# Patient Record
Sex: FEMALE | Race: BLACK OR AFRICAN AMERICAN | Employment: OTHER | URBAN - METROPOLITAN AREA
[De-identification: names, ages, dates, MRNs, and addresses within clinical notes are randomized per-mention and may not be internally consistent; named-entity substitution may affect disease eponyms.]

---

## 2017-04-18 ENCOUNTER — HOSPITAL ENCOUNTER (INPATIENT)
Age: 82
LOS: 6 days | Discharge: HOME | DRG: 871 | End: 2017-04-24
Attending: EMERGENCY MEDICINE | Admitting: INTERNAL MEDICINE
Payer: MEDICARE

## 2017-04-18 ENCOUNTER — APPOINTMENT (OUTPATIENT)
Dept: CT IMAGING | Age: 82
DRG: 871 | End: 2017-04-18
Attending: EMERGENCY MEDICINE
Payer: MEDICARE

## 2017-04-18 ENCOUNTER — APPOINTMENT (OUTPATIENT)
Dept: GENERAL RADIOLOGY | Age: 82
DRG: 871 | End: 2017-04-18
Attending: EMERGENCY MEDICINE
Payer: MEDICARE

## 2017-04-18 DIAGNOSIS — J18.9 PNEUMONIA OF LEFT LUNG DUE TO INFECTIOUS ORGANISM, UNSPECIFIED PART OF LUNG: Primary | ICD-10-CM

## 2017-04-18 PROBLEM — A41.9 SEPSIS, UNSPECIFIED: Status: ACTIVE | Noted: 2017-04-18

## 2017-04-18 PROBLEM — E86.0 DEHYDRATION: Status: ACTIVE | Noted: 2017-04-18

## 2017-04-18 LAB
ALBUMIN SERPL BCP-MCNC: 3.5 G/DL (ref 3.5–5)
ALBUMIN/GLOB SERPL: 0.7 {RATIO} (ref 1.1–2.2)
ALP SERPL-CCNC: 101 U/L (ref 45–117)
ALT SERPL-CCNC: 18 U/L (ref 12–78)
ANION GAP BLD CALC-SCNC: 8 MMOL/L (ref 5–15)
APPEARANCE UR: ABNORMAL
AST SERPL W P-5'-P-CCNC: 20 U/L (ref 15–37)
ATRIAL RATE: 85 BPM
BACTERIA URNS QL MICRO: ABNORMAL /HPF
BASOPHILS # BLD AUTO: 0 K/UL (ref 0–0.1)
BASOPHILS # BLD: 0 % (ref 0–1)
BILIRUB SERPL-MCNC: 0.6 MG/DL (ref 0.2–1)
BILIRUB UR QL: NEGATIVE
BNP SERPL-MCNC: 3944 PG/ML (ref 0–450)
BUN SERPL-MCNC: 19 MG/DL (ref 6–20)
BUN/CREAT SERPL: 13 (ref 12–20)
CALCIUM SERPL-MCNC: 9.7 MG/DL (ref 8.5–10.1)
CALCULATED R AXIS, ECG10: 23 DEGREES
CALCULATED T AXIS, ECG11: 73 DEGREES
CHLORIDE SERPL-SCNC: 107 MMOL/L (ref 97–108)
CK MB CFR SERPL CALC: 1 % (ref 0–2.5)
CK MB SERPL-MCNC: 1.3 NG/ML (ref 5–25)
CK SERPL-CCNC: 125 U/L (ref 26–192)
CO2 SERPL-SCNC: 29 MMOL/L (ref 21–32)
COLOR UR: ABNORMAL
CREAT SERPL-MCNC: 1.43 MG/DL (ref 0.55–1.02)
DIAGNOSIS, 93000: NORMAL
DIFFERENTIAL METHOD BLD: ABNORMAL
EOSINOPHIL # BLD: 0 K/UL (ref 0–0.4)
EOSINOPHIL NFR BLD: 0 % (ref 0–7)
EPITH CASTS URNS QL MICRO: ABNORMAL /LPF
ERYTHROCYTE [DISTWIDTH] IN BLOOD BY AUTOMATED COUNT: 17.2 % (ref 11.5–14.5)
GLOBULIN SER CALC-MCNC: 4.7 G/DL (ref 2–4)
GLUCOSE SERPL-MCNC: 98 MG/DL (ref 65–100)
GLUCOSE UR STRIP.AUTO-MCNC: NEGATIVE MG/DL
HCT VFR BLD AUTO: 38.9 % (ref 35–47)
HGB BLD-MCNC: 12.6 G/DL (ref 11.5–16)
HGB UR QL STRIP: ABNORMAL
HYALINE CASTS URNS QL MICRO: ABNORMAL /LPF (ref 0–5)
INR PPP: 2.2 (ref 0.9–1.1)
KETONES UR QL STRIP.AUTO: NEGATIVE MG/DL
LACTATE SERPL-SCNC: 1.7 MMOL/L (ref 0.4–2)
LEUKOCYTE ESTERASE UR QL STRIP.AUTO: ABNORMAL
LYMPHOCYTES # BLD AUTO: 7 % (ref 12–49)
LYMPHOCYTES # BLD: 0.5 K/UL (ref 0.8–3.5)
MAGNESIUM SERPL-MCNC: 2.2 MG/DL (ref 1.6–2.4)
MCH RBC QN AUTO: 27.2 PG (ref 26–34)
MCHC RBC AUTO-ENTMCNC: 32.4 G/DL (ref 30–36.5)
MCV RBC AUTO: 83.8 FL (ref 80–99)
MONOCYTES # BLD: 0.7 K/UL (ref 0–1)
MONOCYTES NFR BLD AUTO: 10 % (ref 5–13)
NEUTS SEG # BLD: 5.8 K/UL (ref 1.8–8)
NEUTS SEG NFR BLD AUTO: 83 % (ref 32–75)
NITRITE UR QL STRIP.AUTO: NEGATIVE
PH UR STRIP: 6 [PH] (ref 5–8)
PLATELET # BLD AUTO: 198 K/UL (ref 150–400)
POTASSIUM SERPL-SCNC: 4.2 MMOL/L (ref 3.5–5.1)
PROT SERPL-MCNC: 8.2 G/DL (ref 6.4–8.2)
PROT UR STRIP-MCNC: 100 MG/DL
PROTHROMBIN TIME: 23.2 SEC (ref 9–11.1)
Q-T INTERVAL, ECG07: 356 MS
QRS DURATION, ECG06: 82 MS
QTC CALCULATION (BEZET), ECG08: 435 MS
RBC # BLD AUTO: 4.64 M/UL (ref 3.8–5.2)
RBC #/AREA URNS HPF: ABNORMAL /HPF (ref 0–5)
RBC MORPH BLD: ABNORMAL
SODIUM SERPL-SCNC: 144 MMOL/L (ref 136–145)
SP GR UR REFRACTOMETRY: 1.02 (ref 1–1.03)
TROPONIN I SERPL-MCNC: <0.04 NG/ML
UROBILINOGEN UR QL STRIP.AUTO: 1 EU/DL (ref 0.2–1)
VENTRICULAR RATE, ECG03: 90 BPM
WBC # BLD AUTO: 7 K/UL (ref 3.6–11)
WBC URNS QL MICRO: ABNORMAL /HPF (ref 0–4)

## 2017-04-18 PROCEDURE — 81001 URINALYSIS AUTO W/SCOPE: CPT | Performed by: EMERGENCY MEDICINE

## 2017-04-18 PROCEDURE — 96365 THER/PROPH/DIAG IV INF INIT: CPT

## 2017-04-18 PROCEDURE — 93005 ELECTROCARDIOGRAM TRACING: CPT

## 2017-04-18 PROCEDURE — 36415 COLL VENOUS BLD VENIPUNCTURE: CPT | Performed by: EMERGENCY MEDICINE

## 2017-04-18 PROCEDURE — 85025 COMPLETE CBC W/AUTO DIFF WBC: CPT | Performed by: EMERGENCY MEDICINE

## 2017-04-18 PROCEDURE — 74011000258 HC RX REV CODE- 258: Performed by: EMERGENCY MEDICINE

## 2017-04-18 PROCEDURE — 83605 ASSAY OF LACTIC ACID: CPT | Performed by: EMERGENCY MEDICINE

## 2017-04-18 PROCEDURE — 87040 BLOOD CULTURE FOR BACTERIA: CPT | Performed by: EMERGENCY MEDICINE

## 2017-04-18 PROCEDURE — 71250 CT THORAX DX C-: CPT

## 2017-04-18 PROCEDURE — 74011250637 HC RX REV CODE- 250/637: Performed by: INTERNAL MEDICINE

## 2017-04-18 PROCEDURE — 80053 COMPREHEN METABOLIC PANEL: CPT | Performed by: EMERGENCY MEDICINE

## 2017-04-18 PROCEDURE — 82550 ASSAY OF CK (CPK): CPT | Performed by: EMERGENCY MEDICINE

## 2017-04-18 PROCEDURE — 71010 XR CHEST PORT: CPT

## 2017-04-18 PROCEDURE — 77030029684 HC NEB SM VOL KT MONA -A

## 2017-04-18 PROCEDURE — 83735 ASSAY OF MAGNESIUM: CPT | Performed by: EMERGENCY MEDICINE

## 2017-04-18 PROCEDURE — 74011000250 HC RX REV CODE- 250: Performed by: INTERNAL MEDICINE

## 2017-04-18 PROCEDURE — 83880 ASSAY OF NATRIURETIC PEPTIDE: CPT | Performed by: EMERGENCY MEDICINE

## 2017-04-18 PROCEDURE — 74011250636 HC RX REV CODE- 250/636: Performed by: EMERGENCY MEDICINE

## 2017-04-18 PROCEDURE — 84484 ASSAY OF TROPONIN QUANT: CPT | Performed by: EMERGENCY MEDICINE

## 2017-04-18 PROCEDURE — 65660000000 HC RM CCU STEPDOWN

## 2017-04-18 PROCEDURE — 94640 AIRWAY INHALATION TREATMENT: CPT

## 2017-04-18 PROCEDURE — 85610 PROTHROMBIN TIME: CPT | Performed by: EMERGENCY MEDICINE

## 2017-04-18 PROCEDURE — 74011250636 HC RX REV CODE- 250/636: Performed by: INTERNAL MEDICINE

## 2017-04-18 PROCEDURE — 74011000250 HC RX REV CODE- 250: Performed by: EMERGENCY MEDICINE

## 2017-04-18 PROCEDURE — 99285 EMERGENCY DEPT VISIT HI MDM: CPT

## 2017-04-18 RX ORDER — AMLODIPINE BESYLATE 5 MG/1
2.5 TABLET ORAL DAILY
Status: DISCONTINUED | OUTPATIENT
Start: 2017-04-19 | End: 2017-04-24 | Stop reason: HOSPADM

## 2017-04-18 RX ORDER — ACETAMINOPHEN 325 MG/1
650 TABLET ORAL
Status: DISCONTINUED | OUTPATIENT
Start: 2017-04-18 | End: 2017-04-24 | Stop reason: HOSPADM

## 2017-04-18 RX ORDER — WARFARIN 2 MG/1
4 TABLET ORAL
Status: DISCONTINUED | OUTPATIENT
Start: 2017-04-18 | End: 2017-04-18

## 2017-04-18 RX ORDER — WARFARIN 2 MG/1
4 TABLET ORAL ONCE
Status: COMPLETED | OUTPATIENT
Start: 2017-04-18 | End: 2017-04-18

## 2017-04-18 RX ORDER — GUAIFENESIN 600 MG/1
1200 TABLET, EXTENDED RELEASE ORAL EVERY 12 HOURS
Status: DISCONTINUED | OUTPATIENT
Start: 2017-04-18 | End: 2017-04-20

## 2017-04-18 RX ORDER — SODIUM CHLORIDE 9 MG/ML
75 INJECTION, SOLUTION INTRAVENOUS CONTINUOUS
Status: DISCONTINUED | OUTPATIENT
Start: 2017-04-18 | End: 2017-04-19

## 2017-04-18 RX ORDER — GUAIFENESIN/DEXTROMETHORPHAN 100-10MG/5
10 SYRUP ORAL
Status: DISCONTINUED | OUTPATIENT
Start: 2017-04-18 | End: 2017-04-24 | Stop reason: HOSPADM

## 2017-04-18 RX ORDER — SODIUM CHLORIDE 0.9 % (FLUSH) 0.9 %
5-10 SYRINGE (ML) INJECTION EVERY 8 HOURS
Status: DISCONTINUED | OUTPATIENT
Start: 2017-04-18 | End: 2017-04-24 | Stop reason: HOSPADM

## 2017-04-18 RX ORDER — SODIUM CHLORIDE 0.9 % (FLUSH) 0.9 %
5-10 SYRINGE (ML) INJECTION AS NEEDED
Status: DISCONTINUED | OUTPATIENT
Start: 2017-04-18 | End: 2017-04-24 | Stop reason: HOSPADM

## 2017-04-18 RX ORDER — ALLOPURINOL 100 MG/1
200 TABLET ORAL 2 TIMES DAILY
Status: DISCONTINUED | OUTPATIENT
Start: 2017-04-18 | End: 2017-04-19

## 2017-04-18 RX ORDER — METOPROLOL SUCCINATE 50 MG/1
50 TABLET, EXTENDED RELEASE ORAL DAILY
Status: DISCONTINUED | OUTPATIENT
Start: 2017-04-19 | End: 2017-04-24 | Stop reason: HOSPADM

## 2017-04-18 RX ORDER — GUAIFENESIN 100 MG/5ML
200 SOLUTION ORAL
COMMUNITY
End: 2017-04-24

## 2017-04-18 RX ORDER — IPRATROPIUM BROMIDE AND ALBUTEROL SULFATE 2.5; .5 MG/3ML; MG/3ML
3 SOLUTION RESPIRATORY (INHALATION)
Status: DISCONTINUED | OUTPATIENT
Start: 2017-04-18 | End: 2017-04-20

## 2017-04-18 RX ORDER — IPRATROPIUM BROMIDE AND ALBUTEROL SULFATE 2.5; .5 MG/3ML; MG/3ML
3 SOLUTION RESPIRATORY (INHALATION)
Status: COMPLETED | OUTPATIENT
Start: 2017-04-18 | End: 2017-04-18

## 2017-04-18 RX ADMIN — GUAIFENESIN AND DEXTROMETHORPHAN 10 ML: 100; 10 SYRUP ORAL at 20:09

## 2017-04-18 RX ADMIN — ALLOPURINOL 200 MG: 100 TABLET ORAL at 18:08

## 2017-04-18 RX ADMIN — IPRATROPIUM BROMIDE AND ALBUTEROL SULFATE 3 ML: .5; 3 SOLUTION RESPIRATORY (INHALATION) at 13:33

## 2017-04-18 RX ADMIN — Medication 10 ML: at 18:07

## 2017-04-18 RX ADMIN — GUAIFENESIN 1200 MG: 600 TABLET, EXTENDED RELEASE ORAL at 20:56

## 2017-04-18 RX ADMIN — WARFARIN SODIUM 4 MG: 2 TABLET ORAL at 18:07

## 2017-04-18 RX ADMIN — CEFTRIAXONE SODIUM 2 G: 2 INJECTION, POWDER, FOR SOLUTION INTRAMUSCULAR; INTRAVENOUS at 14:47

## 2017-04-18 RX ADMIN — IPRATROPIUM BROMIDE AND ALBUTEROL SULFATE 3 ML: .5; 3 SOLUTION RESPIRATORY (INHALATION) at 20:30

## 2017-04-18 RX ADMIN — SODIUM CHLORIDE 75 ML/HR: 900 INJECTION, SOLUTION INTRAVENOUS at 18:03

## 2017-04-18 NOTE — ED NOTES
Pt states she has had a productive cough  With white sputum for 3 days, with low grade temp accompanied by shortness of breath and weakness. Pt has hx of Lung CA and HTN.c/o cough has progressively gotten worse tightness in chest and sore throat. Currently in A-fib.   MD Morris at bedside  to assess Pt

## 2017-04-18 NOTE — PROGRESS NOTES
MD Wilfredo Agrawal at bedside to discuss case with Pt and family members. Pt remains with dyspnea exertion. SPO2 92-97% on room air. Pt states some relief from previous nebs. Denies pain.

## 2017-04-18 NOTE — PROGRESS NOTES
TRANSFER - OUT REPORT:    Verbal report given to Laverne(name) on Jeannie Iraheta  being transferred to [facility](unit) for routine progression of care       Report consisted of patients Situation, Background, Assessment and   Recommendations(SBAR). Information from the following report(s) SBAR, Kardex, Intake/Output, MAR and Recent Results was reviewed with the receiving nurse. Lines:   Peripheral IV 04/18/17 Left Antecubital (Active)   Site Assessment Clean, dry, & intact 4/18/2017  5:44 PM   Phlebitis Assessment 0 4/18/2017  5:44 PM   Infiltration Assessment 0 4/18/2017  5:44 PM   Dressing Status Clean, dry, & intact 4/18/2017  5:44 PM   Dressing Type Tape;Transparent 4/18/2017  5:44 PM   Hub Color/Line Status Pink 4/18/2017  5:44 PM        Opportunity for questions and clarification was provided.       Patient transported with:  transport

## 2017-04-18 NOTE — ED PROVIDER NOTES
HPI Comments: Pelon Bridges is a 80 y.o. female with hx of A-fib who presents ambulatory to ED [facility] ED with CC of cough producing clear sputum, wheezing, and mild SOB x ~3 days. Pt reports her symptoms are exacerbated by exertion, but denies orthopnea. Per daughter, pt has hx of lung cancer and PNA but denies recent history of admission to hospital. Per daughter, pt is currently taking warfarin and lasix; she denies pt has had recent changes to her medication regimen. Pt reports baseline leg swelling, which she states is unchanged. She denies hx of COPD, CHF, or asthma. Pt's daughter reports pt had her flu shot this year. She states pt has an inhaler that she uses as needed. Pt denies fever, chills, sore throat, CP, ABD pain, and N/V/D. She denies alcohol and tobacco use. PCP: Goldie Lucio    There are no other complaints, changes, or physical findings at this time. The history is provided by the patient and a relative. Past Medical History:   Diagnosis Date    Cancer [facility])     right breast CA, lung, colon    Gastrointestinal disorder     GERD    Hypertension     Other ill-defined conditions     gout       Past Surgical History:   Procedure Laterality Date    BREAST SURGERY PROCEDURE UNLISTED  1995    Right mastectomy    HX APPENDECTOMY      HX HEENT      right glass eye    [place] COLONOSCOPY W/BIOPSY SINGLE/MULTIPLE  3/29/2013         [place] COLSC FLX W/RMVL OF TUMOR POLYP LESION SNARE TQ  3/29/2013              Family History:   Problem Relation Age of Onset    Colon Cancer Other     Hypertension Other        Social History     Social History    Marital status:      Spouse name: N/A    Number of children: N/A    Years of education: N/A     Occupational History    Not on file.      Social History Main Topics    Smoking status: Former Smoker     Quit date: 1967    Smokeless tobacco: Never Used    Alcohol use No    Drug use: No    Sexual activity: Not Currently     Other Topics Concern  Not on file     Social History Narrative         ALLERGIES: Penicillins    Review of Systems   Constitutional: Negative for chills, fatigue and fever. HENT: Negative for congestion, rhinorrhea and sore throat. Eyes: Negative for pain, discharge and visual disturbance. Respiratory: Positive for cough, shortness of breath and wheezing. Negative for chest tightness. -orthopnea   Cardiovascular: Negative for chest pain, palpitations and leg swelling (increased from baseline). Gastrointestinal: Negative for abdominal pain, constipation, diarrhea, nausea and vomiting. Genitourinary: Negative for dysuria, frequency and hematuria. Musculoskeletal: Negative for arthralgias, back pain and myalgias. Skin: Negative for rash. Neurological: Negative for dizziness, weakness, light-headedness and headaches. Psychiatric/Behavioral: Negative. Patient Vitals for the past 12 hrs:   Temp Pulse Resp BP SpO2   04/18/17 1336 - 91 21 (!) 155/113 92 %   04/18/17 1304 - - - - 92 %   04/18/17 1242 99.3 °F (37.4 °C) 86 19 (!) 161/101 94 %   04/18/17 1225 99.1 °F (37.3 °C) 95 24 151/85 93 %            Physical Exam   Constitutional: She is oriented to person, place, and time. She appears well-developed and well-nourished. No distress. HENT:   Head: Normocephalic and atraumatic. Eyes: EOM are normal. Right eye exhibits no discharge. 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 No scleral icterus. Neck: Normal range of motion. Neck supple. No tracheal deviation present. Cardiovascular: Normal rate, normal heart sounds and intact distal pulses. An irregular rhythm present. Exam reveals no gallop and no friction rub. No murmur heard. Pulmonary/Chest: Effort normal. No respiratory distress. She has wheezes (scattered). Crackles in inferior lung bases   Abdominal: Soft. She exhibits no distension. There is no tenderness. Musculoskeletal: Normal range of motion.    2+ pitting edema B/L lower extremities Lymphadenopathy:     She has no cervical adenopathy. Neurological: She is alert and oriented to person, place, and time. No focal neuro deficits   Skin: Skin is warm and dry. No rash noted. Psychiatric: She has a normal mood and affect. Nursing note and vitals reviewed. MDM  Number of Diagnoses or Management Options  Pneumonia of left lung due to infectious organism, unspecified part of lung:   Diagnosis management comments:     Differential includes pneumonia, heart failure, pulmonary edema, pleural effusion, mass, ACS, PE  - CBC, CMP, Rogerio, BNP,coags  - CXR    Disposition: CXR indicates perihilar pneumonia with concern for possible underlying mass. No recent hospitalizations. Will treat with ceftriaxone and azithromycin. Will defer 30 cc/kg bolus since patient does not meet severe sepsis criteria (elevated creatinine is chronic, INR of 2.2 is due to coumadin) and has a history of heart failure. Amount and/or Complexity of Data Reviewed  Clinical lab tests: reviewed and ordered  Tests in the radiology section of CPT®: ordered and reviewed  Tests in the medicine section of CPT®: ordered and reviewed  Obtain history from someone other than the patient: yes (Family)  Review and summarize past medical records: yes  Discuss the patient with other providers: yes (Hospitalist)  Independent visualization of images, tracings, or specimens: yes      ED Course       Procedures    EKG interpretation: 12:37  Rhythm: atrial fib; and irregular. Rate (approx.): 90; Axis: normal; QRS interval: normal ; ST/T wave: normal.  Written by Demarcus Lyon ED Scribe, as dictated by Jose Walker MD.     CONSULT NOTE:   2:26 PM  Jose Walker MD spoke with Jim Wilder MD,  Specialty: Hospitalist  Discussed pt's hx, disposition, and available diagnostic and imaging results. Reviewed care plans. Consultant will evaluate pt for admission.     LABORATORY TESTS:  Recent Results (from the past 12 hour(s)) EKG, 12 LEAD, INITIAL    Collection Time: 04/18/17 12:37 PM   Result Value Ref Range    Ventricular Rate 90 BPM    Atrial Rate 85 BPM    QRS Duration 82 ms    Q-T Interval 356 ms    QTC Calculation (Bezet) 435 ms    Calculated R Axis 23 degrees    Calculated T Axis 73 degrees    Diagnosis       Atrial fibrillation  When compared with ECG of 17-AUG-2016 11:44,  No significant change was found     CK W/ CKMB & INDEX    Collection Time: 04/18/17 12:51 PM   Result Value Ref Range     26 - 192 U/L    CK - MB 1.3 <3.6 NG/ML    CK-MB Index 1.0 0 - 2.5     MAGNESIUM    Collection Time: 04/18/17 12:51 PM   Result Value Ref Range    Magnesium 2.2 1.6 - 2.4 mg/dL   PRO-BNP    Collection Time: 04/18/17 12:51 PM   Result Value Ref Range    NT pro-BNP 3944 (H) 0 - 450 PG/ML   PROTHROMBIN TIME + INR    Collection Time: 04/18/17 12:51 PM   Result Value Ref Range    INR 2.2 (H) 0.9 - 1.1      Prothrombin time 23.2 (H) 9.0 - 11.1 sec   TROPONIN I    Collection Time: 04/18/17 12:51 PM   Result Value Ref Range    Troponin-I, Qt. <0.04 <0.05 ng/mL   CBC WITH AUTOMATED DIFF    Collection Time: 04/18/17 12:57 PM   Result Value Ref Range    WBC 7.0 3.6 - 11.0 K/uL    RBC 4.64 3.80 - 5.20 M/uL    HGB 12.6 11.5 - 16.0 g/dL    HCT 38.9 35.0 - 47.0 %    MCV 83.8 80.0 - 99.0 FL    MCH 27.2 26.0 - 34.0 PG    MCHC 32.4 30.0 - 36.5 g/dL    RDW 17.2 (H) 11.5 - 14.5 %    PLATELET 658 939 - 235 K/uL    NEUTROPHILS 83 (H) 32 - 75 %    LYMPHOCYTES 7 (L) 12 - 49 %    MONOCYTES 10 5 - 13 %    EOSINOPHILS 0 0 - 7 %    BASOPHILS 0 0 - 1 %    ABS. NEUTROPHILS 5.8 1.8 - 8.0 K/UL    ABS. LYMPHOCYTES 0.5 (L) 0.8 - 3.5 K/UL    ABS. MONOCYTES 0.7 0.0 - 1.0 K/UL    ABS. EOSINOPHILS 0.0 0.0 - 0.4 K/UL    ABS.  BASOPHILS 0.0 0.0 - 0.1 K/UL    RBC COMMENTS NORMOCYTIC, NORMOCHROMIC      DF SMEAR SCANNED     METABOLIC PANEL, COMPREHENSIVE    Collection Time: 04/18/17 12:57 PM   Result Value Ref Range    Sodium 144 136 - 145 mmol/L    Potassium 4.2 3.5 - 5.1 mmol/L    Chloride 107 97 - 108 mmol/L    CO2 29 21 - 32 mmol/L    Anion gap 8 5 - 15 mmol/L    Glucose 98 65 - 100 mg/dL    BUN 19 6 - 20 MG/DL    Creatinine 1.43 (H) 0.55 - 1.02 MG/DL    BUN/Creatinine ratio 13 12 - 20      GFR est AA 41 (L) >60 ml/min/1.73m2    GFR est non-AA 34 (L) >60 ml/min/1.73m2    Calcium 9.7 8.5 - 10.1 MG/DL    Bilirubin, total 0.6 0.2 - 1.0 MG/DL    ALT (SGPT) 18 12 - 78 U/L    AST (SGOT) 20 15 - 37 U/L    Alk. phosphatase 101 45 - 117 U/L    Protein, total 8.2 6.4 - 8.2 g/dL    Albumin 3.5 3.5 - 5.0 g/dL    Globulin 4.7 (H) 2.0 - 4.0 g/dL    A-G Ratio 0.7 (L) 1.1 - 2.2     LACTIC ACID, PLASMA    Collection Time: 04/18/17 12:57 PM   Result Value Ref Range    Lactic acid 1.7 0.4 - 2.0 MMOL/L       IMAGING RESULTS:  CXR Results  (Last 48 hours)               04/18/17 1332  XR CHEST PORT Final result    Impression:  IMPRESSION:        Increased linear density at the left lung treatment site. Superimposed pneumonia   versus recurrent cancer. No evidence for atrial fibrillation. Narrative:  EXAM:  XR CHEST PORT       INDICATION:  dyspnea, productive cough. Symptoms increasing over the past 5   days. History of lung cancer. COMPARISON:  8/17/2016       FINDINGS: A portable AP radiograph of the chest was obtained at 1323 hours. The   patient is on a cardiac monitor. There is increased somewhat linear   consolidation in the left superhilar lung. The left hemidiaphragm remains   elevated. The cardiomediastinal silhouette is unchanged. MEDICATIONS GIVEN:  Medications   albuterol-ipratropium (DUO-NEB) 2.5 MG-0.5 MG/3 ML (3 mL Nebulization Given 4/18/17 1333)       IMPRESSION:  1. Pneumonia of left lung due to infectious organism, unspecified part of lung        PLAN:  1.  Admit to Hospitalist    ADMIT NOTE:  2:26 PM  The patient is being admitted to the hospital by Monica Erickson MD.  The results of their tests and reasons for their admission have been discussed with the patient and/or available family. They convey agreement and understanding for the need to be admitted and for their admission diagnosis. This note is prepared by Arty Brittle, acting as Scribe for Arianna Amezquita MD.    Arianna Amezquita MD: The scribe's documentation has been prepared under my direction and personally reviewed by me in its entirety. I confirm that the note above accurately reflects all work, treatment, procedures, and medical decision making performed by me.

## 2017-04-18 NOTE — IP AVS SNAPSHOT
[address] [facility] 
[phone] Patient: Nithya Zamora MRN: [id number] :1918 You are allergic to the following Allergen Reactions Penicillins Hives Recent Documentation Height Weight Breastfeeding? BMI OB Status Smoking Status 1.575 m 98.2 kg No 39.6 kg/m2 Postmenopausal Former Smoker Emergency Contacts Name Discharge Info Relation Home Work Mobile Mara Ahumada  Child [2] [phone] Breanna Hooper  Daughter [21] [phone] About your hospitalization You were admitted on:  2017 You last received care in the:  [facility] 2 CARDIOPULMONARY CARE You were discharged on:  2017 Unit phone number:  [phone] Why you were hospitalized Your primary diagnosis was:  Not on File Your diagnoses also included:  Pneumonia, Sepsis (Hcc), A-Fib (Hcc), Squamous Cell Lung Cancer (Hcc), Dehydration Providers Seen During Your Hospitalizations Provider Role Specialty Primary office phone Edna Schreiber MD Attending Provider Emergency Medicine [phone] Ana Vanessa MD Attending Provider Internal Medicine [phone] Your Primary Care Physician (PCP) Primary Care Physician Office Phone Office Fax Ziggy Chase [phone] Follow-up Information Follow up With Details Comments Contact Info Madison Prince On 2017 Your appointment is at 10:10am.  Have PCP schedule follow up chest CT in 6-8 weeks to ensure pneumonia resolution. [address] [facility] [address] [phone] [facility]  This is your oxygen provider. Please call them with any questions. [address] [address] [facility] [address] 
[phone] Current Discharge Medication List  
  
START taking these medications Dose & Instructions Dispensing Information Comments Morning Noon Evening Bedtime  
 albuterol-ipratropium 2.5 mg-0.5 mg/3 ml Nebu Commonly known as:  Jai Wray Your last dose was: Your next dose is:    
   
   
 Dose:  3 mL  
3 mL by Nebulization route every four (4) hours as needed. Quantity:  30 Nebule Refills:  1  
     
   
   
   
  
 guaiFENesin-dextromethorphan 100-10 mg/5 mL syrup Commonly known as:  ROBITUSSIN DM Your last dose was: Your next dose is:    
   
   
 Dose:  10 mL Take 10 mL by mouth every six (6) hours as needed for up to 10 days. Quantity:  1 Bottle Refills:  0  
     
   
   
   
  
 levoFLOXacin 500 mg tablet Commonly known as:  Cabrera Swanson Your last dose was: Your next dose is:    
   
   
 Dose:  500 mg Take 1 Tab by mouth daily for 3 days. Quantity:  3 Tab Refills:  0 Nebulizers Misc Your last dose was: Your next dose is:    
   
   
 Dose:  1 Inhalation 1 Inhalation by Does Not Apply route every four (4) hours as needed. Quantity:  1 Each Refills:  0  
     
   
   
   
  
 predniSONE 10 mg dose pack Commonly known as:  STERAPRED DS Your last dose was: Your next dose is:    
   
   
 See administration instruction per 10mg dose pack Quantity:  21 Tab Refills:  0 CONTINUE these medications which have CHANGED Dose & Instructions Dispensing Information Comments Morning Noon Evening Bedtime  
 furosemide 20 mg tablet Commonly known as:  LASIX What changed:  Another medication with the same name was removed. Continue taking this medication, and follow the directions you see here. Your last dose was: Your next dose is:    
   
   
 40mg daily PO Quantity:  30 tablet Refills:  0 CONTINUE these medications which have NOT CHANGED Dose & Instructions Dispensing Information Comments Morning Noon Evening Bedtime albuterol 90 mcg/actuation inhaler Commonly known as:  PROVENTIL HFA, VENTOLIN HFA, PROAIR HFA Your last dose was: Your next dose is:    
   
   
 Dose:  2 Puff Take 2 puffs by inhalation every four (4) hours as needed for Wheezing. Refills:  0  
     
   
   
   
  
 allopurinol 100 mg tablet Commonly known as:  Adeline Poisson Your last dose was: Your next dose is:    
   
   
 Dose:  200 mg Take 200 mg by mouth two (2) times a day. Refills:  0  
     
   
   
   
  
 COLCRYS 0.6 mg tablet Generic drug:  colchicine Your last dose was: Your next dose is:    
   
   
 Dose:  0.6 mg Take 0.6 mg by mouth every other day. Refills:  0 KLOR-CON 20 mEq packet Generic drug:  potassium chloride Your last dose was: Your next dose is:    
   
   
 Dose:  20 mEq Take 20 mEq by mouth daily. Refills:  0  
     
   
   
   
  
 metoprolol succinate 50 mg XL tablet Commonly known as:  TOPROL-XL Your last dose was: Your next dose is:    
   
   
 Dose:  50 mg Take 1 Tab by mouth daily. Quantity:  30 Tab Refills:  0 NORVASC 2.5 mg tablet Generic drug:  amLODIPine Your last dose was: Your next dose is:    
   
   
 Dose:  2.5 mg Take 2.5 mg by mouth daily. Refills:  0  
     
   
   
   
  
 TYLENOL EXTRA STRENGTH 500 mg tablet Generic drug:  acetaminophen Your last dose was: Your next dose is:    
   
   
 Dose:  1000 mg Take 1,000 mg by mouth every six (6) hours as needed for Pain. Refills:  0  
     
   
   
   
  
 * warfarin 6 mg tablet Commonly known as:  COUMADIN Your last dose was: Your next dose is:    
   
   
 Dose:  6 mg Take 1 tablet by mouth every Monday and Friday. Quantity:  1 tablet Refills:  0  
     
   
   
   
  
 * warfarin 4 mg tablet Commonly known as:  COUMADIN Your last dose was: Your next dose is:    
   
   
 Dose:  4 mg Take 1 tablet by mouth every Tuesday, Thursday, Saturday & Sunday. Quantity:  30 tablet Refills:  0  
     
   
   
   
  
 * Notice: This list has 2 medication(s) that are the same as other medications prescribed for you. Read the directions carefully, and ask your doctor or other care provider to review them with you. STOP taking these medications   
 guaiFENesin 100 mg/5 mL liquid Commonly known as:  ROBITUSSIN  
   
  
 lisinopril 2.5 mg tablet Commonly known as:  Kenzie Gil Where to Get Your Medications Information on where to get these meds will be given to you by the nurse or doctor. ! Ask your nurse or doctor about these medications  
  albuterol-ipratropium 2.5 mg-0.5 mg/3 ml Nebu  
 guaiFENesin-dextromethorphan 100-10 mg/5 mL syrup  
 levoFLOXacin 500 mg tablet Nebulizers Misc  
 predniSONE 10 mg dose pack Discharge Instructions None Discharge Instructions Attachments/References WARFARIN: LONG-TERM USE (ENGLISH) Discharge Orders None [facility] Announce[facility] We are excited to announce that we are making your provider's discharge notes available to you in Keepstream. You will see these notes when they are completed and signed by the physician that discharged you from your recent hospital stay. If you have any questions or concerns about any information you see in Keepstream, please call the Health Information Department where you were seen or reach out to your Primary Care Provider for more information about your plan of care. Introducing [facility]! Nasreen Burgos introduces Keepstream patient portal. Now you can access parts of your medical record, email your doctor's office, and request medication refills online. 1. In your internet browser, go to [email or web address] 2. Click on the First Time User? Click Here link in the Sign In box.  You will see the New Member Sign Up page. 3. Enter your [employer] Access Code exactly as it appears below. You will not need to use this code after youve completed the sign-up process. If you do not sign up before the expiration date, you must request a new code. · [employer] Access Code: [email or web address] Expires: 7/17/2017 12:32 PM 
 
4. Enter the last four digits of your Social Security Number (xxxx) and Date of Birth (mm/dd/yyyy) as indicated and click Submit. You will be taken to the next sign-up page. 5. Create a [employer]a ID. This will be your [employer] login ID and cannot be changed, so think of one that is secure and easy to remember. 6. Create a [employer] password. You can change your password at any time. 7. Enter your Password Reset Question and Answer. This can be used at a later time if you forget your password. 8. Enter your e-mail address. You will receive e-mail notification when new information is available in [address] 9. Click Sign Up. You can now view and download portions of your medical record. 10. Click the Download Summary menu link to download a portable copy of your medical information. If you have questions, please visit the Frequently Asked Questions section of the [employer] website. Remember, iSirona is NOT to be used for urgent needs. For medical emergencies, dial 911. Now available from your iPhone and Android! General Information Please provide this summary of care documentation to your next provider. Patient Signature:  ____________________________________________________________ Date:  ____________________________________________________________  
  
Arna Star Provider Signature:  ____________________________________________________________ Date:  ____________________________________________________________ More Information Taking Warfarin Safely: Care Instructions Your Care Instructions Warfarin is a medicine that you take to prevent blood clots. It is often called a blood thinner. Doctors give warfarin (such as Coumadin) to reduce the risk of blood clots. You may be at risk for blood clots if you have atrial fibrillation or deep vein thrombosis. Some other health problems may also put you at risk. Warfarin slows the amount of time it takes for your blood to clot. It can cause bleeding problems. Even if you've been taking warfarin for a while, it's important to know how to take it safely. Foods and other medicines can affect the way warfarin works. Some can make warfarin work too well. This can cause bleeding problems. And some can make it work poorly, so that it does not prevent blood clots very well. You will need regular blood tests to check how long it takes for your blood to form a clot. This test is called a PT or prothrombin time test. The result of the test is called an INR level. Depending on the test results, your doctor or anticoagulation clinic may adjust your dose of warfarin. Follow-up care is a key part of your treatment and safety. Be sure to make and go to all appointments, and call your doctor if you are having problems. It's also a good idea to know your test results and keep a list of the medicines you take. How can you care for yourself at home? Take warfarin safely · Take your warfarin at the same time each day. · If you miss a dose of warfarin, don't take an extra dose to make up for it. Your doctor can tell you exactly what to do so you don't take too much or too little. · Wear medical alert jewelry that lets others know that you take warfarin. You can buy this at most [employer]bon [employer] Systems. · Don't take warfarin if you are pregnant or planning to get pregnant. Talk to your doctor about how you can prevent getting pregnant while you are taking it. · Don't change your dose or stop taking warfarin unless your doctor tells you to. Effects of medicines and food on warfarin · Don't start or stop taking any medicines, vitamins, or natural remedies unless you first talk to your doctor. Many medicines can affect how warfarin works. These include aspirin and other pain relievers, over-the-counter medicines, multivitamins, dietary supplements, and herbal products. · Tell all of your doctors and pharmacists that you take warfarin. Some prescription medicines can affect how warfarin works. · Keep the amount of vitamin K in your diet about the same from day to day. Do not suddenly eat a lot more or a lot less food that is rich in vitamin K than you usually do. Vitamin K affects how warfarin works and how your blood clots. Talk with your doctor before making big changes in your diet. Foods that have a lot of vitamin K include cooked green vegetables, such as: ¨ Kale, spinach, turnip greens, jayshree greens, Swiss chard, and mustard greens. ¨ [place] sprouts, broccoli, and asparagus. · Limit your use of alcohol. Avoid bleeding by preventing falls and injuries · Wear slippers or shoes with nonskid soles. · Remove throw rugs and clutter. · Rearrange furniture and electrical cords to keep them out of walking paths. · Keep stairways, porches, and outside walkways well lit. Use night-lights in hallways and bathrooms. · Be extra careful when you work with sharp tools or knives. When should you call for help? Call 911 anytime you think you may need emergency care. For example, call if: 
· You have a sudden, severe headache that is different from past headaches. Call your doctor now or seek immediate medical care if: 
· You have any abnormal bleeding, such as: 
¨ Nosebleeds. ¨ Vaginal bleeding that is different (heavier, more frequent, at a different time of the month) than what you are used to. ¨ Bloody or black stools, or rectal bleeding. ¨ Bloody or pink urine. Watch closely for changes in your health, and be sure to contact your doctor if you have any problems. Where can you learn more? Go to http://natty-mirza.info/. Enter P091 in the search box to learn more about \"Taking Warfarin Safely: Care Instructions. \" Current as of: November 15, 2016 Content Version: 11.2 © 6411-0500 [employer], [employer]. Care instructions adapted under license by [employer] (which disclaims liability or warranty for this information). If you have questions about a medical condition or this instruction, always ask your healthcare professional. [address] [address] any warranty or liability for your use of this information.

## 2017-04-18 NOTE — PROGRESS NOTES
Pharmacy Initial Dosing of Warfarin    Pharmacy consulted to dose warfarin for this  80 y.o. female ordered warfarin for AF    Goal INR (2-3, 2.5-3.5, 3-4): 2-3    Concurrent anticoagulants & Platelet Inhibitors:   Home Warfarin Dose: 6 mg Monday and Friday, 4 mg Tuesday, Thursday, Saturday, Sunday  Major Interacting Medications (Dose/Frequency): None    INR (0.9-1.1) > 5 discuss with MD:   Recent Labs      04/18/17   1257  04/18/17   1251   INR   --   2.2*   HGB  12.6   --    PLT  198   --    ALB  3.5   --    SGOT  20   --    ALT  18   --    TBILI  0.6   --      Warfarin Sensitivity & Sensitivity Factors Present: Normal    Impression/Plan: Warfarin 4 mg tonight then daily dose by INR. Pharmacy will follow daily and adjust the dose as appropriate.     Thanks for the consult    Husam Moya, [facility]

## 2017-04-18 NOTE — H&P
Hospitalist Admission Note    NAME: Kendall Parham   :  1918   MRN:  [id number]     Date/Time:  2017 4:38 PM    Patient PCP: Bonita Wang  ________________________________________________________________________    My assessment of this patient's clinical condition and my plan of care is as follows. Assessment / Plan:  Sepsis POA (tachypnea, tachycardia + L Lung ASD on CXR)  Due to Left perihilar pneumonia POA -? Recurrent mass on CT  H/o remote R Breast Ca, Lung Ca & Colon CA  CXR noted  CT chest noted for ? L Perihilar mass(recurrent) versus Pneumonia + Mediastinal LNs  UA neg    Admit to telemetry bed- pt not hypoxic but has H/o Afib  IV Abx- rocephin + Azithromycin for now as started in ER  Add Duoneb Q 6 RT to improve clearance of mucus  Mucinex, Robitussin DM prn cough  Gentle Hydration for ow, holding Lasix- will need to resume it in 24 hrs after stopping IVF  Will repeat imaging with CT in 3-4 weeks to see if L perihilar ASD resolved with Abx treatment - if not then may need recurrence of Lung Ca workup- spoke with pt & family in ER room 16 - they voice understanding    Chronic CKD stage 3 - Cr at baseline ~ 1.4/1.5  Dehydration POA  AFib with rate control POA  HTN    Holding Lisinopril & Lasix as above, IVF for now  Cont Coumadin, Toprol, norvasc    GOUT  Cont allopurinol  Colchicine prn        Code Status: Full as per pt's wishes in ER  Surrogate Decision Maker: mPOA daughter Portia Damon # [phone]    DVT Prophylaxis: on Coumadin, INR therapeutic  GI Prophylaxis: not indicated    Baseline: pt lives with family at home - is functional at her age. Subjective:   CHIEF COMPLAINT: Cough with Sputum x 3 days    HISTORY OF PRESENT ILLNESS:     Abdulaziz Gomez is a 80 y.o.  female who presents with above complains from home ambulatory. Pt complains of worsening Cough with productive sputum x 3 days.   No h/o associated fever, chills, myalgia or upper respiratory failure, worsening on lying down  Denies any aggravating  Or relieving factors for the cough. H/o Lung cancer remote- pt & family claims she is cured & in remission  H/o Colon & R breast Ca too in past  No h/o sick contact  Claims pt is up to date with Flu & pneumonia shots as per pcp    Pt was found to have CXR & CT chest showing L perihilar ASD/lesion with mediastinal LNs with evidence of mild dehydration on blood work in ER      We were asked to admit for work up and evaluation of the above problems. Past Medical History:   Diagnosis Date    Cancer [facility])     right breast CA, lung, colon    Gastrointestinal disorder     GERD    Hypertension     Other ill-defined conditions     gout        Past Surgical History:   Procedure Laterality Date    BREAST SURGERY PROCEDURE UNLISTED  1995    Right mastectomy    HX APPENDECTOMY      HX HEENT      right glass eye    [place] COLONOSCOPY W/BIOPSY SINGLE/MULTIPLE  3/29/2013         WI COLSC FLX W/RMVL OF TUMOR POLYP LESION SNARE TQ  3/29/2013            Social History   Substance Use Topics    Smoking status: Former Smoker     Quit date: 1967    Smokeless tobacco: Never Used    Alcohol use No        Family History   Problem Relation Age of Onset    Colon Cancer Other     Hypertension Other      Allergies   Allergen Reactions    Penicillins Hives        Prior to Admission medications    Medication Sig Start Date End Date Taking? Authorizing Provider   guaiFENesin (ROBITUSSIN) 100 mg/5 mL liquid Take 200 mg by mouth three (3) times daily as needed for Cough. Yes Marky Dejesus MD   warfarin (COUMADIN) 6 mg tablet Take 1 tablet by mouth every Monday and Friday. 1/23/15  Yes Annalisa Rose MD   warfarin (COUMADIN) 4 mg tablet Take 1 tablet by mouth every Tuesday, Thursday, Saturday & Sunday.  1/24/15  Yes Annalisa Rose MD   furosemide (LASIX) 20 mg tablet 40mg daily PO 1/23/15  Yes Annalisa Rose MD   albuterol (PROVENTIL HFA, VENTOLIN HFA, PROAIR HFA) 90 mcg/actuation inhaler Take 2 puffs by inhalation every four (4) hours as needed for Wheezing. Yes Marky Dejesus MD   potassium chloride (KLOR-CON) 20 mEq packet Take 20 mEq by mouth daily. Yes Marky Dejesus MD   metoprolol-XL (TOPROL-XL) 50 mg XL tablet Take 1 Tab by mouth daily. 4/5/13  Yes Gab Joseph MD   allopurinol (ZYLOPRIM) 100 mg tablet Take 200 mg by mouth two (2) times a day. Yes Marky Dejesus MD   amLODIPine (NORVASC) 2.5 mg tablet Take 2.5 mg by mouth daily. Yes Marky Dejesus MD   furosemide (LASIX) 20 mg tablet Take 1 Tab by mouth daily. 8/18/16   Willow Hodges MD   lisinopril (PRINIVIL, ZESTRIL) 2.5 mg tablet Take 2 tablets by mouth daily. 1/23/15   Geo Garcia MD   colchicine (COLCRYS) 0.6 mg tablet Take 0.6 mg by mouth every other day. Marky Dejesus MD   acetaminophen (TYLENOL EXTRA STRENGTH) 500 mg tablet Take 1,000 mg by mouth every six (6) hours as needed for Pain.     Marky Dejesus MD       REVIEW OF SYSTEMS:           Total of 12 systems reviewed as follows:       POSITIVE= underlined text  Negative = text not underlined  General:  fever, chills, sweats, generalized weakness, weight loss/gain,      loss of appetite   Eyes:    blurred vision, eye pain, loss of vision, double vision  ENT:    rhinorrhea, pharyngitis   Respiratory:   cough, sputum production, SOB, WOOTEN, wheezing, pleuritic pain   Cardiology:   chest pain, palpitations, orthopnea, PND, edema, syncope   Gastrointestinal:  abdominal pain , N/V, diarrhea, dysphagia, constipation, bleeding   Genitourinary:  frequency, urgency, dysuria, hematuria, incontinence   Muskuloskeletal :  arthralgia, myalgia, back pain  Hematology:  easy bruising, nose or gum bleeding, lymphadenopathy   Dermatological: rash, ulceration, pruritis, color change / jaundice  Endocrine:   hot flashes or polydipsia   Neurological:  headache, dizziness, confusion, focal weakness, paresthesia,     Speech difficulties, memory loss, gait difficulty  Psychological: Feelings of anxiety, depression, agitation    Objective:   VITALS:    Visit Vitals    /88    Pulse 99    Temp 99.3 °F (37.4 °C)    Resp 25    Ht 5' 2\" (1.575 m)    Wt 91.6 kg (202 lb)    SpO2 92%    BMI 36.95 kg/m2       PHYSICAL EXAM:    General:    Alert, cooperative, no distress, appears stated age. HEENT: Atraumatic, anicteric sclerae, pink conjunctivae     No oral ulcers, mucosa moist, throat clear, dentition fair  Neck:  Supple, symmetrical,  thyroid: non tender  Lungs:   Mild wheezing on the L lung field noted on auscultation . No rales. Chest wall:  No tenderness  No Accessory muscle use. Heart:   irregular  rhythm,  No  murmur   No edema  Abdomen:   Soft, non-tender. Not distended. Bowel sounds normal  Extremities: No cyanosis. No clubbing    Skin:     Not pale. Not Jaundiced  No rashes   Psych:  Good insight. Not depressed. Not anxious or agitated. Neurologic: EOMs intact. No facial asymmetry. No aphasia or slurred speech. Symmetrical strength, Sensation grossly intact.  Alert and oriented X 4.     _______________________________________________________________________  Care Plan discussed with:    Comments   Patient x    Family  x At bedside in ER 16   RN     Care Manager                    Consultant:      _______________________________________________________________________  Expected  Disposition:   Home with Family x   HH/PT/OT/RN ?   SNF/LTC    LASHONDA    ________________________________________________________________________  TOTAL TIME:  58 Minutes    Critical Care Provided     Minutes non procedure based      Comments    x Reviewed previous records   >50% of visit spent in counseling and coordination of care  Discussion with patient and/or family and questions answered       ________________________________________________________________________  Signed: Shelby Gonzalez MD    Procedures: see electronic medical records for all procedures/Xrays and details which were not copied into this note but were reviewed prior to creation of Plan. LAB DATA REVIEWED:    Recent Results (from the past 24 hour(s))   EKG, 12 LEAD, INITIAL    Collection Time: 04/18/17 12:37 PM   Result Value Ref Range    Ventricular Rate 90 BPM    Atrial Rate 85 BPM    QRS Duration 82 ms    Q-T Interval 356 ms    QTC Calculation (Bezet) 435 ms    Calculated R Axis 23 degrees    Calculated T Axis 73 degrees    Diagnosis       Atrial fibrillation  Nonspecific ST abnormality  When compared with ECG of 17-AUG-2016 11:44,  No significant change was found  Confirmed by Amari Mora ([id number]) on 4/18/2017 4:25:45 PM     CK W/ CKMB & INDEX    Collection Time: 04/18/17 12:51 PM   Result Value Ref Range     26 - 192 U/L    CK - MB 1.3 <3.6 NG/ML    CK-MB Index 1.0 0 - 2.5     MAGNESIUM    Collection Time: 04/18/17 12:51 PM   Result Value Ref Range    Magnesium 2.2 1.6 - 2.4 mg/dL   PRO-BNP    Collection Time: 04/18/17 12:51 PM   Result Value Ref Range    NT pro-BNP 3944 (H) 0 - 450 PG/ML   PROTHROMBIN TIME + INR    Collection Time: 04/18/17 12:51 PM   Result Value Ref Range    INR 2.2 (H) 0.9 - 1.1      Prothrombin time 23.2 (H) 9.0 - 11.1 sec   TROPONIN I    Collection Time: 04/18/17 12:51 PM   Result Value Ref Range    Troponin-I, Qt. <0.04 <0.05 ng/mL   CBC WITH AUTOMATED DIFF    Collection Time: 04/18/17 12:57 PM   Result Value Ref Range    WBC 7.0 3.6 - 11.0 K/uL    RBC 4.64 3.80 - 5.20 M/uL    HGB 12.6 11.5 - 16.0 g/dL    HCT 38.9 35.0 - 47.0 %    MCV 83.8 80.0 - 99.0 FL    MCH 27.2 26.0 - 34.0 PG    MCHC 32.4 30.0 - 36.5 g/dL    RDW 17.2 (H) 11.5 - 14.5 %    PLATELET 017 082 - 481 K/uL    NEUTROPHILS 83 (H) 32 - 75 %    LYMPHOCYTES 7 (L) 12 - 49 %    MONOCYTES 10 5 - 13 %    EOSINOPHILS 0 0 - 7 %    BASOPHILS 0 0 - 1 %    ABS. NEUTROPHILS 5.8 1.8 - 8.0 K/UL    ABS. LYMPHOCYTES 0.5 (L) 0.8 - 3.5 K/UL    ABS. MONOCYTES 0.7 0.0 - 1.0 K/UL    ABS. EOSINOPHILS 0.0 0.0 - 0.4 K/UL    ABS. BASOPHILS 0.0 0.0 - 0.1 K/UL    RBC COMMENTS NORMOCYTIC, NORMOCHROMIC      DF SMEAR SCANNED     METABOLIC PANEL, COMPREHENSIVE    Collection Time: 04/18/17 12:57 PM   Result Value Ref Range    Sodium 144 136 - 145 mmol/L    Potassium 4.2 3.5 - 5.1 mmol/L    Chloride 107 97 - 108 mmol/L    CO2 29 21 - 32 mmol/L    Anion gap 8 5 - 15 mmol/L    Glucose 98 65 - 100 mg/dL    BUN 19 6 - 20 MG/DL    Creatinine 1.43 (H) 0.55 - 1.02 MG/DL    BUN/Creatinine ratio 13 12 - 20      GFR est AA 41 (L) >60 ml/min/1.73m2    GFR est non-AA 34 (L) >60 ml/min/1.73m2    Calcium 9.7 8.5 - 10.1 MG/DL    Bilirubin, total 0.6 0.2 - 1.0 MG/DL    ALT (SGPT) 18 12 - 78 U/L    AST (SGOT) 20 15 - 37 U/L    Alk.  phosphatase 101 45 - 117 U/L    Protein, total 8.2 6.4 - 8.2 g/dL    Albumin 3.5 3.5 - 5.0 g/dL    Globulin 4.7 (H) 2.0 - 4.0 g/dL    A-G Ratio 0.7 (L) 1.1 - 2.2     LACTIC ACID, PLASMA    Collection Time: 04/18/17 12:57 PM   Result Value Ref Range    Lactic acid 1.7 0.4 - 2.0 MMOL/L   URINALYSIS W/ RFLX MICROSCOPIC    Collection Time: 04/18/17  2:22 PM   Result Value Ref Range    Color YELLOW/STRAW      Appearance CLOUDY (A) CLEAR      Specific gravity 1.023 1.003 - 1.030      pH (UA) 6.0 5.0 - 8.0      Protein 100 (A) NEG mg/dL    Glucose NEGATIVE  NEG mg/dL    Ketone NEGATIVE  NEG mg/dL    Bilirubin NEGATIVE  NEG      Blood TRACE (A) NEG      Urobilinogen 1.0 0.2 - 1.0 EU/dL    Nitrites NEGATIVE  NEG      Leukocyte Esterase SMALL (A) NEG      WBC 5-10 0 - 4 /hpf    RBC 20-50 0 - 5 /hpf    Epithelial cells MODERATE (A) FEW /lpf    Bacteria 1+ (A) NEG /hpf    Hyaline cast 2-5 0 - 5 /lpf

## 2017-04-18 NOTE — PROGRESS NOTES
Patient arrived to the unit from the ED via stretcher. Orders placed at [phone] patient is to be placed on cardiac monitoring. Lyndsey 33 supervisor who stated that the patient will be placed on PCU when a bed is available. Patient is A&O times three. Up with assistance to the bathroom. Family at bedside. Will monitor patient. .     Primary Nurse Odalys Mcallister RN and Reéne Capellan RN performed a dual skin assessment on this patient No impairment noted  Michael score is 19

## 2017-04-18 NOTE — PROGRESS NOTES
TRANSFER - IN REPORT:    Verbal report received from Braeden(name) on Sherryle Real  being received from ED(unit) for routine progression of care      Report consisted of patients Situation, Background, Assessment and   Recommendations(SBAR). Information from the following report(s) SBAR, Kardex, ED Summary, Intake/Output, MAR and Recent Results was reviewed with the receiving nurse. Opportunity for questions and clarification was provided. Assessment completed upon patients arrival to unit and care assumed.

## 2017-04-18 NOTE — ROUTINE PROCESS
TRANSFER - OUT REPORT:    Verbal report given to Katharine RN(name) on Sherryle Real  being transferred to Oncology(unit) for routine progression of care       Report consisted of patients Situation, Background, Assessment and   Recommendations(SBAR). Information from the following report(s) SBAR and ED Summary was reviewed with the receiving nurse. Lines:       Opportunity for questions and clarification was provided. Patient transported with:   Transport.

## 2017-04-19 ENCOUNTER — APPOINTMENT (OUTPATIENT)
Dept: GENERAL RADIOLOGY | Age: 82
DRG: 871 | End: 2017-04-19
Attending: INTERNAL MEDICINE
Payer: MEDICARE

## 2017-04-19 LAB
ANION GAP BLD CALC-SCNC: 11 MMOL/L (ref 5–15)
BUN SERPL-MCNC: 21 MG/DL (ref 6–20)
BUN/CREAT SERPL: 15 (ref 12–20)
CALCIUM SERPL-MCNC: 9 MG/DL (ref 8.5–10.1)
CHLORIDE SERPL-SCNC: 108 MMOL/L (ref 97–108)
CO2 SERPL-SCNC: 25 MMOL/L (ref 21–32)
CREAT SERPL-MCNC: 1.39 MG/DL (ref 0.55–1.02)
ERYTHROCYTE [DISTWIDTH] IN BLOOD BY AUTOMATED COUNT: 17 % (ref 11.5–14.5)
GLUCOSE SERPL-MCNC: 97 MG/DL (ref 65–100)
HCT VFR BLD AUTO: 34.6 % (ref 35–47)
HGB BLD-MCNC: 11.4 G/DL (ref 11.5–16)
INR PPP: 2.7 (ref 0.9–1.1)
MCH RBC QN AUTO: 27.8 PG (ref 26–34)
MCHC RBC AUTO-ENTMCNC: 32.9 G/DL (ref 30–36.5)
MCV RBC AUTO: 84.4 FL (ref 80–99)
PLATELET # BLD AUTO: 176 K/UL (ref 150–400)
POTASSIUM SERPL-SCNC: 4.2 MMOL/L (ref 3.5–5.1)
PROTHROMBIN TIME: 28.1 SEC (ref 9–11.1)
RBC # BLD AUTO: 4.1 M/UL (ref 3.8–5.2)
SODIUM SERPL-SCNC: 144 MMOL/L (ref 136–145)
WBC # BLD AUTO: 8 K/UL (ref 3.6–11)

## 2017-04-19 PROCEDURE — 80048 BASIC METABOLIC PNL TOTAL CA: CPT | Performed by: INTERNAL MEDICINE

## 2017-04-19 PROCEDURE — 74011000250 HC RX REV CODE- 250: Performed by: INTERNAL MEDICINE

## 2017-04-19 PROCEDURE — 36415 COLL VENOUS BLD VENIPUNCTURE: CPT | Performed by: INTERNAL MEDICINE

## 2017-04-19 PROCEDURE — 74011000258 HC RX REV CODE- 258: Performed by: INTERNAL MEDICINE

## 2017-04-19 PROCEDURE — 94640 AIRWAY INHALATION TREATMENT: CPT

## 2017-04-19 PROCEDURE — 74011250636 HC RX REV CODE- 250/636: Performed by: INTERNAL MEDICINE

## 2017-04-19 PROCEDURE — 85027 COMPLETE CBC AUTOMATED: CPT | Performed by: HOSPITALIST

## 2017-04-19 PROCEDURE — 71010 XR CHEST PORT: CPT

## 2017-04-19 PROCEDURE — 65660000000 HC RM CCU STEPDOWN

## 2017-04-19 PROCEDURE — 74011250637 HC RX REV CODE- 250/637: Performed by: INTERNAL MEDICINE

## 2017-04-19 PROCEDURE — 85610 PROTHROMBIN TIME: CPT | Performed by: INTERNAL MEDICINE

## 2017-04-19 RX ORDER — IPRATROPIUM BROMIDE AND ALBUTEROL SULFATE 2.5; .5 MG/3ML; MG/3ML
3 SOLUTION RESPIRATORY (INHALATION)
Status: COMPLETED | OUTPATIENT
Start: 2017-04-19 | End: 2017-04-19

## 2017-04-19 RX ORDER — WARFARIN 2 MG/1
2 TABLET ORAL ONCE
Status: COMPLETED | OUTPATIENT
Start: 2017-04-19 | End: 2017-04-19

## 2017-04-19 RX ORDER — ALLOPURINOL 100 MG/1
200 TABLET ORAL DAILY
Status: DISCONTINUED | OUTPATIENT
Start: 2017-04-20 | End: 2017-04-24 | Stop reason: HOSPADM

## 2017-04-19 RX ADMIN — METOPROLOL SUCCINATE 50 MG: 50 TABLET, EXTENDED RELEASE ORAL at 09:13

## 2017-04-19 RX ADMIN — GUAIFENESIN 1200 MG: 600 TABLET, EXTENDED RELEASE ORAL at 09:13

## 2017-04-19 RX ADMIN — GUAIFENESIN AND DEXTROMETHORPHAN 10 ML: 100; 10 SYRUP ORAL at 19:47

## 2017-04-19 RX ADMIN — WARFARIN SODIUM 2 MG: 2 TABLET ORAL at 17:33

## 2017-04-19 RX ADMIN — CEFTRIAXONE 1 G: 1 INJECTION, POWDER, FOR SOLUTION INTRAMUSCULAR; INTRAVENOUS at 15:28

## 2017-04-19 RX ADMIN — IPRATROPIUM BROMIDE AND ALBUTEROL SULFATE 3 ML: .5; 3 SOLUTION RESPIRATORY (INHALATION) at 22:27

## 2017-04-19 RX ADMIN — AZITHROMYCIN MONOHYDRATE 500 MG: 500 INJECTION, POWDER, LYOPHILIZED, FOR SOLUTION INTRAVENOUS at 09:15

## 2017-04-19 RX ADMIN — IPRATROPIUM BROMIDE AND ALBUTEROL SULFATE 3 ML: .5; 3 SOLUTION RESPIRATORY (INHALATION) at 20:41

## 2017-04-19 RX ADMIN — Medication 10 ML: at 22:05

## 2017-04-19 RX ADMIN — Medication 10 ML: at 15:29

## 2017-04-19 RX ADMIN — IPRATROPIUM BROMIDE AND ALBUTEROL SULFATE 3 ML: .5; 3 SOLUTION RESPIRATORY (INHALATION) at 01:07

## 2017-04-19 RX ADMIN — ALLOPURINOL 200 MG: 100 TABLET ORAL at 09:13

## 2017-04-19 RX ADMIN — GUAIFENESIN 1200 MG: 600 TABLET, EXTENDED RELEASE ORAL at 22:04

## 2017-04-19 RX ADMIN — IPRATROPIUM BROMIDE AND ALBUTEROL SULFATE 3 ML: .5; 3 SOLUTION RESPIRATORY (INHALATION) at 08:01

## 2017-04-19 RX ADMIN — AMLODIPINE BESYLATE 2.5 MG: 5 TABLET ORAL at 09:14

## 2017-04-19 NOTE — CARDIO/PULMONARY
Cardiopulmonary Rehab Nursing Entry:    Chart reviewed. Pt 81 yo admitting diagnosis sepsis, PNA, a-fib, with history of, PMHx of lung CA, former smoker. EF 50%. Hospitalist entry does not indicate CHF exacerbation this admission. CHF instruction not indicated at this time.

## 2017-04-19 NOTE — PROGRESS NOTES
0000: Primary Nurse Gomez Baldwin RN and Zenaida Camargo RN performed a dual skin assessment on this patient No impairment noted  Michael score is 19.

## 2017-04-19 NOTE — PROGRESS NOTES
Pharmacy Daily Dosing of Warfarin    Indication: afib  Goal INR: 2-3    Home Warfarin Dose: 6 mg Monday and Friday, 4 mg Tuesday, Thursday, Saturday, Sunday  Concurrent Anticoagulants/Antiplatelets none  Major Interacting Medications: azithromycin - increases INR, allopurinol (on this PTA)    INR (0.9-1.1) > 5 or Platelets (< 34Z): discuss with MD:  Recent Labs      04/19/17   0232  04/18/17   1257  04/18/17   1251   INR  2.7*   --   2.2*   HGB  11.4*  12.6   --    PLT  176  198   --        Impression/Plan: warfarin 2 mg tonight, decreasing dose due to drug interaction     Pharmacy will follow daily and adjust the dose as appropriate.     Thanks for the consult  Ashia Ferrell, ABBYD

## 2017-04-19 NOTE — PROGRESS NOTES
Hospitalist Progress Note    NAME: Annalisa Yoon   :  1918   MRN:  [id number]       Interim Hospital Summary: 80 y.o. female whom presented on 2017 with      Assessment / Plan:    Sepsis POA  tachypnea, tachycardia. CXR Increased linear density at the left lung treatment site. Superimposed pneumonia versus recurrent cancer. Due to Left perihilar pneumonia POA   Recurrent mass on CT, in setting of h/o  H/o remote R Breast Ca, Lung Ca & Colon CA    -cont IV Abx- rocephin + Azithromycin   -Duoneb Q 6 RT Mucinex, Robitussin DM prn cough    need repeat imaging with CT in 3-4 weeks to see if Left perihilar ASD resolved with Abx treatment - if not then may need recurrence of Lung Ca workup     Chronic CKD stage 3  Stable, Cr at baseline ~ 1.4/1.5    AFib with rate control POA  HTN     Cont Coumadin, Toprol, norvasc     GOUT  Cont allopurinol  Colchicine prn          Code Status: Full as per pt's wishes in ER  Surrogate Decision Maker: mPOA daughter Flakita Cox # [phone]     DVT Prophylaxis: on Coumadin, INR therapeutic  GI Prophylaxis: not indicated     Baseline: pt lives with family at home - is functional at her age. Subjective:     Chief Complaint / Reason for Physician Visit  \" i am feeling better\". Discussed with RN events overnight. Review of Systems:  Symptom Y/N Comments  Symptom Y/N Comments   Fever/Chills n   Chest Pain n    Poor Appetite n   Edema     Cough y   Abdominal Pain n    Sputum y   Joint Pain n    SOB/WOOTEN y   Pruritis/Rash n    Nausea/vomit n   Tolerating PT/OT     Diarrhea    Tolerating Diet y    Constipation    Other       Could NOT obtain due to:      Objective:     VITALS:   Last 24hrs VS reviewed since prior progress note.  Most recent are:  Patient Vitals for the past 24 hrs:   Temp Pulse Resp BP SpO2   17 1525 97.8 °F (36.6 °C) 91 18 144/82 93 %   17 1227 98.3 °F (36.8 °C) 89 18 133/82 95 %   17 0805 98 °F (36.7 °C) 87 18 146/76 94 % 04/19/17 0802 - - - - 93 %   04/19/17 0407 98.6 °F (37 °C) 85 20 128/68 96 %   04/19/17 0107 - - - - 94 %   04/18/17 2324 98.9 °F (37.2 °C) 88 20 122/63 96 %   04/18/17 2030 - - - - 95 %   04/18/17 1938 98.8 °F (37.1 °C) 95 22 137/67 96 %   04/18/17 1727 98.2 °F (36.8 °C) 95 22 91/49 95 %   04/18/17 1604 - 99 25 - 92 %       Intake/Output Summary (Last 24 hours) at 04/19/17 1551  Last data filed at 04/19/17 1526   Gross per 24 hour   Intake              200 ml   Output                0 ml   Net              200 ml        PHYSICAL EXAM:  General: WD, WN. Alert, cooperative, no acute distress    EENT:  EOMI. Anicteric sclerae. MMM  Resp:  CTA bilaterally, no wheezing or rales. No accessory muscle use  CV:  Regular  rhythm,  No edema  GI:  Soft, Non distended, Non tender.  +Bowel sounds  Neurologic:  Alert and oriented X 3, normal speech,   Psych:   Good insight. Not anxious nor agitated  Skin:  No rashes. No jaundice    Reviewed most current lab test results and cultures  YES  Reviewed most current radiology test results   YES  Review and summation of old records today    NO  Reviewed patient's current orders and MAR    YES  PMH/ reviewed - no change compared to H&P  ________________________________________________________________________  Care Plan discussed with:    Comments   Patient x    Family  x daughter   RN     Care Manager     Consultant                        Multidiciplinary team rounds were held today with , nursing, pharmacist and clinical coordinator. Patient's plan of care was discussed; medications were reviewed and discharge planning was addressed.      ________________________________________________________________________  Total NON critical care TIME:  35   Minutes    Total CRITICAL CARE TIME Spent:   Minutes non procedure based      Comments   >50% of visit spent in counseling and coordination of care ________________________________________________________________________  Idalmis Hawk MD     Procedures: see electronic medical records for all procedures/Xrays and details which were not copied into this note but were reviewed prior to creation of Plan. LABS:  I reviewed today's most current labs and imaging studies.   Pertinent labs include:  Recent Labs      04/19/17   0232  04/18/17   1257   WBC  8.0  7.0   HGB  11.4*  12.6   HCT  34.6*  38.9   PLT  176  198     Recent Labs      04/19/17   0232  04/18/17   1257  04/18/17   1251   NA  144  144   --    K  4.2  4.2   --    CL  108  107   --    CO2  25  29   --    GLU  97  98   --    BUN  21*  19   --    CREA  1.39*  1.43*   --    CA  9.0  9.7   --    MG   --    --   2.2   ALB   --   3.5   --    TBILI   --   0.6   --    SGOT   --   20   --    ALT   --   18   --    INR  2.7*   --   2.2*       Signed: Idalmis Hawk MD

## 2017-04-19 NOTE — CDMP QUERY
Patient is noted to have a BMI of 36.9kg. Please clarify if this patient is:     =>Obese   =>Overweight  =>Other explanation of clinical findings  =>Unable to determine (no explanation for clinical findings)    Presentation: 5'2\"-202#= BMI 36.9kg    REFERENCE:  The [address] [place] [address] has issued a statement indicating that, \"Individuals who are overweight, obese, or morbidly obese are at an increased risk for certain medical conditions when compared to persons of normal weight. Therefore, these conditions are always clinically significant and reportable when documented by the provider. Please clarify and document your clinical opinion in the progress notes and discharge summary including the definitive and/or presumptive diagnosis, (suspected or probable), related to the above clinical findings. Please include clinical findings supporting your diagnosis. Thank you!   Herberth Arrieta, [facility]. [address]

## 2017-04-19 NOTE — PROGRESS NOTES
Pharmacy Automatic Renal Dosing    Medication: allopurinol   Current regimen:  200 mg twice daily  Recent Labs      04/19/17   0232  04/18/17   1257   CREA  1.39*  1.43*   BUN  21*  19     Creatinine Clearance (ml/min): 17      Impression/Plan: allopurinol dose decreased to 200 mg daily for CrCl < [facility], [facility]

## 2017-04-20 LAB
INR PPP: 2.4 (ref 0.9–1.1)
PROTHROMBIN TIME: 24.8 SEC (ref 9–11.1)

## 2017-04-20 PROCEDURE — 36415 COLL VENOUS BLD VENIPUNCTURE: CPT | Performed by: INTERNAL MEDICINE

## 2017-04-20 PROCEDURE — 65660000000 HC RM CCU STEPDOWN

## 2017-04-20 PROCEDURE — 85610 PROTHROMBIN TIME: CPT | Performed by: INTERNAL MEDICINE

## 2017-04-20 PROCEDURE — 74011250636 HC RX REV CODE- 250/636: Performed by: HOSPITALIST

## 2017-04-20 PROCEDURE — 77010033678 HC OXYGEN DAILY

## 2017-04-20 PROCEDURE — 74011000250 HC RX REV CODE- 250: Performed by: INTERNAL MEDICINE

## 2017-04-20 PROCEDURE — 94640 AIRWAY INHALATION TREATMENT: CPT

## 2017-04-20 PROCEDURE — 74011000250 HC RX REV CODE- 250: Performed by: HOSPITALIST

## 2017-04-20 PROCEDURE — 74011250636 HC RX REV CODE- 250/636: Performed by: INTERNAL MEDICINE

## 2017-04-20 PROCEDURE — 74011250637 HC RX REV CODE- 250/637: Performed by: INTERNAL MEDICINE

## 2017-04-20 PROCEDURE — 74011250637 HC RX REV CODE- 250/637: Performed by: HOSPITALIST

## 2017-04-20 PROCEDURE — 74011000258 HC RX REV CODE- 258: Performed by: INTERNAL MEDICINE

## 2017-04-20 RX ORDER — IPRATROPIUM BROMIDE AND ALBUTEROL SULFATE 2.5; .5 MG/3ML; MG/3ML
3 SOLUTION RESPIRATORY (INHALATION)
Status: DISCONTINUED | OUTPATIENT
Start: 2017-04-20 | End: 2017-04-24 | Stop reason: HOSPADM

## 2017-04-20 RX ORDER — ACETYLCYSTEINE 200 MG/ML
200 SOLUTION ORAL; RESPIRATORY (INHALATION)
Status: DISCONTINUED | OUTPATIENT
Start: 2017-04-20 | End: 2017-04-24 | Stop reason: HOSPADM

## 2017-04-20 RX ORDER — WARFARIN 2 MG/1
4 TABLET ORAL ONCE
Status: COMPLETED | OUTPATIENT
Start: 2017-04-20 | End: 2017-04-20

## 2017-04-20 RX ADMIN — IPRATROPIUM BROMIDE AND ALBUTEROL SULFATE 3 ML: .5; 3 SOLUTION RESPIRATORY (INHALATION) at 21:04

## 2017-04-20 RX ADMIN — IPRATROPIUM BROMIDE AND ALBUTEROL SULFATE 3 ML: .5; 3 SOLUTION RESPIRATORY (INHALATION) at 11:49

## 2017-04-20 RX ADMIN — IPRATROPIUM BROMIDE AND ALBUTEROL SULFATE 3 ML: .5; 3 SOLUTION RESPIRATORY (INHALATION) at 08:27

## 2017-04-20 RX ADMIN — IPRATROPIUM BROMIDE AND ALBUTEROL SULFATE 3 ML: .5; 3 SOLUTION RESPIRATORY (INHALATION) at 16:54

## 2017-04-20 RX ADMIN — ACETYLCYSTEINE 200 MG: 200 SOLUTION ORAL; RESPIRATORY (INHALATION) at 10:00

## 2017-04-20 RX ADMIN — METOPROLOL SUCCINATE 50 MG: 50 TABLET, EXTENDED RELEASE ORAL at 09:03

## 2017-04-20 RX ADMIN — AZITHROMYCIN MONOHYDRATE 500 MG: 500 INJECTION, POWDER, LYOPHILIZED, FOR SOLUTION INTRAVENOUS at 09:03

## 2017-04-20 RX ADMIN — Medication 10 ML: at 14:28

## 2017-04-20 RX ADMIN — METHYLPREDNISOLONE SODIUM SUCCINATE 40 MG: 40 INJECTION, POWDER, FOR SOLUTION INTRAMUSCULAR; INTRAVENOUS at 20:40

## 2017-04-20 RX ADMIN — METHYLPREDNISOLONE SODIUM SUCCINATE 40 MG: 40 INJECTION, POWDER, FOR SOLUTION INTRAMUSCULAR; INTRAVENOUS at 10:31

## 2017-04-20 RX ADMIN — Medication 10 ML: at 20:39

## 2017-04-20 RX ADMIN — METHYLPREDNISOLONE SODIUM SUCCINATE 40 MG: 40 INJECTION, POWDER, FOR SOLUTION INTRAMUSCULAR; INTRAVENOUS at 14:27

## 2017-04-20 RX ADMIN — ALLOPURINOL 200 MG: 100 TABLET ORAL at 09:03

## 2017-04-20 RX ADMIN — AMLODIPINE BESYLATE 2.5 MG: 5 TABLET ORAL at 09:04

## 2017-04-20 RX ADMIN — ACETYLCYSTEINE 200 MG: 200 SOLUTION ORAL; RESPIRATORY (INHALATION) at 21:04

## 2017-04-20 RX ADMIN — CEFTRIAXONE 1 G: 1 INJECTION, POWDER, FOR SOLUTION INTRAMUSCULAR; INTRAVENOUS at 14:27

## 2017-04-20 RX ADMIN — IPRATROPIUM BROMIDE AND ALBUTEROL SULFATE 3 ML: .5; 3 SOLUTION RESPIRATORY (INHALATION) at 02:14

## 2017-04-20 RX ADMIN — WARFARIN SODIUM 4 MG: 2 TABLET ORAL at 17:59

## 2017-04-20 RX ADMIN — Medication 10 ML: at 04:48

## 2017-04-20 NOTE — PROGRESS NOTES
Nutrition Services      Nutrition Screen:  Wt Readings from Last 10 Encounters:   04/18/17 91.6 kg (202 lb)   12/31/16 91.6 kg (202 lb)   08/17/16 91.4 kg (201 lb 8 oz)   01/23/15 91.4 kg (201 lb 8 oz)   12/29/14 93.6 kg (206 lb 5.6 oz)   01/08/14 86.6 kg (191 lb)   12/13/13 91.5 kg (201 lb 12.8 oz)   03/27/13 88.4 kg (194 lb 14.2 oz)   03/13/13 89.8 kg (198 lb)     Body mass index is 36.95 kg/(m^2). Supplements: Ensure                        __x___ ordered ______  declined. __ __  Pt is nutritionally stable at this time, will rescreen in 7 days. _x __    Pt is at nutritional risk and will be rescreened in 2-5 days. __ __  Pt is at moderate or high nutritional risk, will refer to RD for assessment.        Megha Herrmann  Dietetic Technician, Registered

## 2017-04-20 NOTE — PROGRESS NOTES
CM assessment complete. Pt is alert and oriented, sitting up in the chair, her g'daughter is at bedside. Demographics verified with the pt. Pt's DME is a walker, wc, scooter, and wc ramp. Preferred pharmacy is [facility]. Pt lives with her daughter, she has aids come in M-F for about 2 hours a day. Pt is fairly independent with ADL's, the aids assist her with cleaning and going to her MD appt's. Her daughter will also take her to her MD appt's at times. Family is with her 24/7. Pt has used [facility] in the past.      Will leave FYI on chart for MD to order PT/OT consults. CM will continue to follow and assist with d/c needs as they arise. Care Management Interventions  PCP Verified by CM:  Yes  Last Visit to PCP: 04/06/17  Transition of Care Consult (CM Consult): Discharge Planning  Health Maintenance Reviewed: Yes  Physical Therapy Consult: No  Occupational Therapy Consult: No  Current Support Network: [place] (lives with daughter)  Confirm Follow Up Transport: Family  Plan discussed with Pt/Family/Caregiver: Yes  Freedom of Choice Offered: Yes  Discharge Location  Discharge Placement:  (tbd)    Tory Koch, RN    Ext [phone]

## 2017-04-20 NOTE — PROGRESS NOTES
[address] SHIFT NURSING NOTE    Bedside and Verbal shift change report given to Mili Lopez RN (oncoming nurse) by Anika Feliz RN (offgoing nurse). Report included the following information SBAR, Kardex, Intake/Output, MAR, Accordion, Recent Results and Cardiac Rhythm A fib. SHIFT SUMMARY: Moderate to severe WOOTEN. Patient now requiring 2L O2 via NC to keep saturations above 90%        Admission Date 4/18/2017   Admission Diagnosis Pneumonia   Consults None        Consults   [] PT   [] OT   [] Speech   [] Palliative      [] Hospice    [] Case Management   [x] None   Cardiac Monitoring   [x] Yes   [] No     Antibiotics   [x] Yes   [] No   GI Prophylaxis  (Ex: Protonix, Pepcid, etc,.)   [] Yes   [x] No          DVT Prophylaxis   SCDs:             Cricket stockings:         [x] Medication (Ex: Lovenox, Eliquis, Brilinta, Coumadin,  Heparin, etc..)   [] Contraindicated   [] No VTE needed       Urinary Catheter             LDAs               Peripheral IV 04/18/17 Left Antecubital (Active)   Site Assessment Clean, dry, & intact 4/19/2017  9:00 PM   Phlebitis Assessment 0 4/19/2017  9:00 PM   Infiltration Assessment 0 4/19/2017  9:00 PM   Dressing Status Clean, dry, & intact 4/19/2017  9:00 PM   Dressing Type Tape;Transparent 4/19/2017  9:00 PM   Hub Color/Line Status Pink;Flushed;Patent;Capped 4/19/2017 10:05 PM                      I/Os   Intake/Output Summary (Last 24 hours) at 04/20/17 0706  Last data filed at 04/20/17 0448   Gross per 24 hour   Intake          2498.75 ml   Output              400 ml   Net          2098.75 ml         Activity Level Activity Level: Up with Assistance     Activity Assistance: Partial (one person)   Diet Active Orders   Diet    DIET CARDIAC Regular; 2 GM NA (House Low NA)      Purposeful Rounding every 1-2 hour?    [x] Yes    Shane Score  Total Score: 2   Bed Alarm (If score 3 or >)   [] Yes    [] Refused (See signed refusal form in chart)   Michael Score  Michael Score: 19       Michael Score (if score 14 or less)   [] PMT consult   [] Nutrition consult   [] Wound Care consult      []  Specialty bed         Influenza Vaccine Received Flu Vaccine for Current Season (usually Sept-March): Not Flu Season               Needs prior to discharge:   Home O2 required:    [] Yes   [x] No     If yes, how much O2 required?     Other:    Last Bowel Movement Date: 04/19/17   Readmission Risk Assessment Tool Score High Risk            22       Total Score        3 Relationship with PCP    4 More than 1 Admission in calendar year    9 Patient Insurance is Medicare, Medicaid or Self Pay    6 Charlson Comorbidity Score        Criteria that do not apply:    Patient Living Status    Patient Length of Stay > 5       Expected Length of Stay 4d 21h   Actual Length of Stay 2

## 2017-04-20 NOTE — PROGRESS NOTES
Pharmacy Daily Dosing of Warfarin    Indication: afib  Goal INR: 2-3    Home Warfarin Dose: 6 mg Monday and Friday, 4 mg Tuesday, Thursday, Saturday, Sunday  Concurrent Anticoagulants/Antiplatelets none  Major Interacting Medications: azithromycin - increases INR, allopurinol (on this PTA)    INR (0.9-1.1) > 5 or Platelets (< 09Y): discuss with MD:  Recent Labs      04/20/17   0440  04/19/17   0232  04/18/17   1257  04/18/17   1251   INR  2.4*  2.7*   --   2.2*   HGB   --   11.4*  12.6   --    PLT   --   176  198   --        Impression/Plan: INR appears stable, monitor for DDI as above, will continue with PTA dose of 4 mg tonight     Pharmacy will follow daily and adjust the dose as appropriate.     Thanks for the consult  Milly Avitia PHARMD       [facility] Dosing and Monitoring Guidelines

## 2017-04-20 NOTE — PROGRESS NOTES
[address] SHIFT NURSING NOTE    Bedside shift change report given to Eben Stone (oncoming nurse) by Zahra Ruff (offgoing nurse). Report included the following information SBAR, Kardex and MAR. SHIFT SUMMARY:         Admission Date 4/18/2017   Admission Diagnosis Pneumonia   Consults None        Consults   [] PT   [] OT   [] Speech   [] Palliative      [] Hospice    [] Case Management   [x] None   Cardiac Monitoring   [x] Yes   [] No     Antibiotics   [x] Yes   [] No   GI Prophylaxis  (Ex: Protonix, Pepcid, etc,.)   [] Yes   [x] No          DVT Prophylaxis   SCDs:             Cricket stockings:         [x] Medication (Ex: Lovenox, Eliquis, Brilinta, Coumadin,  Heparin, etc..)   [] Contraindicated   [] No VTE needed       Urinary Catheter             LDAs               Peripheral IV 04/18/17 Left Antecubital (Active)   Site Assessment Drainage (comment);Dry; Intact 4/20/2017  7:45 AM   Phlebitis Assessment 0 4/20/2017  7:45 AM   Infiltration Assessment 0 4/20/2017  7:45 AM   Dressing Status Dry; Intact 4/20/2017  7:45 AM   Dressing Type Transparent;Tape 4/20/2017  7:45 AM   Hub Color/Line Status Pink;Capped 4/20/2017  7:45 AM                      I/Os   Intake/Output Summary (Last 24 hours) at 04/20/17 0816  Last data filed at 04/20/17 0448   Gross per 24 hour   Intake          2498.75 ml   Output              400 ml   Net          2098.75 ml         Activity Level Activity Level: Up with Assistance     Activity Assistance: Partial (one person)   Diet Active Orders   Diet    DIET CARDIAC Regular; 2 GM NA (House Low NA)      Purposeful Rounding every 1-2 hour?    [x] Yes    Shane Score  Total Score: 3   Bed Alarm (If score 3 or >)   [x] Yes    [] Refused (See signed refusal form in chart)   Michael Score  Michael Score: 19       Michael Score (if score 14 or less)   [] PMT consult   [] Nutrition consult   [] Wound Care consult      []  Specialty bed         Influenza Vaccine Received Flu Vaccine for Current Season (usually Sept-March): Not Flu Season               Needs prior to discharge:   Home O2 required:    [] Yes   [x] No     If yes, how much O2 required?     Other:    Last Bowel Movement Date: 04/19/17   Readmission Risk Assessment Tool Score High Risk            22       Total Score        3 Relationship with PCP    4 More than 1 Admission in calendar year    9 Patient Insurance is Medicare, Medicaid or Self Pay    6 Charlson Comorbidity Score        Criteria that do not apply:    Patient Living Status    Patient Length of Stay > 5       Expected Length of Stay 4d 21h   Actual Length of Stay 2

## 2017-04-20 NOTE — PROGRESS NOTES
Cardiopulmonary Care Interdisciplinary rounds were held today to discuss patient plan of care and outcomes. The following members were present: PT, NP/Physician, Pharmacy, Nursing, Nutritionist and Case Management.       Plan of Care: Continue current treatment plan  PT/OT consult

## 2017-04-20 NOTE — PROGRESS NOTES
Hospitalist Progress Note    NAME: Royal Brown   :  1918   MRN:  [id number]       Interim Hospital Summary: 80 y.o. female whom presented on 2017 with      Assessment / Plan:    Sepsis POA  tachypnea, tachycardia. CXR Increased linear density at the left lung treatment site. Superimposed pneumonia versus recurrent cancer. Due to Left perihilar pneumonia POA   Recurrent mass on CT, in setting of h/o  H/o remote R Breast Ca, Lung Ca & Colon CA  Wheezing and episode of SOB last night  -started on IV steroids, cont suppl oxygen for   -cont IV Abx- rocephin + Azithromycin   -Duoneb Q 4 RT Mucinex, Robitussin DM prn cough    need repeat imaging with CT in 3-4 weeks to see if Left perihilar ASD resolved with Abx treatment - if not then may need recurrence of Lung Ca workup     UA concerning for UTI: POA  Urine cx not sent, will not be of any benefit to do urine cx as pt is already on abx, ceftriaxone will cover for UTI. Chronic CKD stage 3  Stable, Cr at baseline ~ 1.4/1.5    AFib with rate control POA  HTN     Cont Coumadin, Toprol, norvasc     GOUT  Cont allopurinol  Colchicine prn      obesity  BMI of 36.9kg    Code Status: Full as per pt's wishes in ER  Surrogate Decision Maker: Ehsan daughter Mariely Grossman # [phone]     DVT Prophylaxis: on Coumadin, INR therapeutic  GI Prophylaxis: not indicated     Baseline: pt lives with family at home - is functional at her age. Subjective:     Chief Complaint / Reason for Physician Visit  \" had rough night\". Discussed with RN events overnight.      Review of Systems:  Symptom Y/N Comments  Symptom Y/N Comments   Fever/Chills n   Chest Pain n    Poor Appetite n   Edema     Cough y   Abdominal Pain n    Sputum y   Joint Pain n    SOB/WOOTEN y   Pruritis/Rash n    Nausea/vomit n   Tolerating PT/OT     Diarrhea    Tolerating Diet y    Constipation    Other       Could NOT obtain due to:      Objective:     VITALS:   Last 24hrs VS reviewed since prior progress note. Most recent are:  Patient Vitals for the past 24 hrs:   Temp Pulse Resp BP SpO2   04/20/17 1156 - - - - 98 %   04/20/17 1100 98.5 °F (36.9 °C) 92 26 133/74 96 %   04/20/17 0828 - - - - 97 %   04/20/17 0731 98.4 °F (36.9 °C) 85 24 158/89 99 %   04/20/17 0518 - 96 - 156/90 -   04/20/17 0430 98.5 °F (36.9 °C) 94 24 (!) 156/101 -   04/20/17 0214 - - - - 94 %   04/19/17 2301 98.1 °F (36.7 °C) 97 18 (!) 143/96 92 %   04/19/17 2227 - - - - 97 %   04/19/17 2200 - (!) 108 - (!) 159/102 97 %   04/19/17 2159 - (!) 110 - - (!) 88 %   04/19/17 2042 - - - - 90 %   04/19/17 2024 97.8 °F (36.6 °C) 99 18 (!) 151/102 90 %   04/19/17 1525 97.8 °F (36.6 °C) 91 18 144/82 93 %       Intake/Output Summary (Last 24 hours) at 04/20/17 1414  Last data filed at 04/20/17 0448   Gross per 24 hour   Intake          2498.75 ml   Output              200 ml   Net          2298.75 ml        PHYSICAL EXAM:  General: WD, WN. Alert, cooperative, no acute distress    EENT:  EOMI. Anicteric sclerae. MMM  Resp:  CTA bilaterally, no wheezing or rales. No accessory muscle use  CV:  Regular  rhythm,  No edema  GI:  Soft, Non distended, Non tender.  +Bowel sounds  Neurologic:  Alert and oriented X 3, normal speech,   Psych:   Good insight. Not anxious nor agitated  Skin:  No rashes. No jaundice    Reviewed most current lab test results and cultures  YES  Reviewed most current radiology test results   YES  Review and summation of old records today    NO  Reviewed patient's current orders and MAR    YES  PMH/SH reviewed - no change compared to H&P  ________________________________________________________________________  Care Plan discussed with:    Comments   Patient x    Family  x daughter   RN     Care Manager     Consultant                        Multidiciplinary team rounds were held today with , nursing, pharmacist and clinical coordinator.   Patient's plan of care was discussed; medications were reviewed and discharge planning was addressed. ________________________________________________________________________  Total NON critical care TIME:  35   Minutes    Total CRITICAL CARE TIME Spent:   Minutes non procedure based      Comments   >50% of visit spent in counseling and coordination of care     ________________________________________________________________________  Josi Ayala MD     Procedures: see electronic medical records for all procedures/Xrays and details which were not copied into this note but were reviewed prior to creation of Plan. LABS:  I reviewed today's most current labs and imaging studies.   Pertinent labs include:  Recent Labs      04/19/17   0232  04/18/17   1257   WBC  8.0  7.0   HGB  11.4*  12.6   HCT  34.6*  38.9   PLT  176  198     Recent Labs      04/20/17   0440  04/19/17   0232  04/18/17   1257  04/18/17   1251   NA   --   144  144   --    K   --   4.2  4.2   --    CL   --   108  107   --    CO2   --   25  29   --    GLU   --   97  98   --    BUN   --   21*  19   --    CREA   --   1.39*  1.43*   --    CA   --   9.0  9.7   --    MG   --    --    --   2.2   ALB   --    --   3.5   --    TBILI   --    --   0.6   --    SGOT   --    --   20   --    ALT   --    --   18   --    INR  2.4*  2.7*   --   2.2*       Signed: Josi Ayala MD

## 2017-04-21 LAB
ANION GAP BLD CALC-SCNC: 11 MMOL/L (ref 5–15)
BUN SERPL-MCNC: 31 MG/DL (ref 6–20)
BUN/CREAT SERPL: 22 (ref 12–20)
CALCIUM SERPL-MCNC: 9.5 MG/DL (ref 8.5–10.1)
CHLORIDE SERPL-SCNC: 108 MMOL/L (ref 97–108)
CO2 SERPL-SCNC: 24 MMOL/L (ref 21–32)
CREAT SERPL-MCNC: 1.43 MG/DL (ref 0.55–1.02)
ERYTHROCYTE [DISTWIDTH] IN BLOOD BY AUTOMATED COUNT: 17 % (ref 11.5–14.5)
GLUCOSE SERPL-MCNC: 136 MG/DL (ref 65–100)
HCT VFR BLD AUTO: 36.5 % (ref 35–47)
HGB BLD-MCNC: 11.7 G/DL (ref 11.5–16)
INR PPP: 2.4 (ref 0.9–1.1)
MCH RBC QN AUTO: 26.8 PG (ref 26–34)
MCHC RBC AUTO-ENTMCNC: 32.1 G/DL (ref 30–36.5)
MCV RBC AUTO: 83.7 FL (ref 80–99)
PLATELET # BLD AUTO: 186 K/UL (ref 150–400)
POTASSIUM SERPL-SCNC: 4.5 MMOL/L (ref 3.5–5.1)
PROTHROMBIN TIME: 24.9 SEC (ref 9–11.1)
RBC # BLD AUTO: 4.36 M/UL (ref 3.8–5.2)
SODIUM SERPL-SCNC: 143 MMOL/L (ref 136–145)
WBC # BLD AUTO: 6.6 K/UL (ref 3.6–11)

## 2017-04-21 PROCEDURE — 74011250636 HC RX REV CODE- 250/636: Performed by: HOSPITALIST

## 2017-04-21 PROCEDURE — G8978 MOBILITY CURRENT STATUS: HCPCS | Performed by: PHYSICAL THERAPIST

## 2017-04-21 PROCEDURE — 97161 PT EVAL LOW COMPLEX 20 MIN: CPT | Performed by: PHYSICAL THERAPIST

## 2017-04-21 PROCEDURE — G8987 SELF CARE CURRENT STATUS: HCPCS

## 2017-04-21 PROCEDURE — 36415 COLL VENOUS BLD VENIPUNCTURE: CPT | Performed by: HOSPITALIST

## 2017-04-21 PROCEDURE — 65660000000 HC RM CCU STEPDOWN

## 2017-04-21 PROCEDURE — 85610 PROTHROMBIN TIME: CPT | Performed by: INTERNAL MEDICINE

## 2017-04-21 PROCEDURE — 74011000258 HC RX REV CODE- 258: Performed by: INTERNAL MEDICINE

## 2017-04-21 PROCEDURE — 97165 OT EVAL LOW COMPLEX 30 MIN: CPT

## 2017-04-21 PROCEDURE — 97116 GAIT TRAINING THERAPY: CPT | Performed by: PHYSICAL THERAPIST

## 2017-04-21 PROCEDURE — 74011250637 HC RX REV CODE- 250/637: Performed by: INTERNAL MEDICINE

## 2017-04-21 PROCEDURE — 74011250636 HC RX REV CODE- 250/636: Performed by: INTERNAL MEDICINE

## 2017-04-21 PROCEDURE — G8979 MOBILITY GOAL STATUS: HCPCS | Performed by: PHYSICAL THERAPIST

## 2017-04-21 PROCEDURE — 85027 COMPLETE CBC AUTOMATED: CPT | Performed by: HOSPITALIST

## 2017-04-21 PROCEDURE — 74011000250 HC RX REV CODE- 250: Performed by: HOSPITALIST

## 2017-04-21 PROCEDURE — 80048 BASIC METABOLIC PNL TOTAL CA: CPT | Performed by: HOSPITALIST

## 2017-04-21 PROCEDURE — 94640 AIRWAY INHALATION TREATMENT: CPT

## 2017-04-21 RX ADMIN — Medication 10 ML: at 22:03

## 2017-04-21 RX ADMIN — IPRATROPIUM BROMIDE AND ALBUTEROL SULFATE 3 ML: .5; 3 SOLUTION RESPIRATORY (INHALATION) at 23:00

## 2017-04-21 RX ADMIN — ACETYLCYSTEINE: 200 SOLUTION ORAL; RESPIRATORY (INHALATION) at 19:24

## 2017-04-21 RX ADMIN — METHYLPREDNISOLONE SODIUM SUCCINATE 40 MG: 40 INJECTION, POWDER, FOR SOLUTION INTRAMUSCULAR; INTRAVENOUS at 22:03

## 2017-04-21 RX ADMIN — IPRATROPIUM BROMIDE AND ALBUTEROL SULFATE 3 ML: .5; 3 SOLUTION RESPIRATORY (INHALATION) at 04:54

## 2017-04-21 RX ADMIN — IPRATROPIUM BROMIDE AND ALBUTEROL SULFATE 3 ML: .5; 3 SOLUTION RESPIRATORY (INHALATION) at 15:39

## 2017-04-21 RX ADMIN — ACETYLCYSTEINE 200 MG: 200 SOLUTION ORAL; RESPIRATORY (INHALATION) at 08:10

## 2017-04-21 RX ADMIN — ALLOPURINOL 200 MG: 100 TABLET ORAL at 08:52

## 2017-04-21 RX ADMIN — METHYLPREDNISOLONE SODIUM SUCCINATE 40 MG: 40 INJECTION, POWDER, FOR SOLUTION INTRAMUSCULAR; INTRAVENOUS at 15:07

## 2017-04-21 RX ADMIN — METHYLPREDNISOLONE SODIUM SUCCINATE 40 MG: 40 INJECTION, POWDER, FOR SOLUTION INTRAMUSCULAR; INTRAVENOUS at 07:26

## 2017-04-21 RX ADMIN — IPRATROPIUM BROMIDE AND ALBUTEROL SULFATE 3 ML: .5; 3 SOLUTION RESPIRATORY (INHALATION) at 19:24

## 2017-04-21 RX ADMIN — AZITHROMYCIN MONOHYDRATE 500 MG: 500 INJECTION, POWDER, LYOPHILIZED, FOR SOLUTION INTRAVENOUS at 08:52

## 2017-04-21 RX ADMIN — Medication 10 ML: at 07:26

## 2017-04-21 RX ADMIN — AMLODIPINE BESYLATE 2.5 MG: 5 TABLET ORAL at 08:52

## 2017-04-21 RX ADMIN — Medication 10 ML: at 15:11

## 2017-04-21 RX ADMIN — CEFTRIAXONE 1 G: 1 INJECTION, POWDER, FOR SOLUTION INTRAMUSCULAR; INTRAVENOUS at 15:08

## 2017-04-21 RX ADMIN — WARFARIN SODIUM 3 MG: 2 TABLET ORAL at 17:17

## 2017-04-21 RX ADMIN — METOPROLOL SUCCINATE 50 MG: 50 TABLET, EXTENDED RELEASE ORAL at 08:52

## 2017-04-21 RX ADMIN — IPRATROPIUM BROMIDE AND ALBUTEROL SULFATE 3 ML: .5; 3 SOLUTION RESPIRATORY (INHALATION) at 08:10

## 2017-04-21 RX ADMIN — IPRATROPIUM BROMIDE AND ALBUTEROL SULFATE 3 ML: .5; 3 SOLUTION RESPIRATORY (INHALATION) at 11:38

## 2017-04-21 RX ADMIN — IPRATROPIUM BROMIDE AND ALBUTEROL SULFATE 3 ML: .5; 3 SOLUTION RESPIRATORY (INHALATION) at 00:37

## 2017-04-21 NOTE — PROGRESS NOTES
Bedside report received from [facility], [address] [place] [address]. [address] SHIFT NURSING NOTE    Bedside shift change report given to Gold Valencia (oncoming nurse) by [facility] (offgoing nurse). Report included the following information SBAR, Kardex, Procedure Summary, Intake/Output, MAR and Recent Results. SHIFT SUMMARY:         Admission Date 4/18/2017   Admission Diagnosis Pneumonia   Consults None        Consults   [x] PT   [x] OT   [] Speech   [] Palliative      [] Hospice    [x] Case Management   [] None   Cardiac Monitoring   [x] Yes   [] No     Antibiotics   [x] Yes   [] No   GI Prophylaxis  (Ex: Protonix, Pepcid, etc,.)   [x] Yes   [] No          DVT Prophylaxis   SCDs:             Cricket stockings:         [x] Medication (Ex: Lovenox, Eliquis, Brilinta, Coumadin,  Heparin, etc..)   [] Contraindicated   [] No VTE needed       Urinary Catheter             LDAs               Peripheral IV 04/18/17 Left Antecubital (Active)   Site Assessment Clean, dry, & intact 4/21/2017  2:49 PM   Phlebitis Assessment 0 4/21/2017  2:49 PM   Infiltration Assessment 0 4/21/2017  2:49 PM   Dressing Status Clean, dry, & intact 4/21/2017  2:49 PM   Dressing Type Transparent;Tape 4/21/2017  2:49 PM   Hub Color/Line Status Pink;Patent 4/21/2017  2:49 PM                      I/Os   Intake/Output Summary (Last 24 hours) at 04/21/17 1720  Last data filed at 04/21/17 0300   Gross per 24 hour   Intake              360 ml   Output                0 ml   Net              360 ml         Activity Level Activity Level: Up with Assistance     Activity Assistance: Partial (one person)   Diet Active Orders   Diet    DIET CARDIAC Regular; 2 GM NA (House Low NA)      Purposeful Rounding every 1-2 hour?    [x] Yes    Shane Score  Total Score: 3   Bed Alarm (If score 3 or >)   [] Yes    [] Refused (See signed refusal form in chart)   Michael Score  Michael Score: 19       Michael Score (if score 14 or less)   [] PMT consult   [] Nutrition consult   [] Wound Care consult []  Specialty bed         Influenza Vaccine Received Flu Vaccine for Current Season (usually Sept-March): Not Flu Season               Needs prior to discharge:   Home O2 required:    [] Yes   [x] No     If yes, how much O2 required?     Other:    Last Bowel Movement Date: 04/20/17   Readmission Risk Assessment Tool Score High Risk            22       Total Score        3 Relationship with PCP    4 More than 1 Admission in calendar year    9 Patient Insurance is Medicare, Medicaid or Self Pay    6 Charlson Comorbidity Score        Criteria that do not apply:    Patient Living Status    Patient Length of Stay > 5       Expected Length of Stay 4d 21h   Actual Length of Stay 3

## 2017-04-21 NOTE — PROGRESS NOTES
Problem: Mobility Impaired (Adult and Pediatric)  Goal: *Acute Goals and Plan of Care (Insert Text)  Physical Therapy Goals  Initiated 4/21/2017  1. Patient will move from supine to sit and sit to supine in bed with independence within 7 day(s). 2. Patient will transfer from bed to chair and chair to bed with supervision/set-up using the least restrictive device within 7 day(s). 3. Patient will perform sit to stand with supervision/set-up within 7 day(s). 4. Patient will ambulate with minimal assistance/contact guard assist for 150 feet with the least restrictive device within 7 day(s). 5. Patient will ascend/descend 2 stairs with no handrail(s) with minimal assistance/contact guard assist within 7 day(s). PHYSICAL THERAPY EVALUATION  Patient: Bonnie Jerome (8 y.o. female)  Date: 4/21/2017  Primary Diagnosis: Pneumonia        Precautions: fall        ASSESSMENT :  Based on the objective data described below, the patient presents with decreased activity tolerance, generalized weakness, decreased functional mobility skills. Patient up in chair upon arrival.  Sit to stand with CGA. Patient ambulated 27' with rolling walker and CGA on room air (on 2L upon arrival). O2 sats drop to 88% while ambulating; patient sat in chair for rest x 5 minutes. O2 resumed at 2L. Patient ambulated additional 30' back to room and returned to chair. Sats in low 90's. Prior to admission, patient was fairly independent and used a rollator for ambulation. Patient lives with daughter in one story home with 2 steps to enter. Patient's daughter concerned about condition of bedside commode and tub transfer bench.  notified. Recommend return to outpatient PT at discharge. (HHPT also reasonable if needed at discharge). Patient will benefit from skilled intervention to address the above impairments.   Patients rehabilitation potential is considered to be Good  Factors which may influence rehabilitation potential include:   [X]         None noted  [ ]         Mental ability/status  [ ]         Medical condition  [ ]         Home/family situation and support systems  [ ]         Safety awareness  [ ]         Pain tolerance/management  [ ]         Other:        PLAN :  Recommendations and Planned Interventions:  [X]           Bed Mobility Training             [ ]    Neuromuscular Re-Education  [X]           Transfer Training                   [ ]    Orthotic/Prosthetic Training  [X]           Gait Training                         [ ]    Modalities  [X]           Therapeutic Exercises           [ ]    Edema Management/Control  [X]           Therapeutic Activities            [ ]    Patient and Family Training/Education  [ ]           Other (comment):     Frequency/Duration: Patient will be followed by physical therapy  4 times a week to address goals. Discharge Recommendations: Outpatient vs. HHPT  Further Equipment Recommendations for Discharge: none       SUBJECTIVE:   Patient stated I exercise at home!       OBJECTIVE DATA SUMMARY:   HISTORY:    Past Medical History:   Diagnosis Date    Cancer ([facility] [address]       right breast CA, lung, colon    Gastrointestinal disorder       GERD    Hypertension      Other ill-defined conditions       gout     Past Surgical History:   Procedure Laterality Date    BREAST SURGERY PROCEDURE UNLISTED   1995     Right mastectomy    HX APPENDECTOMY        HX HEENT         right glass eye    [place] COLONOSCOPY W/BIOPSY SINGLE/MULTIPLE   3/29/2013          [place] COLSC FLX W/RMVL OF TUMOR POLYP LESION SNARE TQ   3/29/2013           Prior Level of Function/Home Situation: Lives with daughter in one story home. Ambulates independently with rollator.   Personal factors and/or comorbidities impacting plan of care: respiratory status     Home Situation  Home Environment: Private residence  # Steps to Enter: 2  Rails to Enter: No  One/Two Story Residence: One story  Living Alone: No  Support Systems: Child(indra), Family member(s)  Patient Expects to be Discharged to[de-identified] Private residence  Current DME Used/Available at Home: Transfer bench, Walker, rollator, Commode, bedside     EXAMINATION/PRESENTATION/DECISION MAKING:   Critical Behavior:  Neurologic State: Alert  Orientation Level: Oriented X4  Cognition: Appropriate decision making  Safety/Judgement: Awareness of environment, Insight into deficits  Hearing: Auditory  Auditory Impairment: None  Skin:  intact  Edema: LE edema  Range Of Motion:  AROM: Within functional limits           PROM: Within functional limits           Strength:    Strength: Generally decreased, functional                    Tone & Sensation:   Tone: Normal              Sensation: Impaired (feet)               Coordination:  Coordination: Within functional limits  Vision:      Functional Mobility:  Bed Mobility:              Transfers:  Sit to Stand: Contact guard assistance  Stand to Sit: Contact guard assistance                       Balance:   Sitting: Intact  Standing: Intact; With support (with rolling walker and CGA)  Ambulation/Gait Training:  Distance (ft): 60 Feet (ft)  Assistive Device: Gait belt;Walker, rolling  Ambulation - Level of Assistance: Contact guard assistance        Gait Abnormalities: Decreased step clearance              Speed/[place]: Pace decreased (<100 feet/min)  Step Length: Left shortened;Right shortened                                                             Functional Measure:     Elder Mobility Scale      12/20            EMS and G-code impairment scale:  Percentage of impairment CH  0% CI  1-19% CJ  20-39% CK  40-59% CL  60-79% CM  80-99% CN  100%   EMS Score 0-20 20 17-19 13-16 9-12 5-8 1-4 0      Scores under 10  generally these patients are dependent in mobility maneuvers; require help with  basic ADL, such as transfers, toileting and dressing.      Scores between 10  13  generally these patients are borderline in terms of safe mobility and  independence in ADL i.e. they require some help with some mobility maneuvers. Scores over 14  Generally these patients are able to perform mobility maneuvers alone and safely  and are independent in basic ADL. G codes: In compliance with CMSs Claims Based Outcome Reporting, the following G-code set was chosen for this patient based on their primary functional limitation being treated: The outcome measure chosen to determine the severity of the functional limitation was the Elderly Mobility Scale with a score of 12/20 which was correlated with the impairment scale. · Mobility - Walking and Moving Around:               - CURRENT STATUS:    CK - 40%-59% impaired, limited or restricted               - GOAL STATUS:           CJ - 20%-39% impaired, limited or restricted               - D/C STATUS:                       ---------------To be determined---------------      Physical Therapy Evaluation Charge Determination   History Examination Presentation Decision-Making   MEDIUM  Complexity : 1-2 comorbidities / personal factors will impact the outcome/ POC  LOW Complexity : 1-2 Standardized tests and measures addressing body structure, function, activity limitation and / or participation in recreation  LOW Complexity : Stable, uncomplicated  LOW Complexity : FOTO score of       Based on the above components, the patient evaluation is determined to be of the following complexity level: LOW      Pain:  Pain Scale 1: Numeric (0 - 10)  Pain Intensity 1: 0              Activity Tolerance:   fair  Please refer to the flowsheet for vital signs taken during this treatment.   After treatment:   [X]         Patient left in no apparent distress sitting up in chair  [ ]         Patient left in no apparent distress in bed  [X]         Call bell left within reach  [X]         Nursing notified  [X]         Caregiver present  [ ]         Bed alarm activated COMMUNICATION/EDUCATION:   The patients plan of care was discussed with: Occupational Therapist and Registered Nurse.  [X]         Fall prevention education was provided and the patient/caregiver indicated understanding. [ ]         Patient/family have participated as able in goal setting and plan of care. [X]         Patient/family agree to work toward stated goals and plan of care. [ ]         Patient understands intent and goals of therapy, but is neutral about his/her participation. [ ]         Patient is unable to participate in goal setting and plan of care.      Thank you for this referral.  Lashaun Smallwood, PT   Time Calculation: 28 mins

## 2017-04-21 NOTE — PROGRESS NOTES
Problem: Self Care Deficits Care Plan (Adult)  Goal: *Acute Goals and Plan of Care (Insert Text)  Occupational Therapy Goals  Initiated 4/21/2017  1. Patient will perform grooming with supervision/set-up within 7 day(s). 2. Patient will perform bathing with minimal assistance/contact guard assist within 7 day(s). 3. Patient will perform upper body dressing with modified independence within 7 day(s). 4. Patient will perform toilet transfers with supervision/set-up within 7 day(s). 5. Patient will perform all aspects of toileting with independence within 7 day(s). 6. Patient will participate in upper extremity therapeutic exercise/activities with independence for 5 minutes within 7 day(s). 7. Patient will utilize energy conservation techniques during functional activities with verbal cues within 7 day(s). OCCUPATIONAL THERAPY EVALUATION  Patient: Katlyn Madrid (8 y.o. female)  Date: 4/21/2017  Primary Diagnosis: Pneumonia        Precautions:          ASSESSMENT :  Based on the objective data described below, the patient presents with generalized weakness, decreased endurance, strength, difficulty breathing, functional mobility, and ADLs. Pt was living with her family and stated that she used a RW , and was needing assist with LB ADLs the past few weeks. Pt was sitting in recliner when OT arrived and was SBA to come to stand and was SBA for transfer to the toilet with SBA and use of RW. She was on RA and her O2% ranged in the 90's and as she walked further her O2% dropped to 88% and she needed a rest break. Pt was able to bring her O2% back to 90s once she was put back on 2 liters of NC. Pt was tired after eval and left sitting in recliner with her family in the home. Pt stated that she wanted to continue with out pt PT once she is discharged. OT spoke with family and they will be able to take her to out pt and talked to CM about pt needs BSC and transfer tub seat per family.   Recommend that pt have further therapy at discharge , out pt PT and return home with family and 24/7 assist .     Patient will benefit from skilled intervention to address the above impairments. Patients rehabilitation potential is considered to be Good  Factors which may influence rehabilitation potential include:   [X]             None noted  [ ]             Mental ability/status  [ ]             Medical condition  [ ]             Home/family situation and support systems  [ ]             Safety awareness  [ ]             Pain tolerance/management  [ ]             Other:        PLAN :  Recommendations and Planned Interventions:  [X]               Self Care Training                  [ ]        Therapeutic Activities  [X]               Functional Mobility Training    [ ]        Cognitive Retraining  [X]               Therapeutic Exercises           [X]        Endurance Activities  [ ]               Balance Training                   [ ]        Neuromuscular Re-Education  [ ]               Visual/Perceptual Training     [X]   Home Safety Training  [X]               Patient Education                 [X]        Family Training/Education  [ ]               Other (comment):     Frequency/Duration: Patient will be followed by occupational therapy 4 times a week to address goals. Discharge Recommendations: Home Health  Further Equipment Recommendations for Discharge: tbd       SUBJECTIVE:   Patient stated I need to rest.  I may need the oxygen again.       OBJECTIVE DATA SUMMARY:   HISTORY:   Past Medical History:   Diagnosis Date    Cancer ([facility] [address]       right breast CA, lung, colon    Gastrointestinal disorder       GERD    Hypertension      Other ill-defined conditions       gout     Past Surgical History:   Procedure Laterality Date    BREAST SURGERY PROCEDURE UNLISTED   1995     Right mastectomy    HX APPENDECTOMY        HX HEENT         right glass eye    [place] COLONOSCOPY W/BIOPSY SINGLE/MULTIPLE   3/29/2013          [place] COLSC FLX W/RMVL OF TUMOR POLYP LESION SNARE TQ   3/29/2013              Prior Level of Function/Home Situation: pt lives with family and was needing assist with ADLs and was able to use RW and did not need assist with transfers. Expanded or extensive additional review of patient history:      Home Situation  Home Environment: Private residence  # Steps to Enter: 2  Rails to Enter: No  One/Two Story Residence: One story  Living Alone: No  Support Systems: Child(indra), Family member(s)  Patient Expects to be Discharged to[de-identified] Private residence  Current DME Used/Available at Home: Transfer bench, Walker, rollator, Commode, bedside  [X]  Right hand dominant             [ ]  Left hand dominant     EXAMINATION OF PERFORMANCE DEFICITS:  Cognitive/Behavioral Status:  Neurologic State: Alert  Orientation Level: Oriented X4  Cognition: Appropriate decision making  Perception: Appears intact  Perseveration: No perseveration noted  Safety/Judgement: Awareness of environment; Insight into deficits     Skin: in good health     Edema: noted in BLE     Hearing: Auditory  Auditory Impairment: None     Vision/Perceptual:                 intact                     Range of Motion:     AROM: Within functional limits  PROM: Within functional limits                       Strength:     Strength: Generally decreased, functional                 Coordination:  Coordination: Within functional limits  Fine Motor Skills-Upper: Left Intact; Right Intact    Gross Motor Skills-Upper: Left Intact; Right Intact     Tone & Sensation:     Tone: Normal  Sensation: Impaired (feet)                       Balance:  Sitting: Intact  Standing: Intact; With support (with rolling walker and CGA)     Functional Mobility and Transfers for ADLs:  Bed Mobility:        Transfers:  Sit to Stand: Contact guard assistance  Stand to Sit: Contact guard assistance  Bed to Chair: Contact guard assistance  Toilet Transfer : Contact guard assistance     ADL Assessment:  Feeding: Setup Oral Facial Hygiene/Grooming: Setup     Bathing: Maximum assistance;Minimum assistance     Upper Body Dressing: Setup     Lower Body Dressing: Maximum assistance;Minimum assistance     Toileting: Contact guard assistance              Barthel Index:      Bathin  Bladder: 10  Bowels: 10  Groomin  Dressin  Feedin  Mobility: 10  Stairs: 0  Toilet Use: 5  Transfer (Bed to Chair and Back): 10  Total: 60         Barthel and G-code impairment scale:  Percentage of impairment CH  0% CI  1-19% CJ  20-39% CK  40-59% CL  60-79% CM  80-99% CN  100%   Barthel Score 0-100 100 99-80 79-60 59-40 20-39 1-19    0   Barthel Score 0-20 20 17-19 13-16 9-12 5-8 1-4 0      The Barthel ADL Index: Guidelines  1. The index should be used as a record of what a patient does, not as a record of what a patient could do. 2. The main aim is to establish degree of independence from any help, physical or verbal, however minor and for whatever reason. 3. The need for supervision renders the patient not independent. 4. A patient's performance should be established using the best available evidence. Asking the patient, friends/relatives and nurses are the usual sources, but direct observation and common sense are also important. However direct testing is not needed. 5. Usually the patient's performance over the preceding 24-48 hours is important, but occasionally longer periods will be relevant. 6. Middle categories imply that the patient supplies over 50 per cent of the effort. 7. Use of aids to be independent is allowed. Amie Altman., Barthel, D.W. (0692). Functional evaluation: the Barthel Index. [address] [facility] (14)2. [facility] dana ADRIANA Dimas, Marilee Andrade., Bi Quiñonez., Jamaal Enamorado, [address] [place] [address] (). Measuring the change indisability after inpatient rehabilitation; comparison of the responsiveness of the Barthel Index and Functional [place] Measure. Journal of Neurology, Neurosurgery, and Psychiatry, 66(4), 825-534.   [facility] [place], [place].A, Josefa Grahamnacaakash Arriaza M.A. (2004.) Assessment of post-stroke quality of life in cost-effectiveness studies: The usefulness of the Barthel Index and the EuroQoL-5D. Quality of Life Research, 13, 377-63               Cognitive Retraining  Safety/Judgement: Awareness of environment; Insight into deficits        Functional Measure:        G codes: In compliance with CMSs Claims Based Outcome Reporting, the following G-code set was chosen for this patient based on their primary functional limitation being treated: The outcome measure chosen to determine the severity of the functional limitation was the Barthel with a score of 60/100 which was correlated with the impairment scale. · Self Care:               - CURRENT STATUS:    CJ - 20%-39% impaired, limited or restricted               - GOAL STATUS:           CI - 1%-19% impaired, limited or restricted               - D/C STATUS:                       ---------------To be determined---------------      Occupational Therapy Evaluation Charge Determination   History Examination Decision-Making   MEDIUM Complexity : Expanded review of history including physical, cognitive and psychosocial  history  LOW Complexity : 1-3 performance deficits relating to physical, cognitive , or psychosocial skils that result in activity limitations and / or participation restrictions  MEDIUM Complexity : Patient may present with comorbidities that affect occupational performnce. Miniml to moderate modification of tasks or assistance (eg, physical or verbal ) with assesment(s) is necessary to enable patient to complete evaluation       Based on the above components, the patient evaluation is determined to be of the following complexity level: LOW   Pain:  Pain Scale 1: Numeric (0 - 10)  Pain Intensity 1: 0              Activity Tolerance:   vss  Please refer to the flowsheet for vital signs taken during this treatment.   After treatment:   [X] Patient left in no apparent distress sitting up in chair  [ ] Patient left in no apparent distress in bed  [ ] Call bell left within reach  [X] Nursing notified  [X] Caregiver present  [ ] Bed alarm activated      COMMUNICATION/EDUCATION:   The patients plan of care was discussed with: Physical Therapist and Registered Nurse and family  [X] Home safety education was provided and the patient/caregiver indicated understanding. [X] Patient/family have participated as able in goal setting and plan of care. [X] Patient/family agree to work toward stated goals and plan of care. [ ] Patient understands intent and goals of therapy, but is neutral about his/her participation. [ ] Patient is unable to participate in goal setting and plan of care. This patients plan of care is appropriate for delegation to [facility].      Thank you for this referral.  Jennifer Cedeno OT  Time Calculation: 21 mins

## 2017-04-21 NOTE — PROGRESS NOTES
Therapy is recommending pt continue with her outpt PT they are also recommending a bsc and tub transfer bench. FYI was left for MD to write prescription for outpt PT at discharge. I have s/w DME company and due to pt having Medicare as her primary insurance, which does not cover these DME items,  Medicaid will not cover for the bsc or tub transfer bench. Will update family.     Eligio Son RN    Ext [phone]

## 2017-04-21 NOTE — PROGRESS NOTES
Hospitalist Progress Note    NAME: Antoni Foote   :  1918   MRN:  [id number]       Interim Hospital Summary: 80 y.o. female whom presented on 2017 with      Assessment / Plan:    Sepsis POA  tachypnea, tachycardia. CXR Increased linear density at the left lung treatment site. Superimposed pneumonia versus recurrent cancer. Due to Left perihilar pneumonia POA   Recurrent mass on CT, in setting of h/o  H/o remote R Breast Ca, Lung Ca & Colon CA  Improved symptomatically  -on IV steroids, cont suppl oxygen to keep sats above 90  -cont IV Abx- rocephin + Azithromycin day day 3 today  -Duoneb Q 4 RT Mucinex, Robitussin DM prn cough    need repeat imaging with CT in 3-4 weeks to see if Left perihilar ASD resolved with Abx treatment - if not then may need recurrence of Lung Ca workup     UA concerning for UTI: POA  Urine cx not sent, will not be of any benefit to do urine cx as pt is already on abx, ceftriaxone will cover for UTI. Chronic CKD stage 3  Stable, Cr at baseline ~ 1.4/1.5    AFib with rate control POA  HTN     Cont Coumadin, Toprol, norvasc     GOUT  Cont allopurinol  Colchicine prn      obesity  BMI of 36.9kg    Code Status: Full as per pt's wishes in ER  Surrogate Decision Maker: Ehsan daughter Jasen Reyes # [phone]     DVT Prophylaxis: on Coumadin, INR therapeutic  GI Prophylaxis: not indicated     Baseline: pt lives with family at home - is functional at her age. Subjective:     Chief Complaint / Reason for Physician Visit  \" i am doing better\". Discussed with RN events overnight.      Review of Systems:  Symptom Y/N Comments  Symptom Y/N Comments   Fever/Chills n   Chest Pain n    Poor Appetite n   Edema     Cough y   Abdominal Pain n    Sputum y   Joint Pain n    SOB/WOOTEN y   Pruritis/Rash n    Nausea/vomit n   Tolerating PT/OT     Diarrhea    Tolerating Diet y    Constipation    Other       Could NOT obtain due to:      Objective:     VITALS:   Last 24hrs VS reviewed since prior progress note. Most recent are:  Patient Vitals for the past 24 hrs:   Temp Pulse Resp BP SpO2   04/21/17 1141 97.5 °F (36.4 °C) 85 22 135/79 100 %   04/21/17 0759 97.7 °F (36.5 °C) 76 22 (!) 149/97 100 %   04/21/17 0454 - - - - 99 %   04/21/17 0322 97.6 °F (36.4 °C) 77 22 (!) 149/97 100 %   04/21/17 0037 - - - - 95 %   04/21/17 0018 97.5 °F (36.4 °C) 75 22 127/75 100 %   04/20/17 2104 - - - - 97 %   04/20/17 2012 97.4 °F (36.3 °C) 85 24 149/83 100 %   04/20/17 1654 - - - - 95 %   04/20/17 1507 97.5 °F (36.4 °C) 86 24 139/87 92 %       Intake/Output Summary (Last 24 hours) at 04/21/17 1336  Last data filed at 04/21/17 0300   Gross per 24 hour   Intake              360 ml   Output                0 ml   Net              360 ml        PHYSICAL EXAM:  General: WD, WN. Alert, cooperative, no acute distress    EENT:  EOMI. Anicteric sclerae. MMM  Resp:  CTA bilaterally, no wheezing or rales. No accessory muscle use  CV:  Regular  rhythm,  No edema  GI:  Soft, Non distended, Non tender.  +Bowel sounds  Neurologic:  Alert and oriented X 3, normal speech,   Psych:   Good insight. Not anxious nor agitated  Skin:  No rashes. No jaundice    Reviewed most current lab test results and cultures  YES  Reviewed most current radiology test results   YES  Review and summation of old records today    NO  Reviewed patient's current orders and MAR    YES  PMH/SH reviewed - no change compared to H&P  ________________________________________________________________________  Care Plan discussed with:    Comments   Patient x    Family  x daughter   RN     Care Manager     Consultant                        Multidiciplinary team rounds were held today with , nursing, pharmacist and clinical coordinator. Patient's plan of care was discussed; medications were reviewed and discharge planning was addressed.      ________________________________________________________________________  Total NON critical care TIME:  35 Minutes    Total CRITICAL CARE TIME Spent:   Minutes non procedure based      Comments   >50% of visit spent in counseling and coordination of care     ________________________________________________________________________  Keiry Stuart MD     Procedures: see electronic medical records for all procedures/Xrays and details which were not copied into this note but were reviewed prior to creation of Plan. LABS:  I reviewed today's most current labs and imaging studies.   Pertinent labs include:  Recent Labs      04/21/17   0333  04/19/17   0232   WBC  6.6  8.0   HGB  11.7  11.4*   HCT  36.5  34.6*   PLT  186  176     Recent Labs      04/21/17   0333  04/20/17   0440  04/19/17   0232   NA  143   --   144   K  4.5   --   4.2   CL  108   --   108   CO2  24   --   25   GLU  136*   --   97   BUN  31*   --   21*   CREA  1.43*   --   1.39*   CA  9.5   --   9.0   INR  2.4*  2.4*  2.7*       Signed: Keiry Stuart MD

## 2017-04-21 NOTE — PROGRESS NOTES
Pharmacy Daily Dosing of Warfarin    Indication: afib  Goal INR: 2-3    Home Warfarin Dose: 6 mg Monday and Friday, 4 mg Tuesday, Thursday, Saturday, Sunday  Concurrent Anticoagulants/Antiplatelets none  Major Interacting Medications: azithromycin - increases INR, allopurinol (on this PTA)    INR (0.9-1.1) > 5 or Platelets (< 14V): discuss with MD:  Recent Labs      04/21/17   0333  04/20/17   0440  04/19/17   0232  04/18/17   1257   INR  2.4*  2.4*  2.7*   --    HGB  11.7   --   11.4*  12.6   PLT  186   --   176  198       Impression/Plan: INR appears stable, monitor for DDI as above  Warfarin 3 mg today     Pharmacy will follow daily and adjust the dose as appropriate.     Thanks for the consult  Lanette Martins, ABBYD

## 2017-04-22 LAB
INR PPP: 2.5 (ref 0.9–1.1)
PROTHROMBIN TIME: 26.4 SEC (ref 9–11.1)

## 2017-04-22 PROCEDURE — 94640 AIRWAY INHALATION TREATMENT: CPT

## 2017-04-22 PROCEDURE — 74011250636 HC RX REV CODE- 250/636: Performed by: INTERNAL MEDICINE

## 2017-04-22 PROCEDURE — 74011000250 HC RX REV CODE- 250: Performed by: HOSPITALIST

## 2017-04-22 PROCEDURE — 74011250636 HC RX REV CODE- 250/636: Performed by: HOSPITALIST

## 2017-04-22 PROCEDURE — 77010033678 HC OXYGEN DAILY

## 2017-04-22 PROCEDURE — 74011000258 HC RX REV CODE- 258: Performed by: INTERNAL MEDICINE

## 2017-04-22 PROCEDURE — 85610 PROTHROMBIN TIME: CPT | Performed by: INTERNAL MEDICINE

## 2017-04-22 PROCEDURE — 65660000000 HC RM CCU STEPDOWN

## 2017-04-22 PROCEDURE — 36415 COLL VENOUS BLD VENIPUNCTURE: CPT | Performed by: INTERNAL MEDICINE

## 2017-04-22 PROCEDURE — 74011250637 HC RX REV CODE- 250/637: Performed by: INTERNAL MEDICINE

## 2017-04-22 RX ORDER — WARFARIN 2 MG/1
2 TABLET ORAL ONCE
Status: COMPLETED | OUTPATIENT
Start: 2017-04-22 | End: 2017-04-22

## 2017-04-22 RX ADMIN — IPRATROPIUM BROMIDE AND ALBUTEROL SULFATE 3 ML: .5; 3 SOLUTION RESPIRATORY (INHALATION) at 11:47

## 2017-04-22 RX ADMIN — AZITHROMYCIN MONOHYDRATE 500 MG: 500 INJECTION, POWDER, LYOPHILIZED, FOR SOLUTION INTRAVENOUS at 08:47

## 2017-04-22 RX ADMIN — METHYLPREDNISOLONE SODIUM SUCCINATE 40 MG: 40 INJECTION, POWDER, FOR SOLUTION INTRAMUSCULAR; INTRAVENOUS at 06:20

## 2017-04-22 RX ADMIN — IPRATROPIUM BROMIDE AND ALBUTEROL SULFATE 3 ML: .5; 3 SOLUTION RESPIRATORY (INHALATION) at 03:05

## 2017-04-22 RX ADMIN — Medication 10 ML: at 15:14

## 2017-04-22 RX ADMIN — Medication 10 ML: at 06:21

## 2017-04-22 RX ADMIN — METHYLPREDNISOLONE SODIUM SUCCINATE 40 MG: 40 INJECTION, POWDER, FOR SOLUTION INTRAMUSCULAR; INTRAVENOUS at 22:25

## 2017-04-22 RX ADMIN — IPRATROPIUM BROMIDE AND ALBUTEROL SULFATE 3 ML: .5; 3 SOLUTION RESPIRATORY (INHALATION) at 17:04

## 2017-04-22 RX ADMIN — AMLODIPINE BESYLATE 2.5 MG: 5 TABLET ORAL at 08:49

## 2017-04-22 RX ADMIN — Medication 10 ML: at 22:25

## 2017-04-22 RX ADMIN — CEFTRIAXONE 1 G: 1 INJECTION, POWDER, FOR SOLUTION INTRAMUSCULAR; INTRAVENOUS at 22:25

## 2017-04-22 RX ADMIN — ACETYLCYSTEINE 200 MG: 200 SOLUTION ORAL; RESPIRATORY (INHALATION) at 20:46

## 2017-04-22 RX ADMIN — WARFARIN SODIUM 2 MG: 2 TABLET ORAL at 16:54

## 2017-04-22 RX ADMIN — IPRATROPIUM BROMIDE AND ALBUTEROL SULFATE 3 ML: .5; 3 SOLUTION RESPIRATORY (INHALATION) at 20:46

## 2017-04-22 RX ADMIN — METHYLPREDNISOLONE SODIUM SUCCINATE 40 MG: 40 INJECTION, POWDER, FOR SOLUTION INTRAMUSCULAR; INTRAVENOUS at 15:14

## 2017-04-22 RX ADMIN — METOPROLOL SUCCINATE 50 MG: 50 TABLET, EXTENDED RELEASE ORAL at 08:50

## 2017-04-22 RX ADMIN — IPRATROPIUM BROMIDE AND ALBUTEROL SULFATE 3 ML: .5; 3 SOLUTION RESPIRATORY (INHALATION) at 23:39

## 2017-04-22 RX ADMIN — ACETYLCYSTEINE 200 MG: 200 SOLUTION ORAL; RESPIRATORY (INHALATION) at 07:46

## 2017-04-22 RX ADMIN — IPRATROPIUM BROMIDE AND ALBUTEROL SULFATE 3 ML: .5; 3 SOLUTION RESPIRATORY (INHALATION) at 07:46

## 2017-04-22 RX ADMIN — ALLOPURINOL 200 MG: 100 TABLET ORAL at 08:50

## 2017-04-22 NOTE — PROGRESS NOTES
Pharmacy Daily Dosing of Warfarin    Indication: afib  Goal INR: 2-3    Home Warfarin Dose: 6 mg Monday and Friday, 4 mg Tuesday, Thursday, Saturday, Sunday  Concurrent Anticoagulants/Antiplatelets none  Major Interacting Medications: azithromycin - increases INR, allopurinol (on this PTA)    INR (0.9-1.1) > 5 or Platelets (< 85U): discuss with MD:  Recent Labs      04/22/17   0325  04/21/17   0333  04/20/17   0440   INR  2.5*  2.4*  2.4*   HGB   --   11.7   --    PLT   --   186   --        Impression/Plan: INR may be starting to increase  Warfarin 2 mg today     Pharmacy will follow daily and adjust the dose as appropriate.     Thanks for the consult  Ashia Ferrell, ABBYD

## 2017-04-22 NOTE — PROGRESS NOTES
Hospitalist Progress Note    NAME: Tremayne Denise   :  1918   MRN:  [id number]       Interim Hospital Summary: 80 y.o. female whom presented on 2017 with      Assessment / Plan:    Sepsis POA  tachypnea, tachycardia. CXR Increased linear density at the left lung treatment site. Superimposed pneumonia versus recurrent cancer. Due to Left perihilar pneumonia POA   Recurrent mass on CT, in setting of h/o  H/o remote R Breast Ca, Lung Ca & Colon CA  Improving symptomatically  -on IV steroids, cont suppl oxygen to keep sats above 90  -cont IV Abx- rocephin + Azithromycin day 4  -Duoneb Q 4 RT Mucinex, Robitussin DM prn cough    need repeat imaging with CT in 3-4 weeks to see if Left perihilar ASD resolved with Abx treatment - if not then may need recurrence of Lung Ca workup     UA concerning for UTI: POA  Urine cx not sent,  ceftriaxone will cover for UTI. Chronic CKD stage 3  Stable, Cr at baseline ~ 1.4/1.5    AFib with rate control POA  HTN     Cont Coumadin, Toprol, norvasc     GOUT  Cont allopurinol  Colchicine prn      obesity  BMI of 36.9kg    Code Status: Full as per pt's wishes in ER  Surrogate Decision Maker: Ehsan daughter Yari Captain # [phone]     DVT Prophylaxis: on Coumadin, INR therapeutic  GI Prophylaxis: not indicated     Baseline: pt lives with family at home - is functional at her age. Subjective:     Chief Complaint / Reason for Physician Visit  \" i get SOB when i walk\". Discussed with RN events overnight. Review of Systems:  Symptom Y/N Comments  Symptom Y/N Comments   Fever/Chills n   Chest Pain n    Poor Appetite n   Edema     Cough y   Abdominal Pain n    Sputum y   Joint Pain n    SOB/WOOTEN y   Pruritis/Rash n    Nausea/vomit n   Tolerating PT/OT     Diarrhea    Tolerating Diet y    Constipation    Other       Could NOT obtain due to:      Objective:     VITALS:   Last 24hrs VS reviewed since prior progress note.  Most recent are:  Patient Vitals for the past 24 hrs:   Temp Pulse Resp BP SpO2   04/22/17 0746 - - - - 97 %   04/22/17 0725 97.4 °F (36.3 °C) 81 20 (!) 150/99 99 %   04/22/17 0344 97.3 °F (36.3 °C) 87 18 152/89 100 %   04/22/17 0305 - - - - 95 %   04/22/17 0237 97.4 °F (36.3 °C) 88 20 146/76 96 %   04/21/17 2302 - - - - 100 %   04/21/17 2300 97.2 °F (36.2 °C) 79 20 151/74 100 %   04/21/17 1951 97.6 °F (36.4 °C) 85 22 145/87 100 %   04/21/17 1924 - - - - 94 %   04/21/17 1506 98 °F (36.7 °C) 82 22 138/83 100 %   04/21/17 1141 97.5 °F (36.4 °C) 85 22 135/79 100 %       Intake/Output Summary (Last 24 hours) at 04/22/17 1101  Last data filed at 04/22/17 1010   Gross per 24 hour   Intake              550 ml   Output              400 ml   Net              150 ml        PHYSICAL EXAM:  General: WD, WN. Alert, cooperative, no acute distress    EENT:  EOMI. Anicteric sclerae. MMM  Resp:  wheeze b/l  CV:  Regular  rhythm,  No edema  GI:  Soft, Non distended, Non tender.  +Bowel sounds  Neurologic:  Alert and oriented X 3, normal speech,   Psych:   Good insight. Not anxious nor agitated  Skin:  No rashes. No jaundice    Reviewed most current lab test results and cultures  YES  Reviewed most current radiology test results   YES  Review and summation of old records today    NO  Reviewed patient's current orders and MAR    YES  PMH/SH reviewed - no change compared to H&P  ________________________________________________________________________  Care Plan discussed with:    Comments   Patient x    Family  x daughter   RN     Care Manager     Consultant                        Multidiciplinary team rounds were held today with , nursing, pharmacist and clinical coordinator. Patient's plan of care was discussed; medications were reviewed and discharge planning was addressed.      ________________________________________________________________________  Total NON critical care TIME:  35   Minutes    Total CRITICAL CARE TIME Spent:   Minutes non procedure based Comments   >50% of visit spent in counseling and coordination of care     ________________________________________________________________________  Venu Damon MD     Procedures: see electronic medical records for all procedures/Xrays and details which were not copied into this note but were reviewed prior to creation of Plan. LABS:  I reviewed today's most current labs and imaging studies.   Pertinent labs include:  Recent Labs      04/21/17   0333   WBC  6.6   HGB  11.7   HCT  36.5   PLT  186     Recent Labs      04/22/17   0325  04/21/17   0333  04/20/17   0440   NA   --   143   --    K   --   4.5   --    CL   --   108   --    CO2   --   24   --    GLU   --   136*   --    BUN   --   31*   --    CREA   --   1.43*   --    CA   --   9.5   --    INR  2.5*  2.4*  2.4*       Signed: Venu Damon MD

## 2017-04-22 NOTE — PROGRESS NOTES
[address] SHIFT NURSING NOTE    Bedside shift change report given to Anneliese Dinero RN (oncoming nurse) by Carmen Mosley RN (offgoing nurse). Report included the following information SBAR, Kardex, Intake/Output, MAR, Recent Results, Med Rec Status and Cardiac Rhythm AFIB. SHIFT SUMMARY: Patient incontinent of soft, loose BM this AM.        Admission Date 4/18/2017   Admission Diagnosis Pneumonia   Consults None        Consults   [] PT   [] OT   [] Speech   [] Palliative      [] Hospice    [x] Case Management   [] None   Cardiac Monitoring   [x] Yes   [] No     Antibiotics   [x] Yes   [] No   GI Prophylaxis  (Ex: Protonix, Pepcid, etc,.)   [] Yes   [x] No          DVT Prophylaxis   SCDs:             Cricket stockings:         [x] Medication (Ex: Lovenox, Eliquis, Brilinta, Coumadin,  Heparin, etc..)   [] Contraindicated   [] No VTE needed       Urinary Catheter             LDAs               Peripheral IV 04/18/17 Left Antecubital (Active)   Site Assessment Clean, dry, & intact 4/22/2017  3:30 AM   Phlebitis Assessment 0 4/22/2017  3:30 AM   Infiltration Assessment 0 4/22/2017  3:30 AM   Dressing Status Clean, dry, & intact 4/22/2017  3:30 AM   Dressing Type Tape;Transparent 4/22/2017  3:30 AM   Hub Color/Line Status Pink;Flushed;Patent 4/22/2017  3:30 AM                      I/Os   Intake/Output Summary (Last 24 hours) at 04/22/17 0550  Last data filed at 04/22/17 0330   Gross per 24 hour   Intake              190 ml   Output                0 ml   Net              190 ml         Activity Level Activity Level: Up with Assistance     Activity Assistance: Partial (one person)   Diet Active Orders   Diet    DIET CARDIAC Regular; 2 GM NA (House Low NA)      Purposeful Rounding every 1-2 hour?    [x] Yes    Shane Score  Total Score: 3   Bed Alarm (If score 3 or >)   [x] Yes    [] Refused (See signed refusal form in chart)   Michael Score  Michael Score: 20       Michael Score (if score 14 or less)   [] PMT consult   [] Nutrition consult   [] Wound Care consult      []  Specialty bed         Influenza Vaccine Received Flu Vaccine for Current Season (usually Sept-March): Not Flu Season               Needs prior to discharge:   Home O2 required:    [x] Yes   [] No     If yes, how much O2 required?  2L    Other:    Last Bowel Movement Date: 04/22/17   Readmission Risk Assessment Tool Score High Risk            22       Total Score        3 Relationship with PCP    4 More than 1 Admission in calendar year    9 Patient Insurance is Medicare, Medicaid or Self Pay    6 Charlson Comorbidity Score        Criteria that do not apply:    Patient Living Status    Patient Length of Stay > 5       Expected Length of Stay 4d 21h   Actual Length of Stay 4

## 2017-04-22 NOTE — ROUTINE PROCESS
Bedside and Verbal shift change report given to Jon Officer (oncoming nurse) by Dorita Larose RN (offgoing nurse). Report included the following information SBAR, Kardex, Intake/Output, MAR, Accordion, Recent Results and Cardiac Rhythm A-fib.

## 2017-04-23 ENCOUNTER — APPOINTMENT (OUTPATIENT)
Dept: GENERAL RADIOLOGY | Age: 82
DRG: 871 | End: 2017-04-23
Attending: HOSPITALIST
Payer: MEDICARE

## 2017-04-23 LAB
BACTERIA SPEC CULT: NORMAL
ERYTHROCYTE [DISTWIDTH] IN BLOOD BY AUTOMATED COUNT: 17 % (ref 11.5–14.5)
HCT VFR BLD AUTO: 38.3 % (ref 35–47)
HGB BLD-MCNC: 12.6 G/DL (ref 11.5–16)
INR PPP: 2.4 (ref 0.9–1.1)
MCH RBC QN AUTO: 27.5 PG (ref 26–34)
MCHC RBC AUTO-ENTMCNC: 32.9 G/DL (ref 30–36.5)
MCV RBC AUTO: 83.4 FL (ref 80–99)
PLATELET # BLD AUTO: 195 K/UL (ref 150–400)
PROTHROMBIN TIME: 25.2 SEC (ref 9–11.1)
RBC # BLD AUTO: 4.59 M/UL (ref 3.8–5.2)
SERVICE CMNT-IMP: NORMAL
WBC # BLD AUTO: 8.2 K/UL (ref 3.6–11)

## 2017-04-23 PROCEDURE — 74011250636 HC RX REV CODE- 250/636: Performed by: INTERNAL MEDICINE

## 2017-04-23 PROCEDURE — 85610 PROTHROMBIN TIME: CPT | Performed by: INTERNAL MEDICINE

## 2017-04-23 PROCEDURE — 74011250636 HC RX REV CODE- 250/636: Performed by: HOSPITALIST

## 2017-04-23 PROCEDURE — 36415 COLL VENOUS BLD VENIPUNCTURE: CPT | Performed by: INTERNAL MEDICINE

## 2017-04-23 PROCEDURE — 74011000258 HC RX REV CODE- 258: Performed by: INTERNAL MEDICINE

## 2017-04-23 PROCEDURE — 74011250637 HC RX REV CODE- 250/637: Performed by: INTERNAL MEDICINE

## 2017-04-23 PROCEDURE — 65660000000 HC RM CCU STEPDOWN

## 2017-04-23 PROCEDURE — 74011000250 HC RX REV CODE- 250: Performed by: HOSPITALIST

## 2017-04-23 PROCEDURE — 94640 AIRWAY INHALATION TREATMENT: CPT

## 2017-04-23 PROCEDURE — 85027 COMPLETE CBC AUTOMATED: CPT | Performed by: HOSPITALIST

## 2017-04-23 PROCEDURE — 71020 XR CHEST PA LAT: CPT

## 2017-04-23 RX ADMIN — AMLODIPINE BESYLATE 2.5 MG: 5 TABLET ORAL at 09:14

## 2017-04-23 RX ADMIN — ALLOPURINOL 200 MG: 100 TABLET ORAL at 09:14

## 2017-04-23 RX ADMIN — IPRATROPIUM BROMIDE AND ALBUTEROL SULFATE 3 ML: .5; 3 SOLUTION RESPIRATORY (INHALATION) at 11:30

## 2017-04-23 RX ADMIN — Medication 10 ML: at 14:55

## 2017-04-23 RX ADMIN — ACETYLCYSTEINE 200 MG: 200 SOLUTION ORAL; RESPIRATORY (INHALATION) at 08:01

## 2017-04-23 RX ADMIN — METHYLPREDNISOLONE SODIUM SUCCINATE 40 MG: 40 INJECTION, POWDER, FOR SOLUTION INTRAMUSCULAR; INTRAVENOUS at 21:44

## 2017-04-23 RX ADMIN — Medication 10 ML: at 21:44

## 2017-04-23 RX ADMIN — METOPROLOL SUCCINATE 50 MG: 50 TABLET, EXTENDED RELEASE ORAL at 09:14

## 2017-04-23 RX ADMIN — IPRATROPIUM BROMIDE AND ALBUTEROL SULFATE 3 ML: .5; 3 SOLUTION RESPIRATORY (INHALATION) at 08:01

## 2017-04-23 RX ADMIN — AZITHROMYCIN MONOHYDRATE 500 MG: 500 INJECTION, POWDER, LYOPHILIZED, FOR SOLUTION INTRAVENOUS at 09:14

## 2017-04-23 RX ADMIN — Medication 10 ML: at 07:31

## 2017-04-23 RX ADMIN — ACETAMINOPHEN 650 MG: 325 TABLET, FILM COATED ORAL at 23:40

## 2017-04-23 RX ADMIN — IPRATROPIUM BROMIDE AND ALBUTEROL SULFATE 3 ML: .5; 3 SOLUTION RESPIRATORY (INHALATION) at 04:06

## 2017-04-23 RX ADMIN — CEFTRIAXONE 1 G: 1 INJECTION, POWDER, FOR SOLUTION INTRAMUSCULAR; INTRAVENOUS at 14:55

## 2017-04-23 RX ADMIN — ACETYLCYSTEINE 200 MG: 200 SOLUTION ORAL; RESPIRATORY (INHALATION) at 20:45

## 2017-04-23 RX ADMIN — WARFARIN SODIUM 3 MG: 2 TABLET ORAL at 17:37

## 2017-04-23 RX ADMIN — METHYLPREDNISOLONE SODIUM SUCCINATE 40 MG: 40 INJECTION, POWDER, FOR SOLUTION INTRAMUSCULAR; INTRAVENOUS at 14:54

## 2017-04-23 RX ADMIN — METHYLPREDNISOLONE SODIUM SUCCINATE 40 MG: 40 INJECTION, POWDER, FOR SOLUTION INTRAMUSCULAR; INTRAVENOUS at 07:30

## 2017-04-23 RX ADMIN — IPRATROPIUM BROMIDE AND ALBUTEROL SULFATE 3 ML: .5; 3 SOLUTION RESPIRATORY (INHALATION) at 15:25

## 2017-04-23 RX ADMIN — IPRATROPIUM BROMIDE AND ALBUTEROL SULFATE 3 ML: .5; 3 SOLUTION RESPIRATORY (INHALATION) at 20:45

## 2017-04-23 NOTE — PROGRESS NOTES
[address] SHIFT NURSING NOTE    Bedside shift change report given to Patricia Logan (oncoming nurse) by Jose Khan (offgoing nurse). Report included the following information SBAR, Kardex and MAR. SHIFT SUMMARY:   9076 Pt off floor for procedure. 1400 On room air at rest patient O2 at 96%. On room air with exertion pt O2 sat dropped to 86%. On 2L NC with exertion patient maintained O2 at 96%. Admission Date 4/18/2017   Admission Diagnosis Pneumonia   Consults None        Consults   [x] PT   [x] OT   [] Speech   [] Palliative      [] Hospice    [] Case Management   [] None   Cardiac Monitoring   [x] Yes   [] No     Antibiotics   [x] Yes   [] No   GI Prophylaxis  (Ex: Protonix, Pepcid, etc,.)   [] Yes   [x] No          DVT Prophylaxis   SCDs:             Cricket stockings:         [] Medication (Ex: Lovenox, Eliquis, Brilinta, Coumadin,  Heparin, etc..)   [] Contraindicated   [x] No VTE needed       Urinary Catheter             LDAs               Peripheral IV 04/18/17 Left Antecubital (Active)   Site Assessment Clean, dry, & intact 4/23/2017  7:40 AM   Phlebitis Assessment 0 4/23/2017  7:40 AM   Infiltration Assessment 0 4/23/2017  7:40 AM   Dressing Status Clean, dry, & intact 4/23/2017  7:40 AM   Dressing Type Transparent;Tape 4/23/2017  7:40 AM   Hub Color/Line Status Pink;Capped 4/23/2017  7:40 AM                      I/Os   Intake/Output Summary (Last 24 hours) at 04/23/17 0800  Last data filed at 04/22/17 1315   Gross per 24 hour   Intake              840 ml   Output              400 ml   Net              440 ml         Activity Level Activity Level: Up with Assistance     Activity Assistance: Partial (one person)   Diet Active Orders   Diet    DIET CARDIAC Regular; 2 GM NA (House Low NA)      Purposeful Rounding every 1-2 hour?    [x] Yes    Shane Score  Total Score: 2   Bed Alarm (If score 3 or >)   [] Yes    [] Refused (See signed refusal form in chart)   Michael Score  Michael Score: 18       Michael Score (if score 14 or less)   [] PMT consult   [] Nutrition consult   [] Wound Care consult      []  Specialty bed         Influenza Vaccine Received Flu Vaccine for Current Season (usually Sept-March): Not Flu Season               Needs prior to discharge:   Home O2 required:    [x] Yes   [] No     If yes, how much O2 required?     Other:    Last Bowel Movement Date: 04/22/17   Readmission Risk Assessment Tool Score High Risk            22       Total Score        3 Relationship with PCP    4 More than 1 Admission in calendar year    9 Patient Insurance is Medicare, Medicaid or Self Pay    6 Charlson Comorbidity Score        Criteria that do not apply:    Patient Living Status    Patient Length of Stay > 5       Expected Length of Stay 4d 21h   Actual Length of Stay 5

## 2017-04-23 NOTE — PROGRESS NOTES
Pharmacy Daily Dosing of Warfarin    Indication: afib  Goal INR: 2-3    Home Warfarin Dose: 6 mg Monday and Friday, 4 mg Tuesday, Thursday, Saturday, Sunday  Concurrent Anticoagulants/Antiplatelets none  Major Interacting Medications: azithromycin - increases INR, allopurinol (on this PTA)    INR (0.9-1.1) > 5 or Platelets (< 61C): discuss with MD:  Recent Labs      04/23/17   0437  04/22/17   0325  04/21/17   0333   INR  2.4*  2.5*  2.4*   HGB  12.6   --   11.7   PLT  195   --   186       Impression/Plan: INR stable, therapetuic  Warfarin 3 mg today     Pharmacy will follow daily and adjust the dose as appropriate.     Thanks for the consult  Elda Felix PHARMD

## 2017-04-23 NOTE — PROGRESS NOTES
Hospitalist Progress Note    NAME: Annalisa Yoon   :  1918   MRN:  [id number]       Interim Hospital Summary: 80 y.o. female whom presented on 2017 with      Assessment / Plan:    Sepsis POA  tachypnea, tachycardia. CXR Increased linear density at the left lung treatment site. Superimposed pneumonia versus recurrent cancer. Due to Left perihilar pneumonia POA   Recurrent mass on CT, in setting of h/o  H/o remote R Breast Ca, Lung Ca & Colon CA  Improving symptomatically  -will do 6 min walk test and repeat CXR  -will taper steroids, cont suppl oxygen to keep sats above 90  -cont IV Abx- rocephin + Azithromycin day 5  -Duoneb Q 4 RT Mucinex, Robitussin DM prn cough    need repeat imaging with CT in 3-4 weeks to see if Left perihilar ASD resolved with Abx treatment - if not then may need recurrence of Lung Ca workup     UA concerning for UTI: POA  Urine cx not sent,  ceftriaxone will cover for UTI. Chronic CKD stage 3  Stable, Cr at baseline ~ 1.4/1.5    AFib with rate control POA  HTN     Cont Coumadin, Toprol, norvasc     GOUT  Cont allopurinol  Colchicine prn      obesity  BMI of 36.9kg    Code Status: Full as per pt's wishes in ER  Surrogate Decision Maker: mPOA daughter Flakita Cox # [phone]     DVT Prophylaxis: on Coumadin, INR therapeutic  GI Prophylaxis: not indicated     Baseline: pt lives with family at home - is functional at her age. Subjective:     Chief Complaint / Reason for Physician Visit  \" had good night and feeling good this morning\". Discussed with RN events overnight.      Review of Systems:  Symptom Y/N Comments  Symptom Y/N Comments   Fever/Chills n   Chest Pain n    Poor Appetite n   Edema     Cough y   Abdominal Pain n    Sputum y   Joint Pain n    SOB/WOOTEN y   Pruritis/Rash n    Nausea/vomit n   Tolerating PT/OT     Diarrhea    Tolerating Diet y    Constipation    Other       Could NOT obtain due to:      Objective:     VITALS:   Last 24hrs VS reviewed since prior progress note. Most recent are:  Patient Vitals for the past 24 hrs:   Temp Pulse Resp BP SpO2   04/23/17 0801 - - - - 98 %   04/23/17 0739 97.9 °F (36.6 °C) 81 18 (!) 152/100 100 %   04/23/17 0405 98 °F (36.7 °C) 77 20 (!) 150/95 97 %   04/22/17 2337 - - - - 98 %   04/22/17 2230 97.9 °F (36.6 °C) 76 18 140/84 96 %   04/22/17 2045 - - - - 98 %   04/22/17 1955 98 °F (36.7 °C) 83 18 148/86 98 %   04/22/17 1704 - - - - 98 %   04/22/17 1445 97.9 °F (36.6 °C) 80 20 136/88 98 %   04/22/17 1148 - - - - 98 %   04/22/17 1115 97.7 °F (36.5 °C) 83 20 134/76 97 %       Intake/Output Summary (Last 24 hours) at 04/23/17 0953  Last data filed at 04/22/17 1315   Gross per 24 hour   Intake              480 ml   Output              400 ml   Net               80 ml        PHYSICAL EXAM:  General: WD, WN. Alert, cooperative, no acute distress    EENT:  EOMI. Anicteric sclerae. MMM  Resp:  wheeze b/l  CV:  Regular  rhythm,  No edema  GI:  Soft, Non distended, Non tender.  +Bowel sounds  Neurologic:  Alert and oriented X 3, normal speech,   Psych:   Good insight. Not anxious nor agitated  Skin:  No rashes. No jaundice    Reviewed most current lab test results and cultures  YES  Reviewed most current radiology test results   YES  Review and summation of old records today    NO  Reviewed patient's current orders and MAR    YES  PMH/SH reviewed - no change compared to H&P  ________________________________________________________________________  Care Plan discussed with:    Comments   Patient x    Family  x daughter   RN     Care Manager     Consultant                        Multidiciplinary team rounds were held today with , nursing, pharmacist and clinical coordinator. Patient's plan of care was discussed; medications were reviewed and discharge planning was addressed.      ________________________________________________________________________  Total NON critical care TIME:  35   Minutes    Total CRITICAL CARE TIME Spent: Minutes non procedure based      Comments   >50% of visit spent in counseling and coordination of care     ________________________________________________________________________  Dot Abbasi MD     Procedures: see electronic medical records for all procedures/Xrays and details which were not copied into this note but were reviewed prior to creation of Plan. LABS:  I reviewed today's most current labs and imaging studies.   Pertinent labs include:  Recent Labs      04/23/17 0437 04/21/17   0333   WBC  8.2  6.6   HGB  12.6  11.7   HCT  38.3  36.5   PLT  195  186     Recent Labs      04/23/17 0437 04/22/17   0325  04/21/17   0333   NA   --    --   143   K   --    --   4.5   CL   --    --   108   CO2   --    --   24   GLU   --    --   136*   BUN   --    --   31*   CREA   --    --   1.43*   CA   --    --   9.5   INR  2.4*  2.5*  2.4*       Signed: Dot Abbasi MD

## 2017-04-24 VITALS
DIASTOLIC BLOOD PRESSURE: 78 MMHG | TEMPERATURE: 97.3 F | HEART RATE: 72 BPM | HEIGHT: 62 IN | WEIGHT: 216.49 LBS | SYSTOLIC BLOOD PRESSURE: 130 MMHG | OXYGEN SATURATION: 95 % | BODY MASS INDEX: 39.84 KG/M2 | RESPIRATION RATE: 20 BRPM

## 2017-04-24 LAB
INR PPP: 2.2 (ref 0.9–1.1)
PROTHROMBIN TIME: 23.1 SEC (ref 9–11.1)

## 2017-04-24 PROCEDURE — 74011250637 HC RX REV CODE- 250/637: Performed by: INTERNAL MEDICINE

## 2017-04-24 PROCEDURE — 74011250636 HC RX REV CODE- 250/636: Performed by: HOSPITALIST

## 2017-04-24 PROCEDURE — 36415 COLL VENOUS BLD VENIPUNCTURE: CPT | Performed by: INTERNAL MEDICINE

## 2017-04-24 PROCEDURE — 77030029684 HC NEB SM VOL KT MONA -A

## 2017-04-24 PROCEDURE — 85610 PROTHROMBIN TIME: CPT | Performed by: INTERNAL MEDICINE

## 2017-04-24 PROCEDURE — 94640 AIRWAY INHALATION TREATMENT: CPT

## 2017-04-24 PROCEDURE — 74011250636 HC RX REV CODE- 250/636: Performed by: INTERNAL MEDICINE

## 2017-04-24 PROCEDURE — 77010033678 HC OXYGEN DAILY

## 2017-04-24 PROCEDURE — 74011000250 HC RX REV CODE- 250: Performed by: HOSPITALIST

## 2017-04-24 RX ORDER — FUROSEMIDE 10 MG/ML
40 INJECTION INTRAMUSCULAR; INTRAVENOUS DAILY
Status: DISCONTINUED | OUTPATIENT
Start: 2017-04-24 | End: 2017-04-24 | Stop reason: HOSPADM

## 2017-04-24 RX ORDER — PREDNISONE 10 MG/1
TABLET ORAL
Qty: 21 TAB | Refills: 0 | Status: SHIPPED | OUTPATIENT
Start: 2017-04-24 | End: 2017-12-09

## 2017-04-24 RX ORDER — PEN NEEDLE, DIABETIC 31 GX5/16"
1 NEEDLE, DISPOSABLE MISCELLANEOUS
Qty: 1 EACH | Refills: 0 | Status: SHIPPED | OUTPATIENT
Start: 2017-04-24 | End: 2018-05-29

## 2017-04-24 RX ORDER — GUAIFENESIN/DEXTROMETHORPHAN 100-10MG/5
10 SYRUP ORAL
Qty: 1 BOTTLE | Refills: 0 | Status: SHIPPED | OUTPATIENT
Start: 2017-04-24 | End: 2017-05-04

## 2017-04-24 RX ORDER — LEVOFLOXACIN 500 MG/1
500 TABLET, FILM COATED ORAL DAILY
Qty: 3 TAB | Refills: 0 | Status: SHIPPED | OUTPATIENT
Start: 2017-04-24 | End: 2017-04-27

## 2017-04-24 RX ORDER — IPRATROPIUM BROMIDE AND ALBUTEROL SULFATE 2.5; .5 MG/3ML; MG/3ML
3 SOLUTION RESPIRATORY (INHALATION)
Qty: 30 NEBULE | Refills: 1 | Status: ON HOLD | OUTPATIENT
Start: 2017-04-24 | End: 2018-07-18

## 2017-04-24 RX ADMIN — IPRATROPIUM BROMIDE AND ALBUTEROL SULFATE 3 ML: .5; 3 SOLUTION RESPIRATORY (INHALATION) at 11:22

## 2017-04-24 RX ADMIN — AMLODIPINE BESYLATE 2.5 MG: 5 TABLET ORAL at 09:05

## 2017-04-24 RX ADMIN — ALLOPURINOL 200 MG: 100 TABLET ORAL at 09:05

## 2017-04-24 RX ADMIN — AZITHROMYCIN MONOHYDRATE 500 MG: 500 INJECTION, POWDER, LYOPHILIZED, FOR SOLUTION INTRAVENOUS at 09:05

## 2017-04-24 RX ADMIN — FUROSEMIDE 40 MG: 10 INJECTION, SOLUTION INTRAMUSCULAR; INTRAVENOUS at 09:05

## 2017-04-24 RX ADMIN — METHYLPREDNISOLONE SODIUM SUCCINATE 40 MG: 40 INJECTION, POWDER, FOR SOLUTION INTRAMUSCULAR; INTRAVENOUS at 05:35

## 2017-04-24 RX ADMIN — Medication 10 ML: at 05:35

## 2017-04-24 RX ADMIN — IPRATROPIUM BROMIDE AND ALBUTEROL SULFATE 3 ML: .5; 3 SOLUTION RESPIRATORY (INHALATION) at 00:01

## 2017-04-24 RX ADMIN — ACETYLCYSTEINE 200 MG: 200 SOLUTION ORAL; RESPIRATORY (INHALATION) at 08:22

## 2017-04-24 RX ADMIN — IPRATROPIUM BROMIDE AND ALBUTEROL SULFATE 3 ML: .5; 3 SOLUTION RESPIRATORY (INHALATION) at 04:27

## 2017-04-24 RX ADMIN — IPRATROPIUM BROMIDE AND ALBUTEROL SULFATE 3 ML: .5; 3 SOLUTION RESPIRATORY (INHALATION) at 08:22

## 2017-04-24 RX ADMIN — METOPROLOL SUCCINATE 50 MG: 50 TABLET, EXTENDED RELEASE ORAL at 09:05

## 2017-04-24 NOTE — PROGRESS NOTES
Patient seen and examined by me. Patient meets criteria for home oxygen for diagnosis of Hypoxia secondary to pneumonia concern for post obstructive and underlying malignancy. She will need 2 liters continuous home oxygen via nasal cannula with portable tank.

## 2017-04-24 NOTE — DISCHARGE SUMMARY
Hospitalist Discharge Summary     Patient ID:  Nicolasa Gaston  [id number]  47 y.o.  8/12/1918    PCP on record: Cecy Lombardi    Admit date: 4/18/2017  Discharge date and time: 4/24/2017      DISCHARGE DIAGNOSIS:    Sepsis POA  Left perihilar pneumonia POA   UA concerning for UTI: POA  Chronic CKD stage 3  AFib with rate control POA  HTN  GOUT  obesity      CONSULTATIONS:  None    Excerpted HPI from H&P of Saran Torres MD:  Cynthia De Los Santos is a 80 y.o.  female who presents with above complains from home ambulatory. Pt complains of worsening Cough with productive sputum x 3 days. No h/o associated fever, chills, myalgia or upper respiratory failure, worsening on lying down  Denies any aggravating Or relieving factors for the cough. H/o Lung cancer remote- pt & family claims she is cured & in remission  H/o Colon & R breast Ca too in past  No h/o sick contact  Claims pt is up to date with Flu & pneumonia shots as per pcp     Pt was found to have CXR & CT chest showing L perihilar ASD/lesion with mediastinal LNs with evidence of mild dehydration on blood work in ER        We were asked to admit for work up and evaluation of the above problems    ______________________________________________________________________  DISCHARGE SUMMARY/HOSPITAL COURSE:  for full details see H&P, daily progress notes, labs, consult notes. Sepsis POA  tachypnea, tachycardia. CXR Increased linear density at the left lung treatment site. Superimposed pneumonia versus recurrent cancer.   Due to Left perihilar pneumonia POA   Recurrent mass on CT, in setting of h/o H/o remote R Breast Ca, Lung Ca & Colon CA  Improved symptomatically  -will need home oxygen on discharge, oxygen arranged.  -treated with IV, abx, treated with ceftriaxone and azith-for 7 days and will d/c on 3 days levaquin  -Duoneb Q 4 RT Mucinex, Robitussin DM prn cough     Need repeat imaging with CT in 3-4 weeks to see if Left perihilar ASD resolved with Abx treatment - if not then may need recurrence of Lung Ca workup  Communicated with son and patient and voiced understanding      UA concerning for UTI: POA  Urine cx not sent, ceftriaxone will cover for UTI.     Chronic CKD stage 3  Stable, Cr at baseline ~ 1.4/1. 5     AFib with rate control POA  HTN      Cont Coumadin, Toprol, norvasc      GOUT  Cont allopurinol  Colchicine prn       obesity  BMI of 36.9kg     Code Status: Full as per pt's wishes in ER  Surrogate Decision Maker: Ehsan daughter Mariely Grossman # [phone]  Baseline: pt lives with family at home - is functional at her age.  _______________________________________________________________________  Patient seen and examined by me on discharge day. Pertinent Findings:  Gen:    Not in distress  Chest: Clear lungs  CVS:   Regular rhythm. No edema  Abd:  Soft, not distended, not tender  Neuro:  Alert, CN 2-12 grossly intact  _______________________________________________________________________  DISCHARGE MEDICATIONS:   Current Discharge Medication List      START taking these medications    Details   albuterol-ipratropium (DUO-NEB) 2.5 mg-0.5 mg/3 ml nebu 3 mL by Nebulization route every four (4) hours as needed. Qty: 30 Nebule, Refills: 1      guaiFENesin-dextromethorphan (ROBITUSSIN DM) 100-10 mg/5 mL syrup Take 10 mL by mouth every six (6) hours as needed for up to 10 days. Qty: 1 Bottle, Refills: 0      levoFLOXacin (LEVAQUIN) 500 mg tablet Take 1 Tab by mouth daily for 3 days. Qty: 3 Tab, Refills: 0      Nebulizers misc 1 Inhalation by Does Not Apply route every four (4) hours as needed. Qty: 1 Each, Refills: 0      predniSONE (STERAPRED DS) 10 mg dose pack See administration instruction per 10mg dose pack  Qty: 21 Tab, Refills: 0         CONTINUE these medications which have NOT CHANGED    Details   !! warfarin (COUMADIN) 6 mg tablet Take 1 tablet by mouth every Monday and Friday.   Qty: 1 tablet, Refills: 0      !! warfarin (COUMADIN) 4 mg tablet Take 1 tablet by mouth every Tuesday, Thursday, Saturday & Sunday. Qty: 30 tablet, Refills: 0      furosemide (LASIX) 20 mg tablet 40mg daily PO  Qty: 30 tablet, Refills: 0      albuterol (PROVENTIL HFA, VENTOLIN HFA, PROAIR HFA) 90 mcg/actuation inhaler Take 2 puffs by inhalation every four (4) hours as needed for Wheezing. potassium chloride (KLOR-CON) 20 mEq packet Take 20 mEq by mouth daily. metoprolol-XL (TOPROL-XL) 50 mg XL tablet Take 1 Tab by mouth daily. Qty: 30 Tab, Refills: 0      allopurinol (ZYLOPRIM) 100 mg tablet Take 200 mg by mouth two (2) times a day. amLODIPine (NORVASC) 2.5 mg tablet Take 2.5 mg by mouth daily. colchicine (COLCRYS) 0.6 mg tablet Take 0.6 mg by mouth every other day. acetaminophen (TYLENOL EXTRA STRENGTH) 500 mg tablet Take 1,000 mg by mouth every six (6) hours as needed for Pain. !! - Potential duplicate medications found. Please discuss with provider. STOP taking these medications       guaiFENesin (ROBITUSSIN) 100 mg/5 mL liquid Comments:   Reason for Stopping:         lisinopril (PRINIVIL, ZESTRIL) 2.5 mg tablet Comments:   Reason for Stopping:               My Recommended Diet, Activity, Wound Care, and follow-up labs are listed in the patient's Discharge Insturctions which I have personally completed and reviewed.     _______________________________________________________________________  DISPOSITION:    Home with Family:    Home with HH/PT/OT/RN:    SNF/LTC:    LASHONDA:    OTHER:        Condition at Discharge:  Stable  _______________________________________________________________________  Follow up with:   PCP : Eder Narvaez  Follow-up Information     Follow up With Details Comments [id number]  [phone]                Total time in minutes spent coordinating this discharge (includes going over instructions, follow-up, prescriptions, and preparing report for sign off to her PCP) :  35 minutes    Signed:  Cinda Garcia MD

## 2017-04-24 NOTE — PROGRESS NOTES
Pt is being discharged to home today with her family. Family will transport home. Pt is needing home oxygen, referral being sent to [facility], waiting on their response to see if they deliver to pt's area. Follow up appt has been made, AVS updated. I have called pt's daughter and notified her of pt's discharge, she said someone will be up to  the pt. CM will continue to follow. Aranza Srinivasan RN    Ext [phone] - [facility] has accepted pt and a portable tank has been delivered to pt's room. I have updated pt's daughter. MD has written prescription for pt to continue outpt PT, pt is aware and prescription will be given to her with her d/c paperwork.

## 2017-04-24 NOTE — PROGRESS NOTES
Pt discharged to home, instructions reviewed with pt and pt's daughter and copies given along with prescriptions. Pt's IV & telemetry removed, opportunity for questions provided.

## 2017-06-20 ENCOUNTER — HOSPITAL ENCOUNTER (OUTPATIENT)
Dept: CT IMAGING | Age: 82
Discharge: HOME | End: 2017-06-20
Attending: FAMILY MEDICINE
Payer: MEDICARE

## 2017-06-20 DIAGNOSIS — R93.89 ABNORMAL CHEST XRAY: ICD-10-CM

## 2017-06-20 PROCEDURE — 71250 CT THORAX DX C-: CPT

## 2017-12-09 ENCOUNTER — APPOINTMENT (OUTPATIENT)
Dept: GENERAL RADIOLOGY | Age: 82
End: 2017-12-09
Attending: EMERGENCY MEDICINE
Payer: MEDICARE

## 2017-12-09 ENCOUNTER — HOSPITAL ENCOUNTER (EMERGENCY)
Age: 82
Discharge: HOME | End: 2017-12-09
Attending: EMERGENCY MEDICINE
Payer: MEDICARE

## 2017-12-09 ENCOUNTER — APPOINTMENT (OUTPATIENT)
Dept: CT IMAGING | Age: 82
End: 2017-12-09
Attending: PHYSICIAN ASSISTANT
Payer: MEDICARE

## 2017-12-09 VITALS
SYSTOLIC BLOOD PRESSURE: 151 MMHG | RESPIRATION RATE: 23 BRPM | HEART RATE: 94 BPM | WEIGHT: 204 LBS | HEIGHT: 62 IN | DIASTOLIC BLOOD PRESSURE: 95 MMHG | OXYGEN SATURATION: 93 % | BODY MASS INDEX: 37.54 KG/M2 | TEMPERATURE: 98.5 F

## 2017-12-09 DIAGNOSIS — J90 PLEURAL EFFUSION, LEFT: ICD-10-CM

## 2017-12-09 DIAGNOSIS — J20.8 ACUTE BRONCHITIS DUE TO OTHER SPECIFIED ORGANISMS: Primary | ICD-10-CM

## 2017-12-09 LAB
ALBUMIN SERPL-MCNC: 3.2 G/DL (ref 3.5–5)
ALBUMIN/GLOB SERPL: 0.6 {RATIO} (ref 1.1–2.2)
ALP SERPL-CCNC: 134 U/L (ref 45–117)
ALT SERPL-CCNC: 20 U/L (ref 12–78)
ANION GAP SERPL CALC-SCNC: 6 MMOL/L (ref 5–15)
APPEARANCE UR: ABNORMAL
AST SERPL-CCNC: 33 U/L (ref 15–37)
BACTERIA URNS QL MICRO: ABNORMAL /HPF
BASOPHILS # BLD: 0.1 K/UL (ref 0–0.1)
BASOPHILS NFR BLD: 1 % (ref 0–1)
BILIRUB SERPL-MCNC: 0.6 MG/DL (ref 0.2–1)
BILIRUB UR QL: NEGATIVE
BNP SERPL-MCNC: 3059 PG/ML (ref 0–450)
BUN SERPL-MCNC: 18 MG/DL (ref 6–20)
BUN/CREAT SERPL: 11 (ref 12–20)
CALCIUM SERPL-MCNC: 9.3 MG/DL (ref 8.5–10.1)
CHLORIDE SERPL-SCNC: 103 MMOL/L (ref 97–108)
CK MB CFR SERPL CALC: 0.8 % (ref 0–2.5)
CK MB SERPL-MCNC: 1.1 NG/ML (ref 5–25)
CK SERPL-CCNC: 136 U/L (ref 26–192)
CO2 SERPL-SCNC: 32 MMOL/L (ref 21–32)
COLOR UR: ABNORMAL
CREAT SERPL-MCNC: 1.71 MG/DL (ref 0.55–1.02)
EOSINOPHIL # BLD: 0 K/UL (ref 0–0.4)
EOSINOPHIL NFR BLD: 0 % (ref 0–7)
EPITH CASTS URNS QL MICRO: ABNORMAL /LPF
ERYTHROCYTE [DISTWIDTH] IN BLOOD BY AUTOMATED COUNT: 17.6 % (ref 11.5–14.5)
GLOBULIN SER CALC-MCNC: 5 G/DL (ref 2–4)
GLUCOSE SERPL-MCNC: 95 MG/DL (ref 65–100)
GLUCOSE UR STRIP.AUTO-MCNC: NEGATIVE MG/DL
HCT VFR BLD AUTO: 39.4 % (ref 35–47)
HGB BLD-MCNC: 12.9 G/DL (ref 11.5–16)
HGB UR QL STRIP: NEGATIVE
HYALINE CASTS URNS QL MICRO: ABNORMAL /LPF (ref 0–5)
KETONES UR QL STRIP.AUTO: NEGATIVE MG/DL
LEUKOCYTE ESTERASE UR QL STRIP.AUTO: ABNORMAL
LYMPHOCYTES # BLD: 0.3 K/UL (ref 0.8–3.5)
LYMPHOCYTES NFR BLD: 6 % (ref 12–49)
MCH RBC QN AUTO: 26.4 PG (ref 26–34)
MCHC RBC AUTO-ENTMCNC: 32.7 G/DL (ref 30–36.5)
MCV RBC AUTO: 80.6 FL (ref 80–99)
MONOCYTES # BLD: 0.8 K/UL (ref 0–1)
MONOCYTES NFR BLD: 15 % (ref 5–13)
NEUTS SEG # BLD: 4 K/UL (ref 1.8–8)
NEUTS SEG NFR BLD: 78 % (ref 32–75)
NITRITE UR QL STRIP.AUTO: NEGATIVE
PH UR STRIP: 6.5 [PH] (ref 5–8)
PLATELET # BLD AUTO: 244 K/UL (ref 150–400)
POTASSIUM SERPL-SCNC: 4 MMOL/L (ref 3.5–5.1)
PROT SERPL-MCNC: 8.2 G/DL (ref 6.4–8.2)
PROT UR STRIP-MCNC: 100 MG/DL
RBC # BLD AUTO: 4.89 M/UL (ref 3.8–5.2)
RBC #/AREA URNS HPF: ABNORMAL /HPF (ref 0–5)
RBC MORPH BLD: ABNORMAL
SODIUM SERPL-SCNC: 141 MMOL/L (ref 136–145)
SP GR UR REFRACTOMETRY: 1.02 (ref 1–1.03)
TROPONIN I SERPL-MCNC: <0.04 NG/ML
UA: UC IF INDICATED,UAUC: ABNORMAL
UROBILINOGEN UR QL STRIP.AUTO: 1 EU/DL (ref 0.2–1)
WBC # BLD AUTO: 5.2 K/UL (ref 3.6–11)
WBC URNS QL MICRO: ABNORMAL /HPF (ref 0–4)

## 2017-12-09 PROCEDURE — 85025 COMPLETE CBC W/AUTO DIFF WBC: CPT | Performed by: EMERGENCY MEDICINE

## 2017-12-09 PROCEDURE — 71020 XR CHEST PA LAT: CPT

## 2017-12-09 PROCEDURE — 71250 CT THORAX DX C-: CPT

## 2017-12-09 PROCEDURE — 77030029684 HC NEB SM VOL KT MONA -A

## 2017-12-09 PROCEDURE — 74011000250 HC RX REV CODE- 250: Performed by: PHYSICIAN ASSISTANT

## 2017-12-09 PROCEDURE — 87086 URINE CULTURE/COLONY COUNT: CPT | Performed by: PHYSICIAN ASSISTANT

## 2017-12-09 PROCEDURE — 84484 ASSAY OF TROPONIN QUANT: CPT | Performed by: PHYSICIAN ASSISTANT

## 2017-12-09 PROCEDURE — 80053 COMPREHEN METABOLIC PANEL: CPT | Performed by: PHYSICIAN ASSISTANT

## 2017-12-09 PROCEDURE — 93005 ELECTROCARDIOGRAM TRACING: CPT

## 2017-12-09 PROCEDURE — 94640 AIRWAY INHALATION TREATMENT: CPT

## 2017-12-09 PROCEDURE — 36415 COLL VENOUS BLD VENIPUNCTURE: CPT | Performed by: EMERGENCY MEDICINE

## 2017-12-09 PROCEDURE — 99284 EMERGENCY DEPT VISIT MOD MDM: CPT

## 2017-12-09 PROCEDURE — 83880 ASSAY OF NATRIURETIC PEPTIDE: CPT | Performed by: PHYSICIAN ASSISTANT

## 2017-12-09 PROCEDURE — 82550 ASSAY OF CK (CPK): CPT | Performed by: PHYSICIAN ASSISTANT

## 2017-12-09 PROCEDURE — 74011250637 HC RX REV CODE- 250/637: Performed by: PHYSICIAN ASSISTANT

## 2017-12-09 PROCEDURE — 81001 URINALYSIS AUTO W/SCOPE: CPT | Performed by: PHYSICIAN ASSISTANT

## 2017-12-09 RX ORDER — ALBUTEROL SULFATE 90 UG/1
2 AEROSOL, METERED RESPIRATORY (INHALATION)
Qty: 1 INHALER | Refills: 0 | Status: SHIPPED | OUTPATIENT
Start: 2017-12-09 | End: 2018-07-01

## 2017-12-09 RX ORDER — IPRATROPIUM BROMIDE AND ALBUTEROL SULFATE 2.5; .5 MG/3ML; MG/3ML
3 SOLUTION RESPIRATORY (INHALATION)
Status: COMPLETED | OUTPATIENT
Start: 2017-12-09 | End: 2017-12-09

## 2017-12-09 RX ORDER — DOXYCYCLINE HYCLATE 100 MG
100 TABLET ORAL 2 TIMES DAILY
Qty: 13 TAB | Refills: 0 | Status: SHIPPED | OUTPATIENT
Start: 2017-12-09 | End: 2017-12-16

## 2017-12-09 RX ORDER — DOXYCYCLINE HYCLATE 100 MG
100 TABLET ORAL
Status: COMPLETED | OUTPATIENT
Start: 2017-12-09 | End: 2017-12-09

## 2017-12-09 RX ORDER — BENZONATATE 100 MG/1
100 CAPSULE ORAL
COMMUNITY
End: 2017-12-09

## 2017-12-09 RX ORDER — BENZONATATE 100 MG/1
100 CAPSULE ORAL
Qty: 30 CAP | Refills: 0 | Status: SHIPPED | OUTPATIENT
Start: 2017-12-09 | End: 2017-12-16

## 2017-12-09 RX ORDER — AZITHROMYCIN 250 MG/1
500 TABLET, FILM COATED ORAL
Status: DISCONTINUED | OUTPATIENT
Start: 2017-12-09 | End: 2017-12-09

## 2017-12-09 RX ADMIN — IPRATROPIUM BROMIDE AND ALBUTEROL SULFATE 3 ML: .5; 3 SOLUTION RESPIRATORY (INHALATION) at 14:42

## 2017-12-09 RX ADMIN — DOXYCYCLINE HYCLATE 100 MG: 100 TABLET, COATED ORAL at 17:54

## 2017-12-09 NOTE — ED NOTES
Assisted pt to stand/pivot to [facility]. Pt back to bed and position of comfort  Clean catch urine obtained and sent. Son back to bedside.

## 2017-12-09 NOTE — ED NOTES
Case discussed with Isaías WINSLOW; will give po dose of AB, then discharge home with instructions to use home O2.

## 2017-12-09 NOTE — ED NOTES
Assumed care of pt from triage. Pt transferred from chair to bed. Pt reports she has had SOB and coughing up phlegm \"cold like\". Dizziness has been ongoing for a few days. Pt denies constipation or urinary discomfort.   Son at bedside

## 2017-12-09 NOTE — ROUTINE PROCESS
I have reviewed discharge instructions with the patient. The patient verbalized understanding. Son at bedside. Assisted into clothes and to wheelchair by RN, to private vehicle.

## 2017-12-09 NOTE — DISCHARGE INSTRUCTIONS
Bronchitis: Care Instructions  Your Care Instructions    Bronchitis is inflammation of the bronchial tubes, which carry air to the lungs. The tubes swell and produce mucus, or phlegm. The mucus and inflamed bronchial tubes make you cough. You may have trouble breathing. Most cases of bronchitis are caused by viruses like those that cause colds. Antibiotics usually do not help and they may be harmful. Bronchitis usually develops rapidly and lasts about 2 to 3 weeks in otherwise healthy people. Follow-up care is a key part of your treatment and safety. Be sure to make and go to all appointments, and call your doctor if you are having problems. It's also a good idea to know your test results and keep a list of the medicines you take. How can you care for yourself at home? · Take all medicines exactly as prescribed. Call your doctor if you think you are having a problem with your medicine. · Get some extra rest.  · Take an over-the-counter pain medicine, such as acetaminophen (Tylenol), ibuprofen (Advil, Motrin), or naproxen (Aleve) to reduce fever and relieve body aches. Read and follow all instructions on the label. · Do not take two or more pain medicines at the same time unless the doctor told you to. Many pain medicines have acetaminophen, which is Tylenol. Too much acetaminophen (Tylenol) can be harmful. · Take an over-the-counter cough medicine that contains dextromethorphan to help quiet a dry, hacking cough so that you can sleep. Avoid cough medicines that have more than one active ingredient. Read and follow all instructions on the label. · Breathe moist air from a humidifier, hot shower, or sink filled with hot water. The heat and moisture will thin mucus so you can cough it out. · Do not smoke. Smoking can make bronchitis worse. If you need help quitting, talk to your doctor about stop-smoking programs and medicines. These can increase your chances of quitting for good.   When should you call for help? Call 911 anytime you think you may need emergency care. For example, call if:  ? · You have severe trouble breathing. ?Call your doctor now or seek immediate medical care if:  ? · You have new or worse trouble breathing. ? · You cough up dark brown or bloody mucus (sputum). ? · You have a new or higher fever. ? · You have a new rash. ? Watch closely for changes in your health, and be sure to contact your doctor if:  ? · You cough more deeply or more often, especially if you notice more mucus or a change in the color of your mucus. ? · You are not getting better as expected. Where can you learn more? Go to http://natty-mirza.info/. Enter H333 in the search box to learn more about \"Bronchitis: Care Instructions. \"  Current as of: May 12, 2017  Content Version: 11.4  © 6869-8718 [employer]. Care instructions adapted under license by [employer] (which disclaims liability or warranty for this information). If you have questions about a medical condition or this instruction, always ask your healthcare professional. [address] any warranty or liability for your use of this information.

## 2017-12-09 NOTE — ED PROVIDER NOTES
tablet 40mg daily PO (Patient taking differently: 40 mg two (2) times a day. 40mg daily PO) 30 tablet 0    potassium chloride (KLOR-CON) 20 mEq packet Take 20 mEq by mouth daily. Indications: pill      colchicine (COLCRYS) 0.6 mg tablet Take 0.6 mg by mouth every other day.  acetaminophen (TYLENOL EXTRA STRENGTH) 500 mg tablet Take 1,000 mg by mouth every six (6) hours as needed for Pain.  amLODIPine (NORVASC) 2.5 mg tablet Take 2.5 mg by mouth daily. Past History     Past Medical History:  Past Medical History:   Diagnosis Date    [facility] ([facility] [address] [facility]     Cancer ([facility]     right breast CA, lung, colon    Gastrointestinal disorder     GERD    Hypertension     Other ill-defined conditions(799.89)     gout       Past Surgical History:  Past Surgical History:   Procedure Laterality Date    BREAST SURGERY PROCEDURE UNLISTED  1995    Right mastectomy    HX APPENDECTOMY      HX HEENT      right glass eye    [place] COLONOSCOPY W/BIOPSY SINGLE/MULTIPLE  3/29/2013         [place] COLSC FLX W/RMVL OF TUMOR POLYP LESION SNARE TQ  3/29/2013            Family History:  Family History   Problem Relation Age of Onset    Colon Cancer Other     Hypertension Other        Social History:  Social History   Substance Use Topics    Smoking status: Former Smoker     Quit date: 1967    Smokeless tobacco: Never Used    Alcohol use No       Allergies: Allergies   Allergen Reactions    Penicillins Hives         Review of Systems   Review of Systems   Constitutional: Negative. Negative for chills and fever. HENT: Positive for congestion and ear pain. Negative for rhinorrhea and sore throat. Eyes: Negative. Negative for visual disturbance. Respiratory: Positive for cough and shortness of breath. Negative for chest tightness and wheezing. Cardiovascular: Negative. Negative for chest pain and palpitations. Gastrointestinal: Negative. Negative for abdominal pain, constipation, diarrhea, nausea and vomiting. Genitourinary: Negative. Negative for dysuria and hematuria. Musculoskeletal: Negative. Negative for arthralgias and myalgias. Skin: Negative. Negative for rash. Allergic/Immunologic: Negative. Negative for environmental allergies and food allergies. Neurological: Positive for dizziness. Negative for headaches. Psychiatric/Behavioral: Negative. Negative for suicidal ideas. Physical Exam   Physical Exam   Constitutional: She is oriented to person, place, and time. She appears well-developed and well-nourished. No distress. Pt is an elderly AAF, awake and alert in NAD. Patient does not appear septic. HENT:   Head: Normocephalic and atraumatic. Right Ear: External ear normal.   Left Ear: Tympanic membrane, external ear and ear canal normal.   Nose: Nose normal.   Mouth/Throat: Uvula is midline, oropharynx is clear and moist and mucous membranes are normal.   Dentures in place. Right ear with cerumen impaction; unable to visualize TM. Eyes: Conjunctivae are normal. Right eye exhibits no discharge. 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 right eye prosthesis. Neck: Normal range of motion. Cardiovascular: Normal rate, normal heart sounds and intact distal pulses. Pulmonary/Chest: Effort normal. No respiratory distress. She has no wheezes. She has no rales. No active cough during interview or PE. Shallow breath sounds. Abdominal: Soft. Bowel sounds are normal. There is no tenderness. There is no guarding. No CVA tenderness b/l. Musculoskeletal: Normal range of motion. She exhibits edema. She exhibits no tenderness. Scant 1+ pitting edema bilateral lower extremities. Neurological: She is alert and oriented to person, place, and time. Coordination normal.   No focal neuro deficits. Skin: Skin is warm and dry. No rash noted. She is not diaphoretic. No erythema. No pallor. Psychiatric: She has a normal mood and affect.  Her behavior is normal.   Vitals reviewed. Diagnostic Study Results     Labs -     Recent Results (from the past 12 hour(s))   EKG, 12 LEAD, INITIAL    Collection Time: 12/09/17  1:45 PM   Result Value Ref Range    Ventricular Rate 98 BPM    Atrial Rate 227 BPM    QRS Duration 82 ms    Q-T Interval 354 ms    QTC Calculation (Bezet) 451 ms    Calculated T Axis 100 degrees    Diagnosis       Atrial fibrillation  Low voltage QRS  Abnormal QRS-T angle, consider primary T wave abnormality  When compared with ECG of 18-APR-2017 12:37,  No significant change was found     CBC WITH AUTOMATED DIFF    Collection Time: 12/09/17  2:49 PM   Result Value Ref Range    WBC 5.2 3.6 - 11.0 K/uL    RBC 4.89 3.80 - 5.20 M/uL    HGB 12.9 11.5 - 16.0 g/dL    HCT 39.4 35.0 - 47.0 %    MCV 80.6 80.0 - 99.0 FL    MCH 26.4 26.0 - 34.0 PG    MCHC 32.7 30.0 - 36.5 g/dL    RDW 17.6 (H) 11.5 - 14.5 %    PLATELET 919 939 - 890 K/uL    NEUTROPHILS 78 (H) 32 - 75 %    LYMPHOCYTES 6 (L) 12 - 49 %    MONOCYTES 15 (H) 5 - 13 %    EOSINOPHILS 0 0 - 7 %    BASOPHILS 1 0 - 1 %    ABS. NEUTROPHILS 4.0 1.8 - 8.0 K/UL    ABS. LYMPHOCYTES 0.3 (L) 0.8 - 3.5 K/UL    ABS. MONOCYTES 0.8 0.0 - 1.0 K/UL    ABS. EOSINOPHILS 0.0 0.0 - 0.4 K/UL    ABS.  BASOPHILS 0.1 0.0 - 0.1 K/UL    RBC COMMENTS ANISOCYTOSIS  1+       NT-PRO BNP    Collection Time: 12/09/17  2:49 PM   Result Value Ref Range    NT pro-BNP 3059 (H) 0 - 450 PG/ML   TROPONIN I    Collection Time: 12/09/17  2:49 PM   Result Value Ref Range    Troponin-I, Qt. <0.04 <0.05 ng/mL   CK W/ CKMB & INDEX    Collection Time: 12/09/17  2:49 PM   Result Value Ref Range     26 - 192 U/L    CK - MB 1.1 <3.6 NG/ML    CK-MB Index 0.8 0 - 2.5     METABOLIC PANEL, COMPREHENSIVE    Collection Time: 12/09/17  2:49 PM   Result Value Ref Range    Sodium 141 136 - 145 mmol/L    Potassium 4.0 3.5 - 5.1 mmol/L    Chloride 103 97 - 108 mmol/L    CO2 32 21 - 32 mmol/L    Anion gap 6 5 - 15 mmol/L    Glucose 95 65 - 100 mg/dL    BUN 18 6 - 20 MG/DL Creatinine 1.71 (H) 0.55 - 1.02 MG/DL    BUN/Creatinine ratio 11 (L) 12 - 20      GFR est AA 33 (L) >60 ml/min/1.73m2    GFR est non-AA 28 (L) >60 ml/min/1.73m2    Calcium 9.3 8.5 - 10.1 MG/DL    Bilirubin, total 0.6 0.2 - 1.0 MG/DL    ALT (SGPT) 20 12 - 78 U/L    AST (SGOT) 33 15 - 37 U/L    Alk. phosphatase 134 (H) 45 - 117 U/L    Protein, total 8.2 6.4 - 8.2 g/dL    Albumin 3.2 (L) 3.5 - 5.0 g/dL    Globulin 5.0 (H) 2.0 - 4.0 g/dL    A-G Ratio 0.6 (L) 1.1 - 2.2     URINALYSIS W/ REFLEX CULTURE    Collection Time: 12/09/17  3:44 PM   Result Value Ref Range    Color YELLOW/STRAW      Appearance CLOUDY (A) CLEAR      Specific gravity 1.018 1.003 - 1.030      pH (UA) 6.5 5.0 - 8.0      Protein 100 (A) NEG mg/dL    Glucose NEGATIVE  NEG mg/dL    Ketone NEGATIVE  NEG mg/dL    Bilirubin NEGATIVE  NEG      Blood NEGATIVE  NEG      Urobilinogen 1.0 0.2 - 1.0 EU/dL    Nitrites NEGATIVE  NEG      Leukocyte Esterase SMALL (A) NEG      WBC 0-4 0 - 4 /hpf    RBC 5-10 0 - 5 /hpf    Epithelial cells MODERATE (A) FEW /lpf    Bacteria 1+ (A) NEG /hpf    UA:UC IF INDICATED URINE CULTURE ORDERED (A) CNI      Hyaline cast 0-2 0 - 5 /lpf       Radiologic Studies -   CT CHEST WO CONT   Final Result      XR CHEST PA LAT   Final Result        CT Results  (Last 48 hours)               12/09/17 1651  CT CHEST WO CONT Final result    Impression:  IMPRESSION:    1. Scattered tree-in-bud opacities throughout the right lung can be seen with   nonspecific infection or inflammation. 2. Stable appearance of the left hemithorax with chronic left upper lobe opacity   and left pleural effusion. Narrative:  EXAM:  CT CHEST WO CONT       INDICATION:  Shortness of breath and cough for 2 weeks. COMPARISON: Chest radiographs 12/9/2017. CT chest 6/20/2017. TECHNIQUE: Helical CT of the chest is performed without intravenous contrast.   Coronal and sagittal reformatted images are obtained.  CT dose reduction was   achieved through use of a standardized protocol tailored for this examination   and automatic exposure control for dose modulation. FINDINGS: Evaluation of the solid organs is limited without intravenous   contrast. The aorta is stable in caliber with atherosclerosis. The main   pulmonary artery is normal in caliber. The cardiac size is within normal limits. No pericardial effusion. No lymphadenopathy by CT size criteria. There is stable chronic left upper lobe opacification and chronic left pleural   effusion. There are new scattered tree-in-bud opacities throughout the right   lung. There is no right pleural effusion. There is no pneumothorax. The central   airways are clear. In the limited images of the upper abdomen, the partially imaged solid organs   are unremarkable. There is diffuse osteopenia and diffuse degenerative changes   in the spine without aggressive bony lesion. CXR Results  (Last 48 hours)               12/09/17 1409  XR CHEST PA LAT Final result    Impression:  IMPRESSION:    Stable chronic left lung opacity and pleural effusion. Clear right lung. Narrative:  EXAM:  CR chest PA and lateral       INDICATION:  Cough and shortness of breath. COMPARISON: CT chest 6/20/2017. Chest radiographs 4/23/2017. TECHNIQUE: Frontal and lateral chest views. FINDINGS: The cardiomediastinal contours are stable. There is stable left   hemidiaphragm elevation with stable chronic left opacity and pleural effusion. The right lung and pleural space are clear. There is no pneumothorax. The bones   and upper abdomen are stable. Medical Decision Making   I am the first provider for this patient. I reviewed the vital signs, available nursing notes, past medical history, past surgical history, family history and social history. Vital Signs-Reviewed the patient's vital signs.   Patient Vitals for the past 12 hrs:   Temp Pulse Resp BP SpO2 12/09/17 1755 - 94 23 (!) 151/95 93 %   12/09/17 1700 - 95 29 - 94 %   12/09/17 1612 - 99 20 - 90 %   12/09/17 1600 - 95 26 161/72 94 %   12/09/17 1545 - 98 22 159/74 94 %   12/09/17 1455 - 91 24 - (!) 87 %   12/09/17 1448 - 92 24 - 94 %   12/09/17 1418 - 90 22 (!) 151/96 94 %   12/09/17 1329 98.5 °F (36.9 °C) 90 20 (!) 176/94 93 %       Pulse Oximetry Analysis - 93% on room air    Cardiac Monitor:   Rate: 94 bpm  Rhythm: Atrial Fibrillation     EKG interpretation: (Preliminary)  Rhythm: atrial fibrillation. low voltage QRS. Non-specific T-wave abnormality. Written by Juan José Mcclain. Enrrique Ernst, ED Scribe, as dictated by [employer] Hulda Opitz, MD.    Records Reviewed: Nursing Notes, Old Medical Records and Previous electrocardiograms    Provider Notes (Medical Decision Making):   DDx: Pneumonia, bronchitis, chronic pleural effusion, chronic A-fib     ED Course:   Initial assessment performed. The patients presenting problems have been discussed, and they are in agreement with the care plan formulated and outlined with them. I have encouraged them to ask questions as they arise throughout their visit. 1:57 PM  CL Shields attempted to evaluate the patient, but pt was not yet in the room. 3:33 PM  Pt states she is feeling better and her breathing has improved. She has deeper breath sounds on auscultation, lungs still CTA B/L. .     4:25 PM  Pt's oxygen saturation dropped to 87% with ambulation. Discussed with patient. Will order chest CT and admit patient. Patient and son are in agreement. Thalia Sanchezma    CONSULT NOTE:   5:24 PM  CL Shields spoke with Dr Roshni Koo,   Specialty: Hospitalist  Discussed pt's hx, disposition, and available diagnostic and imaging results. Reviewed care plans. Consultant will evaluate pt for admission, and recommends starting the patient on Rocephin and Zithromax, and obtain cultures. He will evaluate the patient for admission. Written by Juan José Mcclain.  Enrrique Ernst, ED Scribe, as dictated by CL Lucero. 5:38 PM  The hospitalist has evaluated the patient, patient now states she is supposed to be on oxygen at home, but she does not use it. Dr Rafael Junior recommends discharging the patient home. Patient states she has plenty of oxygen at home she can start using. Will dc home with abx. Son and patient are in agreement with plan. Disposition:  DISCHARGE NOTE  6:13 PM  The patient has been re-evaluated and is ready for discharge. Reviewed available results with patient. Counseled pt on diagnosis and care plan. Pt has expressed understanding, and all questions have been answered. Pt agrees with plan and agrees to follow up as recommended, or return to the ED if their symptoms worsen. Discharge instructions have been provided and explained to the pt, along with reasons to return to the ED. PLAN:  1. Discharge Medication List as of 12/9/2017  5:46 PM      START taking these medications    Details   doxycycline (VIBRA-TABS) 100 mg tablet Take 1 Tab by mouth two (2) times a day for 7 days. , Normal, Disp-13 Tab, R-0         CONTINUE these medications which have CHANGED    Details   albuterol (PROVENTIL HFA, VENTOLIN HFA, PROAIR HFA) 90 mcg/actuation inhaler Take 2 Puffs by inhalation every four (4) hours as needed for Wheezing., Normal, Disp-1 Inhaler, R-0      benzonatate (TESSALON PERLES) 100 mg capsule Take 1 Cap by mouth three (3) times daily as needed for Cough for up to 7 days. , Normal, Disp-30 Cap, R-0         CONTINUE these medications which have NOT CHANGED    Details   albuterol-ipratropium (DUO-NEB) 2.5 mg-0.5 mg/3 ml nebu 3 mL by Nebulization route every four (4) hours as needed. , Print, Disp-30 Nebule, R-1      Nebulizers misc 1 Inhalation by Does Not Apply route every four (4) hours as needed. , Print, Disp-1 Each, R-0      !! warfarin (COUMADIN) 6 mg tablet Take 1 tablet by mouth every Monday and Friday. , Print, Disp-1 tablet, R-0      !! warfarin (COUMADIN) 4 mg tablet Take 1 tablet by mouth every Tuesday, Thursday, Saturday & Sunday. , Print, Disp-30 tablet, R-0      furosemide (LASIX) 20 mg tablet 40mg daily PO, Print, Disp-30 tablet, R-0      potassium chloride (KLOR-CON) 20 mEq packet Take 20 mEq by mouth daily. Indications: pill, Historical Med      colchicine (COLCRYS) 0.6 mg tablet Take 0.6 mg by mouth every other day., Historical Med      acetaminophen (TYLENOL EXTRA STRENGTH) 500 mg tablet Take 1,000 mg by mouth every six (6) hours as needed for Pain., Historical Med      amLODIPine (NORVASC) 2.5 mg tablet Take 2.5 mg by mouth daily. Historical Med, 2.5 mg       !! - Potential duplicate medications found. Please discuss with provider. 2.   Follow-up Information     Follow up With Details Comments [address] an appointment as soon as possible for a visit in 2 days  [address] [facility]  [phone]      [facility] [place] EM[facility]  As needed or, If symptoms worsen [address] [facility] [id number] [phone]          Diagnosis     Clinical Impression:   1. Acute bronchitis due to other specified organisms    2. Pleural effusion, left        Attestations: This note is prepared by Kaylyn Zhang. Marlo Tate, acting as Scribe for Amy Duran. CL Talavera: The scribe's documentation has been prepared under my direction and personally reviewed by me in its entirety. I confirm that the note above accurately reflects all work, treatment, procedures, and medical decision making performed by me. This note will not be viewable in [address]

## 2017-12-10 LAB
ATRIAL RATE: 227 BPM
CALCULATED T AXIS, ECG11: 100 DEGREES
DIAGNOSIS, 93000: NORMAL
Q-T INTERVAL, ECG07: 354 MS
QRS DURATION, ECG06: 82 MS
QTC CALCULATION (BEZET), ECG08: 451 MS
VENTRICULAR RATE, ECG03: 98 BPM

## 2017-12-11 LAB
BACTERIA SPEC CULT: NORMAL
CC UR VC: NORMAL
SERVICE CMNT-IMP: NORMAL

## 2017-12-29 ENCOUNTER — HOSPITAL ENCOUNTER (EMERGENCY)
Age: 82
Discharge: HOME | End: 2017-12-29
Attending: EMERGENCY MEDICINE
Payer: MEDICARE

## 2017-12-29 ENCOUNTER — APPOINTMENT (OUTPATIENT)
Dept: GENERAL RADIOLOGY | Age: 82
End: 2017-12-29
Attending: EMERGENCY MEDICINE
Payer: MEDICARE

## 2017-12-29 VITALS
DIASTOLIC BLOOD PRESSURE: 91 MMHG | RESPIRATION RATE: 19 BRPM | TEMPERATURE: 98 F | BODY MASS INDEX: 35.7 KG/M2 | HEIGHT: 62 IN | HEART RATE: 77 BPM | OXYGEN SATURATION: 98 % | SYSTOLIC BLOOD PRESSURE: 136 MMHG | WEIGHT: 194 LBS

## 2017-12-29 DIAGNOSIS — J20.9 ACUTE BRONCHITIS, UNSPECIFIED ORGANISM: Primary | ICD-10-CM

## 2017-12-29 DIAGNOSIS — J90 PLEURAL EFFUSION: ICD-10-CM

## 2017-12-29 PROCEDURE — 77030029684 HC NEB SM VOL KT MONA -A

## 2017-12-29 PROCEDURE — 99283 EMERGENCY DEPT VISIT LOW MDM: CPT

## 2017-12-29 PROCEDURE — 94640 AIRWAY INHALATION TREATMENT: CPT

## 2017-12-29 PROCEDURE — A9270 NON-COVERED ITEM OR SERVICE: HCPCS | Performed by: EMERGENCY MEDICINE

## 2017-12-29 PROCEDURE — 74011636637 HC RX REV CODE- 636/637: Performed by: EMERGENCY MEDICINE

## 2017-12-29 PROCEDURE — 74011000250 HC RX REV CODE- 250: Performed by: EMERGENCY MEDICINE

## 2017-12-29 PROCEDURE — 71020 XR CHEST PA LAT: CPT

## 2017-12-29 RX ORDER — BENZONATATE 100 MG/1
100 CAPSULE ORAL
Qty: 30 CAP | Refills: 0 | Status: SHIPPED | OUTPATIENT
Start: 2017-12-29 | End: 2018-01-05

## 2017-12-29 RX ORDER — PREDNISONE 50 MG/1
50 TABLET ORAL DAILY
Qty: 3 TAB | Refills: 0 | Status: SHIPPED | OUTPATIENT
Start: 2017-12-29 | End: 2018-01-01

## 2017-12-29 RX ORDER — IPRATROPIUM BROMIDE AND ALBUTEROL SULFATE 2.5; .5 MG/3ML; MG/3ML
3 SOLUTION RESPIRATORY (INHALATION)
Status: COMPLETED | OUTPATIENT
Start: 2017-12-29 | End: 2017-12-29

## 2017-12-29 RX ORDER — PREDNISONE 20 MG/1
60 TABLET ORAL
Status: COMPLETED | OUTPATIENT
Start: 2017-12-29 | End: 2017-12-29

## 2017-12-29 RX ADMIN — PREDNISONE 60 MG: 20 TABLET ORAL at 20:07

## 2017-12-29 RX ADMIN — IPRATROPIUM BROMIDE AND ALBUTEROL SULFATE 3 ML: .5; 3 SOLUTION RESPIRATORY (INHALATION) at 19:24

## 2017-12-29 NOTE — ED NOTES
Assumed care of pt from triage. Pt reports cough for several weeks, states her daughter noted bloody sputum. Pt states she did not see any blood. Reports white sputum. Denies fever. Positioned for comfort on stretcher. Call bell within reach. Family bedside.

## 2017-12-29 NOTE — ED PROVIDER NOTES
EMERGENCY DEPARTMENT HISTORY AND PHYSICAL EXAM      Date: 12/29/2017  Patient Name: Maxine Thrasher    History of Presenting Illness     Chief Complaint   Patient presents with    Cough     pt has had a cough x3 weeks. Dx with bronchitis on 12/9. Pts son noted blood in her sputum today. History Provided By: patient, patient's son    HPI: Maxine Thrasher, 80 y.o. female with PMHx significant for HTN and A-fib, presents ambulatory to the ED with cc of productive cough that returned yesterday afternoon. She reports mild CP secondary to cough. Pt states cough is worse at night. Pt's son reports that another family member noticed minimal blood in the sputum pt's cough was producing. Pt was seen here 12/9/17 for cough and diagnosed with bronchitis; pt was prescribed doxycycline, tessalon perles, and albuterol. Pt reports she last used her inhaler yesterday twice and once today PTA with some relief. She denies SOB today, nausea, vomiting, or fever. PCP: Osvaldo Fulton    There are no other complaints, changes, or physical findings at this time. Current Outpatient Prescriptions   Medication Sig Dispense Refill    predniSONE (DELTASONE) 50 mg tablet Take 1 Tab by mouth daily for 3 days. 3 Tab 0    benzonatate (TESSALON PERLES) 100 mg capsule Take 1 Cap by mouth three (3) times daily as needed for Cough for up to 7 days. 30 Cap 0    albuterol (PROVENTIL HFA, VENTOLIN HFA, PROAIR HFA) 90 mcg/actuation inhaler Take 2 Puffs by inhalation every four (4) hours as needed for Wheezing. 1 Inhaler 0    albuterol-ipratropium (DUO-NEB) 2.5 mg-0.5 mg/3 ml nebu 3 mL by Nebulization route every four (4) hours as needed. 30 Nebule 1    Nebulizers misc 1 Inhalation by Does Not Apply route every four (4) hours as needed. 1 Each 0    warfarin (COUMADIN) 6 mg tablet Take 1 tablet by mouth every Monday and Friday.  1 tablet 0    warfarin (COUMADIN) 4 mg tablet Take 1 tablet by mouth every Tuesday, Thursday, Saturday & Sunday. 30 tablet 0    furosemide (LASIX) 20 mg tablet 40mg daily PO (Patient taking differently: 40 mg two (2) times a day. 40mg daily PO) 30 tablet 0    potassium chloride (KLOR-CON) 20 mEq packet Take 20 mEq by mouth daily. Indications: pill      colchicine (COLCRYS) 0.6 mg tablet Take 0.6 mg by mouth every other day.  acetaminophen (TYLENOL EXTRA STRENGTH) 500 mg tablet Take 1,000 mg by mouth every six (6) hours as needed for Pain.  amLODIPine (NORVASC) 2.5 mg tablet Take 2.5 mg by mouth daily. Past History     Past Medical History:  Past Medical History:   Diagnosis Date    [facility] ([facility] [address]     Cancer ([facility] [address]     right breast CA, lung, colon    Gastrointestinal disorder     GERD    Hypertension     Other ill-defined conditions(799.89)     gout       Past Surgical History:  Past Surgical History:   Procedure Laterality Date    BREAST SURGERY PROCEDURE UNLISTED  1995    Right mastectomy    HX APPENDECTOMY      HX HEENT      right glass eye    SC COLONOSCOPY W/BIOPSY SINGLE/MULTIPLE  3/29/2013         SC COLSC FLX W/RMVL OF TUMOR POLYP LESION SNARE TQ  3/29/2013            Family History:  Family History   Problem Relation Age of Onset    Colon Cancer Other     Hypertension Other        Social History:  Social History   Substance Use Topics    Smoking status: Former Smoker     Quit date: 1967    Smokeless tobacco: Never Used    Alcohol use No       Allergies: Allergies   Allergen Reactions    Penicillins Hives         Review of Systems   Review of Systems   Constitutional: Negative for chills and fever. Respiratory: Positive for cough. Negative for shortness of breath. Positive for minimal hemoptysis   Cardiovascular: Positive for chest pain (secondary to cough). Gastrointestinal: Negative for constipation, diarrhea, nausea and vomiting. Neurological: Negative for weakness and numbness. All other systems reviewed and are negative.       Physical Exam   Physical Exam Constitutional: She is oriented to person, place, and time. She appears well-developed and well-nourished. HENT:   Head: Normocephalic and atraumatic. Eyes: Conjunctivae and EOM are normal.   Neck: Normal range of motion. Neck supple. Cardiovascular: Normal rate and regular rhythm. Pulmonary/Chest: Effort normal and breath sounds normal. No respiratory distress. Poor air movement. Shallow breaths. Abdominal: Soft. She exhibits no distension. There is no tenderness. Musculoskeletal: Normal range of motion. Trace pitting edema bilaterally of lower extremities. Neurological: She is alert and oriented to person, place, and time. Skin: Skin is warm and dry. Psychiatric: She has a normal mood and affect. Nursing note and vitals reviewed. Diagnostic Study Results     Labs -   No results found for this or any previous visit (from the past 12 hour(s)). Radiologic Studies -   CXR Results  (Last 48 hours)               12/29/17 1854  XR CHEST PA LAT Final result    Impression:  IMPRESSION: Unchanged left pleural effusion and left upper and lower lobe   airspace disease. Narrative:  INDICATION: Cough. Bronchitis. Hemoptysis. COMPARISON: December 9, 2017       FINDINGS: PA and lateral views of the chest demonstrate a stable   cardiomediastinal silhouette. Moderately large left pleural effusion is   unchanged. Airspace disease in the left upper lower lobes appear stable. The   right lung remains clear. The visualized osseous structures are unremarkable. Medical Decision Making   I am the first provider for this patient. I reviewed the vital signs, available nursing notes, past medical history, past surgical history, family history and social history. Vital Signs-Reviewed the patient's vital signs.   Patient Vitals for the past 12 hrs:   Temp Pulse Resp BP SpO2   12/29/17 1957 - - 19 (!) 136/91 98 %   12/29/17 1734 98 °F (36.7 °C) 77 18 172/84 99 % Records Reviewed: Old Medical Records    Provider Notes (Medical Decision Making): The patient complains of nasal congestion, rhinorrhea, and cough. DDx: viral URI, acute bronchitis, bacterial sinusitis vs. pharyngitis, migraine, flu. Minimal hemoptysis most likely related to bronchitis. Will get CXR to r/o PNA and treat symptoms. ED Course:   Initial assessment performed. The patients presenting problems have been discussed, and they are in agreement with the care plan formulated and outlined with them. I have encouraged them to ask questions as they arise throughout their visit. 7:58 PM  Pt's O2 stayed 95% on ambulation. She states she feels better after nebulizer treatment. Given unchanged X-Ray, will discharge pt on medications for acute bronchitis and recommend follow up with pulmologist for persistent pleural .      Disposition:  Discharge Note:  8:02 PM  The pt is ready for discharge. The pt's signs, symptoms, diagnosis, and discharge instructions have been discussed and pt has conveyed their understanding. The pt is to follow up as recommended or return to ER should their symptoms worsen. Plan has been discussed and pt is in agreement. PLAN:  1. Discharge Medication List as of 12/29/2017  8:00 PM      START taking these medications    Details   predniSONE (DELTASONE) 50 mg tablet Take 1 Tab by mouth daily for 3 days. , Normal, Disp-3 Tab, R-0      benzonatate (TESSALON PERLES) 100 mg capsule Take 1 Cap by mouth three (3) times daily as needed for Cough for up to 7 days. , Normal, Disp-30 Cap, R-0         CONTINUE these medications which have NOT CHANGED    Details   albuterol (PROVENTIL HFA, VENTOLIN HFA, PROAIR HFA) 90 mcg/actuation inhaler Take 2 Puffs by inhalation every four (4) hours as needed for Wheezing., Normal, Disp-1 Inhaler, R-0      albuterol-ipratropium (DUO-NEB) 2.5 mg-0.5 mg/3 ml nebu 3 mL by Nebulization route every four (4) hours as needed. , Print, Disp-30 Nebule, R-1 Nebulizers misc 1 Inhalation by Does Not Apply route every four (4) hours as needed. , Print, Disp-1 Each, R-0      !! warfarin (COUMADIN) 6 mg tablet Take 1 tablet by mouth every Monday and Friday. , Print, Disp-1 tablet, R-0      !! warfarin (COUMADIN) 4 mg tablet Take 1 tablet by mouth every Tuesday, Thursday, Saturday & Sunday. , Print, Disp-30 tablet, R-0      furosemide (LASIX) 20 mg tablet 40mg daily PO, Print, Disp-30 tablet, R-0      potassium chloride (KLOR-CON) 20 mEq packet Take 20 mEq by mouth daily. Indications: pill, Historical Med      colchicine (COLCRYS) 0.6 mg tablet Take 0.6 mg by mouth every other day., Historical Med      acetaminophen (TYLENOL EXTRA STRENGTH) 500 mg tablet Take 1,000 mg by mouth every six (6) hours as needed for Pain., Historical Med      amLODIPine (NORVASC) 2.5 mg tablet Take 2.5 mg by mouth daily. Historical Med, 2.5 mg       !! - Potential duplicate medications found. Please discuss with provider. 2.   Follow-up Information     Follow up With Details Comments Contact Info    Your Pulmonologist   About persistent pleural effusion         Return to ED if worse     Diagnosis     Clinical Impression:   1. Acute bronchitis, unspecified organism    2. Pleural effusion        Attestations: This note is prepared by Chas Medel, acting as a Scribe for MD Chris Wilburn MD: The scribe's documentation has been prepared under my direction and personally reviewed by me in its entirety. I confirm that the notes above accurately reflects all work, treatment, procedures, and medical decision making performed by me.

## 2017-12-30 NOTE — DISCHARGE INSTRUCTIONS
Learning About Pleural Effusion  What is pleural effusion? Pleural effusion (say \"PLER-abdiaziz [id number]-[id number]\") is the buildup of fluid in the space between tissues lining the lungs and chest wall. Because of the fluid buildup, the lungs may not be able to expand completely, which can make it hard to breathe. The lung, or part of it, may collapse. Pleural effusion has many causes, such as pneumonia, cancer, inflammation of the tissues around the lungs, and heart failure. Pleural effusion is usually diagnosed with an X-ray and a physical exam. The doctor listens to the air flow in your lungs. What are the symptoms? Symptoms of pleural effusion may include:  · Trouble breathing. · Shortness of breath. · Chest pain. · Fever. · A cough. Minor pleural effusion may not cause any symptoms. How is pleural effusion treated? Doctors may need to treat the condition that is causing pleural effusion. For example, you may get medicines to treat pneumonia or congestive heart failure. Minor pleural effusion often goes away on its own without treatment. Removing fluid  Pleural effusion can be treated by removing fluid from the space between the tissues around the lungs. This is done with a needle that's put into the chest (thoracentesis). A small amount of the fluid may be sent to a lab to find out what is causing the buildup of fluid. Removing the fluid may help to relieve symptoms, such as shortness of breath and chest pain. It can help the lungs to expand more fully. In some cases, if pleural effusion doesn't get better, a catheter may be placed in the chest. This is a flexible tube that allows fluid to drain from the lungs. The catheter stays in the chest until the doctor removes it. Some people may get a treatment that removes the fluid and then puts a medicine into the chest cavity. This helps to prevent too much fluid from building up again. Follow-up care is a key part of your treatment and safety.  Be sure to make and go to all appointments, and call your doctor if you are having problems. It's also a good idea to know your test results and keep a list of the medicines you take. Where can you learn more? Go to http://natty-mirza.info/. Enter A920 in the search box to learn more about \"Learning About Pleural Effusion. \"  Current as of: May 12, 2017  Content Version: 11.4  © 9681-6192 [employer]. Care instructions adapted under license by [employer] (which disclaims liability or warranty for this information). If you have questions about a medical condition or this instruction, always ask your healthcare professional. [address] any warranty or liability for your use of this information. Bronchitis: Care Instructions  Your Care Instructions    Bronchitis is inflammation of the bronchial tubes, which carry air to the lungs. The tubes swell and produce mucus, or phlegm. The mucus and inflamed bronchial tubes make you cough. You may have trouble breathing. Most cases of bronchitis are caused by viruses like those that cause colds. Antibiotics usually do not help and they may be harmful. Bronchitis usually develops rapidly and lasts about 2 to 3 weeks in otherwise healthy people. Follow-up care is a key part of your treatment and safety. Be sure to make and go to all appointments, and call your doctor if you are having problems. It's also a good idea to know your test results and keep a list of the medicines you take. How can you care for yourself at home? · Take all medicines exactly as prescribed. Call your doctor if you think you are having a problem with your medicine. · Get some extra rest.  · Take an over-the-counter pain medicine, such as acetaminophen (Tylenol), ibuprofen (Advil, Motrin), or naproxen (Aleve) to reduce fever and relieve body aches. Read and follow all instructions on the label.   · Do not take two or more pain medicines at the same time unless the doctor told you to. Many pain medicines have acetaminophen, which is Tylenol. Too much acetaminophen (Tylenol) can be harmful. · Take an over-the-counter cough medicine that contains dextromethorphan to help quiet a dry, hacking cough so that you can sleep. Avoid cough medicines that have more than one active ingredient. Read and follow all instructions on the label. · Breathe moist air from a humidifier, hot shower, or sink filled with hot water. The heat and moisture will thin mucus so you can cough it out. · Do not smoke. Smoking can make bronchitis worse. If you need help quitting, talk to your doctor about stop-smoking programs and medicines. These can increase your chances of quitting for good. When should you call for help? Call 911 anytime you think you may need emergency care. For example, call if:  ? · You have severe trouble breathing. ?Call your doctor now or seek immediate medical care if:  ? · You have new or worse trouble breathing. ? · You cough up dark brown or bloody mucus (sputum). ? · You have a new or higher fever. ? · You have a new rash. ? Watch closely for changes in your health, and be sure to contact your doctor if:  ? · You cough more deeply or more often, especially if you notice more mucus or a change in the color of your mucus. ? · You are not getting better as expected. Where can you learn more? Go to http://natty-mirza.info/. Enter H333 in the search box to learn more about \"Bronchitis: Care Instructions. \"  Current as of: May 12, 2017  Content Version: 11.4  © 4130-1067 [employer]. Care instructions adapted under license by [employer] (which disclaims liability or warranty for this information).  If you have questions about a medical condition or this instruction, always ask your healthcare professional. [place] [address] any warranty or liability for your use of this information.

## 2017-12-30 NOTE — ED NOTES
Dr. Anabela Hope reviewed discharge instructions with the patient and family. The patient and family verbalized understanding. Patient wheeled to discharge by this nurse.

## 2017-12-30 NOTE — ED NOTES
Bedside and Verbal shift change report given to Domingo Neves (oncoming nurse) by Fred Lundberg (offgoing nurse). Report included the following information SBAR.

## 2018-04-13 ENCOUNTER — HOSPITAL ENCOUNTER (EMERGENCY)
Age: 83
Discharge: HOME | End: 2018-04-13
Attending: EMERGENCY MEDICINE
Payer: MEDICARE

## 2018-04-13 ENCOUNTER — APPOINTMENT (OUTPATIENT)
Dept: GENERAL RADIOLOGY | Age: 83
End: 2018-04-13
Attending: EMERGENCY MEDICINE
Payer: MEDICARE

## 2018-04-13 VITALS
WEIGHT: 200 LBS | HEIGHT: 62 IN | BODY MASS INDEX: 36.8 KG/M2 | TEMPERATURE: 98.2 F | DIASTOLIC BLOOD PRESSURE: 74 MMHG | RESPIRATION RATE: 18 BRPM | SYSTOLIC BLOOD PRESSURE: 143 MMHG | OXYGEN SATURATION: 97 % | HEART RATE: 86 BPM

## 2018-04-13 DIAGNOSIS — R06.09 DYSPNEA ON EXERTION: Primary | ICD-10-CM

## 2018-04-13 DIAGNOSIS — E87.79 OTHER HYPERVOLEMIA: ICD-10-CM

## 2018-04-13 LAB
ALBUMIN SERPL-MCNC: 3 G/DL (ref 3.5–5)
ALBUMIN/GLOB SERPL: 0.6 {RATIO} (ref 1.1–2.2)
ALP SERPL-CCNC: 123 U/L (ref 45–117)
ALT SERPL-CCNC: 21 U/L (ref 12–78)
ANION GAP SERPL CALC-SCNC: 9 MMOL/L (ref 5–15)
AST SERPL-CCNC: 24 U/L (ref 15–37)
BASOPHILS # BLD: 0 K/UL (ref 0–0.1)
BASOPHILS NFR BLD: 1 % (ref 0–1)
BILIRUB SERPL-MCNC: 0.5 MG/DL (ref 0.2–1)
BNP SERPL-MCNC: 3611 PG/ML (ref 0–450)
BUN SERPL-MCNC: 17 MG/DL (ref 6–20)
BUN/CREAT SERPL: 12 (ref 12–20)
CALCIUM SERPL-MCNC: 9.3 MG/DL (ref 8.5–10.1)
CHLORIDE SERPL-SCNC: 106 MMOL/L (ref 97–108)
CK SERPL-CCNC: 56 U/L (ref 26–192)
CO2 SERPL-SCNC: 28 MMOL/L (ref 21–32)
CREAT SERPL-MCNC: 1.38 MG/DL (ref 0.55–1.02)
DIFFERENTIAL METHOD BLD: ABNORMAL
EOSINOPHIL # BLD: 0 K/UL (ref 0–0.4)
EOSINOPHIL NFR BLD: 1 % (ref 0–7)
ERYTHROCYTE [DISTWIDTH] IN BLOOD BY AUTOMATED COUNT: 17.4 % (ref 11.5–14.5)
GLOBULIN SER CALC-MCNC: 4.8 G/DL (ref 2–4)
GLUCOSE SERPL-MCNC: 110 MG/DL (ref 65–100)
HCT VFR BLD AUTO: 39.8 % (ref 35–47)
HGB BLD-MCNC: 12.5 G/DL (ref 11.5–16)
IMM GRANULOCYTES # BLD: 0 K/UL (ref 0–0.04)
IMM GRANULOCYTES NFR BLD AUTO: 0 % (ref 0–0.5)
LYMPHOCYTES # BLD: 0.5 K/UL (ref 0.8–3.5)
LYMPHOCYTES NFR BLD: 11 % (ref 12–49)
MCH RBC QN AUTO: 25.9 PG (ref 26–34)
MCHC RBC AUTO-ENTMCNC: 31.4 G/DL (ref 30–36.5)
MCV RBC AUTO: 82.6 FL (ref 80–99)
MONOCYTES # BLD: 0.5 K/UL (ref 0–1)
MONOCYTES NFR BLD: 10 % (ref 5–13)
NEUTS SEG # BLD: 3.7 K/UL (ref 1.8–8)
NEUTS SEG NFR BLD: 77 % (ref 32–75)
NRBC # BLD: 0 K/UL (ref 0–0.01)
NRBC BLD-RTO: 0 PER 100 WBC
PLATELET # BLD AUTO: 262 K/UL (ref 150–400)
PMV BLD AUTO: 10.4 FL (ref 8.9–12.9)
POTASSIUM SERPL-SCNC: 3.8 MMOL/L (ref 3.5–5.1)
PROT SERPL-MCNC: 7.8 G/DL (ref 6.4–8.2)
RBC # BLD AUTO: 4.82 M/UL (ref 3.8–5.2)
RBC MORPH BLD: ABNORMAL
SODIUM SERPL-SCNC: 143 MMOL/L (ref 136–145)
TROPONIN I SERPL-MCNC: <0.04 NG/ML
WBC # BLD AUTO: 4.7 K/UL (ref 3.6–11)
WBC MORPH BLD: ABNORMAL

## 2018-04-13 PROCEDURE — 71045 X-RAY EXAM CHEST 1 VIEW: CPT

## 2018-04-13 PROCEDURE — 82550 ASSAY OF CK (CPK): CPT | Performed by: PHYSICIAN ASSISTANT

## 2018-04-13 PROCEDURE — 96374 THER/PROPH/DIAG INJ IV PUSH: CPT

## 2018-04-13 PROCEDURE — 74011250636 HC RX REV CODE- 250/636: Performed by: EMERGENCY MEDICINE

## 2018-04-13 PROCEDURE — 80053 COMPREHEN METABOLIC PANEL: CPT | Performed by: PHYSICIAN ASSISTANT

## 2018-04-13 PROCEDURE — 85025 COMPLETE CBC W/AUTO DIFF WBC: CPT | Performed by: PHYSICIAN ASSISTANT

## 2018-04-13 PROCEDURE — 99283 EMERGENCY DEPT VISIT LOW MDM: CPT

## 2018-04-13 PROCEDURE — 83880 ASSAY OF NATRIURETIC PEPTIDE: CPT | Performed by: PHYSICIAN ASSISTANT

## 2018-04-13 PROCEDURE — 93005 ELECTROCARDIOGRAM TRACING: CPT

## 2018-04-13 PROCEDURE — 36415 COLL VENOUS BLD VENIPUNCTURE: CPT | Performed by: PHYSICIAN ASSISTANT

## 2018-04-13 PROCEDURE — 84484 ASSAY OF TROPONIN QUANT: CPT | Performed by: PHYSICIAN ASSISTANT

## 2018-04-13 RX ORDER — FUROSEMIDE 10 MG/ML
20 INJECTION INTRAMUSCULAR; INTRAVENOUS
Status: COMPLETED | OUTPATIENT
Start: 2018-04-13 | End: 2018-04-13

## 2018-04-13 RX ADMIN — FUROSEMIDE 20 MG: 10 INJECTION, SOLUTION INTRAMUSCULAR; INTRAVENOUS at 22:59

## 2018-04-13 NOTE — ED PROVIDER NOTES
EMERGENCY DEPARTMENT HISTORY AND PHYSICAL EXAM      Date: 4/13/2018  Patient Name: Daisy Lisa    History of Presenting Illness     Chief Complaint   Patient presents with    Shortness of Breath     Pt wheeled to triage with c/o SOB, cough x \"a few days\" with productive white mucus; denies fevers at home; hx of lung cancer      PROVIDER IN TRIAGE NOTE:  7:51 PM  Emily Boateng PA-C has evaluated the patient as the Provider in Triage (PIT). Pt presents with c/o shortness of breath and lower extremity swelling. They have reviewed the vital signs and the triage nurse assessment. They have talked with the patient and any available family and advised that the appropriate studies have been ordered to initiate the work up based on the clinical presentation during the assessment. The pt has been advised that they will be accommodated in the Main ED as soon as possible. The pt has been requested to contact the triage nurse or PIT immediately if they experiences any changes in their condition during this brief waiting period. History Provided By: Patient and Pt's Relative    HPI: Daisy Lisa, 80 y.o. female with PMHx significant for HTN, R breast CA, R Mastectomy, Lung CA, Colon CA, presents ambulatory to the ED with cc of an acute onset of intermittent SOB, white productive cough, and BL leg swelling x several days. Pt denies any hematemesis. She states she has been wearing compression stockings to treat her intermittent leg swelling with some relief. She affirms she has been taking her fluid pills as instructed but is unsure about any changes inweight. Pt also report she is urinating a normal amount. She denies other sxs including any fevers, hematemesis, N/V/D. She denies other modifying factors. Social hx: -(former) Tobacco, -EtOH, -Drugs    PCP: Jackie Cobb MD    There are no other complaints, changes, or physical findings at this time.     Current Outpatient Prescriptions   Medication Sig Dispense Refill    albuterol (PROVENTIL HFA, VENTOLIN HFA, PROAIR HFA) 90 mcg/actuation inhaler Take 2 Puffs by inhalation every four (4) hours as needed for Wheezing. 1 Inhaler 0    albuterol-ipratropium (DUO-NEB) 2.5 mg-0.5 mg/3 ml nebu 3 mL by Nebulization route every four (4) hours as needed. 30 Nebule 1    Nebulizers misc 1 Inhalation by Does Not Apply route every four (4) hours as needed. 1 Each 0    warfarin (COUMADIN) 6 mg tablet Take 1 tablet by mouth every Monday and Friday. 1 tablet 0    warfarin (COUMADIN) 4 mg tablet Take 1 tablet by mouth every Tuesday, Thursday, Saturday & Sunday. 30 tablet 0    furosemide (LASIX) 20 mg tablet 40mg daily PO (Patient taking differently: 40 mg two (2) times a day. 40mg daily PO) 30 tablet 0    potassium chloride (KLOR-CON) 20 mEq packet Take 20 mEq by mouth daily. Indications: pill      colchicine (COLCRYS) 0.6 mg tablet Take 0.6 mg by mouth every other day.  acetaminophen (TYLENOL EXTRA STRENGTH) 500 mg tablet Take 1,000 mg by mouth every six (6) hours as needed for Pain.  amLODIPine (NORVASC) 2.5 mg tablet Take 2.5 mg by mouth daily.          Past History     Past Medical History:  Past Medical History:   Diagnosis Date    [facility]fib ([facility] [address]     Cancer ([facility] [address]     right breast CA, lung, colon    Gastrointestinal disorder     GERD    Hypertension     Other ill-defined conditions(799.89)     gout       Past Surgical History:  Past Surgical History:   Procedure Laterality Date    BREAST SURGERY PROCEDURE UNLISTED  1995    Right mastectomy    HX APPENDECTOMY      HX HEENT      right glass eye    ND COLONOSCOPY W/BIOPSY SINGLE/MULTIPLE  3/29/2013         ND COLSC FLX W/RMVL OF TUMOR POLYP LESION SNARE TQ  3/29/2013            Family History:  Family History   Problem Relation Age of Onset    Colon Cancer Other     Hypertension Other        Social History:  Social History   Substance Use Topics    Smoking status: Former Smoker     Quit date: 1967    Smokeless tobacco: Never Used    Alcohol use No       Allergies: Allergies   Allergen Reactions    Penicillins Hives         Review of Systems   Review of Systems   Constitutional: Negative for activity change, appetite change, chills, fever and unexpected weight change. HENT: Negative for congestion. Eyes: Negative for pain and visual disturbance. Respiratory: Positive for cough and shortness of breath. Negative for hematemesis. Cardiovascular: Positive for leg swelling. Negative for chest pain. Gastrointestinal: Negative for abdominal pain, diarrhea, nausea and vomiting. Genitourinary: Negative for dysuria. Musculoskeletal: Negative for back pain. Skin: Negative for rash. Neurological: Negative for headaches. Physical Exam   Physical Exam   Constitutional: She is oriented to person, place, and time. She appears well-developed and well-nourished. Elderly female in mild distress. HENT:   Head: Normocephalic and atraumatic. Mouth/Throat: Oropharynx is clear and moist.   Eyes: Conjunctivae and EOM are normal. Pupils are equal, round, and reactive to light. Right eye exhibits no discharge. 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 Neck: Normal range of motion. Neck supple. Cardiovascular: Normal rate, regular rhythm and normal heart sounds. No murmur heard. Pulmonary/Chest: Effort normal. No respiratory distress. She has no wheezes. Faint rales in R lung base. L lung breath sounds are clear. Abdominal: Soft. Bowel sounds are normal. She exhibits no distension. There is no tenderness. Musculoskeletal: Normal range of motion. 2+ pitting edema to BL lower extremities to knees. Neurological: She is alert and oriented to person, place, and time. No cranial nerve deficit. She exhibits normal muscle tone. Skin: Skin is warm and dry. No rash noted. She is not diaphoretic. Nursing note and vitals reviewed.         Diagnostic Study Results     Labs -     Recent Results (from the past 12 hour(s))   EKG, 12 LEAD, INITIAL    Collection Time: 04/13/18  7:59 PM   Result Value Ref Range    Ventricular Rate 82 BPM    Atrial Rate 92 BPM    QRS Duration 74 ms    Q-T Interval 382 ms    QTC Calculation (Bezet) 446 ms    Calculated R Axis -3 degrees    Calculated T Axis 60 degrees    Diagnosis       Atrial fibrillation  Low voltage QRS  Cannot rule out Anterior infarct , age undetermined  Abnormal ECG  When compared with ECG of 09-DEC-2017 13:45,  Nonspecific T wave abnormality now evident in Inferior leads     CBC WITH AUTOMATED DIFF    Collection Time: 04/13/18  8:28 PM   Result Value Ref Range    WBC 4.7 3.6 - 11.0 K/uL    RBC 4.82 3.80 - 5.20 M/uL    HGB 12.5 11.5 - 16.0 g/dL    HCT 39.8 35.0 - 47.0 %    MCV 82.6 80.0 - 99.0 FL    MCH 25.9 (L) 26.0 - 34.0 PG    MCHC 31.4 30.0 - 36.5 g/dL    RDW 17.4 (H) 11.5 - 14.5 %    PLATELET 854 331 - 730 K/uL    MPV 10.4 8.9 - 12.9 FL    NRBC 0.0 0  WBC    ABSOLUTE NRBC 0.00 0.00 - 0.01 K/uL    NEUTROPHILS 77 (H) 32 - 75 %    LYMPHOCYTES 11 (L) 12 - 49 %    MONOCYTES 10 5 - 13 %    EOSINOPHILS 1 0 - 7 %    BASOPHILS 1 0 - 1 %    IMMATURE GRANULOCYTES 0 0.0 - 0.5 %    ABS. NEUTROPHILS 3.7 1.8 - 8.0 K/UL    ABS. LYMPHOCYTES 0.5 (L) 0.8 - 3.5 K/UL    ABS. MONOCYTES 0.5 0.0 - 1.0 K/UL    ABS. EOSINOPHILS 0.0 0.0 - 0.4 K/UL    ABS. BASOPHILS 0.0 0.0 - 0.1 K/UL    ABS. IMM.  GRANS. 0.0 0.00 - 0.04 K/UL    DF SMEAR SCANNED      RBC COMMENTS ANISOCYTOSIS  1+        WBC COMMENTS TOXIC GRANULATION     METABOLIC PANEL, COMPREHENSIVE    Collection Time: 04/13/18  8:28 PM   Result Value Ref Range    Sodium 143 136 - 145 mmol/L    Potassium 3.8 3.5 - 5.1 mmol/L    Chloride 106 97 - 108 mmol/L    CO2 28 21 - 32 mmol/L    Anion gap 9 5 - 15 mmol/L    Glucose 110 (H) 65 - 100 mg/dL    BUN 17 6 - 20 MG/DL    Creatinine 1.38 (H) 0.55 - 1.02 MG/DL    BUN/Creatinine ratio 12 12 - 20      GFR est AA 43 (L) >60 ml/min/1.73m2    GFR est non-AA 35 (L) >60 ml/min/1.73m2 Calcium 9.3 8.5 - 10.1 MG/DL    Bilirubin, total 0.5 0.2 - 1.0 MG/DL    ALT (SGPT) 21 12 - 78 U/L    AST (SGOT) 24 15 - 37 U/L    Alk. phosphatase 123 (H) 45 - 117 U/L    Protein, total 7.8 6.4 - 8.2 g/dL    Albumin 3.0 (L) 3.5 - 5.0 g/dL    Globulin 4.8 (H) 2.0 - 4.0 g/dL    A-G Ratio 0.6 (L) 1.1 - 2.2     CK W/ REFLX CKMB    Collection Time: 04/13/18  8:28 PM   Result Value Ref Range    CK 56 26 - 192 U/L   TROPONIN I    Collection Time: 04/13/18  8:28 PM   Result Value Ref Range    Troponin-I, Qt. <0.04 <0.05 ng/mL   NT-PRO BNP    Collection Time: 04/13/18  8:28 PM   Result Value Ref Range    NT pro-BNP 3611 (H) 0 - 450 PG/ML       Radiologic Studies -     CXR Results  (Last 48 hours)               04/13/18 2151  XR CHEST PORT Final result    Impression:  IMPRESSION:       Increased left basilar opacification that likely represents an increased acute   on chronic left pleural effusion and left lung opacity. Airspace opacity may   represent infection or chronic atelectasis. Narrative:  EXAM:  XR CHEST PORT       INDICATION:  Shortness of breath. COMPARISON: 12/29/2017       TECHNIQUE: Portable AP semiupright chest view at 2150 hours       FINDINGS: The cardiomediastinal contours are stable. The pulmonary vasculature   is within normal limits. There is increased opacification of the left lung base that likely represents an   increased acute on chronic left pleural effusion and left lung opacity. The   right lung and pleural space are clear. There is no pneumothorax. The bones and   upper abdomen are stable. Medical Decision Making   I am the first provider for this patient. I reviewed the vital signs, available nursing notes, past medical history, past surgical history, family history and social history. Vital Signs-Reviewed the patient's vital signs.   Patient Vitals for the past 12 hrs:   Temp Pulse Resp BP SpO2   04/13/18 2259 - 86 - 143/74 -   04/13/18 1951 98.2 °F (36.8 °C) 86 18 (!) 156/102 97 %     EKG interpretation: (Preliminary) 8:03 PM   Rhythm: atrial fib; and irregular. Rate (approx.): 82 bpm; Axis: normal; RI interval: normal; QRS interval: low voltage; ST/T wave: nonspecific T wave abnormality in inferior leads; Records Reviewed: Old Medical Records    Provider Notes (Medical Decision Making): Worsening dyspnea w/ fluid overload on exam; low suspicion for PNA, bronchitis or influenza     ED Course:   Initial assessment performed. The patients presenting problems have been discussed, and they are in agreement with the care plan formulated and outlined with them. I have encouraged them to ask questions as they arise throughout their visit. 22:10 Discussed results and recommendations for extra lasix. 23:50 Patient with large urine output. Discussed extra lasix for the next three days with repeat MD check Monday. Return precautions given. Critical Care Time: 0    Disposition:  DISCHARGE NOTE  11:15 PM  The patient has been re-evaluated and is ready for discharge. Reviewed available results with patient. Counseled pt on diagnosis and care plan. Pt has expressed understanding, and all questions have been answered. Pt agrees with plan and agrees to F/U as recommended, or return to the ED if their sxs worsen. Discharge instructions have been provided and explained to the pt, along with reasons to return to the ED. PLAN:  1. Discharge Medication List as of 4/13/2018 11:15 PM        2. Follow-up Information     Follow up With Details Comments Contact Info    [facility] EMERGENCY DEPT  If symptoms worsen [address] [facility] [place] [address]  [phone]        Return to ED if worse     Diagnosis     Clinical Impression:   1. Dyspnea on exertion    2. Other hypervolemia        Attestations:     This note is prepared by Anna Mason, acting as Scribe for Marybeth Milligan MD.    The scribe's documentation has been prepared under my direction and personally reviewed by me in its entirety. I confirm that the note above accurately reflects all work, treatment, procedures, and medical decision making performed by me.   Momo Potts MD

## 2018-04-14 LAB
ATRIAL RATE: 92 BPM
CALCULATED R AXIS, ECG10: -3 DEGREES
CALCULATED T AXIS, ECG11: 60 DEGREES
DIAGNOSIS, 93000: NORMAL
Q-T INTERVAL, ECG07: 382 MS
QRS DURATION, ECG06: 74 MS
QTC CALCULATION (BEZET), ECG08: 446 MS
VENTRICULAR RATE, ECG03: 82 BPM

## 2018-04-14 NOTE — ED NOTES
Discharge instructions given to patient by Garfield Miller. MD Kandice. Patient verbalized understanding of discharge instructions. Pt discharged without difficulty. Pt discharged in stable condition via wheelchair, accompanied by son.

## 2018-04-14 NOTE — ED NOTES
Bedside shift change report given to Toby Frye RN (oncoming nurse) by Clearance Scale, RN (offgoing nurse). Report included the following information SBAR, Kardex, ED Summary, Intake/Output, MAR and Recent Results.

## 2018-05-29 ENCOUNTER — APPOINTMENT (OUTPATIENT)
Dept: GENERAL RADIOLOGY | Age: 83
DRG: 291 | End: 2018-05-29
Attending: PHYSICIAN ASSISTANT
Payer: MEDICARE

## 2018-05-29 ENCOUNTER — HOSPITAL ENCOUNTER (INPATIENT)
Age: 83
LOS: 2 days | Discharge: HOME HEALTH | DRG: 291 | End: 2018-05-31
Attending: EMERGENCY MEDICINE | Admitting: INTERNAL MEDICINE
Payer: MEDICARE

## 2018-05-29 DIAGNOSIS — J81.0 ACUTE PULMONARY EDEMA (HCC): Primary | ICD-10-CM

## 2018-05-29 DIAGNOSIS — J90 PLEURAL EFFUSION, LEFT: ICD-10-CM

## 2018-05-29 PROBLEM — I50.33 DIASTOLIC CHF, ACUTE ON CHRONIC (HCC): Status: ACTIVE | Noted: 2018-05-29

## 2018-05-29 PROBLEM — I48.20 CHRONIC A-FIB (HCC): Status: ACTIVE | Noted: 2018-05-29

## 2018-05-29 PROBLEM — N18.30 CKD (CHRONIC KIDNEY DISEASE) STAGE 3, GFR 30-59 ML/MIN (HCC): Status: ACTIVE | Noted: 2018-05-29

## 2018-05-29 LAB
ALBUMIN SERPL-MCNC: 3 G/DL (ref 3.5–5)
ALBUMIN/GLOB SERPL: 0.6 {RATIO} (ref 1.1–2.2)
ALP SERPL-CCNC: 120 U/L (ref 45–117)
ALT SERPL-CCNC: 18 U/L (ref 12–78)
ANION GAP SERPL CALC-SCNC: 9 MMOL/L (ref 5–15)
AST SERPL-CCNC: 23 U/L (ref 15–37)
ATRIAL RATE: 264 BPM
BASOPHILS # BLD: 0.1 K/UL (ref 0–0.1)
BASOPHILS NFR BLD: 1 % (ref 0–1)
BILIRUB SERPL-MCNC: 0.6 MG/DL (ref 0.2–1)
BNP SERPL-MCNC: 4856 PG/ML (ref 0–450)
BUN SERPL-MCNC: 18 MG/DL (ref 6–20)
BUN/CREAT SERPL: 13 (ref 12–20)
CALCIUM SERPL-MCNC: 9.8 MG/DL (ref 8.5–10.1)
CALCULATED R AXIS, ECG10: -4 DEGREES
CALCULATED T AXIS, ECG11: 25 DEGREES
CHLORIDE SERPL-SCNC: 103 MMOL/L (ref 97–108)
CK SERPL-CCNC: 45 U/L (ref 26–192)
CO2 SERPL-SCNC: 31 MMOL/L (ref 21–32)
CREAT SERPL-MCNC: 1.39 MG/DL (ref 0.55–1.02)
DIAGNOSIS, 93000: NORMAL
DIFFERENTIAL METHOD BLD: ABNORMAL
EOSINOPHIL # BLD: 0.1 K/UL (ref 0–0.4)
EOSINOPHIL NFR BLD: 1 % (ref 0–7)
ERYTHROCYTE [DISTWIDTH] IN BLOOD BY AUTOMATED COUNT: 18.3 % (ref 11.5–14.5)
GLOBULIN SER CALC-MCNC: 5.1 G/DL (ref 2–4)
GLUCOSE SERPL-MCNC: 103 MG/DL (ref 65–100)
HCT VFR BLD AUTO: 40.4 % (ref 35–47)
HGB BLD-MCNC: 12.8 G/DL (ref 11.5–16)
IMM GRANULOCYTES # BLD: 0.1 K/UL (ref 0–0.04)
IMM GRANULOCYTES NFR BLD AUTO: 1 % (ref 0–0.5)
INR PPP: 2.2 (ref 0.9–1.1)
LYMPHOCYTES # BLD: 0.5 K/UL (ref 0.8–3.5)
LYMPHOCYTES NFR BLD: 9 % (ref 12–49)
MCH RBC QN AUTO: 25.5 PG (ref 26–34)
MCHC RBC AUTO-ENTMCNC: 31.7 G/DL (ref 30–36.5)
MCV RBC AUTO: 80.5 FL (ref 80–99)
MONOCYTES # BLD: 0.4 K/UL (ref 0–1)
MONOCYTES NFR BLD: 8 % (ref 5–13)
NEUTS SEG # BLD: 4.4 K/UL (ref 1.8–8)
NEUTS SEG NFR BLD: 80 % (ref 32–75)
NRBC # BLD: 0 K/UL (ref 0–0.01)
NRBC BLD-RTO: 0 PER 100 WBC
PLATELET # BLD AUTO: 284 K/UL (ref 150–400)
PMV BLD AUTO: 10 FL (ref 8.9–12.9)
POTASSIUM SERPL-SCNC: 3.9 MMOL/L (ref 3.5–5.1)
PROT SERPL-MCNC: 8.1 G/DL (ref 6.4–8.2)
PROTHROMBIN TIME: 21.8 SEC (ref 9–11.1)
Q-T INTERVAL, ECG07: 398 MS
QRS DURATION, ECG06: 76 MS
QTC CALCULATION (BEZET), ECG08: 473 MS
RBC # BLD AUTO: 5.02 M/UL (ref 3.8–5.2)
RBC MORPH BLD: ABNORMAL
SODIUM SERPL-SCNC: 143 MMOL/L (ref 136–145)
TROPONIN I SERPL-MCNC: <0.04 NG/ML
VENTRICULAR RATE, ECG03: 85 BPM
WBC # BLD AUTO: 5.6 K/UL (ref 3.6–11)

## 2018-05-29 PROCEDURE — 85025 COMPLETE CBC W/AUTO DIFF WBC: CPT | Performed by: PHYSICIAN ASSISTANT

## 2018-05-29 PROCEDURE — 74011250637 HC RX REV CODE- 250/637: Performed by: EMERGENCY MEDICINE

## 2018-05-29 PROCEDURE — 85610 PROTHROMBIN TIME: CPT | Performed by: EMERGENCY MEDICINE

## 2018-05-29 PROCEDURE — 96374 THER/PROPH/DIAG INJ IV PUSH: CPT

## 2018-05-29 PROCEDURE — 93005 ELECTROCARDIOGRAM TRACING: CPT

## 2018-05-29 PROCEDURE — 71046 X-RAY EXAM CHEST 2 VIEWS: CPT

## 2018-05-29 PROCEDURE — 99284 EMERGENCY DEPT VISIT MOD MDM: CPT

## 2018-05-29 PROCEDURE — 82550 ASSAY OF CK (CPK): CPT | Performed by: PHYSICIAN ASSISTANT

## 2018-05-29 PROCEDURE — 80053 COMPREHEN METABOLIC PANEL: CPT | Performed by: PHYSICIAN ASSISTANT

## 2018-05-29 PROCEDURE — 36415 COLL VENOUS BLD VENIPUNCTURE: CPT | Performed by: EMERGENCY MEDICINE

## 2018-05-29 PROCEDURE — 65660000000 HC RM CCU STEPDOWN

## 2018-05-29 PROCEDURE — 83880 ASSAY OF NATRIURETIC PEPTIDE: CPT | Performed by: PHYSICIAN ASSISTANT

## 2018-05-29 PROCEDURE — 84484 ASSAY OF TROPONIN QUANT: CPT | Performed by: PHYSICIAN ASSISTANT

## 2018-05-29 PROCEDURE — 96372 THER/PROPH/DIAG INJ SC/IM: CPT

## 2018-05-29 PROCEDURE — 74011250636 HC RX REV CODE- 250/636: Performed by: EMERGENCY MEDICINE

## 2018-05-29 PROCEDURE — 74011250637 HC RX REV CODE- 250/637: Performed by: INTERNAL MEDICINE

## 2018-05-29 RX ORDER — MOMETASONE FUROATE 50 UG/1
2 SPRAY, METERED NASAL
COMMUNITY

## 2018-05-29 RX ORDER — SODIUM CHLORIDE 0.9 % (FLUSH) 0.9 %
5-10 SYRINGE (ML) INJECTION AS NEEDED
Status: DISCONTINUED | OUTPATIENT
Start: 2018-05-29 | End: 2018-05-31 | Stop reason: HOSPADM

## 2018-05-29 RX ORDER — FUROSEMIDE 10 MG/ML
40 INJECTION INTRAMUSCULAR; INTRAVENOUS 2 TIMES DAILY
Status: DISCONTINUED | OUTPATIENT
Start: 2018-05-30 | End: 2018-05-31 | Stop reason: HOSPADM

## 2018-05-29 RX ORDER — BUDESONIDE AND FORMOTEROL FUMARATE DIHYDRATE 160; 4.5 UG/1; UG/1
2 AEROSOL RESPIRATORY (INHALATION) 2 TIMES DAILY
COMMUNITY
End: 2019-03-15

## 2018-05-29 RX ORDER — POTASSIUM CHLORIDE 1.5 G/1.77G
20 POWDER, FOR SOLUTION ORAL 2 TIMES DAILY WITH MEALS
Status: DISCONTINUED | OUTPATIENT
Start: 2018-05-29 | End: 2018-05-31 | Stop reason: HOSPADM

## 2018-05-29 RX ORDER — FUROSEMIDE 40 MG/1
80 TABLET ORAL DAILY
Status: ON HOLD | COMMUNITY
End: 2018-05-31 | Stop reason: SDUPTHER

## 2018-05-29 RX ORDER — ALLOPURINOL 100 MG/1
TABLET ORAL
Status: ON HOLD | COMMUNITY
End: 2018-07-18

## 2018-05-29 RX ORDER — FUROSEMIDE 40 MG/1
TABLET ORAL
COMMUNITY
End: 2018-06-14

## 2018-05-29 RX ORDER — SODIUM CHLORIDE 0.9 % (FLUSH) 0.9 %
5-10 SYRINGE (ML) INJECTION EVERY 8 HOURS
Status: DISCONTINUED | OUTPATIENT
Start: 2018-05-29 | End: 2018-05-31 | Stop reason: HOSPADM

## 2018-05-29 RX ORDER — FLUTICASONE FUROATE AND VILANTEROL 100; 25 UG/1; UG/1
1 POWDER RESPIRATORY (INHALATION) DAILY
Status: DISCONTINUED | OUTPATIENT
Start: 2018-05-30 | End: 2018-05-31 | Stop reason: HOSPADM

## 2018-05-29 RX ORDER — IPRATROPIUM BROMIDE AND ALBUTEROL SULFATE 2.5; .5 MG/3ML; MG/3ML
3 SOLUTION RESPIRATORY (INHALATION)
Status: DISCONTINUED | OUTPATIENT
Start: 2018-05-29 | End: 2018-05-31 | Stop reason: HOSPADM

## 2018-05-29 RX ORDER — WARFARIN 2 MG/1
4 TABLET ORAL ONCE
Status: COMPLETED | OUTPATIENT
Start: 2018-05-29 | End: 2018-05-29

## 2018-05-29 RX ORDER — AMLODIPINE BESYLATE 2.5 MG/1
2.5 TABLET ORAL DAILY
Status: DISCONTINUED | OUTPATIENT
Start: 2018-05-30 | End: 2018-05-31 | Stop reason: HOSPADM

## 2018-05-29 RX ORDER — ACETAMINOPHEN 500 MG
500 TABLET ORAL
Status: DISCONTINUED | OUTPATIENT
Start: 2018-05-29 | End: 2018-05-31 | Stop reason: HOSPADM

## 2018-05-29 RX ORDER — WARFARIN 3 MG/1
TABLET ORAL
COMMUNITY
End: 2018-05-31

## 2018-05-29 RX ORDER — FUROSEMIDE 10 MG/ML
60 INJECTION INTRAMUSCULAR; INTRAVENOUS
Status: COMPLETED | OUTPATIENT
Start: 2018-05-29 | End: 2018-05-29

## 2018-05-29 RX ORDER — COLCHICINE 0.6 MG/1
0.6 TABLET ORAL
Status: DISCONTINUED | OUTPATIENT
Start: 2018-05-29 | End: 2018-05-31 | Stop reason: HOSPADM

## 2018-05-29 RX ORDER — ALLOPURINOL 100 MG/1
100 TABLET ORAL DAILY
Status: DISCONTINUED | OUTPATIENT
Start: 2018-05-30 | End: 2018-05-31 | Stop reason: HOSPADM

## 2018-05-29 RX ADMIN — Medication 10 ML: at 21:43

## 2018-05-29 RX ADMIN — FUROSEMIDE 60 MG: 10 INJECTION, SOLUTION INTRAMUSCULAR; INTRAVENOUS at 12:38

## 2018-05-29 RX ADMIN — Medication 5 ML: at 15:58

## 2018-05-29 RX ADMIN — WARFARIN SODIUM 4 MG: 2 TABLET ORAL at 18:43

## 2018-05-29 RX ADMIN — POTASSIUM CHLORIDE 20 MEQ: 1.5 POWDER, FOR SOLUTION ORAL at 18:43

## 2018-05-29 NOTE — PROGRESS NOTES
[facility] received consult for Ms. Tena for management of DHF  Has not seen an [facility] MD since 2013. Talked with RN in ED reference Dr. Evelia Peralta or other seeing patient later today or tomorrow.    Agree with diuresis as patient tolerates

## 2018-05-29 NOTE — ED NOTES
TRANSFER - OUT REPORT:    Verbal report given to MC Wagner(name) on Darby Galindo  being transferred to [facility](unit) for routine progression of care       Report consisted of patients Situation, Background, Assessment and   Recommendations(SBAR). Information from the following report(s) SBAR, ED Summary and Recent Results was reviewed with the receiving nurse. Lines:   Peripheral IV 05/29/18 Left Antecubital (Active)   Site Assessment Clean, dry, & intact 5/29/2018 12:39 PM   Phlebitis Assessment 0 5/29/2018 12:39 PM   Infiltration Assessment 0 5/29/2018 12:39 PM   Dressing Status Clean, dry, & intact 5/29/2018 12:39 PM   Hub Color/Line Status Pink 5/29/2018 12:39 PM   Action Taken Blood drawn 5/29/2018 12:39 PM        Opportunity for questions and clarification was provided.       Patient transported with:   [employer]

## 2018-05-29 NOTE — PROGRESS NOTES
Pharmacy Clarification of Prior to Admission Medication Regimen     The patient was interviewed regarding clarification of the prior to admission medication regimen. Sons were present in room and obtained permission from patient to discuss drug regimen with visitor(s) present. Patient was questioned regarding use of any other inhalers, topical products, over the counter medications, herbal medications, vitamin products or ophthalmic/nasal/otic medication use. Information Obtained From: Patient, patient's son, prescription bottles, RX Query    Pertinent Pharmacy Findings:   Per patient's son, the patient will break her potassium and MVI in half, and take both halves, to 'help her swallow them'.  Identified High Alert Medication Information  o Current Anticoagulants:  - Name: warfarin (COUMADIN)      PTA medication list was corrected to the following:     Prior to Admission Medications   Prescriptions Last Dose Informant Patient Reported? Taking?   acetaminophen (TYLENOL EXTRA STRENGTH) 500 mg tablet 5/22/2018 at Unknown time Child Yes Yes   Sig: Take 1,000 mg by mouth every six (6) hours as needed for Pain. albuterol (PROVENTIL HFA, VENTOLIN HFA, PROAIR HFA) 90 mcg/actuation inhaler 5/28/2018 at Unknown time Child No Yes   Sig: Take 2 Puffs by inhalation every four (4) hours as needed for Wheezing. albuterol-ipratropium (DUO-NEB) 2.5 mg-0.5 mg/3 ml nebu 5/27/2018 at Unknown time Child No Yes   Sig: 3 mL by Nebulization route every four (4) hours as needed. amLODIPine (NORVASC) 2.5 mg tablet 5/28/2018 at Unknown time Child Yes Yes   Sig: Take 2.5 mg by mouth daily. budesonide-formoterol (SYMBICORT) 160-4.5 mcg/actuation HFAA 5/28/2018 at Unknown time Child Yes Yes   Sig: Take 2 Puffs by inhalation two (2) times a day. colchicine (COLCRYS) 0.6 mg tablet 5/28/2018 at Unknown time Child Yes Yes   Sig: Take 0.6 mg by mouth daily as needed (gout flare up).    dextran 70/hypromellose/PF (NATURAL TEARS, PF, OP) 2018 at Unknown time Child Yes Yes   Sig: Administer 1 Drop to both eyes daily as needed (dry eyes). furosemide (LASIX) 40 mg tablet 2018 at Unknown time Child Yes Yes   Sig: Take 80 mg by mouth daily. Patient takes 80 mg QAM and 40 mg QHS   furosemide (LASIX) 40 mg tablet 2018 at Unknown time Child Yes Yes   Sig: Take 40 mg by mouth nightly. Patient takes 80 mg QAM and 40 mg QHS   mometasone (NASONEX) 50 mcg/actuation nasal spray 2018 at Unknown time Child Yes Yes   Si Sprays by Both Nostrils route daily as needed (congestion). mv-mn/folic acid/calcium/vit K (ONE-A-DAY WOMEN'S 50 PLUS PO) 2018 at Unknown time Child Yes Yes   Sig: Take 1 Tab by mouth daily. potassium chloride (KLOR-CON) 20 mEq packet 2018 at Unknown time Child Yes Yes   Sig: Take 20 mEq by mouth daily. Indications: pill   warfarin (COUMADIN) 3 mg tablet 2018 at Unknown time Child Yes Yes   Sig: Take 6 mg by mouth two (2) days a week. Patient takes 2 tabs (6 mg) Monday and , and 4 mg Tuesday, Thursday, Saturday and Sundays   warfarin (COUMADIN) 4 mg tablet 2018 at Unknown time Child No Yes   Sig: Take 1 tablet by mouth every Tuesday, Thursday, Saturday & .       Facility-Administered Medications: None          Thank you,  Flory Gaston CPhT  Medication History Pharmacy Technician

## 2018-05-29 NOTE — PROGRESS NOTES
Pharmacy Daily Dosing of Warfarin    Indication: atrial fibrillation    Goal INR: 2-3    PTA Warfarin Dose: 6 mg on mon and Friday; 4 mg daily on all other days    Concurrent anticoagulants: N/A    Concurrent antiplatelet: N/A    Major Interacting Medications    Drugs that may increase INR: N/A   Drugs that may decrease INR: N/A    Conditions that may increase/decrease INR (CHF, C. diff, cirrhosis, thyroid disorder, hypoalbuminemia): hypoalbuminemia    Labs:  Recent Labs      05/29/18   1240  05/29/18   1104   INR  2.2*   --    HGB   --   12.8   PLT   --   284   SGOT   --   23   TBILI   --   0.6   ALB   --   3.0*         Impression/Plan:    INR therapeutic   Last PTA warfarin dose administered on 5/28 per information provided by [facility]   Will order warfarin 4 mg PO x 1 dose (PTA dose)   Daily INR and every other day CBC w/o diff QOD     Pharmacy will follow daily and adjust the dose as appropriate.     Thanks    Adamaris Urbano, PharmD, [facility] Specialist

## 2018-05-29 NOTE — IP AVS SNAPSHOT
[address] 
[phone] Patient: Haile Urrutia MRN: [id number] :1918 About your hospitalization You were admitted on:  May 29, 2018 You last received care in the:  [facility] 3 [facility] You were discharged on:  May 31, 2018 Why you were hospitalized Your primary diagnosis was:  Diastolic Chf, Acute On Chronic (Hcc) Your diagnoses also included:  Ckd (Chronic Kidney Disease) Stage 3, Gfr 30-59 Ml/Min, Chronic A-Fib (Hcc), Pleural Effusion, A-Fib (Hcc), Htn (Hypertension), Malignant Essential Hypertension Follow-up Information Follow up With Details Comments Contact Info Krystal Salinas MD Go on 2018 Hospital follow-up scheduled at 1:00pm (If you have questions or need to reschedule please call [phone] [address] [facility] [address] [address] 
[phone] Nicola Jackson MD Go on 2018 at 10:45 AM and appointment is at 11 AM for your cardiology follow up appointment. [address] [facility] 
[phone] [facility] [facility] 227 On 2018 this is your home care provider. If you do not hear from them in the next 24 hours then please call. 0860 [facility] [address] [phone] Your Scheduled Appointments 2018 To Be Determined START OF CARE with [facility] ([address]) [address] ([address]) 2018 To Be Determined OT EVALUATION with [address] ([address]) [address] ([address]) 2018 11:00 AM EDT  
ESTABLISHED PATIENT with Nicola Jackson MD  
[facility] [address].  
[phone] Wednesday June 06, 2018 To Be Determined PT EVALUATION with Santy Suarez, [address] [facility] [address] ([address]) [address] ([address]) Discharge Orders None A check rodrigo indicates which time of day the medication should be taken. My Medications START taking these medications Instructions Each Dose to Equal  
 Morning Noon Evening Bedtime  
 apixaban 2.5 mg tablet Commonly known as:  Jake Valencia Your last dose was: Your next dose is: Take 1 Tab by mouth two (2) times a day. 2.5 mg  
    
   
   
   
  
  
CHANGE how you take these medications Instructions Each Dose to Equal  
 Morning Noon Evening Bedtime  
 furosemide 40 mg tablet Commonly known as:  LASIX What changed:  Another medication with the same name was removed. Continue taking this medication, and follow the directions you see here. Your last dose was: Your next dose is:    
   
   
 Patient takes 80 mg every morning and 40 mg every night CONTINUE taking these medications Instructions Each Dose to Equal  
 Morning Noon Evening Bedtime  
 albuterol 90 mcg/actuation inhaler Commonly known as:  PROVENTIL HFA, VENTOLIN HFA, PROAIR HFA Your last dose was: Your next dose is: Take 2 Puffs by inhalation every four (4) hours as needed for Wheezing. 2 Puff  
    
   
   
   
  
 albuterol-ipratropium 2.5 mg-0.5 mg/3 ml Nebu Commonly known as:  Mario Bertha Your last dose was: Your next dose is:    
   
   
 3 mL by Nebulization route every four (4) hours as needed. 3 mL  
    
   
   
   
  
 allopurinol 100 mg tablet Commonly known as:  Alanan Allen Your last dose was: Your next dose is: Patient takes 2 tablets in the morning and 1 tablet at night. COLCRYS 0.6 mg tablet Generic drug:  colchicine Your last dose was: Your next dose is: Take 0.6 mg by mouth daily. 0.6 mg  
    
   
   
   
  
 KLOR-CON 20 mEq packet Generic drug:  potassium chloride Your last dose was: Your next dose is: Take 20 mEq by mouth daily. Indications: pill 20 mEq NASONEX 50 mcg/actuation nasal spray Generic drug:  mometasone Your last dose was: Your next dose is: 2 Sprays by Both Nostrils route daily as needed (congestion). 2 Spray NATURAL TEARS (PF) OP Your last dose was: Your next dose is:    
   
   
 Administer 1 Drop to both eyes daily as needed (dry eyes). 1 Drop NORVASC 2.5 mg tablet Generic drug:  amLODIPine Your last dose was: Your next dose is: Take 2.5 mg by mouth daily. 2.5 mg  
    
   
   
   
  
 ONE-A-DAY WOMEN'S 50 PLUS PO Your last dose was: Your next dose is: Take 1 Tab by mouth daily. 1 Tab SYMBICORT 160-4.5 mcg/actuation Hfaa Generic drug:  budesonide-formoterol Your last dose was: Your next dose is: Take 2 Puffs by inhalation two (2) times a day. 2 Puff TYLENOL EXTRA STRENGTH 500 mg tablet Generic drug:  acetaminophen Your last dose was: Your next dose is: Take 1,000 mg by mouth every six (6) hours as needed for Pain. 1000 mg  
    
   
   
   
  
  
STOP taking these medications   
 warfarin 3 mg tablet Commonly known as:  COUMADIN  
   
  
 warfarin 4 mg tablet Commonly known as:  COUMADIN  
   
  
 warfarin 6 mg tablet Commonly known as:  COUMADIN Where to Get Your Medications These medications were sent to [address] [place] [address], [address] [address] Phone:  [phone]  
  apixaban 2.5 mg tablet Discharge Instructions Patient Discharge Instructions Pt Name  Maddie Boone Date of Birth 8/12/1918 Age  80 y.o. Medical Record Number  [id number] PCP Dominic Fulton MD   
Admit date:  5/29/2018 @    [place] [address] Room Number  9662/08 Date of Discharge 5/31/2018 Admission Diagnoses:     Diastolic CHF, acute on chronic (HCC) Allergies Allergen Reactions  Penicillins Hives You were admitted to [address] for  Diastolic CHF, acute on chronic ([facility] [address] YOUR OTHER MEDICAL DIAGNOSES INCLUDE (BUT NOT LIMITED TO ): 
Present on Admission:  CKD (chronic kidney disease) stage 3, GFR 30-59 ml/min  Chronic a-fib ([address]  Diastolic CHF, acute on chronic (HCC)  Pleural effusion  A-fib ([facility] [address]  HTN (hypertension)  Malignant essential hypertension DIET:  Cardiac Diet Recommended activity: Activity as tolerated Follow up : Follow-up Information Follow up With Details Comments Contact Info Dominic Fulton MD Schedule an appointment as soon as possible for a visit to be seen within one week 1901   [address] [address] [phone] 1700 Jonathan Leach MD Schedule an appointment as soon as possible for a visit to be seen within 2 weeks Isaura Cuba [address] [place] [address] 
[phone] Daily weights: Notify MD for a weight gain of 3 pounds or greater in one day or 5 pounds in one week. Start taking Eliquis twice a day starting this Saturday. Apixaban (By mouth) Apixaban (a-PIX-a-ban) Treats and prevents blood clots. This medicine is a blood thinner. Brand Name(s): Eliquis There may be other brand names for this medicine. When This Medicine Should Not Be Used: This medicine is not right for everyone. Do not use it if you had an allergic reaction to apixaban or you have active bleeding. How to Use This Medicine:  
Tablet · Your doctor will tell you how much medicine to use. Do not use more than directed. · If you are not able to swallow the tablets whole, they may be crushed and mixed in water, 5% dextrose in water (D5W), apple juice, or applesauce. The crushed tablets may be mixed with 60 mL of water or D5W dose and given through a nasogastric tube (NGT). · This medicine should come with a Medication Guide. Ask your pharmacist for a copy if you do not have one. · Missed dose: Take a dose as soon as you remember. If it is almost time for your next dose, wait until then and take a regular dose. Do not take extra medicine to make up for a missed dose. · Store the medicine in a closed container at room temperature, away from heat, moisture, and direct light. Drugs and Foods to Avoid: Ask your doctor or pharmacist before using any other medicine, including over-the-counter medicines, vitamins, and herbal products. · Some medicines can affect how apixaban works. Tell your doctor if you are using any of the following: ¨ Carbamazepine, clarithromycin, itraconazole, ketoconazole, phenytoin, rifampin, ritonavir, Mis's wort ¨ Blood thinner (including clopidogrel, heparin, prasugrel, warfarin) ¨ Medicine to treat depression ¨ NSAID pain or arthritis medicine (including aspirin, celecoxib, diclofenac, ibuprofen, naproxen) Warnings While Using This Medicine: · Tell your doctor if you are pregnant or breastfeeding, or if you have kidney disease, liver disease, bleeding problems, or an artificial heart valve. · Do not stop using this medicine suddenly without asking your doctor. You might have a higher risk of stroke for a short time after you stop using this medicine. · This medicine increases your risk for bleeding that can become serious if not controlled. You may also bruise easily, and it may take longer than usual for bleeding to stop. · This medicine may increase your risk for blood clots in your spine or back if you undergo an epidural or spinal puncture. This could lead to paralysis. Tell your doctor if you ever had spine problems or back surgery. · Tell any doctor or dentist who treats you that you are using this medicine. With your doctor's supervision, you may need to stop using this medicine several days before you have surgery or medical tests. · Your doctor will do lab tests at regular visits to check on the effects of this medicine. Keep all appointments. · Keep all medicine out of the reach of children. Never share your medicine with anyone. Possible Side Effects While Using This Medicine:  
Call your doctor right away if you notice any of these side effects: · Allergic reaction: Itching or hives, swelling in your face or hands, swelling or tingling in your mouth or throat, chest tightness, trouble breathing · Change in how much or how often you urinate, red or pink urine · Chest pain, trouble breathing · Coughing up blood, vomiting blood or material that looks like coffee grounds · Numbness, tingling, or muscle weakness in your legs or feet · Red or black, tarry stools · Unusual bleeding, bruising, or weakness If you notice other side effects that you think are caused by this medicine, tell your doctor. Call your doctor for medical advice about side effects. You may report side effects to FDA at 7-171-FDA-8670 © 2017 [address] Information is for End User's use only and may not be sold, redistributed or otherwise used for commercial purposes. The above information is an  only. It is not intended as medical advice for individual conditions or treatments.  Talk to your doctor, nurse or pharmacist before following any medical regimen to see if it is safe and effective for you. · It is important that you take the medication exactly as they are prescribed. · Keep your medication in the bottles provided by the pharmacist and keep a list of the medication names, dosages, and times to be taken in your wallet. · Do not take other medications without consulting your doctor. ADDITIONAL INFORMATION: If you experience any of the following symptoms or have any health problem not listed below, then please call your primary care physician or return to the emergency room if you cannot get hold of your doctor: Fever, chills, nausea, vomiting, diarrhea, change in mentation, falling, bleeding, shortness of breath. I understand that if any problems occur once I am discharged, I am supposed to call my Primary care physician for further care or seek help in the Emergency Department at the nearest Healthcare facility. I have had an opportunity to discuss my clinical issues with my doctor and nursing staff. I understand and acknowledge receipt of the above instructions. Physician's or R.N.'s Signature                                                            Date/Time Patient or Representative Signature                                                 Date/Time Yurpyt Announcement We are excited to announce that we are making your provider's discharge notes available to you in [employer]. You will see these notes when they are completed and signed by the physician that discharged you from your recent hospital stay.   If you have any questions or concerns about any information you see in Bubbles, please call the Health Information Department where you were seen or reach out to your Primary Care Provider for more information about your plan of care. Introducing [facility] & HEALTH SERVICES! Dear Kaela Gutiérrez: Thank you for requesting a Bubbles account. Our records indicate that you already have an active Bubbles account. You can access your account anytime at [email or web address] Did you know that you can access your hospital and ER discharge instructions at any time in Bubbles? You can also review all of your test results from your hospital stay or ER visit. Additional Information If you have questions, please visit the Frequently Asked Questions section of the Bubbles website at [email or web address] Remember, Bubbles is NOT to be used for urgent needs. For medical emergencies, dial 911. Now available from your iPhone and Android! Introducing [employer] As a [facility] patient, I wanted to make you aware of our electronic visit tool called Lamin Janeschuckie. [facility] 24/7 allows you to connect within minutes with a medical provider 24 hours a day, seven days a week via a mobile device or tablet or logging into a secure website from your computer. You can access Lamin Plata from anywhere in the [place]. A virtual visit might be right for you when you have a simple condition and feel like you just dont want to get out of bed, or cant get away from work for an appointment, when your regular [facility] provider is not available (evenings, weekends or holidays), or when youre out of town and need minor care. Electronic visits cost only $49 and if the [facility] 24/7 provider determines a prescription is needed to treat your condition, one can be electronically transmitted to a nearby pharmacy*. Please take a moment to enroll today if you have not already done so.   The enrollment process is free and takes just a few minutes. To enroll, please download the [employer] 24/7 chito to your tablet or phone, or visit [email or web address] to enroll on your computer. And, as an [address] [place] [address] patient with a [employer] account, the results of your visits will be scanned into your electronic medical record and your primary care provider will be able to view the scanned results. We urge you to continue to see your regular [employer] provider for your ongoing medical care. And while your primary care provider may not be the one available when you seek a HealthWave virtual visit, the peace of mind you get from getting a real diagnosis real time can be priceless. For more information on HealthWave, view our Frequently Asked Questions (FAQs) at [email or web address]. Sincerely, 
 
Shannon Blakely MD 
Chief Medical Officer [facility] Garima Vinson *:  certain medications cannot be prescribed via HealthWave Providers Seen During Your Hospitalization Provider Specialty Primary office phone Diane Bird. Antelmo Kaufman MD Emergency Medicine [phone] Charolette Peabody, MD Internal Medicine [phone] Your Primary Care Physician (PCP) Primary Care Physician Office Phone Office Fax Nancy Feliz [phone] You are allergic to the following Allergen Reactions Penicillins Hives Recent Documentation Height Weight BMI OB Status Smoking Status 1.575 m 85.7 kg 34.56 kg/m2 Postmenopausal Former Smoker Emergency Contacts Name Discharge Info Relation Home Work Mobile [address] CAREGIVER [3] Child [2] [phone] [address] [place] [address] CAREGIVER [3] Daughter [21] [phone] Patient Belongings The following personal items are in your possession at time of discharge: 
  Dental Appliances:  With patient, Uppers, Lowers  Visual Aid: None Home Medications: None   Jewelry: None  Clothing: Pants, Jacket/Coat, Shirt, Socks, Footwear    Other Valuables: Eyeglasses Discharge Instructions Attachments/References WARFARIN: LONG-TERM USE (ENGLISH) HEART FAILURE ZONES: GENERAL INFO (ENGLISH) HEART FAILURE: AVOIDING TRIGGERS (ENGLISH) HEART FAILURE: LIMITING SODIUM AND FLUIDS (ENGLISH) Patient Handouts Taking Warfarin Safely: Care Instructions Your Care Instructions Warfarin is a medicine that you take to prevent blood clots. It is often called a blood thinner. Doctors give warfarin (such as Coumadin) to reduce the risk of blood clots. You may be at risk for blood clots if you have atrial fibrillation or deep vein thrombosis. Some other health problems may also put you at risk. Warfarin slows the amount of time it takes for your blood to clot. It can cause bleeding problems. Even if you've been taking warfarin for a while, it's important to know how to take it safely. Foods and other medicines can affect the way warfarin works. Some can make warfarin work too well. This can cause bleeding problems. And some can make it work poorly, so that it does not prevent blood clots very well. You will need regular blood tests to check how long it takes for your blood to form a clot. This test is called a PT or prothrombin time test. The result of the test is called an INR level. Depending on the test results, your doctor or anticoagulation clinic may adjust your dose of warfarin. Follow-up care is a key part of your treatment and safety. Be sure to make and go to all appointments, and call your doctor if you are having problems. It's also a good idea to know your test results and keep a list of the medicines you take. How can you care for yourself at home? Take warfarin safely · Take your warfarin at the same time each day.  
· If you miss a dose of warfarin, don't take an extra dose to make up for it. Your doctor can tell you exactly what to do so you don't take too much or too little. · Wear medical alert jewelry that lets others know that you take warfarin. You can buy this at most drugstores. · Don't take warfarin if you are pregnant or planning to get pregnant. Talk to your doctor about how you can prevent getting pregnant while you are taking it. · Don't change your dose or stop taking warfarin unless your doctor tells you to. Effects of medicines and food on warfarin · Don't start or stop taking any medicines, vitamins, or natural remedies unless you first talk to your doctor. Many medicines can affect how warfarin works. These include aspirin and other pain relievers, over-the-counter medicines, multivitamins, dietary supplements, and herbal products. · Tell all of your doctors and pharmacists that you take warfarin. Some prescription medicines can affect how warfarin works. · Keep the amount of vitamin K in your diet about the same from day to day. Do not suddenly eat a lot more or a lot less food that is rich in vitamin K than you usually do. Vitamin K affects how warfarin works and how your blood clots. Talk with your doctor before making big changes in your diet. Foods that have a lot of vitamin K include cooked green vegetables, such as: ¨ Kale, spinach, turnip greens, jayshree greens, Swiss chard, and mustard greens. ¨ York sprouts, broccoli, and asparagus. · Limit your use of alcohol. Avoid bleeding by preventing falls and injuries · Wear slippers or shoes with nonskid soles. · Remove throw rugs and clutter. · Rearrange furniture and electrical cords to keep them out of walking paths. · Keep stairways, porches, and outside walkways well lit. Use night-lights in hallways and bathrooms. · Be extra careful when you work with sharp tools or knives. When should you call for help? Call 911 anytime you think you may need emergency care. For example, call if: ? · You have a sudden, severe headache that is different from past headaches. ?Call your doctor now or seek immediate medical care if: 
? · You have any abnormal bleeding, such as: 
¨ Nosebleeds. ¨ Vaginal bleeding that is different (heavier, more frequent, at a different time of the month) than what you are used to. ¨ Bloody or black stools, or rectal bleeding. ¨ Bloody or pink urine. ? Watch closely for changes in your health, and be sure to contact your doctor if you have any problems. Where can you learn more? Go to http://natty-mirza.info/. Enter O066 in the search box to learn more about \"Taking Warfarin Safely: Care Instructions. \" Current as of: September 21, 2016 Content Version: 11.4 © 9491-1505 [employer]. Care instructions adapted under license by [employer] (which disclaims liability or warranty for this information). If you have questions about a medical condition or this instruction, always ask your healthcare professional. [place] [address] any warranty or liability for your use of this information. Learning About Heart Failure Zones What are heart failure zones? Heart failure zones give you an easy way to see changes in your heart failure symptoms. They also tell you when you need to get help. Check every day to see which zone you are in. Green zone. You are doing well. This is where you want to be. · Your weight is stable. This means it is not going up or down. · You breathe easily. · You are sleeping well. You are able to lie flat without shortness of breath. · You can do your usual activities. Yellow zone. Be careful. Your symptoms are changing. Call your doctor. · You have new or increased shortness of breath. · You are dizzy or lightheaded, or you feel like you may faint.  
· You have sudden weight gain, such as more than 2 to 3 pounds in a day or 5 pounds in a week. (Your doctor may suggest a different range of weight gain.) · You have increased swelling in your legs, ankles, or feet. · You are so tired or weak that you cannot do your usual activities. · You are not sleeping well. Shortness of breath wakes you up at night. You need extra pillows. Your doctor's name: ____________________________________________________________ Your doctor's contact information: _________________________________________________ Red zone. This is an emergency. Call 911. You have symptoms of sudden heart failure, such as: 
· You have severe trouble breathing. · You cough up pink, foamy mucus. · You have a new irregular or fast heartbeat. You have symptoms of a heart attack. These may include: · Chest pain or pressure, or a strange feeling in the chest. 
· Sweating. · Shortness of breath. · Nausea or vomiting. · Pain, pressure, or a strange feeling in the back, neck, jaw, or upper belly or in one or both shoulders or arms. · Lightheadedness or sudden weakness. · A fast or irregular heartbeat. If you have symptoms of a heart attack: After you call 911, the  may tell you to chew 1 adult-strength or 2 to 4 low-dose aspirin. Wait for an ambulance. Do not try to drive yourself. Follow-up care is a key part of your treatment and safety. Be sure to make and go to all appointments, and call your doctor if you are having problems. It's also a good idea to know your test results and keep a list of the medicines you take. Where can you learn more? Go to http://natty-mirza.info/. Enter T174 in the search box to learn more about \"Learning About Heart Failure Zones. \" Current as of: September 21, 2016 Content Version: 11.4 © 8054-5357 [employer]. Care instructions adapted under license by [employer] (which disclaims liability or warranty for this information).  If you have questions about a medical condition or this instruction, always ask your healthcare professional. [place] [address] any warranty or liability for your use of this information. Avoiding Triggers With Heart Failure: Care Instructions Your Care Instructions Triggers are anything that make your heart failure flare up. A flare-up is also called \"sudden heart failure\" or \"acute heart failure. \" When you have a flare-up, fluid builds up in your lungs, and you have problems breathing. You might need to go to the hospital. By watching for changes in your condition and avoiding triggers, you can prevent heart failure flare-ups. Follow-up care is a key part of your treatment and safety. Be sure to make and go to all appointments, and call your doctor if you are having problems. It's also a good idea to know your test results and keep a list of the medicines you take. How can you care for yourself at home? Watch for changes in your weight and condition · Weigh yourself without clothing at the same time each day. Record your weight. Call your doctor if you have sudden weight gain, such as more than 2 to 3 pounds in a day or 5 pounds in a week. (Your doctor may suggest a different range of weight gain.) A sudden weight gain may mean that your heart failure is getting worse. · Keep a daily record of your symptoms. Write down any changes in how you feel, such as new shortness of breath, cough, or problems eating. Also record if your ankles are more swollen than usual and if you feel more tired than usual. Note anything that you ate or did that could have triggered these changes. Limit sodium Sodium causes your body to hold on to extra water. This may cause your heart failure symptoms to get worse. People get most of their sodium from processed foods. Fast food and restaurant meals also tend to be very high in sodium.  
· Your doctor may suggest that you limit sodium to 2,000 milligrams (mg) a day or less. That is less than 1 teaspoon of salt a day, including all the salt you eat in cooking or in packaged foods. · Read food labels on cans and food packages. They tell you how much sodium you get in one serving. Check the serving size. If you eat more than one serving, you are getting more sodium. · Be aware that sodium can come in forms other than salt, including monosodium glutamate (MSG), sodium citrate, and sodium bicarbonate (baking soda). MSG is often added to Asian food. You can sometimes ask for food without MSG or salt. · Slowly reducing salt will help you adjust to the taste. Take the salt shaker off the table. · Flavor your food with garlic, lemon juice, onion, vinegar, herbs, and spices instead of salt. Do not use soy sauce, steak sauce, onion salt, garlic salt, mustard, or ketchup on your food, unless it is labeled \"low-sodium\" or \"low-salt. \" 
· Make your own salad dressings, sauces, and ketchup without adding salt. · Use fresh or frozen ingredients, instead of canned ones, whenever you can. Choose low-sodium canned goods. · Eat less processed food and food from restaurants, including fast food. Exercise as directed Moderate, regular exercise is very good for your heart. It improves your blood flow and helps control your weight. But too much exercise can stress your heart and cause a heart failure flare-up. · Check with your doctor before you start an exercise program. 
· Walking is an easy way to get exercise. Start out slowly. Gradually increase the length and pace of your walk. Swimming, riding a bike, and using a treadmill are also good forms of exercise. · When you exercise, watch for signs that your heart is working too hard. You are pushing yourself too hard if you cannot talk while you are exercising. If you become short of breath or dizzy or have chest pain, stop, sit down, and rest. 
· Do not exercise when you do not feel well. Take medicines correctly · Take your medicines exactly as prescribed. Call your doctor if you think you are having a problem with your medicine. · Make a list of all the medicines you take. Include those prescribed to you by other doctors and any over-the-counter medicines, vitamins, or supplements you take. Take this list with you when you go to any doctor. · Take your medicines at the same time every day. It may help you to post a list of all the medicines you take every day and what time of day you take them. · Make taking your medicine as simple as you can. Plan times to take your medicines when you are doing other things, such as eating a meal or getting ready for bed. This will make it easier to remember to take your medicines. · Get organized. Use helpful tools, such as daily or weekly pill containers. When should you call for help? Call 911 if you have symptoms of sudden heart failure such as: 
? · You have severe trouble breathing. ? · You cough up pink, foamy mucus. ? · You have a new irregular or rapid heartbeat. ?Call your doctor now or seek immediate medical care if: 
? · You have new or increased shortness of breath. ? · You are dizzy or lightheaded, or you feel like you may faint. ? · You have sudden weight gain, such as more than 2 to 3 pounds in a day or 5 pounds in a week. (Your doctor may suggest a different range of weight gain.) ? · You have increased swelling in your legs, ankles, or feet. ? · You are suddenly so tired or weak that you cannot do your usual activities. ? Watch closely for changes in your health, and be sure to contact your doctor if you develop new symptoms. Where can you learn more? Go to http://natty-mirza.info/. Enter F543 in the search box to learn more about \"Avoiding Triggers With Heart Failure: Care Instructions. \" Current as of: September 21, 2016 Content Version: 11.4 © 2022-2246 Healthwise, Incorporated.  Care instructions adapted under license by [address] (which disclaims liability or warranty for this information). If you have questions about a medical condition or this instruction, always ask your healthcare professional. [address] any warranty or liability for your use of this information. Limiting Sodium and Fluids With Heart Failure: Care Instructions Your Care Instructions Sodium causes your body to hold on to extra water. This may cause your heart failure symptoms to get worse. Limiting sodium may help you feel better and lower your risk of having to go to the hospital. 
People get most of their sodium from processed foods. Fast food and restaurant meals also tend to be very high in sodium. Your doctor may suggest that you limit sodium to 2,000 milligrams (mg) a day or less. That is less than 1 teaspoon of salt a day, including all the salt you eat in cooked or packaged foods. Usually, you have to limit the amount of liquids you drink only if your heart failure is severe. Limiting sodium alone often is enough to help your body get rid of extra fluids. However, your doctor may tell you to limit your fluid intake to a set amount each day. Follow-up care is a key part of your treatment and safety. Be sure to make and go to all appointments, and call your doctor if you are having problems. It's also a good idea to know your test results and keep a list of the medicines you take. How can you care for yourself at home? Read food labels · Read food labels on cans and food packages. The labels tell you how much sodium is in each serving. Make sure that you look at the serving size. If you eat more than the serving size, you have eaten more sodium than is listed for one serving. · Food labels also tell you the Percent Daily Value.  If the Percent Daily Value says 50%, it means that you will get at least 50% of all the sodium you need for the entire day in one serving. Choose products with low Percent Daily Values for sodium. · Be aware that sodium can come in forms other than salt, including monosodium glutamate (MSG), sodium citrate, and sodium bicarbonate (baking soda). MSG is often added to Asian food. You can sometimes ask for food without MSG or salt. Buy low-sodium foods · Buy foods that are labeled \"unsalted\" (no salt added), \"sodium-free\" (less than 5 mg of sodium per serving), or \"low-sodium\" (less than 140 mg of sodium per serving). A food labeled \"light sodium\" has less than half of the full-sodium version of that food. Foods labeled \"reduced-sodium\" may still have too much sodium. · Buy fresh vegetables or plain, frozen vegetables. Buy low-sodium versions of canned vegetables, soups, and other canned goods. Prepare low-sodium meals · Use less salt each day when cooking. Reducing salt in this way will help you adjust to the taste. Do not add salt after cooking. Take the salt shaker off the table. · Flavor your food with garlic, lemon juice, onion, vinegar, herbs, and spices instead of salt. Do not use soy sauce, steak sauce, onion salt, garlic salt, mustard, or ketchup on your food. · Make your own salad dressings, sauces, and ketchup without adding salt. · Use less salt (or none) when recipes call for it. You can often use half the salt a recipe calls for without losing flavor. Other dishes like rice, pasta, and grains do not need added salt. · Rinse canned vegetables. This removes some-but not all-of the salt. · Avoid water that has a naturally high sodium content or that has been treated with water softeners, which add sodium. Call your local water company to find out the sodium content of your water supply. If you buy bottled water, read the label and choose a sodium-free brand. Avoid high-sodium foods, such as: 
· Smoked, cured, salted, and canned meat, fish, and poultry. · Ham, mcdonald, hot dogs, and luncheon meats. · Regular, hard, and processed cheese and regular peanut butter. · Crackers with salted tops. · Frozen prepared meals. · Canned and dried soups, broths, and bouillon, unless labeled sodium-free or low-sodium. · Canned vegetables, unless labeled sodium-free or low-sodium. · Salted snack foods such as chips and pretzels. · [place] fries, pizza, tacos, and other fast foods. · Pickles, olives, ketchup, and other condiments, especially soy sauce, unless labeled sodium-free or low-sodium. If you cannot cook for yourself · Have family members or friends help you, or have someone cook low-sodium meals. · Check with your local senior nutrition program to find out where meals are served and whether they offer a low-sodium option. You can often find these programs through your local health department or hospital. 
· Have meals delivered to your home. [facility] have a Meals on [employer]. These programs provide one hot meal a day for older adults, delivered to their homes. Ask whether these meals are low-sodium. Let them know that you are on a low-sodium diet. Limiting fluid intake · Find a method that works for you. You might simply write down how much you drink every time you do. Some people keep a container filled with the amount of fluid allowed for that day. If they drink from a source other than the container, then they pour out that amount. · Measure your regular drinking glasses to find out how much fluid each one holds. Once you know this, you will not have to measure every time. · Besides water, milk, juices, and other drinks, some foods have a lot of fluid. Count any foods that will melt (such as ice cream or gelatin dessert) or liquid foods (such as soup) as part of your fluid intake for the day. Where can you learn more? Go to http://natty-mirza.info/.  
Enter A166 in the search box to learn more about \"Limiting Sodium and Fluids With Heart Failure: Care Instructions. \" Current as of: September 21, 2016 Content Version: 11.4 © 2006-2017 Healthwise, Incorporated. Care instructions adapted under license by [employer] (which disclaims liability or warranty for this information). If you have questions about a medical condition or this instruction, always ask your healthcare professional. [address] any warranty or liability for your use of this information. Please provide this summary of care documentation to your next provider. Signatures-by signing, you are acknowledging that this After Visit Summary has been reviewed with you and you have received a copy. Patient Signature:  ____________________________________________________________ Date:  ____________________________________________________________  
  
[facility] Provider Signature:  ____________________________________________________________ Date:  ____________________________________________________________

## 2018-05-29 NOTE — CONSULTS
Cardiology Consult Note       Date of  Admission: 5/29/2018 11:14 AM     Admission type:Emergency     Subjective:     Ms. Tatyana Benoit presents with 2 week h/o cough with expectoration, SOB,edema, generalized weakness. No chest pain, palpitation. Has PND, orthopnea. She has chronic afib. Last EF 50%. No previous MI. Chest CT today showed left pleural effusion. EKG shows afib.     Patient Active Problem List    Diagnosis Date Noted    CKD (chronic kidney disease) stage 3, GFR 30-59 ml/min 05/29/2018    Chronic a-fib ([address] 27/93/0267    Diastolic CHF, acute on chronic (HCC) 05/29/2018    Pleural effusion 05/29/2018    Pneumonia 04/18/2017    Sepsis(995.91) 04/18/2017    Dehydration 04/18/2017    Acute CHF ([address] 01/23/2015    SOB (shortness of breath) 01/21/2015    Pedal edema 01/21/2015    Squamous cell lung cancer ([address] 01/21/2015    Adenocarcinoma ([address] 04/05/2013    S/P right colectomy 04/05/2013    Malignant essential hypertension 04/01/2013    Colon cancer ([address] 03/30/2013    Ileitis, terminal ([address] 03/27/2013    A-fib ([address] 03/27/2013    HTN (hypertension) 03/27/2013      Kendra Encinas MD  Past Medical History:   Diagnosis Date    [facility]fib [facility])     Cancer [facility])     right breast CA, lung, colon    Gastrointestinal disorder     GERD    Hypertension     Other ill-defined conditions(799.89)     gout      Past Surgical History:   Procedure Laterality Date    BREAST SURGERY PROCEDURE UNLISTED  1995    Right mastectomy    HX APPENDECTOMY      HX HEENT      right glass eye    ID COLONOSCOPY W/BIOPSY SINGLE/MULTIPLE  3/29/2013         ID COLSC FLX W/RMVL OF TUMOR POLYP LESION SNARE TQ  3/29/2013          Allergies   Allergen Reactions    Penicillins Hives      Family History   Problem Relation Age of Onset    Colon Cancer Other     Hypertension Other       Current Facility-Administered Medications   Medication Dose Route Frequency    acetaminophen (TYLENOL) tablet 500 mg  500 mg Oral Q6H PRN  albuterol-ipratropium (DUO-NEB) 2.5 MG-0.5 MG/3 ML  3 mL Nebulization Q4H PRN    [START ON 5/30/2018] allopurinol (ZYLOPRIM) tablet 100 mg  100 mg Oral DAILY    [START ON 5/30/2018] amLODIPine (NORVASC) tablet 2.5 mg  2.5 mg Oral DAILY    [START ON 5/30/2018] fluticasone-vilanterol (BREO ELLIPTA) 100mcg-25mcg/puff  1 Puff Inhalation DAILY    colchicine tablet 0.6 mg  0.6 mg Oral DAILY PRN    [START ON 5/30/2018] furosemide (LASIX) injection 40 mg  40 mg IntraVENous BID    potassium chloride (KLOR-CON) packet 20 mEq  20 mEq Oral BID WITH MEALS    sodium chloride (NS) flush 5-10 mL  5-10 mL IntraVENous Q8H    sodium chloride (NS) flush 5-10 mL  5-10 mL IntraVENous PRN    WARFARIN INFORMATION NOTE (COUMADIN)   Other QPM         Review of Symptoms:  A comprehensive review of systems was negative except for that written in the HPI. Physical Exam    Visit Vitals    /90 (BP 1 Location: Right arm, BP Patient Position: At rest)    Pulse 79    Temp 97.4 °F (36.3 °C)    Resp 20    Ht 5' 2\" (1.575 m)    Wt 196 lb (88.9 kg)    SpO2 98%    BMI 35.85 kg/m2     General Appearance:  Well developed, well nourished,alert and oriented x 3, and individual in no acute distress. Ears/Nose/Mouth/Throat:   Hearing grossly normal.         Neck: Supple. Chest:   Lungs diminished BS d bilaterally. Cardiovascular:  Regular rate and rhythm, S1, S2 normal, no murmur. Abdomen:   Soft, non-tender, bowel sounds are active. Extremities: 1+ edema bilaterally. Skin: Warm and dry.                Cardiographics    Telemetry: AFIB  ECG:  atrial fibrillation, rate 100  Echocardiogram: Not done    Labs:   Recent Results (from the past 24 hour(s))   EKG, 12 LEAD, INITIAL    Collection Time: 05/29/18 10:53 AM   Result Value Ref Range    Ventricular Rate 85 BPM    Atrial Rate 264 BPM    QRS Duration 76 ms    Q-T Interval 398 ms    QTC Calculation (Bezet) 473 ms    Calculated R Axis -4 degrees    Calculated T Axis 25 degrees    Diagnosis       Atrial flutter with variable AV block  Low voltage QRS  Inferior infarct , age undetermined  When compared with ECG of 13-APR-2018 19:59,  Atrial flutter has replaced Atrial fibrillation  Confirmed by [place] Arya ([id number]) on 5/29/2018 6:01:44 PM     CBC WITH AUTOMATED DIFF    Collection Time: 05/29/18 11:04 AM   Result Value Ref Range    WBC 5.6 3.6 - 11.0 K/uL    RBC 5.02 3.80 - 5.20 M/uL    HGB 12.8 11.5 - 16.0 g/dL    HCT 40.4 35.0 - 47.0 %    MCV 80.5 80.0 - 99.0 FL    MCH 25.5 (L) 26.0 - 34.0 PG    MCHC 31.7 30.0 - 36.5 g/dL    RDW 18.3 (H) 11.5 - 14.5 %    PLATELET 087 554 - 951 K/uL    MPV 10.0 8.9 - 12.9 FL    NRBC 0.0 0  WBC    ABSOLUTE NRBC 0.00 0.00 - 0.01 K/uL    NEUTROPHILS 80 (H) 32 - 75 %    LYMPHOCYTES 9 (L) 12 - 49 %    MONOCYTES 8 5 - 13 %    EOSINOPHILS 1 0 - 7 %    BASOPHILS 1 0 - 1 %    IMMATURE GRANULOCYTES 1 (H) 0.0 - 0.5 %    ABS. NEUTROPHILS 4.4 1.8 - 8.0 K/UL    ABS. LYMPHOCYTES 0.5 (L) 0.8 - 3.5 K/UL    ABS. MONOCYTES 0.4 0.0 - 1.0 K/UL    ABS. EOSINOPHILS 0.1 0.0 - 0.4 K/UL    ABS. BASOPHILS 0.1 0.0 - 0.1 K/UL    ABS. IMM. GRANS. 0.1 (H) 0.00 - 0.04 K/UL    DF AUTOMATED      RBC COMMENTS ANISOCYTOSIS  1+       METABOLIC PANEL, COMPREHENSIVE    Collection Time: 05/29/18 11:04 AM   Result Value Ref Range    Sodium 143 136 - 145 mmol/L    Potassium 3.9 3.5 - 5.1 mmol/L    Chloride 103 97 - 108 mmol/L    CO2 31 21 - 32 mmol/L    Anion gap 9 5 - 15 mmol/L    Glucose 103 (H) 65 - 100 mg/dL    BUN 18 6 - 20 MG/DL    Creatinine 1.39 (H) 0.55 - 1.02 MG/DL    BUN/Creatinine ratio 13 12 - 20      GFR est AA 42 (L) >60 ml/min/1.73m2    GFR est non-AA 35 (L) >60 ml/min/1.73m2    Calcium 9.8 8.5 - 10.1 MG/DL    Bilirubin, total 0.6 0.2 - 1.0 MG/DL    ALT (SGPT) 18 12 - 78 U/L    AST (SGOT) 23 15 - 37 U/L    Alk.  phosphatase 120 (H) 45 - 117 U/L    Protein, total 8.1 6.4 - 8.2 g/dL    Albumin 3.0 (L) 3.5 - 5.0 g/dL    Globulin 5.1 (H) 2.0 - 4.0 g/dL    A-G Ratio 0.6 (L) 1.1 - 2.2     TROPONIN I    Collection Time: 05/29/18 11:04 AM   Result Value Ref Range    Troponin-I, Qt. <0.04 <0.05 ng/mL   CK W/ REFLX CKMB    Collection Time: 05/29/18 11:04 AM   Result Value Ref Range    CK 45 26 - 192 U/L   NT-PRO BNP    Collection Time: 05/29/18 11:04 AM   Result Value Ref Range    NT pro-BNP 4856 (H) 0 - 450 PG/ML   PROTHROMBIN TIME + INR    Collection Time: 05/29/18 12:40 PM   Result Value Ref Range    INR 2.2 (H) 0.9 - 1.1      Prothrombin time 21.8 (H) 9.0 - 11.1 sec        Assessment:     Assessment:       Active Problems:    CKD (chronic kidney disease) stage 3, GFR 30-59 ml/min (5/29/2018)      Chronic a-fib (HCC) (2/94/4710)      Diastolic CHF, acute on chronic (HCC) (5/29/2018)      Pleural effusion (5/29/2018)         Plan: Active Problems:    CKD (chronic kidney disease) stage 3, GFR 30-59 ml/min (5/29/2018)      Chronic a-fib (HCC) (5/29/2018)- rate reasonably controlled. Not a candidate for anti coagulation due to advanced age. Diastolic CHF, acute on chronic (HCC) (5/29/2018)- LVEF in 2015 was 50%. May have tachycardia induced cardiomyopathy. Check echo. Continue diuresis. Pleural effusion- could be malignant as she has h/o lung and colon cancer. Will benefit from thoracentesis. At this time patient only wants medical therapy. Will follow. Care discussed with family. Thank you for the consult.         Vinicius Olivera MD

## 2018-05-29 NOTE — H&P
Hospitalist Admission Note    NAME: Milana Sandoval   :  1918   MRN:  [id number]     Date/Time:  2018 3:58 PM    Patient PCP: Galo Emmanuel MD  ______________________________________________________________________  Given the patient's current clinical presentation, I have a high level of concern for decompensation if discharged from the emergency department. Complex decision making was performed, which includes reviewing the patient's available past medical records, laboratory results, and x-ray films. My assessment of this patient's clinical condition and my plan of care is as follows.     Assessment / Plan:  Acute on chronic Diastolic CHF POA  Due to Chronic AFib POA  Causing L pleural effusion with Pulm edema POA  Cannot rule out new systolic dysfunction    Admit to telemetry bed  Oxygen prn- currently off oxygen at rest & ambulation  S/p Lasix IV 60 mg x 1 in ER, cont Lasix 40 mg BID IV for now  PO K+ BID with it  Check 2DEcho  Cardiology consult for further recommendations- pt has opted for only Medical management, Declined aggressive management including Thoracentesis  Daily Weight  Strict Is & Os  PO Fluid restriction + cardiac low salt diet  Cont Coumadin as at home- INR therapeutic in ER  Likely unable to tolerate BB & ACE/ARB at her age & CKD/COPD    CKD stage 3 POA- Cr at baseline ~ 1.3, stable currently  Watch closely with IV diuresis  BMP in AM    COPD  Exsmoker (remote h/o of smoking now)    Cont home inhalers & nebs  No wheezing in ER- no role for steroids at this time    HTN  Cont Norvasc        Code Status: DNR as per pt's wishes, confirmed in ER with family at bedside  Surrogate Decision Maker: Anuj Singh # [phone]    DVT Prophylaxis: On coumadin  GI Prophylaxis: not indicated    Baseline: Pt is apparently steady at home, uses walker, pretty independent with ADLs      Subjective:   CHIEF COMPLAINT: Worsening SOB with cough with Edema x 2 weeks    HISTORY OF PRESENT ILLNESS:     Claire Levine is a 80 y.o.  female who presents with above complains from home ambulatory with family. Pt presented with CC of worsening SOB x weeks  H/o associated productive cough- whitish frothy sputum, denies any fever, diarrhea, chills  H/o associated LE edema x weeks- worsening  H/o seeing PCP who increased AM Lasix dose to 80 mg last week  H/o seeing Sindi vergara in office- added symbicort inhaler    Pt was found to have worsened L pleural effusion along with Pulm edema on CXR with evidence of LE edema but was non-hypoxic on RA. We were asked to admit for work up and evaluation of the above problems. Past Medical History:   Diagnosis Date    [facility]fib ([facility] [address]     Cancer ([facility] [address]     right breast CA, lung, colon    Gastrointestinal disorder     GERD    Hypertension     Other ill-defined conditions(799.89)     gout        Past Surgical History:   Procedure Laterality Date    BREAST SURGERY PROCEDURE UNLISTED  1995    Right mastectomy    HX APPENDECTOMY      HX HEENT      right glass eye    ND COLONOSCOPY W/BIOPSY SINGLE/MULTIPLE  3/29/2013         ND COLSC FLX W/RMVL OF TUMOR POLYP LESION SNARE TQ  3/29/2013            Social History   Substance Use Topics    Smoking status: Former Smoker     Quit date: 1967    Smokeless tobacco: Never Used    Alcohol use No        Family History   Problem Relation Age of Onset    Colon Cancer Other     Hypertension Other      Allergies   Allergen Reactions    Penicillins Hives        Prior to Admission medications    Medication Sig Start Date End Date Taking? Authorizing Provider   furosemide (LASIX) 40 mg tablet Take 80 mg by mouth daily. Patient takes 80 mg QAM and 40 mg QHS   Yes Phys Other, MD   furosemide (LASIX) 40 mg tablet Take 40 mg by mouth nightly. Patient takes 80 mg QAM and 40 mg QHS   Yes Phys Other, MD   warfarin (COUMADIN) 3 mg tablet Take 6 mg by mouth two (2) days a week.  Patient takes 2 tabs (6 mg) Monday and Fridays, and 4 mg Tuesday, Thursday, Saturday and Sundays   Yes Marky Dejesus MD   budesonide-formoterol (SYMBICORT) 160-4.5 mcg/actuation HFAA Take 2 Puffs by inhalation two (2) times a day. Yes Marky Dejesus MD   mv-mn/folic acid/calcium/vit K (ONE-A-DAY WOMEN'S 50 PLUS PO) Take 1 Tab by mouth daily. Yes Marky Dejesus MD   dextran 70/hypromellose/PF (NATURAL TEARS, PF, OP) Administer 1 Drop to both eyes daily as needed (dry eyes). Yes Marky Dejesus MD   mometasone (NASONEX) 50 mcg/actuation nasal spray 2 Sprays by Both Nostrils route daily as needed (congestion). Yes Marky Dejesus MD   allopurinol (ZYLOPRIM) 100 mg tablet Take  by mouth daily. Yes Historical Provider   albuterol (PROVENTIL HFA, VENTOLIN HFA, PROAIR HFA) 90 mcg/actuation inhaler Take 2 Puffs by inhalation every four (4) hours as needed for Wheezing. 12/9/17  Yes GOLDY Garcia   albuterol-ipratropium (DUO-NEB) 2.5 mg-0.5 mg/3 ml nebu 3 mL by Nebulization route every four (4) hours as needed. 4/24/17  Yes Marialuisa Kapadia MD   warfarin (COUMADIN) 4 mg tablet Take 1 tablet by mouth every Tuesday, Thursday, Saturday & Sunday. 1/24/15  Yes Shilpa Daily MD   potassium chloride (KLOR-CON) 20 mEq packet Take 20 mEq by mouth daily. Indications: pill   Yes Marky Dejesus MD   colchicine (COLCRYS) 0.6 mg tablet Take 0.6 mg by mouth daily as needed (gout flare up). Yes Marky Dejesus MD   acetaminophen (TYLENOL EXTRA STRENGTH) 500 mg tablet Take 1,000 mg by mouth every six (6) hours as needed for Pain. Yes Marky Dejesus MD   amLODIPine (NORVASC) 2.5 mg tablet Take 2.5 mg by mouth daily.    Yes Marky Dejesus MD       REVIEW OF SYSTEMS:       Total of 12 systems reviewed as follows:       POSITIVE= underlined text  Negative = text not underlined  General:  fever, chills, sweats, generalized weakness, weight loss/gain,      loss of appetite   Eyes:    blurred vision, eye pain, loss of vision, double vision  ENT:    rhinorrhea, pharyngitis Respiratory:   cough, sputum production (whitish frothy), SOB, WOOTEN, wheezing, pleuritic pain   Cardiology:   chest pain, palpitations, orthopnea, PND, edema, syncope   Gastrointestinal:  abdominal pain , N/V, diarrhea, dysphagia, constipation, bleeding   Genitourinary:  frequency, urgency, dysuria, hematuria, incontinence   Muskuloskeletal :  arthralgia, myalgia, back pain  Hematology:  easy bruising, nose or gum bleeding, lymphadenopathy   Dermatological: rash, ulceration, pruritis, color change / jaundice  Endocrine:   hot flashes or polydipsia   Neurological:  headache, dizziness, confusion, focal weakness, paresthesia,     Speech difficulties, memory loss, gait difficulty  Psychological: Feelings of anxiety, depression, agitation    Objective:   VITALS:    Visit Vitals    /82    Pulse 75    Temp 97.6 °F (36.4 °C)    Resp 19    Ht 5' 2\" (1.575 m)    Wt 88.9 kg (196 lb)    SpO2 92%    BMI 35.85 kg/m2       PHYSICAL EXAM:    General:    Alert, cooperative, no distress, appears stated age. HEENT: Atraumatic, anicteric sclerae, pink conjunctivae     No oral ulcers, mucosa moist, throat clear, dentition fair  Neck:  Supple, symmetrical,  thyroid: non tender  Lungs:   Crackles noted at bases +, No Wheezing or Rhonchi. No rales. Chest wall:  No tenderness  No Accessory muscle use. Heart:   Irregular  rhythm,  No  murmur   3+ B/L LE pitting edema noted +  Abdomen:   Soft, non-tender. Not distended. Bowel sounds normal  Extremities: No cyanosis. No clubbing,      Skin turgor normal, Capillary refill normal, Radial dial pulse 2+  Skin:     Not pale. Not Jaundiced  No rashes   Psych:  Good insight. Not depressed. Not anxious or agitated. Neurologic: EOMs intact. No facial asymmetry. No aphasia or slurred speech. Symmetrical strength, Sensation grossly intact.  Alert and oriented X 4.     _______________________________________________________________________  Care Plan discussed with:    Comments Patient x    Family  x Son & daughter at bedside in ER   RN x    Care Manager                    Consultant:  cielo FRAGA MD   _______________________________________________________________________  Expected  Disposition:   Home with Family x   HH/PT/OT/RN x   SNF/LTC    LASHONDA    ________________________________________________________________________  TOTAL TIME:  61 Minutes    Critical Care Provided     Minutes non procedure based      Comments    x Reviewed previous records   >50% of visit spent in counseling and coordination of care x Discussion with patient and family and questions answered       ________________________________________________________________________  Signed: Meaghan Jean MD    Procedures: see electronic medical records for all procedures/Xrays and details which were not copied into this note but were reviewed prior to creation of Plan.     LAB DATA REVIEWED:    Recent Results (from the past 24 hour(s))   EKG, 12 LEAD, INITIAL    Collection Time: 05/29/18 10:53 AM   Result Value Ref Range    Ventricular Rate 85 BPM    Atrial Rate 264 BPM    QRS Duration 76 ms    Q-T Interval 398 ms    QTC Calculation (Bezet) 473 ms    Calculated R Axis -4 degrees    Calculated T Axis 25 degrees    Diagnosis       Atrial flutter with variable AV block  Low voltage QRS  Inferior infarct , age undetermined  When compared with ECG of 13-APR-2018 19:59,  Atrial flutter has replaced Atrial fibrillation     CBC WITH AUTOMATED DIFF    Collection Time: 05/29/18 11:04 AM   Result Value Ref Range    WBC 5.6 3.6 - 11.0 K/uL    RBC 5.02 3.80 - 5.20 M/uL    HGB 12.8 11.5 - 16.0 g/dL    HCT 40.4 35.0 - 47.0 %    MCV 80.5 80.0 - 99.0 FL    MCH 25.5 (L) 26.0 - 34.0 PG    MCHC 31.7 30.0 - 36.5 g/dL    RDW 18.3 (H) 11.5 - 14.5 %    PLATELET 859 897 - 238 K/uL    MPV 10.0 8.9 - 12.9 FL    NRBC 0.0 0  WBC    ABSOLUTE NRBC 0.00 0.00 - 0.01 K/uL    NEUTROPHILS 80 (H) 32 - 75 %    LYMPHOCYTES 9 (L) 12 - 49 %    MONOCYTES 8 5 - 13 %    EOSINOPHILS 1 0 - 7 %    BASOPHILS 1 0 - 1 %    IMMATURE GRANULOCYTES 1 (H) 0.0 - 0.5 %    ABS. NEUTROPHILS 4.4 1.8 - 8.0 K/UL    ABS. LYMPHOCYTES 0.5 (L) 0.8 - 3.5 K/UL    ABS. MONOCYTES 0.4 0.0 - 1.0 K/UL    ABS. EOSINOPHILS 0.1 0.0 - 0.4 K/UL    ABS. BASOPHILS 0.1 0.0 - 0.1 K/UL    ABS. IMM. GRANS. 0.1 (H) 0.00 - 0.04 K/UL    DF AUTOMATED      RBC COMMENTS ANISOCYTOSIS  1+       METABOLIC PANEL, COMPREHENSIVE    Collection Time: 05/29/18 11:04 AM   Result Value Ref Range    Sodium 143 136 - 145 mmol/L    Potassium 3.9 3.5 - 5.1 mmol/L    Chloride 103 97 - 108 mmol/L    CO2 31 21 - 32 mmol/L    Anion gap 9 5 - 15 mmol/L    Glucose 103 (H) 65 - 100 mg/dL    BUN 18 6 - 20 MG/DL    Creatinine 1.39 (H) 0.55 - 1.02 MG/DL    BUN/Creatinine ratio 13 12 - 20      GFR est AA 42 (L) >60 ml/min/1.73m2    GFR est non-AA 35 (L) >60 ml/min/1.73m2    Calcium 9.8 8.5 - 10.1 MG/DL    Bilirubin, total 0.6 0.2 - 1.0 MG/DL    ALT (SGPT) 18 12 - 78 U/L    AST (SGOT) 23 15 - 37 U/L    Alk.  phosphatase 120 (H) 45 - 117 U/L    Protein, total 8.1 6.4 - 8.2 g/dL    Albumin 3.0 (L) 3.5 - 5.0 g/dL    Globulin 5.1 (H) 2.0 - 4.0 g/dL    A-G Ratio 0.6 (L) 1.1 - 2.2     TROPONIN I    Collection Time: 05/29/18 11:04 AM   Result Value Ref Range    Troponin-I, Qt. <0.04 <0.05 ng/mL   CK W/ REFLX CKMB    Collection Time: 05/29/18 11:04 AM   Result Value Ref Range    CK 45 26 - 192 U/L   NT-PRO BNP    Collection Time: 05/29/18 11:04 AM   Result Value Ref Range    NT pro-BNP 4856 (H) 0 - 450 PG/ML   PROTHROMBIN TIME + INR    Collection Time: 05/29/18 12:40 PM   Result Value Ref Range    INR 2.2 (H) 0.9 - 1.1      Prothrombin time 21.8 (H) 9.0 - 11.1 sec

## 2018-05-29 NOTE — ED PROVIDER NOTES
EMERGENCY DEPARTMENT HISTORY AND PHYSICAL EXAM      Date: 5/29/2018  Patient Name: Kay Chapman    History of Presenting Illness     Chief Complaint   Patient presents with    Shortness of Breath     SOB for the alst two weeks has been seen by PCP, fluid pill was increased and she is still having SOB without improvment.  Cough     Patient jaja states that she has had a cough for about 3 weeks     History Provided By: Patient and family    HPI: Kay Chapman, 80 y.o. female with PMHx significant for HTN, A-fib, and GERD, presents ambulatory to the ED with cc of gradually worsening cough alongside SOB, leg swelling, and fatigue. Per pt and family at bedside, the pt has been presenting with a sporadic, intermittent cough for the past two weeks which has been worsening in intensity and frequency over the past week. Pt informs her cough is productive in presentation with creamy, white, thick sputum. Pt reports her cough exacerbates to present alongside SOB which was initially intermittent and has since become constant in presentation. Pt informs her cough and SOB are not associated with any modifying factors including laying flat on her back or deep inspirations. Family informs they have additionally noted increased bilateral lower extremity swelling, worsening for the past two weeks. Family lastly expresses concern for increased fatigue within the pt secondary to the aforementioned symptoms. Of note, pt's family informs she was evaluated by her PCP one week prior with doubling of her fluid pills, and was prescribed an inhaler by her pulmonologist, both without alleviation of her symptoms. PMHx: R breast cancer  PSHx: R mastectomy, appendectomy, colonoscopy   Social Hx: - EtOH; Former Smoker; - Illicit Drugs    PCP: Ani Rivera MD   Pulmonology: Dr. Graceann Jeans     There are no other complaints, changes, or physical findings at this time.     Current Facility-Administered Medications   Medication Dose Route Frequency Provider Last Rate Last Dose    acetaminophen (TYLENOL) tablet 500 mg  500 mg Oral Q6H PRN Gutierrez Bay MD        albuterol-ipratropium (DUO-NEB) 2.5 MG-0.5 MG/3 ML  3 mL Nebulization Q4H PRN [facility]] allopurinol (ZYLOPRIM) tablet 100 mg  100 mg Oral DAILY Gutierrez Bay MD       [facility] ON 5/30/2018] amLODIPine (NORVASC) tablet 2.5 mg  2.5 mg Oral DAILY Gutierrez Bay MD        [START ON 5/30/2018] fluticasone-vilanterol (BREO ELLIPTA) 100mcg-25mcg/puff  1 Puff Inhalation DAILY Gutierrez Bay MD        colchicine tablet 0.6 mg  0.6 mg Oral DAILY PRN Gutierrez Bay MD       [facility] ON 5/30/2018] furosemide (LASIX) injection 40 mg  40 mg IntraVENous BID Gutierrez Bay MD        potassium chloride (KLOR-CON) packet 20 mEq  20 mEq Oral BID WITH MEALS Gutierrez Bay MD        sodium chloride (NS) flush 5-10 mL  5-10 mL IntraVENous Q8H Gutierrez Bay MD   5 mL at 05/29/18 1558    sodium chloride (NS) flush 5-10 mL  5-10 mL IntraVENous PRN Gutierrez Bay MD        WARFARIN INFORMATION NOTE (COUMADIN)   Other QPM Melisa Ramirez. Nati Ocampo MD        warfarin (COUMADIN) tablet 4 mg  4 mg Oral ONCE Vladimir D. Nati Ocampo MD         Current Outpatient Prescriptions   Medication Sig Dispense Refill    furosemide (LASIX) 40 mg tablet Take 80 mg by mouth daily. Patient takes 80 mg QAM and 40 mg QHS      furosemide (LASIX) 40 mg tablet Take 40 mg by mouth nightly. Patient takes 80 mg QAM and 40 mg QHS      warfarin (COUMADIN) 3 mg tablet Take 6 mg by mouth two (2) days a week. Patient takes 2 tabs (6 mg) Monday and Fridays, and 4 mg Tuesday, Thursday, Saturday and Sundays      budesonide-formoterol (SYMBICORT) 160-4.5 mcg/actuation HFAA Take 2 Puffs by inhalation two (2) times a day.  mv-mn/folic acid/calcium/vit K (ONE-A-DAY WOMEN'S 50 PLUS PO) Take 1 Tab by mouth daily.       dextran 70/hypromellose/PF (NATURAL TEARS, PF, OP) Administer 1 Drop to both eyes daily as needed (dry eyes).  mometasone (NASONEX) 50 mcg/actuation nasal spray 2 Sprays by Both Nostrils route daily as needed (congestion).  allopurinol (ZYLOPRIM) 100 mg tablet Take  by mouth daily.  albuterol (PROVENTIL HFA, VENTOLIN HFA, PROAIR HFA) 90 mcg/actuation inhaler Take 2 Puffs by inhalation every four (4) hours as needed for Wheezing. 1 Inhaler 0    albuterol-ipratropium (DUO-NEB) 2.5 mg-0.5 mg/3 ml nebu 3 mL by Nebulization route every four (4) hours as needed. 30 Nebule 1    warfarin (COUMADIN) 4 mg tablet Take 1 tablet by mouth every Tuesday, Thursday, Saturday & Sunday. 30 tablet 0    potassium chloride (KLOR-CON) 20 mEq packet Take 20 mEq by mouth daily. Indications: pill      colchicine (COLCRYS) 0.6 mg tablet Take 0.6 mg by mouth daily as needed (gout flare up).  acetaminophen (TYLENOL EXTRA STRENGTH) 500 mg tablet Take 1,000 mg by mouth every six (6) hours as needed for Pain.  amLODIPine (NORVASC) 2.5 mg tablet Take 2.5 mg by mouth daily. Past History     Past Medical History:  Past Medical History:   Diagnosis Date    [facility]     [facility]     right breast CA, lung, colon    Gastrointestinal disorder     GERD    Hypertension     Other ill-defined conditions(799.89)     gout     Past Surgical History:  Past Surgical History:   Procedure Laterality Date    BREAST SURGERY PROCEDURE UNLISTED  1995    Right mastectomy    HX APPENDECTOMY      HX HEENT      right glass eye    VT COLONOSCOPY W/BIOPSY SINGLE/MULTIPLE  3/29/2013         VT COLSC FLX W/RMVL OF TUMOR POLYP LESION SNARE TQ  3/29/2013          Family History:  Family History   Problem Relation Age of Onset    Colon Cancer Other     Hypertension Other      Social History:  Social History   Substance Use Topics    Smoking status: Former Smoker     Quit date: 1967    Smokeless tobacco: Never Used    Alcohol use No     Allergies:   Allergies   Allergen Reactions    Penicillins Hives     Review of Systems   Review of Systems   Constitutional: Positive for fatigue. Negative for chills and fever. HENT: Negative for congestion. Eyes: Negative for visual disturbance. Respiratory: Positive for cough and shortness of breath. Negative for chest tightness. Cardiovascular: Positive for leg swelling. Negative for chest pain. Gastrointestinal: Negative for abdominal pain and vomiting. Endocrine: Negative for polyuria. Genitourinary: Negative for dysuria and frequency. Musculoskeletal: Negative for myalgias. Skin: Negative for color change. Allergic/Immunologic: Negative for immunocompromised state. Neurological: Negative for numbness. Physical Exam   Physical Exam   Nursing note and vitals reviewed.   General appearance: non-toxic, sitting in bed with family at bedside   Eyes: L pupil reactive with EOMI and conjunctiva normal, anicteric sclera; R eye prosthesis   HEENT: mucous membranes moist, oropharynx is clear; no visible JVD; upper and lower dentures   Pulmonary: diminished breath sounds BL without wheezing or crackles; dullness to percussion at the bases  Cardiac: normal rate and regular rhythm, no murmurs, gallops, or rubs, DP pulses palpable, 1+ radial pulses  Abdomen: soft, nontender, nondistended, bowel sounds present; no CVA tenderness  MSK: 3+ pitting edema BLE; blister formation to the BL anterior lower legs with isolated blister to the L lower leg which has minimal surrounding erythema   Neuro: Alert, answers questions  Skin: capillary refill brisk  Diagnostic Study Results   Labs -     Recent Results (from the past 12 hour(s))   EKG, 12 LEAD, INITIAL    Collection Time: 05/29/18 10:53 AM   Result Value Ref Range    Ventricular Rate 85 BPM    Atrial Rate 264 BPM    QRS Duration 76 ms    Q-T Interval 398 ms    QTC Calculation (Bezet) 473 ms    Calculated R Axis -4 degrees    Calculated T Axis 25 degrees    Diagnosis       Atrial flutter with variable AV block  Low voltage QRS  Inferior infarct , age undetermined  When compared with ECG of 13-APR-2018 19:59,  Atrial flutter has replaced Atrial fibrillation     CBC WITH AUTOMATED DIFF    Collection Time: 05/29/18 11:04 AM   Result Value Ref Range    WBC 5.6 3.6 - 11.0 K/uL    RBC 5.02 3.80 - 5.20 M/uL    HGB 12.8 11.5 - 16.0 g/dL    HCT 40.4 35.0 - 47.0 %    MCV 80.5 80.0 - 99.0 FL    MCH 25.5 (L) 26.0 - 34.0 PG    MCHC 31.7 30.0 - 36.5 g/dL    RDW 18.3 (H) 11.5 - 14.5 %    PLATELET 995 620 - 451 K/uL    MPV 10.0 8.9 - 12.9 FL    NRBC 0.0 0  WBC    ABSOLUTE NRBC 0.00 0.00 - 0.01 K/uL    NEUTROPHILS 80 (H) 32 - 75 %    LYMPHOCYTES 9 (L) 12 - 49 %    MONOCYTES 8 5 - 13 %    EOSINOPHILS 1 0 - 7 %    BASOPHILS 1 0 - 1 %    IMMATURE GRANULOCYTES 1 (H) 0.0 - 0.5 %    ABS. NEUTROPHILS 4.4 1.8 - 8.0 K/UL    ABS. LYMPHOCYTES 0.5 (L) 0.8 - 3.5 K/UL    ABS. MONOCYTES 0.4 0.0 - 1.0 K/UL    ABS. EOSINOPHILS 0.1 0.0 - 0.4 K/UL    ABS. BASOPHILS 0.1 0.0 - 0.1 K/UL    ABS. IMM. GRANS. 0.1 (H) 0.00 - 0.04 K/UL    DF AUTOMATED      RBC COMMENTS ANISOCYTOSIS  1+       METABOLIC PANEL, COMPREHENSIVE    Collection Time: 05/29/18 11:04 AM   Result Value Ref Range    Sodium 143 136 - 145 mmol/L    Potassium 3.9 3.5 - 5.1 mmol/L    Chloride 103 97 - 108 mmol/L    CO2 31 21 - 32 mmol/L    Anion gap 9 5 - 15 mmol/L    Glucose 103 (H) 65 - 100 mg/dL    BUN 18 6 - 20 MG/DL    Creatinine 1.39 (H) 0.55 - 1.02 MG/DL    BUN/Creatinine ratio 13 12 - 20      GFR est AA 42 (L) >60 ml/min/1.73m2    GFR est non-AA 35 (L) >60 ml/min/1.73m2    Calcium 9.8 8.5 - 10.1 MG/DL    Bilirubin, total 0.6 0.2 - 1.0 MG/DL    ALT (SGPT) 18 12 - 78 U/L    AST (SGOT) 23 15 - 37 U/L    Alk.  phosphatase 120 (H) 45 - 117 U/L    Protein, total 8.1 6.4 - 8.2 g/dL    Albumin 3.0 (L) 3.5 - 5.0 g/dL    Globulin 5.1 (H) 2.0 - 4.0 g/dL    A-G Ratio 0.6 (L) 1.1 - 2.2     TROPONIN I    Collection Time: 05/29/18 11:04 AM   Result Value Ref Range Troponin-I, Qt. <0.04 <0.05 ng/mL   CK W/ REFLX CKMB    Collection Time: 05/29/18 11:04 AM   Result Value Ref Range    CK 45 26 - 192 U/L   NT-PRO BNP    Collection Time: 05/29/18 11:04 AM   Result Value Ref Range    NT pro-BNP 4856 (H) 0 - 450 PG/ML   PROTHROMBIN TIME + INR    Collection Time: 05/29/18 12:40 PM   Result Value Ref Range    INR 2.2 (H) 0.9 - 1.1      Prothrombin time 21.8 (H) 9.0 - 11.1 sec     Radiologic Studies -   XR CHEST PA LAT   Final Result        CT Results  (Last 48 hours)    None        CXR Results  (Last 48 hours)               05/29/18 1145  XR CHEST PA LAT Final result    Impression:  IMPRESSION:    Mild pulmonary edema   Increased left pleural effusion   Cardiomegaly                       Narrative:  EXAM:  XR CHEST PA LAT       INDICATION:   SOB, recent productive cough, afebrile       COMPARISON: 4/13/2018. FINDINGS: PA and lateral radiographs of the chest demonstrate cardiomegaly with   increased moderate left pleural effusion. There is mild pulmonary vascular   congestion. Sheet artifact is present. Medical Decision Making   I am the first provider for this patient. I reviewed the vital signs, available nursing notes, past medical history, past surgical history, family history and social history. Vital Signs-Reviewed the patient's vital signs.   Patient Vitals for the past 12 hrs:   Temp Pulse Resp BP SpO2   05/29/18 1730 - - 25 166/88 (!) 89 %   05/29/18 1715 - - 12 153/82 95 %   05/29/18 1700 - 78 22 152/86 92 %   05/29/18 1645 - 82 (!) 40 145/76 98 %   05/29/18 1630 - - 14 142/69 93 %   05/29/18 1615 - 79 28 135/85 91 %   05/29/18 1600 - 80 22 142/78 94 %   05/29/18 1545 - 75 19 140/82 92 %   05/29/18 1515 - 80 27 131/80 94 %   05/29/18 1500 - 79 27 142/63 94 %   05/29/18 1445 - 77 24 135/72 95 %   05/29/18 1430 - 80 22 128/77 94 %   05/29/18 1415 - 83 20 164/85 91 %   05/29/18 1400 - 75 14 148/49 96 %   05/29/18 1300 - - - (!) 156/92 93 %   05/29/18 1245 - - - (!) 160/93 96 %   05/29/18 1230 - - - 148/82 97 %   05/29/18 1215 - - - 148/85 99 %   05/29/18 1205 - - - 142/82 94 %   05/29/18 1121 - - - 148/70 -   05/29/18 1048 97.6 °F (36.4 °C) 92 20 (!) 169/98 91 %     Pulse Oximetry Analysis - 91% on RA    Cardiac Monitor:   Rate: 92 bpm  Rhythm: Normal Sinus Rhythm      EKG interpretation: (1053)  Rhythm: atrial flutter with variable AV block; and irregular. Rate (approx.): 85; Axis: normal; QRS interval: 76; QTc: 473; ST/T wave: nonspecific ST T wave changes; no MARCO A/STD. Written by PHILLY Anthony, as dictated by Alfred Ocampo. Ozzy Lin MD.    Records Reviewed: Nursing Notes and Old Medical Records    Provider Notes (Medical Decision Making):   DDx: pulmonary edema, PNA, bronchitis, pleural effusion, CHF, ACS    ED Course:   Initial assessment performed. The patients presenting problems have been discussed, and they are in agreement with the care plan formulated and outlined with them. I have encouraged them to ask questions as they arise throughout their visit. CONSULT NOTE:   12:27 PM  Alfred Ocampo. Ozzy Lin MD spoke with Dr. Chico Saleem,  Specialty: Hospitalist   Discussed pt's hx, disposition, and available diagnostic and imaging results. Reviewed care plans. Consultant agrees with plans as outlined. Accepts admission. Written by PHILLY Anthony, as dictated by Alfred Ocampo. Ozzy Lin MD.    Progress Note:   12:30 PM  Updated pt on all returned results and findings including hospitalist consult and likely admission. Pt in agreement with the further progression of care plan and expresses agreement with and understanding of all items discussed. Written by PHILLY Anthony, as dictated by Alfred Ocampo. Ozzy Lin MD.     Disposition:  ADMIT NOTE:    12:28 PM  Patient is being admitted to the hospital by Dr. Chico Saleem. The results of their tests and reasons for their admission have been discussed with the patient and/or available family.  They convey agreement and understanding for the need to be admitted and for the admission diagnosis. PLAN:  1. Admit to Hospitalist, Dr. Jamie Zendejas     Diagnosis     Clinical Impression:   1. Acute pulmonary edema (HCC)    2. Pleural effusion, left      Attestations: This note is prepared by Mila Marie, acting as Scribe for Luisito Sims. Yamila Macdonald MD.    Luisito Sims. Yamila Macdonald MD: The scribe's documentation has been prepared under my direction and personally reviewed by me in its entirety. I confirm that the note above accurately reflects all work, treatment, procedures, and medical decision making performed by me. This note will not be viewable in [address]

## 2018-05-30 LAB
ANION GAP SERPL CALC-SCNC: 8 MMOL/L (ref 5–15)
BUN SERPL-MCNC: 21 MG/DL (ref 6–20)
BUN/CREAT SERPL: 16 (ref 12–20)
CALCIUM SERPL-MCNC: 10.2 MG/DL (ref 8.5–10.1)
CHLORIDE SERPL-SCNC: 104 MMOL/L (ref 97–108)
CO2 SERPL-SCNC: 28 MMOL/L (ref 21–32)
CREAT SERPL-MCNC: 1.32 MG/DL (ref 0.55–1.02)
ERYTHROCYTE [DISTWIDTH] IN BLOOD BY AUTOMATED COUNT: 18.6 % (ref 11.5–14.5)
GLUCOSE SERPL-MCNC: 106 MG/DL (ref 65–100)
HCT VFR BLD AUTO: 43.3 % (ref 35–47)
HGB BLD-MCNC: 13.6 G/DL (ref 11.5–16)
INR PPP: 2.2 (ref 0.9–1.1)
MAGNESIUM SERPL-MCNC: 2.3 MG/DL (ref 1.6–2.4)
MCH RBC QN AUTO: 25.5 PG (ref 26–34)
MCHC RBC AUTO-ENTMCNC: 31.4 G/DL (ref 30–36.5)
MCV RBC AUTO: 81.2 FL (ref 80–99)
NRBC # BLD: 0 K/UL (ref 0–0.01)
NRBC BLD-RTO: 0 PER 100 WBC
PLATELET # BLD AUTO: 289 K/UL (ref 150–400)
PMV BLD AUTO: 10.5 FL (ref 8.9–12.9)
POTASSIUM SERPL-SCNC: 4 MMOL/L (ref 3.5–5.1)
PROTHROMBIN TIME: 22.6 SEC (ref 9–11.1)
RBC # BLD AUTO: 5.33 M/UL (ref 3.8–5.2)
SODIUM SERPL-SCNC: 140 MMOL/L (ref 136–145)
WBC # BLD AUTO: 6.5 K/UL (ref 3.6–11)

## 2018-05-30 PROCEDURE — 80048 BASIC METABOLIC PNL TOTAL CA: CPT | Performed by: INTERNAL MEDICINE

## 2018-05-30 PROCEDURE — 36415 COLL VENOUS BLD VENIPUNCTURE: CPT | Performed by: INTERNAL MEDICINE

## 2018-05-30 PROCEDURE — G8988 SELF CARE GOAL STATUS: HCPCS | Performed by: OCCUPATIONAL THERAPIST

## 2018-05-30 PROCEDURE — 74011250637 HC RX REV CODE- 250/637: Performed by: GENERAL ACUTE CARE HOSPITAL

## 2018-05-30 PROCEDURE — 97161 PT EVAL LOW COMPLEX 20 MIN: CPT | Performed by: PHYSICAL THERAPIST

## 2018-05-30 PROCEDURE — 74011250637 HC RX REV CODE- 250/637: Performed by: INTERNAL MEDICINE

## 2018-05-30 PROCEDURE — G8978 MOBILITY CURRENT STATUS: HCPCS | Performed by: PHYSICAL THERAPIST

## 2018-05-30 PROCEDURE — G8987 SELF CARE CURRENT STATUS: HCPCS | Performed by: OCCUPATIONAL THERAPIST

## 2018-05-30 PROCEDURE — 85027 COMPLETE CBC AUTOMATED: CPT | Performed by: EMERGENCY MEDICINE

## 2018-05-30 PROCEDURE — 97165 OT EVAL LOW COMPLEX 30 MIN: CPT | Performed by: OCCUPATIONAL THERAPIST

## 2018-05-30 PROCEDURE — G8979 MOBILITY GOAL STATUS: HCPCS | Performed by: PHYSICAL THERAPIST

## 2018-05-30 PROCEDURE — 83735 ASSAY OF MAGNESIUM: CPT | Performed by: INTERNAL MEDICINE

## 2018-05-30 PROCEDURE — 97535 SELF CARE MNGMENT TRAINING: CPT | Performed by: OCCUPATIONAL THERAPIST

## 2018-05-30 PROCEDURE — 65660000000 HC RM CCU STEPDOWN

## 2018-05-30 PROCEDURE — 85610 PROTHROMBIN TIME: CPT | Performed by: EMERGENCY MEDICINE

## 2018-05-30 PROCEDURE — 74011250636 HC RX REV CODE- 250/636: Performed by: INTERNAL MEDICINE

## 2018-05-30 RX ORDER — WARFARIN 2 MG/1
4 TABLET ORAL ONCE
Status: COMPLETED | OUTPATIENT
Start: 2018-05-30 | End: 2018-05-30

## 2018-05-30 RX ADMIN — FUROSEMIDE 40 MG: 10 INJECTION, SOLUTION INTRAMUSCULAR; INTRAVENOUS at 08:51

## 2018-05-30 RX ADMIN — Medication 10 ML: at 21:32

## 2018-05-30 RX ADMIN — Medication 10 ML: at 17:45

## 2018-05-30 RX ADMIN — FLUTICASONE FUROATE AND VILANTEROL TRIFENATATE 1 PUFF: 100; 25 POWDER RESPIRATORY (INHALATION) at 08:51

## 2018-05-30 RX ADMIN — AMLODIPINE BESYLATE 2.5 MG: 2.5 TABLET ORAL at 08:50

## 2018-05-30 RX ADMIN — ACETAMINOPHEN 500 MG: 500 TABLET ORAL at 21:32

## 2018-05-30 RX ADMIN — POTASSIUM CHLORIDE 20 MEQ: 1.5 POWDER, FOR SOLUTION ORAL at 17:42

## 2018-05-30 RX ADMIN — WARFARIN SODIUM 4 MG: 2 TABLET ORAL at 17:42

## 2018-05-30 RX ADMIN — Medication 10 ML: at 06:21

## 2018-05-30 RX ADMIN — POTASSIUM CHLORIDE 20 MEQ: 1.5 POWDER, FOR SOLUTION ORAL at 08:50

## 2018-05-30 RX ADMIN — ALLOPURINOL 100 MG: 100 TABLET ORAL at 08:50

## 2018-05-30 RX ADMIN — FUROSEMIDE 40 MG: 10 INJECTION, SOLUTION INTRAMUSCULAR; INTRAVENOUS at 17:42

## 2018-05-30 NOTE — PROGRESS NOTES
Problem: Self Care Deficits Care Plan (Adult)  Goal: *Acute Goals and Plan of Care (Insert Text)  Occupational Therapy Goals:  Initiated 5/30/2018  1. Patient will perform grooming standing with modified independence within 7 days. 2. Patient will perform lower body dressing with modified independence with AE PRN within 7 days. 3. Patient will perform toileting with modified independence within 7 days. 4. Patient will transfer from toilet with modified independence using the least restrictive device and appropriate durable medical equipment within 7 days. Occupational Therapy EVALUATION  Patient: Fely Au (72 y.o. female)  Date: 5/30/2018  Primary Diagnosis: Diastolic CHF, acute on chronic (HCC)  Chronic a-fib (HCC)  CKD (chronic kidney disease) stage 3, GFR 30-59 ml/min        Precautions: none       ASSESSMENT :  Based on the objective data described below, the patient presents with O2 sat of 91% with activity today on 2 liters NC. Pt reports having O2 at home but has not needed over the past month. Pt has good family support and also has an aide that assists. Pt was seated at bedside chair upon arrival. CGA with increased time for sit to stand. SBA to CGA for mobility with rolling walker to bathroom. CGA to manage gown in standing and pt was able to manage yokasta care seated. Verbal cues for walker safety but pt was able to wash hands at sink with CGA. Unable to don socks today due to swelling/decreased reach. Pt is close to baseline but will benefit from OT services. Recommend home health with family assist.    Patient will benefit from skilled intervention to address the above impairments.   Patients rehabilitation potential is considered to be Good  Factors which may influence rehabilitation potential include:   []             None noted  []             Mental ability/status  [x]             Medical condition  []             Home/family situation and support systems  []             Safety awareness  []             Pain tolerance/management  []             Other:      PLAN :  Recommendations and Planned Interventions:  [x]               Self Care Training                  [x]        Therapeutic Activities  [x]               Functional Mobility Training    []        Cognitive Retraining  [x]               Therapeutic Exercises           []        Endurance Activities  [x]               Balance Training                   []        Neuromuscular Re-Education  []               Visual/Perceptual Training     [x]   Home Safety Training  [x]               Patient Education                 [x]        Family Training/Education  []               Other (comment):    Frequency/Duration: Patient will be followed by occupational therapy 4 times a week to address goals. Discharge Recommendations: Home Health  Further Equipment Recommendations for Discharge: shower chair     SUBJECTIVE:   Patient stated I was not wearing oxygen.     OBJECTIVE DATA SUMMARY:   HISTORY:   Past Medical History:   Diagnosis Date    [facility]     Cancer ([facility]     right breast CA, lung, colon    Gastrointestinal disorder     GERD    Hypertension     Other ill-defined conditions(799.89)     gout     Past Surgical History:   Procedure Laterality Date    BREAST SURGERY PROCEDURE UNLISTED  1995    Right mastectomy    HX APPENDECTOMY      HX HEENT      right glass eye    MA COLONOSCOPY W/BIOPSY SINGLE/MULTIPLE  3/29/2013         MA COLSC FLX W/RMVL OF TUMOR POLYP LESION SNARE TQ  3/29/2013            Prior Level of Function/Environment/Context: home bound; does not like to go out of the home due to fear of snakes; only goes to doctor appointments outside of the home; has a aide and family to assist; needs assist with socks and shoes at times; sponge bathes as she is unable to get into tub and shower chair is broken; performed toileting on her own; ambulated with rollator walker    Expanded or extensive additional review of patient history:     Home Situation  Home Environment: Private residence  # Steps to Enter: 1  One/Two Story Residence: One story  Living Alone: No  Support Systems: Family member(s)  Patient Expects to be Discharged to[de-identified] Private residence  Current DME Used/Available at Home: Koleen Wes, rollator (elevated toilet)  Tub or Shower Type: Tub/Shower combination (sponge barthing)    Hand dominance: Right    EXAMINATION OF PERFORMANCE DEFICITS:  Cognitive/Behavioral Status:  Neurologic State: Alert  Orientation Level: Oriented X4  Cognition: Appropriate decision making; Appropriate for age attention/concentration; Follows commands  Perception: Appears intact  Perseveration: No perseveration noted  Safety/Judgement: Awareness of environment; Fall prevention;Home safety        Hearing: Auditory  Auditory Impairment: None, Hard of hearing, bilateral    Vision/Perceptual:                           Acuity: Within Defined Limits         Range of Motion:    AROM: Generally decreased, functional                         Strength:    Strength: Generally decreased, functional                Coordination:  Coordination: Within functional limits  Fine Motor Skills-Upper: Left Intact; Right Intact    Gross Motor Skills-Upper: Left Intact; Right Intact    Tone & Sensation:    Tone: Normal  Sensation: Intact                      Balance:  Sitting: Intact  Standing: Impaired  Standing - Static: Constant support;Good  Standing - Dynamic : Fair    Functional Mobility and Transfers for ADLs:  Bed Mobility:  Rolling:  (seated at bedside chair upon arrival)  Scooting: Supervision    Transfers:  Sit to Stand: Stand-by assistance/CGA; Additional time  Stand to Sit: Stand-by assistance; Additional time  Bed to Chair: Contact guard assistance; Additional time (with rolling walker)  Toilet Transfer : Contact guard assistance; Adaptive equipment    ADL Assessment:  Feeding: Independent    Oral Facial Hygiene/Grooming: Contact guard assistance (standing)    Bathing: Moderate assistance (LB)    Upper Body Dressing: Setup    Lower Body Dressing: Moderate assistance    Toileting: Minimum assistance                ADL Intervention and task modifications:  See assessment  Cognitive Retraining  Safety/Judgement: Awareness of environment; Fall prevention;Home safety    Functional Measure:  Barthel Index:    Bathin  Bladder: 10  Bowels: 10  Groomin  Dressin  Feeding: 10  Mobility: 5  Stairs: 0  Toilet Use: 5  Transfer (Bed to Chair and Back): 10  Total: 60       Barthel and G-code impairment scale:  Percentage of impairment CH  0% CI  1-19% CJ  20-39% CK  40-59% CL  60-79% CM  80-99% CN  100%   Barthel Score 0-100 100 99-80 79-60 59-40 20-39 1-19   0   Barthel Score 0-20 20 17-19 13-16 9-12 5-8 1-4 0      The Barthel ADL Index: Guidelines  1. The index should be used as a record of what a patient does, not as a record of what a patient could do. 2. The main aim is to establish degree of independence from any help, physical or verbal, however minor and for whatever reason. 3. The need for supervision renders the patient not independent. 4. A patient's performance should be established using the best available evidence. Asking the patient, friends/relatives and nurses are the usual sources, but direct observation and common sense are also important. However direct testing is not needed. 5. Usually the patient's performance over the preceding 24-48 hours is important, but occasionally longer periods will be relevant. 6. Middle categories imply that the patient supplies over 50 per cent of the effort. 7. Use of aids to be independent is allowed. Fiorella Faye., Barthel, D.W. (7697). Functional evaluation: the Barthel Index. [facility] (14)2. [facility], ADRIANA, Jenniffer Campbell., Peewee Gudino., [place], [address] ().  Measuring the change indisability after inpatient rehabilitation; comparison of the responsiveness of the Barthel Index and Functional Sanford Measure. Journal of Neurology, Neurosurgery, and Psychiatry, 66(4), 353-643. PARMJIT Cooley, ALFREDO Posada, & Peter Galindo M.A. (2004.) Assessment of post-stroke quality of life in cost-effectiveness studies: The usefulness of the Barthel Index and the EuroQoL-5D. Quality of Life Research, 13, 332-79         G codes: In compliance with CMSs Claims Based Outcome Reporting, the following G-code set was chosen for this patient based on their primary functional limitation being treated: The outcome measure chosen to determine the severity of the functional limitation was the barthel with a score of 60/100 which was correlated with the impairment scale. ? Self Care:     - CURRENT STATUS: CJ - 20%-39% impaired, limited or restricted    - GOAL STATUS: CI - 1%-19% impaired, limited or restricted    - D/C STATUS:  ---------------To be determined---------------     Occupational Therapy Evaluation Charge Determination   History Examination Decision-Making   LOW Complexity : Brief history review  LOW Complexity : 1-3 performance deficits relating to physical, cognitive , or psychosocial skils that result in activity limitations and / or participation restrictions  MEDIUM Complexity : Patient may present with comorbidities that affect occupational performnce. Miniml to moderate modification of tasks or assistance (eg, physical or verbal ) with assesment(s) is necessary to enable patient to complete evaluation       Based on the above components, the patient evaluation is determined to be of the following complexity level: LOW   Pain:  Pain Scale 1: Numeric (0 - 10)  Pain Intensity 1: 0              Activity Tolerance:     Please refer to the flowsheet for vital signs taken during this treatment.   After treatment:   [x] Patient left in no apparent distress sitting up in chair  [] Patient left in no apparent distress in bed  [x] Call bell left within reach  [x] Nursing notified  [x] Caregiver present  [x] Bed alarm activated    COMMUNICATION/EDUCATION:   The patients plan of care was discussed with: Physical Therapist, Registered Nurse and patient. [x] Home safety education was provided and the patient/caregiver indicated understanding. [x] Patient/family have participated as able in goal setting and plan of care. [x] Patient/family agree to work toward stated goals and plan of care. [] Patient understands intent and goals of therapy, but is neutral about his/her participation. [] Patient is unable to participate in goal setting and plan of care. This patients plan of care is appropriate for delegation to [facility].     Thank you for this referral.  Isidoro Helms OTR/L  Time Calculation: 18 mins

## 2018-05-30 NOTE — PROGRESS NOTES
Reason for Admission: Diastolic CHF acute on chronic, chronic Afib, CKD stage 3                RRAT Score:  25                Resources/supports as identified by patient/family: Supportive daughter/family, PCA M-F 9:00AM-2:00PM                  Top Challenges facing patient (as identified by patient/family and CM): Finances/Medication cost?  None at this time                  Transportation? None at this time              Support system or lack thereof? Appropriate                     Living arrangements? Appropriate               Self-care/ADLs/Cognition? Fairly independent with ADLS, needs some assistance with bathing, preparing meals, cleaning, dressing          Current Advanced Directive/Advance Care Plan:  DNR - ACP not on file                          Plan for utilizing home health: Pending PT/OT evaluation                        Likelihood of readmission: Moderate                 Transition of Care Plan: Pending PT/OT evaluation, follow-up care appointments    Pt is a 81 yo  female admitted on 4/44/66 for diastolic CHF acute on chronic, chronic Afib, CKD stage 3. Pt is a CHF bundle pt. Pt lives in a [facility] ([address]) with daughter Melisa Siddiqui) in [place], [place]. Pt is somewhat independent with ADLs but gets assistance with bathing, dressing, preparing meals. Pt has no reported history of HH, SNF, or acute inpatient rehab. Pt has a RW, cane, w/c, and oxygen concentrator and portables at home (cannot recall provider at this time). Pt to dc home by private vehicle with family. Pt's family to provide transport to follow-up care appointments. Pt's preferred Rx is [employer]. CM met with pt to verify demographic info and complete initial assessment, dc planning. Pt is alert and oriented x 4. Pt's daughter Melisa Siddiqui) is bedside, assisted with providing info for assessment. Pt's daughter recently moved down to [place] from [place] to stay with her mother and provide assistance.  Pt has a PCA 5 days per week (M-F 9:00-2:00), but does not recall agency name. Pt sees Dr. Lashonda Quinones (PCP) OP, does not have a Cardiologist at this time. PT/OT to evaluate. Pt's family requesting a shower chair at this time for pt to assist with bathing - CM will discuss with therapy, likely will not be covered by insurance and will need to go to [facility]. CM will continue to follow-up to ensure additional CM needs are met. Care Management Interventions  PCP Verified by CM: Yes  Palliative Care Criteria Met (RRAT>21 & CHF Dx)?: Yes  Palliative Consult Recommended?:  (CM to consult MD)  Mode of Transport at Discharge:  Other (see comment) (By private vehicle with family)  Transition of Care Consult (CM Consult): Discharge Planning  Discharge Durable Medical Equipment: No (RW, Cane, W/C, Continuous home oxygen PRN with concentrator/portables)  Health Maintenance Reviewed: Yes  Physical Therapy Consult: Yes  Occupational Therapy Consult: Yes  Speech Therapy Consult: No  Current Support Network: Lives with Caregiver, Own Home, Family Lives [place] ([address] [facility] ([address]) with daughter Stacy Vazquez))  Confirm Follow Up Transport: Family  Plan discussed with Pt/Family/Caregiver: Yes  Discharge Location  Discharge Placement:  (TBD)    [facility], [address] [place] [address]  [phone]

## 2018-05-30 NOTE — PROGRESS NOTES
Bedside shift change report given to George (oncoming nurse) by Shashank Blanco  (offgoing nurse). Report included the following information SBAR, Kardex, Intake/Output, MAR and Recent Results. Zone Phone for oncoming shift:   [phone]    Shift Summary: Pt rested quietly through night. No c/o pain. LDAs               Peripheral IV 05/29/18 Left Antecubital (Active)   Site Assessment Clean, dry, & intact 5/30/2018  3:30 AM   Phlebitis Assessment 0 5/30/2018  3:30 AM   Infiltration Assessment 0 5/30/2018  3:30 AM   Dressing Status Clean, dry, & intact 5/30/2018  3:30 AM   Dressing Type Transparent;Tape 5/30/2018  3:30 AM   Hub Color/Line Status Pink;Capped 5/29/2018  7:54 PM   Action Taken Blood drawn 5/29/2018 12:39 PM                        Intake & Output   Date 05/29/18 0700 - 05/30/18 0659 05/30/18 0700 - 05/31/18 0659   Shift 9090-8869 8217-6673 24 Hour Total 2355-4300 8721-8547 24 Hour Total   I  N  T  A  K  E   P.O.  480 480         P. O.  480 480       Shift Total  (mL/kg)  480  (5.4) 480  (5.4)      O  U  T  P  U  T   Urine  (mL/kg/hr) 450  (0.4) 400  (0.4) 850  (0.4)         Urine Voided 450 400 850         Urine Occurrence(s) 1 x  1 x       Shift Total  (mL/kg) 450  (5.1) 400  (4.5) 850  (9.6)      NET -450 80 -370      Weight (kg) 88.9 88.9 88.9 88.9 88.9 88.9      Last Bowel Movement Last Bowel Movement Date: 05/29/18   Glucose Checks [x] N/A  [] AC/HS  [] Q6  Concerns:   Nutrition Active Orders   Diet    DIET CARDIAC Regular; 2 GM NA (House Low NA); FR 2000ML       Consults []PT  []OT  []Speech  [x]Case Management   Cardiac Monitoring []N/A [x]Yes Expires:48 hrs

## 2018-05-30 NOTE — PROGRESS NOTES
Pulse Oximetry Assessment:    92% at rest on room air (If this value is <90%, you do not need to proceed and the other fields may be deleted)  95% at rest on 1 LPM  97% at rest on 2 LPM    Patient requested to wait for ambulation part of O2 challenge due to having just worked with PT. Patient agreeable to ambulating at a later time for O2 challenge. If patient will require home oxygen, please obtain an order from the attending physician for a case management consult to set up home oxygen.

## 2018-05-30 NOTE — PROGRESS NOTES
Initial Nutrition Assessment:    INTERVENTIONS/RECOMMENDATIONS:   · Meals/Snacks: General/healthful diet: Cardiac diet    ASSESSMENT:   Patient medically noted for CHF, AFIB, CKD, and pleural effusion. PMH for HTN, colon cancer, and lung cancer. Patient reports a good appetite and eating well. Waiting on lunch at time of visit but did eat the majority of her breakfast this morning. No significant weight changes recently per chart review. Patient reports eating well PTA. Aware of room service and how to order her meals. No questions or concerns from patient at this time. Encouraged intake of meals. Diet Order: Cardiac  % Eaten:  No data found. Pertinent Medications: [x]Reviewed []Other: Norvasc, lasix, KCl, Coumadin   Pertinent Labs: [x]Reviewed []Other:   Food Allergies: [x]None []Other   Last BM: 5/29   []Active     []Hyperactive  []Hypoactive       [] Absent BS  Skin:    [x] Intact   [] Incision  [] Breakdown  [x] Other: edema    Anthropometrics:   Height: 5' 2\" (157.5 cm) Weight: 88.9 kg (196 lb)   IBW (%IBW):   ( ) UBW (%UBW):   (  %)   Last Weight Metrics:  Weight Loss Metrics 5/29/2018 4/13/2018 12/29/2017 12/9/2017 4/24/2017 12/31/2016 8/17/2016   Today's Wt 196 lb 200 lb 194 lb 204 lb 216 lb 7.9 oz 202 lb 201 lb 8 oz   BMI 35.85 kg/m2 36.58 kg/m2 35.48 kg/m2 37.31 kg/m2 39.6 kg/m2 36.95 kg/m2 36.85 kg/m2       BMI: Body mass index is 35.85 kg/(m^2). This BMI is indicative of:   []Underweight    []Normal    []Overweight    [x] Obesity   [] Extreme Obesity (BMI>40)     Estimated Nutrition Needs (Based on):   1584 Kcals/day (BMR (1218) x 1. 3AF) , 71 g (0.8 g/kg bw) Protein  Carbohydrate:  At Least 130 g/day  Fluids: 1600 mL/day (1ml/kcal)    Pt expected to meet estimated nutrient needs: [x]Yes []No    NUTRITION DIAGNOSES:   Problem:  No nutritional diagnosis at this time      Etiology: related to       Signs/Symptoms: as evidenced by        NUTRITION INTERVENTIONS:  Meals/Snacks: General/healthful diet GOAL:   PO intake >50% of meals next 4-6 days    LEARNING NEEDS (Diet, Food/Nutrient-Drug Interaction):    [x] None Identified   [] Identified and Education Provided/Documented   [] Identified and Pt declined/was not appropriate     Cultureal, Yazidi, OR Ethnic Dietary Needs:    [x] None Identified   [] Identified and Addressed     [x] Interdisciplinary Care Plan Reviewed/Documented    [x] Discharge Planning:  Low sodium diet     MONITORING /EVALUATION:   Food/Nutrient Intake Outcomes:  Total energy intake  Physical Signs/Symptoms Outcomes: Weight/weight change    NUTRITION RISK:    [] High              [] Moderate           [x]  Low  []  Minimal/Uncompromised    PT SEEN FOR:    []  MD Consult: []Calorie Count      []Diabetic Diet Education        []Diet Education     []Electrolyte Management     []General Nutrition Management and Supplements     []Management of Tube Feeding     []TPN Recommendations    [x]  RN Referral:  [x]MST score >=2     []Enteral/Parenteral Nutrition PTA     []Pregnant: Gestational DM or Multigestation     []Pressure Ulcer/Wound Care needs        []  Low BMI  []  [facility]  Pager [phone]                 Weekend Pager [phone]

## 2018-05-30 NOTE — PROGRESS NOTES
[address] [address]  [phone]      Cardiology Progress Note      5/30/2018 2:00 PM    Admit Date: 5/29/2018    Admit Diagnosis:   Diastolic CHF, acute on chronic (HCC)  Chronic a-fib (HCC)  CKD (chronic kidney disease) stage 3, GFR 30-59 ml/min    Subjective:     Brown Mustafa has no complaints.       Visit Vitals    /90 (BP 1 Location: Right arm, BP Patient Position: Sitting)    Pulse 88    Temp 98 °F (36.7 °C)    Resp 20    Ht 5' 2\" (1.575 m)    Wt 88.9 kg (196 lb)    SpO2 97%    BMI 35.85 kg/m2       Current Facility-Administered Medications   Medication Dose Route Frequency    warfarin (COUMADIN) tablet 4 mg  4 mg Oral ONCE    acetaminophen (TYLENOL) tablet 500 mg  500 mg Oral Q6H PRN    albuterol-ipratropium (DUO-NEB) 2.5 MG-0.5 MG/3 ML  3 mL Nebulization Q4H PRN    allopurinol (ZYLOPRIM) tablet 100 mg  100 mg Oral DAILY    amLODIPine (NORVASC) tablet 2.5 mg  2.5 mg Oral DAILY    fluticasone-vilanterol (BREO ELLIPTA) 100mcg-25mcg/puff  1 Puff Inhalation DAILY    colchicine tablet 0.6 mg  0.6 mg Oral DAILY PRN    furosemide (LASIX) injection 40 mg  40 mg IntraVENous BID    potassium chloride (KLOR-CON) packet 20 mEq  20 mEq Oral BID WITH MEALS    sodium chloride (NS) flush 5-10 mL  5-10 mL IntraVENous Q8H    sodium chloride (NS) flush 5-10 mL  5-10 mL IntraVENous PRN    WARFARIN INFORMATION NOTE (COUMADIN)   Other QPM       Objective:      Physical Exam:  General Appearance:  elderly AAF in no acute distress  Chest:   Clear  Cardiovascular:  irr, irr no murmur.   Abdomen:   Soft, non-tender, bowel sounds are active.   Extremities: no peripheral edema  Skin:  Warm and dry.     Data Review:   Recent Labs      05/30/18   0416  05/29/18   1104   WBC  6.5  5.6   HGB  13.6  12.8   HCT  43.3  40.4   PLT  289  284     Recent Labs      05/30/18   0416  05/29/18   1240  05/29/18   1104   NA  140   --   143   K  4.0   --   3.9   CL  104   --   103   CO2  28 --   31   GLU  106*   --   103*   BUN  21*   --   18   CREA  1.32*   --   1.39*   CA  10.2*   --   9.8   MG  2.3   --    --    ALB   --    --   3.0*   TBILI   --    --   0.6   SGOT   --    --   23   ALT   --    --   18   INR  2.2*  2.2*   --        Recent Labs      05/29/18   1104   TROIQ  <0.04         Intake/Output Summary (Last 24 hours) at 05/30/18 1605  Last data filed at 05/30/18 1438   Gross per 24 hour   Intake              480 ml   Output              400 ml   Net               80 ml        Telemetry: afib      Assessment:     Active Problems:    CKD (chronic kidney disease) stage 3, GFR 30-59 ml/min (5/29/2018)      Chronic a-fib ([facility]) (5/14/5850)      Diastolic CHF, acute on chronic ([facility]) (5/29/2018)      Pleural effusion (5/29/2018)        Plan:     CKD (chronic kidney disease) stage 3, GFR 30-59 ml/min (5/29/2018)     Chronic a-fib ([facility] (5/29/2018)  On no rate controlling medications, reasonably controlled. Patient has been on warfarin for the last 10 years, followed by PCP for INR checks  Patient and family requesting to switch to Eliquis. Case Management consult for evaluation of affordability of Eliquis     Diastolic CHF, acute on chronic:   LVEF in 2015 was 50%. Echo pending  May have tachycardia induced cardiomyopathy. Continue diuresis.     Pleural effusion:  could be malignant as she has h/o lung and colon cancer. Will benefit from thoracentesis. At this time patient only wants medical therapy      Asael Montgomery [address] Cardiology            Patient seen, examined by me personally. Plan discussed as detailed. Agree with note as outlined by  NP. I confirm findings in history and physical exam. No additional findings noted. Agree with plan as outlined above.      Naomi Scott MD

## 2018-05-30 NOTE — PROGRESS NOTES
Hospitalist Progress Note    NAME: Reyes Calderón   :  1918   MRN:  [id number]       Interim Hospital Summary: 80 y.o. female whom presented on 2018 with      Assessment / Plan:  Acute on chronic Diastolic CHF POA  Due to Chronic AFib POA  Causing L pleural effusion with Pulm edema POA  - appreciate cardiology input  - Echo to be done, most recent Echo(2015) EF 50%  - continue with Lasix 40 mg BID IV; daily weight 88.9kg  - CXR: Mild pulmonary edema, Increased left pleural effusion. Cardiomegaly  - pt has opted for only Medical management; pt declined aggressive management including Thoracentesis  - Cont Coumadin as at home; INR 2.2. Pt's daughter asked about taking Eliquis. Requested CM to check price on Eliquis. - avoid BB & ACE/ARB at her age & CKD/COPD     CKD stage 3 POA  - Cr at baseline ~ 1.3, stable currently  - monitor BMP in AM     COPD  Exsmoker (remote h/o of smoking now)  - weaned off from supplemental O2   - Cont home inhalers & nebs  - No wheezing in ER- no role for steroids at this time     HTN  - Cont Norvasc        nCode Status: DNR as per pt's wishes, confirmed in ER with family at bedside  Surrogate Decision Maker: Julio C Corral # [phone]     DVT Prophylaxis: On coumadin  GI Prophylaxis: not indicated     Baseline: Pt is apparently steady at home, uses walker, pretty independent with ADLs    Recommended Disposition:  PT, OT, RN     Subjective:     Chief Complaint / Reason for Physician Visit  \"I feel pretty good\". Discussed with RN events overnight. Review of Systems:  Symptom Y/N Comments  Symptom Y/N Comments   Fever/Chills n   Chest Pain n    Poor Appetite    Edema     Cough n   Abdominal Pain     Sputum    Joint Pain     SOB/WOOTEN n   Pruritis/Rash     Nausea/vomit n   Tolerating PT/OT     Diarrhea    Tolerating Diet     Constipation    Other       Could NOT obtain due to:      Objective:     VITALS:   Last 24hrs VS reviewed since prior progress note.  Most recent are:  Patient Vitals for the past 24 hrs:   Temp Pulse Resp BP SpO2   05/30/18 1353 - - - - 97 %   05/30/18 1129 - - - 145/90 -   05/30/18 1104 98 °F (36.7 °C) 88 20 (!) 165/106 95 %   05/30/18 0716 97.7 °F (36.5 °C) 73 20 (!) 158/97 97 %   05/30/18 0426 97.9 °F (36.6 °C) 95 20 166/85 99 %   05/29/18 2212 97.4 °F (36.3 °C) 79 20 155/90 98 %   05/29/18 1918 97.4 °F (36.3 °C) 75 20 (!) 165/99 90 %   05/29/18 1730 - - 25 166/88 (!) 89 %   05/29/18 1715 - - 12 153/82 95 %   05/29/18 1700 - 78 22 152/86 92 %   05/29/18 1645 - 82 (!) 40 145/76 98 %   05/29/18 1630 - - 14 142/69 93 %   05/29/18 1615 - 79 28 135/85 91 %   05/29/18 1600 - 80 22 142/78 94 %   05/29/18 1545 - 75 19 140/82 92 %       Intake/Output Summary (Last 24 hours) at 05/30/18 1526  Last data filed at 05/30/18 7136   Gross per 24 hour   Intake              480 ml   Output              550 ml   Net              -70 ml        PHYSICAL EXAM:  General: WD, WN. Alert, cooperative, no acute distress    EENT:  EOMI. Anicteric sclerae. MMM  Resp:  Clear in apex with a few crackles at bases, no wheezing or rales. No accessory muscle use  CV:  Regular  rhythm,  trace of pitting edema  GI:  Soft, Non distended, Non tender.  +Bowel sounds  Neurologic:  Alert and oriented X 3, normal speech,   Psych:   Good insight. Not anxious nor agitated  Skin:  No rashes. No jaundice    Reviewed most current lab test results and cultures  YES  Reviewed most current radiology test results   YES  Review and summation of old records today    NO  Reviewed patient's current orders and MAR    YES  PMH/SH reviewed - no change compared to H&P  ________________________________________________________________________  Care Plan discussed with:    Comments   Patient y    Family  y Daughter at bedside   RN y    Care Manager     Consultant  y Ms. Marlon Avalos, cardiology NP                    y Multidiciplinary team rounds were held today with , nursing, pharmacist and clinical coordinator. Patient's plan of care was discussed; medications were reviewed and discharge planning was addressed. ________________________________________________________________________  Total NON critical care TIME: 30   Minutes    Total CRITICAL CARE TIME Spent:   Minutes non procedure based      Comments   >50% of visit spent in counseling and coordination of care     ________________________________________________________________________  Mihir Ceballos NP     Procedures: see electronic medical records for all procedures/Xrays and details which were not copied into this note but were reviewed prior to creation of Plan. LABS:  I reviewed today's most current labs and imaging studies.   Pertinent labs include:  Recent Labs      05/30/18   0416  05/29/18   1104   WBC  6.5  5.6   HGB  13.6  12.8   HCT  43.3  40.4   PLT  289  284     Recent Labs      05/30/18   0416  05/29/18   1240  05/29/18   1104   NA  140   --   143   K  4.0   --   3.9   CL  104   --   103   CO2  28   --   31   GLU  106*   --   103*   BUN  21*   --   18   CREA  1.32*   --   1.39*   CA  10.2*   --   9.8   MG  2.3   --    --    ALB   --    --   3.0*   TBILI   --    --   0.6   SGOT   --    --   23   ALT   --    --   18   INR  2.2*  2.2*   --        Signed: )Jovana Do NP

## 2018-05-30 NOTE — PROGRESS NOTES
Primary Nurse Willow Kumar and Maegan Johnson, RN performed a dual skin assessment on this patient No impairment noted  Michael score is 20

## 2018-05-30 NOTE — PROGRESS NOTES
Spiritual Care Partner Volunteer visited patient in 5/30/18 on [address]. Documented by:  JOSH Eng

## 2018-05-30 NOTE — CDMP QUERY
Patient is noted to have a BMI of 35. Please clarify if this patient is:     =>Morbidly obese (BMI ³ 40)  =>Obese (BMI 30 - 39.9)  =>Overweight (BMI 25 - 29.9)  =>Other explanation of clinical findings  =>Clinically Undetermined (no explanation for clinical findings)    Presentation: 5'2\", 193 lbs = BMI 35    REFERENCE:  The [address] [place] [address] has issued a statement indicating that, \"Individuals who are overweight, obese, or morbidly obese are at an increased risk for certain medical conditions when compared to persons of normal weight. Therefore, these conditions are always clinically significant and reportable when documented by the provider. Please clarify and document your clinical opinion in the progress notes and discharge summary, including the definitive and or presumptive diagnosis, (suspected or probable), related to the above clinical findings. Please include clinical findings supporting your diagnosis.    Thanks,  Kiran Moore Rn/PASCUAL

## 2018-05-30 NOTE — PROGRESS NOTES
Problem: Mobility Impaired (Adult and Pediatric)  Goal: *Acute Goals and Plan of Care (Insert Text)  Physical Therapy Goals  Initiated 5/30/2018  1. Patient will move from supine to sit and sit to supine  in bed with minimal assistance/contact guard assist within 7 day(s). 2.  Patient will transfer from bed to chair and chair to bed with modified independence using the least restrictive device within 7 day(s). 3.  Patient will perform sit to stand with modified independence within 7 day(s). 4.  Patient will ambulate with minimal assistance/contact guard assist for 100 feet with the least restrictive device within 7 day(s). 5.  Patient will ascend/descend 4 stairs with handrail(s) with minimal assistance/contact guard assist within 7 day(s). physical Therapy EVALUATION  Seen 1255 to 1311      Patient: Neli Amin (58 y.o. female)  Date: 5/30/2018  Primary Diagnosis: Diastolic CHF, acute on chronic (HCC)  Chronic a-fib (HCC)  CKD (chronic kidney disease) stage 3, GFR 30-59 ml/min  Precautions: Fall    ASSESSMENT :  Based on the objective data described below, the patient presents up in chair with daughter present and supportive. Patient is a little K[place]. She was very pleasant and willing to work with PT. Instructed in LE exercises to be going on her own to assist with decreased the amount of edema in her lower legs and feet. Good sitting balance. Stood to [facility] with SBA and additional time. Slow to rise. Ambulated 39' with RW and CGA/verbal cues for safety on turns and at thresholds. Up in chair again at end of session. Encouraged LE exercises now and then while up in the chair. Daughter states that the patient  has 5 hours of help M-F. She would like to look into getting something on the weekends as well. Daughter seems supportive, but tired. Recommend HHPT and possibly some extra help as well. Patient will benefit from skilled intervention to address the above impairments.   Patients rehabilitation potential is considered to be Good  Factors which may influence rehabilitation potential include:   []         None noted  []         Mental ability/status  [x]         Medical condition  []         Home/family situation and support systems  [x]         Safety awareness  []         Pain tolerance/management  []         Other:      PLAN :  Recommendations and Planned Interventions:  []           Bed Mobility Training             []    Neuromuscular Re-Education  [x]           Transfer Training                   []    Orthotic/Prosthetic Training  [x]           Gait Training                         []    Modalities  [x]           Therapeutic Exercises           []    Edema Management/Control  []           Therapeutic Activities            [x]    Patient and Family Training/Education  []           Other (comment):    Frequency/Duration: Patient will be followed by physical therapy  4 times a week to address goals. Discharge Recommendations: Home Health  Further Equipment Recommendations for Discharge: shower chair or transfer bench     SUBJECTIVE:   Patient stated I don't get in the tub.     OBJECTIVE DATA SUMMARY:   HISTORY:    Past Medical History:   Diagnosis Date    [facility]     [facility]     right breast CA, lung, colon    Gastrointestinal disorder     GERD    Hypertension     Other ill-defined conditions(799.89)     gout     Past Surgical History:   Procedure Laterality Date    BREAST SURGERY PROCEDURE UNLISTED  1995    Right mastectomy    HX APPENDECTOMY      HX HEENT      right glass eye    GA COLONOSCOPY W/BIOPSY SINGLE/MULTIPLE  3/29/2013         GA COLSC FLX W/RMVL OF TUMOR POLYP LESION SNARE TQ  3/29/2013          Prior Level of Function/Home Situation: ambulatory with rollator  Personal factors and/or comorbidities impacting plan of care: age, [facility], needing more care    Home Situation  Home Environment: Private residence  # Steps to Enter: 1  One/Two Story Residence: [facility] story  Living Alone: No  Support Systems: Family member(s)  Patient Expects to be Discharged to[de-identified] Private residence  Current DME Used/Available at Home: Koleen Wes, rollator (elevated toilet)  Tub or Shower Type: Tub/Shower combination (sponge barthing)    EXAMINATION/PRESENTATION/DECISION MAKING:   Critical Behavior:  Neurologic State: Alert  Orientation Level: Oriented X4  Cognition: Appropriate decision making, Appropriate for age attention/concentration, Follows commands  Safety/Judgement: Awareness of environment, Fall prevention, Home safety     Hearing: Auditory  Auditory Impairment: None, Hard of hearing, bilateral    Skin:  Intact    Edema: BLE edema    Range Of Motion:  AROM: Generally decreased, functional    Strength:    Strength: Generally decreased, functional     Tone & Sensation:   Tone: Normal  Sensation: Intact     Coordination:  Coordination: Within functional limits     Vision:   Acuity: Within Defined Limits  Functional Mobility:  Bed Mobility:  Rolling:  (seated at bedside chair upon arrival)  Scooting: Supervision     Transfers:  Sit to Stand: Stand-by assistance; Additional time  Stand to Sit: Stand-by assistance; Additional time  Bed to Chair: Contact guard assistance; Additional time (with rolling walker)        Balance:   Sitting: Intact  Standing: Impaired  Standing - Static: Constant support;Good  Standing - Dynamic : Fair    Ambulation/Gait Training:  Distance (ft): 45 Feet (ft)  Assistive Device: Gait belt;Walker, rolling  Ambulation - Level of Assistance: Contact guard assistance;Minimal assistance  Gait Abnormalities: Decreased step clearance; Path deviations    Therapeutic Exercises:    Toe wiggles, ankle pumps, hip horizontal abduction/adduction, marching and long arc quads    Functional Measure:    Elder Mobility Scale    8/20         EMS and G-code impairment scale:  Percentage of impairment CH  0% CI  1-19% CJ  20-39% CK  40-59% CL  60-79% CM  80-99% CN  100%   EMS Score 0-20 20 17-19 13-16 9-12 5-8 1-4 0      Scores under 10  generally these patients are dependent in mobility maneuvers; require help with  basic ADL, such as transfers, toileting and dressing. Scores between 10  13  generally these patients are borderline in terms of safe mobility and  independence in ADL i.e. they require some help with some mobility maneuvers. Scores over 14  Generally these patients are able to perform mobility maneuvers alone and safely  and are independent in basic ADL. G codes: In compliance with CMSs Claims Based Outcome Reporting, the following G-code set was chosen for this patient based on their primary functional limitation being treated: The outcome measure chosen to determine the severity of the functional limitation was the Elderly Mobility Scale Score with a score of 8/20 which was correlated with the impairment scale. ? Mobility - Walking and Moving Around:     - CURRENT STATUS: CL - 60%-79% impaired, limited or restricted    - GOAL STATUS: CK - 40%-59% impaired, limited or restricted    - D/C STATUS:  ---------------To be determined---------------      Pain:  Pain Scale 1: Numeric (0 - 10)  Pain Intensity 1: 0     Activity Tolerance: Tolerated PT evaluation/treatment session fairly well. Please refer to the flowsheet for vital signs taken during this treatment. After treatment:   [x]         Patient left in no apparent distress sitting up in chair  []         Patient left in no apparent distress in bed  [x]         Call bell left within reach  [x]         Nursing notified  [x]         Caregiver present  []         Bed alarm activated (not being used)    COMMUNICATION/EDUCATION:   The patients plan of care was discussed with: Registered Nurse. [x]         Fall prevention education was provided and the patient/caregiver indicated understanding. []         Patient/family have participated as able in goal setting and plan of care.   [x] Patient/family agree to work toward stated goals and plan of care. []         Patient understands intent and goals of therapy, but is neutral about his/her participation. []         Patient is unable to participate in goal setting and plan of care.     Thank you for this referral.  Yany Gaspar, PT  Time Calculation: 16 mins

## 2018-05-30 NOTE — PROGRESS NOTES
Pharmacy Daily Dosing of Warfarin    Indication: atrial fibrillation    Goal INR: 2-3    PTA Warfarin Dose: 6 mg on mon and Friday; 4 mg daily on all other days    Concurrent anticoagulants: N/A    Concurrent antiplatelet: N/A    Major Interacting Medications    Drugs that may increase INR: N/A   Drugs that may decrease INR: N/A    Conditions that may increase/decrease INR (CHF, C. diff, cirrhosis, thyroid disorder, hypoalbuminemia): hypoalbuminemia    Labs:  Recent Labs      05/30/18   0416  05/29/18   1240  05/29/18   1104   INR  2.2*  2.2*   --    HGB  13.6   --   12.8   PLT  289   --   284   SGOT   --    --   23   TBILI   --    --   0.6   ALB   --    --   3.0*         Impression/Plan:    INR therapeutic   Will order warfarin 4 mg PO x 1 dose    Daily INR and every other day CBC w/o diff QOD     Pharmacy will follow daily and adjust the dose as appropriate.     Thanks  Susan Logan, PHARMD

## 2018-05-31 ENCOUNTER — HOME HEALTH ADMISSION (OUTPATIENT)
Dept: HOME HEALTH SERVICES | Facility: HOME HEALTH | Age: 83
End: 2018-05-31
Payer: MEDICARE

## 2018-05-31 VITALS
SYSTOLIC BLOOD PRESSURE: 137 MMHG | HEART RATE: 88 BPM | DIASTOLIC BLOOD PRESSURE: 65 MMHG | OXYGEN SATURATION: 95 % | BODY MASS INDEX: 34.77 KG/M2 | HEIGHT: 62 IN | RESPIRATION RATE: 20 BRPM | TEMPERATURE: 97.6 F | WEIGHT: 188.93 LBS

## 2018-05-31 LAB
ANION GAP SERPL CALC-SCNC: 7 MMOL/L (ref 5–15)
BASOPHILS # BLD: 0.1 K/UL (ref 0–0.1)
BASOPHILS NFR BLD: 1 % (ref 0–1)
BUN SERPL-MCNC: 21 MG/DL (ref 6–20)
BUN/CREAT SERPL: 16 (ref 12–20)
CALCIUM SERPL-MCNC: 10 MG/DL (ref 8.5–10.1)
CHLORIDE SERPL-SCNC: 104 MMOL/L (ref 97–108)
CO2 SERPL-SCNC: 30 MMOL/L (ref 21–32)
CREAT SERPL-MCNC: 1.3 MG/DL (ref 0.55–1.02)
DIFFERENTIAL METHOD BLD: ABNORMAL
EOSINOPHIL # BLD: 0.2 K/UL (ref 0–0.4)
EOSINOPHIL NFR BLD: 3 % (ref 0–7)
ERYTHROCYTE [DISTWIDTH] IN BLOOD BY AUTOMATED COUNT: 18.1 % (ref 11.5–14.5)
GLUCOSE SERPL-MCNC: 96 MG/DL (ref 65–100)
HCT VFR BLD AUTO: 41.6 % (ref 35–47)
HGB BLD-MCNC: 12.9 G/DL (ref 11.5–16)
IMM GRANULOCYTES # BLD: 0 K/UL (ref 0–0.04)
IMM GRANULOCYTES NFR BLD AUTO: 0 % (ref 0–0.5)
INR PPP: 2.8 (ref 0.9–1.1)
LYMPHOCYTES # BLD: 0.6 K/UL (ref 0.8–3.5)
LYMPHOCYTES NFR BLD: 10 % (ref 12–49)
MCH RBC QN AUTO: 25.4 PG (ref 26–34)
MCHC RBC AUTO-ENTMCNC: 31 G/DL (ref 30–36.5)
MCV RBC AUTO: 81.9 FL (ref 80–99)
MONOCYTES # BLD: 0.5 K/UL (ref 0–1)
MONOCYTES NFR BLD: 9 % (ref 5–13)
NEUTS SEG # BLD: 4.4 K/UL (ref 1.8–8)
NEUTS SEG NFR BLD: 76 % (ref 32–75)
NRBC # BLD: 0 K/UL (ref 0–0.01)
NRBC BLD-RTO: 0 PER 100 WBC
PLATELET # BLD AUTO: 302 K/UL (ref 150–400)
PMV BLD AUTO: 10.9 FL (ref 8.9–12.9)
POTASSIUM SERPL-SCNC: 4 MMOL/L (ref 3.5–5.1)
PROTHROMBIN TIME: 28.1 SEC (ref 9–11.1)
RBC # BLD AUTO: 5.08 M/UL (ref 3.8–5.2)
SODIUM SERPL-SCNC: 141 MMOL/L (ref 136–145)
WBC # BLD AUTO: 5.8 K/UL (ref 3.6–11)

## 2018-05-31 PROCEDURE — 80048 BASIC METABOLIC PNL TOTAL CA: CPT | Performed by: GENERAL ACUTE CARE HOSPITAL

## 2018-05-31 PROCEDURE — 85610 PROTHROMBIN TIME: CPT | Performed by: EMERGENCY MEDICINE

## 2018-05-31 PROCEDURE — 77010033678 HC OXYGEN DAILY

## 2018-05-31 PROCEDURE — 74011250636 HC RX REV CODE- 250/636: Performed by: INTERNAL MEDICINE

## 2018-05-31 PROCEDURE — 93306 TTE W/DOPPLER COMPLETE: CPT

## 2018-05-31 PROCEDURE — 85025 COMPLETE CBC W/AUTO DIFF WBC: CPT | Performed by: GENERAL ACUTE CARE HOSPITAL

## 2018-05-31 PROCEDURE — 36415 COLL VENOUS BLD VENIPUNCTURE: CPT | Performed by: EMERGENCY MEDICINE

## 2018-05-31 PROCEDURE — 74011250637 HC RX REV CODE- 250/637: Performed by: INTERNAL MEDICINE

## 2018-05-31 RX ADMIN — AMLODIPINE BESYLATE 2.5 MG: 2.5 TABLET ORAL at 09:19

## 2018-05-31 RX ADMIN — Medication 10 ML: at 05:40

## 2018-05-31 RX ADMIN — FLUTICASONE FUROATE AND VILANTEROL TRIFENATATE 1 PUFF: 100; 25 POWDER RESPIRATORY (INHALATION) at 09:23

## 2018-05-31 RX ADMIN — POTASSIUM CHLORIDE 20 MEQ: 1.5 POWDER, FOR SOLUTION ORAL at 09:19

## 2018-05-31 RX ADMIN — FUROSEMIDE 40 MG: 10 INJECTION, SOLUTION INTRAMUSCULAR; INTRAVENOUS at 09:19

## 2018-05-31 RX ADMIN — ALLOPURINOL 100 MG: 100 TABLET ORAL at 09:19

## 2018-05-31 NOTE — CARDIO/PULMONARY
Cardiopulmonary Rehab:      Chart reviewed. Pt is on the CHF bundle list. Pt is a 80 y.o. female admitted with Acute on chronic Diastolic CHF Due to Chronic AFib. LVEF in 2015 was 50%  Pleural effusion- could be malignant as she has h/o lung and colon cancer. Will benefit from thoracentesis. At this time patient only wants medical therapy. Attempted pt visit. RN in room medicating, Returned to unit for second attempt, py off the floor no family present at time of visit. CHF teaching packet in room. Will follow for cardiac teaching as appropriate.

## 2018-05-31 NOTE — DISCHARGE SUMMARY
Hospitalist Discharge Summary     Patient ID:  Avel Blum  [id number]  27 y.o.  8/12/1918    PCP on record: Treasure Freitas MD    Admit date: 5/29/2018  Discharge date and time: 5/31/2018      DISCHARGE DIAGNOSIS:  Acute on chronic Diastolic CHF POA  Due to Chronic AFib POA  Causing L pleural effusion with Pulm edema POA  CKD stage 3 POA  COPD  Exsmoker   HTN    CONSULTATIONS:  IP CONSULT TO CARDIOLOGY    Excerpted HPI from H&P of Husam Kay MD:  Claire Levine is a 80 y.o.  female who presents with above complains from home ambulatory with family. Pt presented with CC of worsening SOB x weeks  H/o associated productive cough- whitish frothy sputum, denies any fever, diarrhea, chills  H/o associated LE edema x weeks- worsening  H/o seeing PCP who increased AM Lasix dose to 80 mg last week  H/o seeing Sindi vergara in office- added symbicort inhaler     Pt was found to have worsened L pleural effusion along with Pulm edema on CXR with evidence of LE edema but was non-hypoxic on RA.       ______________________________________________________________________  DISCHARGE SUMMARY/HOSPITAL COURSE:  for full details see H&P, daily progress notes, labs, consult notes. Acute on chronic Diastolic CHF POA  Due to Chronic AFib POA  Causing L pleural effusion with Pulm edema POA  - appreciate cardiology input  - Echo to be done, most recent Echo(1/2015) EF 50%  - continue with home Lasix dose. Weight upon discharge 85.7kg (188lbs)  - CXR: Mild pulmonary edema, Increased left pleural effusion. Cardiomegaly  - pt has opted for only Medical management; pt declined aggressive management including Thoracentesis  - coumadin has been d/c'd. Pt will continue wtih Eliquis for anticoagulation therapy. INR 2.8. Pt has been advised to start taking eliquis on Saturday.   - avoid BB & ACE/ARB at her age & CKD/COPD      CKD stage 3 POA  - Cr at baseline ~ 1.3, stable currently    COPD  Exsmoker (remote h/o of smoking now)  - weaned off from supplemental O2. Pt does have home O2 that she uses as need bases. - Cont home inhalers & nebs  - No wheezing in ER  - no role for steroids at this time      HTN  - Cont Norvasc      Obese BMP 35  Pt is not interested in weight loss at this time (\"I am 80 yrs old. I am not interested about loosing weight\"). She agrees following heart healthy diet.  _______________________________________________________________________  Patient seen and examined by me on discharge day. Pertinent Findings:  Gen:    Not in distress  Chest: Clear lungs  CVS:   irregular rhythm. No edema  Abd:  Soft, not distended, not tender  Neuro:  Alert, orient to self, place, and identifying her family members  _______________________________________________________________________  DISCHARGE MEDICATIONS:   Current Discharge Medication List      START taking these medications    Details   apixaban (ELIQUIS) 2.5 mg tablet Take 1 Tab by mouth two (2) times a day. Qty: 60 Tab, Refills: 11         CONTINUE these medications which have NOT CHANGED    Details   furosemide (LASIX) 40 mg tablet Patient takes 80 mg every morning and 40 mg every night      budesonide-formoterol (SYMBICORT) 160-4.5 mcg/actuation HFAA Take 2 Puffs by inhalation two (2) times a day. mv-mn/folic acid/calcium/vit K (ONE-A-DAY WOMEN'S 50 PLUS PO) Take 1 Tab by mouth daily. dextran 70/hypromellose/PF (NATURAL TEARS, PF, OP) Administer 1 Drop to both eyes daily as needed (dry eyes). mometasone (NASONEX) 50 mcg/actuation nasal spray 2 Sprays by Both Nostrils route daily as needed (congestion). allopurinol (ZYLOPRIM) 100 mg tablet Patient takes 2 tablets in the morning and 1 tablet at night. albuterol (PROVENTIL HFA, VENTOLIN HFA, PROAIR HFA) 90 mcg/actuation inhaler Take 2 Puffs by inhalation every four (4) hours as needed for Wheezing.   Qty: 1 Inhaler, Refills: 0 albuterol-ipratropium (DUO-NEB) 2.5 mg-0.5 mg/3 ml nebu 3 mL by Nebulization route every four (4) hours as needed. Qty: 30 Nebule, Refills: 1      potassium chloride (KLOR-CON) 20 mEq packet Take 20 mEq by mouth daily. Indications: pill      colchicine (COLCRYS) 0.6 mg tablet Take 0.6 mg by mouth daily. amLODIPine (NORVASC) 2.5 mg tablet Take 2.5 mg by mouth daily. acetaminophen (TYLENOL EXTRA STRENGTH) 500 mg tablet Take 1,000 mg by mouth every six (6) hours as needed for Pain. STOP taking these medications       warfarin (COUMADIN) 3 mg tablet Comments:   Reason for Stopping:         warfarin (COUMADIN) 4 mg tablet Comments:   Reason for Stopping:         warfarin (COUMADIN) 6 mg tablet Comments:   Reason for Stopping:               My Recommended Diet, Activity, Wound Care, and follow-up labs are listed in the patient's Discharge Insturctions which I have personally completed and reviewed.     _______________________________________________________________________  DISPOSITION:    Home with Family:    Home with HH/PT/OT/RN: y   SNF/LTC:    LASHONDA:    OTHER:        Condition at Discharge:  Stable  _______________________________________________________________________  Follow up with:   PCP : Nick Bonilla MD  Follow-up Information     Follow up With Details Comments Contact Info    Nick Bonilla MD Schedule an appointment as soon as possible for a visit to be seen in 3-5 days for CHF Bundle Pt for PCP follow up.   [facility]  [place]  [phone]      Tiffany Plummer MD Schedule an appointment as soon as possible for a visit to be seen within 2 weeks [id number] [facility] [place]  [address]                 Total time in minutes spent coordinating this discharge (includes going over instructions, follow-up, prescriptions, and preparing report for sign off to her PCP) :  30 minutes    Signed:  Jovana Wells NP

## 2018-05-31 NOTE — DISCHARGE INSTRUCTIONS
Patient Discharge Instructions     Pt Name  Kristi Ruiz   Date of Birth 8/12/1918   Age  80 y.o. Medical Record Number  [id number]   PCP Brandi Marx MD    Admit date:  5/29/2018 @    [place] [address]    Room Number  3253/01   Date of Discharge 5/31/2018     Admission Diagnoses:     Diastolic CHF, acute on chronic ([facility]          Allergies   Allergen Reactions    Penicillins Hives        You were admitted to [address] for  Diastolic CHF, acute on chronic ([address]    [place] (BUT NOT LIMITED TO ):  Present on Admission:   CKD (chronic kidney disease) stage 3, GFR 30-59 ml/min   Chronic a-fib (HCC)   Diastolic CHF, acute on chronic (HCC)   Pleural effusion   A-fib ([facility] [address]   HTN (hypertension)   Malignant essential hypertension      DIET:  Cardiac Diet     Recommended activity: Activity as tolerated  Follow up : Follow-up Information     Follow up With Details Comments Contact Info    Brandi Marx MD Schedule an appointment as soon as possible for a visit to be seen within one week [address]   [address] [facility]  [address] [facility]      [address] [facility], [place] Schedule an appointment as soon as possible for a visit to be seen within 2 weeks [address] [facility]  [phone]          Daily weights: Notify MD for a weight gain of 3 pounds or greater in one day or 5 pounds in one week. Start taking Eliquis twice a day starting this Saturday. Apixaban (By mouth)   Apixaban (a-PIX-a-ban)  Treats and prevents blood clots. This medicine is a blood thinner. Brand Name(s): Eliquis   There may be other brand names for this medicine. When This Medicine Should Not Be Used: This medicine is not right for everyone. Do not use it if you had an allergic reaction to apixaban or you have active bleeding. How to Use This Medicine:   Tablet  · Your doctor will tell you how much medicine to use.  Do not use more than directed. · If you are not able to swallow the tablets whole, they may be crushed and mixed in water, 5% dextrose in water (D5W), apple juice, or applesauce. The crushed tablets may be mixed with 60 mL of water or D5W dose and given through a nasogastric tube (NGT). · This medicine should come with a Medication Guide. Ask your pharmacist for a copy if you do not have one. · Missed dose: Take a dose as soon as you remember. If it is almost time for your next dose, wait until then and take a regular dose. Do not take extra medicine to make up for a missed dose. · Store the medicine in a closed container at room temperature, away from heat, moisture, and direct light. Drugs and Foods to Avoid:   Ask your doctor or pharmacist before using any other medicine, including over-the-counter medicines, vitamins, and herbal products. · Some medicines can affect how apixaban works. Tell your doctor if you are using any of the following:   ¨ Carbamazepine, clarithromycin, itraconazole, ketoconazole, phenytoin, rifampin, ritonavir, Mis's wort  ¨ Blood thinner (including clopidogrel, heparin, prasugrel, warfarin)  ¨ Medicine to treat depression  ¨ NSAID pain or arthritis medicine (including aspirin, celecoxib, diclofenac, ibuprofen, naproxen)  Warnings While Using This Medicine:   · Tell your doctor if you are pregnant or breastfeeding, or if you have kidney disease, liver disease, bleeding problems, or an artificial heart valve. · Do not stop using this medicine suddenly without asking your doctor. You might have a higher risk of stroke for a short time after you stop using this medicine. · This medicine increases your risk for bleeding that can become serious if not controlled. You may also bruise easily, and it may take longer than usual for bleeding to stop. · This medicine may increase your risk for blood clots in your spine or back if you undergo an epidural or spinal puncture.  This could lead to paralysis. Tell your doctor if you ever had spine problems or back surgery. · Tell any doctor or dentist who treats you that you are using this medicine. With your doctor's supervision, you may need to stop using this medicine several days before you have surgery or medical tests. · Your doctor will do lab tests at regular visits to check on the effects of this medicine. Keep all appointments. · Keep all medicine out of the reach of children. Never share your medicine with anyone. Possible Side Effects While Using This Medicine:   Call your doctor right away if you notice any of these side effects:  · Allergic reaction: Itching or hives, swelling in your face or hands, swelling or tingling in your mouth or throat, chest tightness, trouble breathing  · Change in how much or how often you urinate, red or pink urine  · Chest pain, trouble breathing  · Coughing up blood, vomiting blood or material that looks like coffee grounds  · Numbness, tingling, or muscle weakness in your legs or feet  · Red or black, tarry stools  · Unusual bleeding, bruising, or weakness  If you notice other side effects that you think are caused by this medicine, tell your doctor. Call your doctor for medical advice about side effects. You may report side effects to FDA at 9-081-FDA-1187  © 2017 [address] [employer] Information is for End User's use only and may not be sold, redistributed or otherwise used for commercial purposes. The above information is an  only. It is not intended as medical advice for individual conditions or treatments. Talk to your doctor, nurse or pharmacist before following any medical regimen to see if it is safe and effective for you. · It is important that you take the medication exactly as they are prescribed. · Keep your medication in the bottles provided by the pharmacist and keep a list of the medication names, dosages, and times to be taken in your wallet.    · Do not take other medications without consulting your doctor. ADDITIONAL INFORMATION: If you experience any of the following symptoms or have any health problem not listed below, then please call your primary care physician or return to the emergency room if you cannot get hold of your doctor: Fever, chills, nausea, vomiting, diarrhea, change in mentation, falling, bleeding, shortness of breath. I understand that if any problems occur once I am discharged, I am supposed to call my Primary care physician for further care or seek help in the Emergency Department at the nearest Healthcare facility. I have had an opportunity to discuss my clinical issues with my doctor and nursing staff. I understand and acknowledge receipt of the above instructions.                                                                                                                                            Physician's or R.N.'s Signature                                                            Date/Time                                                                                                                                              Patient or Representative Signature                                                 Date/Time

## 2018-05-31 NOTE — PROGRESS NOTES
[address] [place] [address]  [phone]      Cardiology Progress Note      5/31/2018 12:00PM    Admit Date: 5/29/2018    Admit Diagnosis:   Diastolic CHF, acute on chronic (HCC)  Chronic a-fib (HCC)  CKD (chronic kidney disease) stage 3, GFR 30-59 ml/min    Subjective:     [place] Nephew has no complaints. Remains in chronic afib. Starting Eliquis 2.5 mg BID at discharge. With her insurance, free.      Visit Vitals    /65    Pulse 88    Temp 97.6 °F (36.4 °C)    Resp 20    Ht 5' 2\" (1.575 m)    Wt 85.7 kg (188 lb 15 oz)    SpO2 95%    BMI 34.56 kg/m2       Current Facility-Administered Medications   Medication Dose Route Frequency    acetaminophen (TYLENOL) tablet 500 mg  500 mg Oral Q6H PRN    albuterol-ipratropium (DUO-NEB) 2.5 MG-0.5 MG/3 ML  3 mL Nebulization Q4H PRN    allopurinol (ZYLOPRIM) tablet 100 mg  100 mg Oral DAILY    amLODIPine (NORVASC) tablet 2.5 mg  2.5 mg Oral DAILY    fluticasone-vilanterol (BREO ELLIPTA) 100mcg-25mcg/puff  1 Puff Inhalation DAILY    colchicine tablet 0.6 mg  0.6 mg Oral DAILY PRN    furosemide (LASIX) injection 40 mg  40 mg IntraVENous BID    potassium chloride (KLOR-CON) packet 20 mEq  20 mEq Oral BID WITH MEALS    sodium chloride (NS) flush 5-10 mL  5-10 mL IntraVENous Q8H    sodium chloride (NS) flush 5-10 mL  5-10 mL IntraVENous PRN    WARFARIN INFORMATION NOTE (COUMADIN)   Other QPM       Objective:      Physical Exam:  General Appearance:  elderly AAF in no acute distress  Chest:   Clear  Cardiovascular:  irr, irr, no murmur.   Abdomen:   Soft, non-tender, bowel sounds are active.   Extremities: no peripheral edema  Skin:  Warm and dry.     Data Review:   Recent Labs      05/31/18   0457  05/30/18   0416  05/29/18   1104   WBC  5.8  6.5  5.6   HGB  12.9  13.6  12.8   HCT  41.6  43.3  40.4   PLT  302  289  284     Recent Labs      05/31/18   0457  05/30/18   0416  05/29/18   1240  05/29/18   1104   NA  141  140   -- 143   K  4.0  4.0   --   3.9   CL  104  104   --   103   CO2  30  28   --   31   GLU  96  106*   --   103*   BUN  21*  21*   --   18   CREA  1.30*  1.32*   --   1.39*   CA  10.0  10.2*   --   9.8   MG   --   2.3   --    --    ALB   --    --    --   3.0*   TBILI   --    --    --   0.6   SGOT   --    --    --   23   ALT   --    --    --   18   INR  2.8*  2.2*  2.2*   --        Recent Labs      05/29/18   1104   TROIQ  <0.04         Intake/Output Summary (Last 24 hours) at 05/31/18 1501  Last data filed at 05/31/18 1218   Gross per 24 hour   Intake                0 ml   Output              350 ml   Net             -350 ml        Telemetry: afib     Assessment:     Principal Problem:    Diastolic CHF, acute on chronic (HCC) (5/29/2018)    Active Problems:    A-fib ([address] (3/27/2013)      Overview: Chronic and followed by Dr. Kathia Fish      HTN (hypertension) (3/27/2013)      Malignant essential hypertension (4/1/2013)      CKD (chronic kidney disease) stage 3, GFR 30-59 ml/min (5/29/2018)      Chronic a-fib (HCC) (5/29/2018)      Pleural effusion (5/29/2018)        Plan:     CKD (chronic kidney disease) stage 3, GFR 30-59 ml/min (5/29/2018)      Chronic a-fib ([address] (5/29/2018)  On no rate controlling medications, reasonably controlled. Switching to Eliquis at discharge.       Diastolic CHF, acute on chronic:   LVEF in 2015 was 50%. Echo unchanged  Continue diuresis and home diuretics      Pleural effusion: At this time patient only wants medical therapy     Can followup as needed. Asael Montgomery [address] Cardiology    5/31/2018         Patient seen, examined by me personally. Plan discussed as detailed. Agree with note as outlined by  NP. I confirm findings in history and physical exam. No additional findings noted. Agree with plan as outlined above.      Kacey Sidhu MD

## 2018-05-31 NOTE — PROGRESS NOTES
Heart Failure Care Coordinator visited the patient in room to review the transitional care plan with the patient. We discussed the importance of transitional care as it relates to reducing the likelihood of readmission. We reviewed the goals for the first 30 days following hospital discharge. The patient and I discussed wrap around services including nurse navigation, care coordination, home health, transitional care appointments with their primary care provider and specialist team. "" does not service patient area at this time. Appointment Dates and Times were documented on [facility] Calendar. Coordinator discussed with patient the importance of follow up appointments. The patient verbalized understanding. Heart Failure Care Coordinator visited the patient in room. Provided introduction to self, and explanation of the Care Coordinator role. Patient was provided a copy of the [facility] letter. Contact information provided should any questions arise before or after discharge. FYI for palliative care left on chart per CHF protocol. Patient stated they have functioning scales. Reinforced the importance of checking weight every AM and calling MD if weight is up 3 lbs in a day or 5 lbs in a week (or per MD guidelines).

## 2018-05-31 NOTE — PROGRESS NOTES
Pulse Oximetry Assessment:    100 % at rest on room air (If this value is <90%, you do not need to proceed and the other fields may be deleted)  93% while ambulating on room air      If patient will require home oxygen, please obtain an order from the attending physician for a case management consult to set up home oxygen.

## 2018-05-31 NOTE — PROGRESS NOTES
Attempted to see pt for therapy services. Pt is off the floor for testing. Will defer and continue to follow.

## 2018-05-31 NOTE — PROGRESS NOTES
Pt and daughter given discharge instructions, new medication education (eliquis sent to [facility]- given instructions to start on Saturday and discontinue warfarin) and follow up information (Dr. [address], Dr. Flro Mullins, and [address] [place] [address] Jimmy Johnson). IV taken out and pt taken off of cardiac monitor. Time for questions made. Family to transport. Primary nurse, Manjula herbert.

## 2018-05-31 NOTE — PROGRESS NOTES
Bedside shift change report given to MC Fair (oncoming nurse) by Marito Gonzalez RN (offgoing nurse). Report included the following information SBAR, Kardex, Procedure Summary, Intake/Output, MAR, Recent Results and Cardiac Rhythm Afib.

## 2018-05-31 NOTE — FACE TO FACE
Face to Face Order for [address] Name  Fely Au   Date of Birth 8/12/1918   Age  80 y.o. Medical Record Number  [id number]   Room Number  9411/94   Admit date:  5/29/2018   Date of Face to Face:  5/31/2018     Admission Diagnosis:  Diastolic CHF, acute on chronic (HCC)     Diagnoses Present on Admission:   CKD (chronic kidney disease) stage 3, GFR 30-59 ml/min   Chronic a-fib (HCC)   Diastolic CHF, acute on chronic (HCC)   Pleural effusion   [facility] [address]   HTN (hypertension)   Malignant essential hypertension     Past Medical History:   Diagnosis Date    A-fib ([facility] [address] [facility]     Cancer ([facility] [address]     right breast CA, lung, colon    Gastrointestinal disorder     GERD    Hypertension     Other ill-defined conditions(799.89)     gout      Visit Vitals    /90    Pulse 85    Temp 98 °F (36.7 °C)    Resp 20    Ht 5' 2\" (1.575 m)    Wt 85.7 kg (188 lb 15 oz)    SpO2 96%    BMI 34.56 kg/m2           Allergies   Allergen Reactions    Penicillins Hives             I certify that the patient needs home health care as prescribed below and I will not be following this patient in the Community.  I also certify that the patient is homebound as evidenced by    - Patient with poor safety awareness and is at risk for falls without assistance of another person and the use of an assistive device. Patient with poor ambulation endurance limiting their safe ability to ascend/descend the required number of steps to leave the home.   - Requires the assistance of 1 or more persons to leave the home    - and also as noted in various sections of this medical record.  Dr. Calvin Kent MD  will be responsible for signing the Plan of Care and will follow/coordinate ongoing care.      Initial Orders for Care:  - skilled nursing care:  skilled observation/assessment, patient education  - physical therapy: strengthening, stretching/ROM, transfer training, gait/stair training and balance training  - occupational therapy:  ADL safety (ie. cooking, bathing, dressing), ROM and pt/caregiver education      - Report to Ilana Butler MD     I certify that I am taking care of the patient today (Please see hospital Discharge Records for details of clinical issues pertaining to this order).       ________________________________________________________________________  Radha Renee, TOMAS, FNP-C, APRN-BC  (electronically signed; no signature required )    Hospitalist, [address], [facility], [address]  Tel: [phone]

## 2018-05-31 NOTE — PROGRESS NOTES
CM noted the discharge order for this Pt. CM called and spoke to NP Trudy Arriaag and she indicated to complete the eliquis consult that she has reviewed this and it was covered. CM completed as requested. Pt is CHF Bundle. Pt needs HH RN/PT/OT to evaluate and treat at discharge. CM was concerned about the oxygen. Pt is currently on 2 LPM of oxygen continuous. Pt could not tell me how much she is on, how often she uses it or the name of the provider for the Home oxygen. RN completed oxygen challenge. CM called her DTR Ryland Milligan per Pt request.  Ryland Milligan: [phone] CM had to leave a . CM will need to figure out which [facility] agency they prefer. And follow RN oxygen challenge to discuss if we need to order more oxygen for her at home. CM tried to call her to get her to bring the portable to the hospital.      CM emailed CM Specialist to make PCP appointment in 3-5 days since Pt is CHF Bundle Pt. CM called Dr. Spaulding Necessary office and made Cardiology follow up appointment. 2:05 PM   CM spoke to Ryland Milligan and discussed [facility] services with her and provided her with choice list.  FOC was given and was placed on bedside chart. [facility]st choice is [facility]. 2nd choice is [facility]. 3rd choice is [facility]. CM will send referral to [facility] to see if they can accept the case via CC. Ryland Milligan indicated that she has a ride coming at 6 PM tonight to transport her home. DTR was going to call her brother to see if they could transport her home earlier than that.     3:06 PM   CM noted that [facility] can accept. Information added to AVS.      CM spoke to Marion Marquez, CHF Bundle Coordinator and reviewed DC plan.      3:27 PM   CM issued [facility] letter. Signed copy is on bedside chart. (Document list would not let me change it to signed.)    No further CM needs. Care Management Interventions  PCP Verified by CM:  Yes  Palliative Care Criteria Met (RRAT>21 & CHF Dx)?: Yes  Palliative Consult Recommended?: No  Reason Palliative Care Not Recommended?: Provider preference to wait  Mode of Transport at Discharge:  Other (see comment)  Transition of Care Consult (CM Consult): [address]: Yes  MyChart Signup: No  Discharge Durable Medical Equipment: No  Health Maintenance Reviewed: Yes  Physical Therapy Consult: Yes  Occupational Therapy Consult: Yes  Speech Therapy Consult: No  Current Support Network: Lives with Caregiver, Family Lives [place], Own Home, Has Personal Caregivers  Confirm Follow Up Transport: Family  Plan discussed with Pt/Family/Caregiver: Yes  Freedom of Choice Offered: Yes  Discharge Location  Discharge Placement: Home with home health    Meli Grover [address]

## 2018-05-31 NOTE — PROGRESS NOTES
Problem: Falls - Risk of  Goal: *Absence of Falls  Document Shane Fall Risk and appropriate interventions in the flowsheet.    Outcome: Progressing Towards Goal  Fall Risk Interventions:  Mobility Interventions: Bed/chair exit alarm, Patient to call before getting OOB         Medication Interventions: Bed/chair exit alarm, Patient to call before getting OOB, Teach patient to arise slowly    Elimination Interventions: Call light in reach, Patient to call for help with toileting needs, Toileting schedule/hourly rounds

## 2018-06-01 ENCOUNTER — HOME CARE VISIT (OUTPATIENT)
Dept: SCHEDULING | Facility: HOME HEALTH | Age: 83
End: 2018-06-01
Payer: MEDICARE

## 2018-06-01 ENCOUNTER — PATIENT OUTREACH (OUTPATIENT)
Dept: CARDIOLOGY CLINIC | Age: 83
End: 2018-06-01

## 2018-06-01 PROCEDURE — 3331090001 HH PPS REVENUE CREDIT

## 2018-06-01 PROCEDURE — 3331090002 HH PPS REVENUE DEBIT

## 2018-06-01 PROCEDURE — G0299 HHS/HOSPICE OF RN EA 15 MIN: HCPCS

## 2018-06-01 PROCEDURE — 400013 HH SOC

## 2018-06-01 NOTE — Clinical Note
Pt has been wearing o2 more frequently. May have increased swelling. She has a virtual visit with you tomorrow. No wt gain.

## 2018-06-01 NOTE — PROGRESS NOTES
Hospital Discharge Follow-Up      Date/Time:  2018 4:21 PM    Patient was admitted to [facility] on 18 and discharged on 18 forAcute on chronic Diastolic CHF POA  Due to Chronic AFib POA  Causing L pleural effusion with Pulm edema POA  CKD stage 3 POA  COPD . The physician discharge summary was available at the time of outreach. Patient was contacted within 1 business day of discharge. Patient was seen today by [facility] and the PCP office. The son states he has yet to get the mother Eliquis. Advised the son to please go get this medication an the importance of starting it right away. Patient has a PCA daily from 9-2pm       Top Challenges reviewed with the provider   Distance to patients home           Method of communication with provider :chart routing    Inpatient RRAT score: 25  Was this a readmission? no   Patient stated reason for the readmission: shortness of breath    Nurse Navigator (NN) contacted the son Monica Toro on [facility] by telephone to perform post hospital discharge assessment. Verified name and  with son as identifiers. Provided introduction to self, and explanation of the Nurse Navigator role. Reviewed discharge instructions and red flags with son who verbalized understanding. son given an opportunity to ask questions and does not have any further questions or concerns at this time. The son agrees to contact the PCP office for questions related to their healthcare. NN provided contact information for future reference. Summary of patient's top problems:  1. Patient follows low sodium diet   2. Patient not interested in losing weight d/t her age states \"im 89\"      [address] orders at discharge: Physical Therapy and [address] [place] [address]: [facility]  Date of initial visit: 18    Durable Medical Equipment ordered/company: N/A  Durable Medical Equipment received: N/A    Barriers to care?      Advance Care Planning:   Does patient have an Advance Directive:  not on file     Medication(s):     New Medications at Discharge: Eliquis  Changed Medications at Discharge: lasix increased  Discontinued Medications at Discharge: coumadin    Medication reconciliation was performed with family, who verbalizes understanding of administration of home medications. There were no barriers to obtaining medications identified at this time. Do you have a Scale:    yes   How often do you weigh:  daily    Daily Weight (document daily weights in flowsheets): Increase   Amount:  191.0lb       Provider Notified:   yes     Zone:(Pt Reported)  green     EF: 5/31/18 on 55%    Type of HF:   HFpEF     Cardiac Device present: n/a      Heart Failure Medications: Diuretic, Potassium, Anticoagulat       Referral to Pharm D needed: no     Current Outpatient Prescriptions   Medication Sig    OXYGEN-AIR DELIVERY SYSTEMS Take 2 L by inhalation as needed (shortness of breath).  apixaban (ELIQUIS) 2.5 mg tablet Take 1 Tab by mouth two (2) times a day.  furosemide (LASIX) 40 mg tablet Patient takes 80 mg every morning and 40 mg every night    budesonide-formoterol (SYMBICORT) 160-4.5 mcg/actuation HFAA Take 2 Puffs by inhalation two (2) times a day.  mv-mn/folic acid/calcium/vit K (ONE-A-DAY WOMEN'S 50 PLUS PO) Take 1 Tab by mouth daily.  dextran 70/hypromellose/PF (NATURAL TEARS, PF, OP) Administer 1 Drop to both eyes daily as needed (dry eyes).  mometasone (NASONEX) 50 mcg/actuation nasal spray 2 Sprays by Both Nostrils route daily as needed (congestion).  allopurinol (ZYLOPRIM) 100 mg tablet Patient takes 2 tablets in the morning and 1 tablet at night.  albuterol (PROVENTIL HFA, VENTOLIN HFA, PROAIR HFA) 90 mcg/actuation inhaler Take 2 Puffs by inhalation every four (4) hours as needed for Wheezing.  albuterol-ipratropium (DUO-NEB) 2.5 mg-0.5 mg/3 ml nebu 3 mL by Nebulization route every four (4) hours as needed.     potassium chloride (KLOR-CON) 20 mEq packet Take 20 mEq by mouth daily. Indications: pill    colchicine (COLCRYS) 0.6 mg tablet Take 0.6 mg by mouth daily.  acetaminophen (TYLENOL EXTRA STRENGTH) 500 mg tablet Take 1,000 mg by mouth every six (6) hours as needed for Pain.  amLODIPine (NORVASC) 2.5 mg tablet Take 2.5 mg by mouth daily. No current facility-administered medications for this visit. There are no discontinued medications.     PCP/Specialist follow up: Future Appointments  Date Time Provider Department Center   6/2/2018 To Be Determined Kaye Vicente RN [address] [facility]   6/4/2018 To Be Determined Jabier Huggins RN [facility]   6/5/2018 To Be Determined Driss Mendoza,  [facility]   6/5/2018 11:00 AM Marlena Melendez MD [facility]   6/6/2018 To Be Determined Tehsa Doan, PT [address]   6/6/2018 To Be Determined Jabier Huggins RN [facility]   6/8/2018 To Be Determined Jabier Huggins RN [facility]   6/11/2018 To Be Determined Jabier Huggins RN [facility]   6/13/2018 To Be Determined Jabier Huggins RN [facility]   6/15/2018 To Be Determined Jabier Huggins RN [facility]          Goals     None

## 2018-06-02 ENCOUNTER — HOME CARE VISIT (OUTPATIENT)
Dept: SCHEDULING | Facility: HOME HEALTH | Age: 83
End: 2018-06-02
Payer: MEDICARE

## 2018-06-02 PROCEDURE — 3331090001 HH PPS REVENUE CREDIT

## 2018-06-02 PROCEDURE — 3331090002 HH PPS REVENUE DEBIT

## 2018-06-02 PROCEDURE — G0299 HHS/HOSPICE OF RN EA 15 MIN: HCPCS

## 2018-06-03 PROCEDURE — 3331090002 HH PPS REVENUE DEBIT

## 2018-06-03 PROCEDURE — 3331090001 HH PPS REVENUE CREDIT

## 2018-06-04 ENCOUNTER — HOME CARE VISIT (OUTPATIENT)
Dept: SCHEDULING | Facility: HOME HEALTH | Age: 83
End: 2018-06-04
Payer: MEDICARE

## 2018-06-04 ENCOUNTER — HOME CARE VISIT (OUTPATIENT)
Dept: HOME HEALTH SERVICES | Facility: HOME HEALTH | Age: 83
End: 2018-06-04
Payer: MEDICARE

## 2018-06-04 VITALS
RESPIRATION RATE: 20 BRPM | SYSTOLIC BLOOD PRESSURE: 118 MMHG | OXYGEN SATURATION: 97 % | DIASTOLIC BLOOD PRESSURE: 70 MMHG | TEMPERATURE: 97.2 F | HEART RATE: 72 BPM

## 2018-06-04 VITALS
DIASTOLIC BLOOD PRESSURE: 90 MMHG | HEART RATE: 79 BPM | OXYGEN SATURATION: 92 % | RESPIRATION RATE: 30 BRPM | TEMPERATURE: 98.1 F | SYSTOLIC BLOOD PRESSURE: 132 MMHG

## 2018-06-04 PROCEDURE — A9270 NON-COVERED ITEM OR SERVICE: HCPCS

## 2018-06-04 PROCEDURE — A6449 LT COMPRES BAND >=3" <5"/YD: HCPCS

## 2018-06-04 PROCEDURE — A6212 FOAM DRG <=16 SQ IN W/BORDER: HCPCS

## 2018-06-04 PROCEDURE — 3331090002 HH PPS REVENUE DEBIT

## 2018-06-04 PROCEDURE — A4216 STERILE WATER/SALINE, 10 ML: HCPCS

## 2018-06-04 PROCEDURE — A6446 CONFORM BAND S W>=3" <5"/YD: HCPCS

## 2018-06-04 PROCEDURE — 3331090001 HH PPS REVENUE CREDIT

## 2018-06-04 PROCEDURE — G0299 HHS/HOSPICE OF RN EA 15 MIN: HCPCS

## 2018-06-05 ENCOUNTER — OFFICE VISIT (OUTPATIENT)
Dept: CARDIOLOGY CLINIC | Age: 83
End: 2018-06-05

## 2018-06-05 VITALS
OXYGEN SATURATION: 98 % | DIASTOLIC BLOOD PRESSURE: 70 MMHG | HEART RATE: 90 BPM | RESPIRATION RATE: 24 BRPM | HEIGHT: 62 IN | SYSTOLIC BLOOD PRESSURE: 140 MMHG | WEIGHT: 188.2 LBS | BODY MASS INDEX: 34.63 KG/M2

## 2018-06-05 VITALS
TEMPERATURE: 97.2 F | OXYGEN SATURATION: 96 % | DIASTOLIC BLOOD PRESSURE: 72 MMHG | WEIGHT: 186 LBS | RESPIRATION RATE: 20 BRPM | BODY MASS INDEX: 34.02 KG/M2 | SYSTOLIC BLOOD PRESSURE: 118 MMHG | HEART RATE: 82 BPM

## 2018-06-05 DIAGNOSIS — I10 ESSENTIAL HYPERTENSION: ICD-10-CM

## 2018-06-05 DIAGNOSIS — I50.32 DIASTOLIC CHF, CHRONIC (HCC): Primary | ICD-10-CM

## 2018-06-05 DIAGNOSIS — I48.20 CHRONIC A-FIB (HCC): ICD-10-CM

## 2018-06-05 DIAGNOSIS — N18.30 CKD (CHRONIC KIDNEY DISEASE) STAGE 3, GFR 30-59 ML/MIN (HCC): ICD-10-CM

## 2018-06-05 DIAGNOSIS — R60.0 PEDAL EDEMA: ICD-10-CM

## 2018-06-05 PROCEDURE — 3331090001 HH PPS REVENUE CREDIT

## 2018-06-05 PROCEDURE — 3331090002 HH PPS REVENUE DEBIT

## 2018-06-05 NOTE — PROGRESS NOTES
1. Have you been to the ER, urgent care clinic since your last visit? Hospitalized since your last visit? Yes When: 5/2018 [facility]    2. Have you seen or consulted any other health care providers outside of the [address] [place] [address] since your last visit? Include any pap smears or colon screening. Yes PCP 6/1/18    Patient C/O lower extremities swelling and SOB.  Patient weight 191-186.8 since 6-1-18

## 2018-06-05 NOTE — MR AVS SNAPSHOT
[facility] [address] [facility]. 
[phone] Patient: Evita Woods MRN: [id number] :1918 Visit Information Date & Time Provider Department Dept. Phone Encounter #  
 2018 11:00 AM Jerson Trujillo MD [facility] [phone] [id number] Upcoming Health Maintenance Date Due DTaP/Tdap/Td series (1 - Tdap) 1939 ZOSTER VACCINE AGE 60> 1978 GLAUCOMA SCREENING Q2Y 1983 Bone Densitometry (Dexa) Screening 1983 Pneumococcal 65+ High/Highest Risk (2 of 2 - PCV13) 3/28/2014 MEDICARE YEARLY EXAM 3/20/2018 Influenza Age 5 to Adult 2018 Allergies as of 2018  Review Complete On: 2018 By: Joselin Pacheco NP Severity Noted Reaction Type Reactions Penicillins  2013    Hives Current Immunizations  Never Reviewed Name Date Influenza Vaccine 10/1/2014 Influenza Vaccine PF 3/28/2013  3:10 PM  
 Pneumococcal Polysaccharide (PPSV-23) 3/28/2013  3:09 PM  
  
 Not reviewed this visit You Were Diagnosed With   
  
 Codes Comments Diastolic CHF, chronic (HCC)    -  Primary ICD-10-CM: I50.32 
ICD-9-CM: 428.32, 428.0 Chronic a-fib (HCC)     ICD-10-CM: K58.3 ICD-9-CM: 427.31 Essential hypertension     ICD-10-CM: I10 
ICD-9-CM: 401.9 Pedal edema     ICD-10-CM: R60.0 ICD-9-CM: 782.3 CKD (chronic kidney disease) stage 3, GFR 30-59 ml/min     ICD-10-CM: N18.3 ICD-9-CM: 269. 3 Vitals BP Pulse Resp Height(growth percentile) Weight(growth percentile) SpO2  
 140/70 (BP 1 Location: Left arm, BP Patient Position: Sitting) 90 24 5' 2\" (1.575 m) 188 lb 3.2 oz (85.4 kg) 98% BMI OB Status Smoking Status 34.42 kg/m2 Postmenopausal Former Smoker BMI and BSA Data Body Mass Index Body Surface Area 34.42 kg/m 2 1.93 m 2 Preferred Pharmacy Pharmacy Name Phone [phone] [phone] Your Updated Medication List  
  
   
This list is accurate as of 6/5/18 11:31 AM.  Always use your most recent med list.  
  
  
  
  
 albuterol 90 mcg/actuation inhaler Commonly known as:  PROVENTIL HFA, VENTOLIN HFA, PROAIR HFA Take 2 Puffs by inhalation every four (4) hours as needed for Wheezing. albuterol-ipratropium 2.5 mg-0.5 mg/3 ml Nebu Commonly known as:  DUO-NEB  
3 mL by Nebulization route every four (4) hours as needed. allopurinol 100 mg tablet Commonly known as:  Leslee Irlanda Patient takes 2 tablets in the morning and 1 tablet at night. apixaban 2.5 mg tablet Commonly known as:  Alex Royals Take 1 Tab by mouth two (2) times a day. COLCRYS 0.6 mg tablet Generic drug:  colchicine Take 0.6 mg by mouth daily. furosemide 40 mg tablet Commonly known as:  LASIX Patient takes 80 mg every morning and 40 mg every night KLOR-CON 20 mEq packet Generic drug:  potassium chloride Take 20 mEq by mouth daily. Indications: pill NASONEX 50 mcg/actuation nasal spray Generic drug:  mometasone 2 Sprays by Both Nostrils route daily as needed (congestion). NATURAL TEARS (PF) OP Administer 1 Drop to both eyes daily as needed (dry eyes). NORVASC 2.5 mg tablet Generic drug:  amLODIPine Take 2.5 mg by mouth daily. ONE-A-DAY WOMEN'S 50 PLUS PO Take 1 Tab by mouth daily. OXYGEN-AIR DELIVERY SYSTEMS Take 2 L by inhalation as needed (shortness of breath). SYMBICORT 160-4.5 mcg/actuation Hfaa Generic drug:  budesonide-formoterol Take 2 Puffs by inhalation two (2) times a day. TYLENOL EXTRA STRENGTH 500 mg tablet Generic drug:  acetaminophen Take 1,000 mg by mouth every six (6) hours as needed for Pain. We Performed the Following AMB POC EKG ROUTINE W/ 12 LEADS, INTER & REP [20857 CPT(R)] To-Do List   
 06/06/2018 To Be Determined Appointment with Misa Alford PT at [place] [address]  
  
 06/06/2018 To Be Determined Appointment with Safia Corley RN at [place] [address]  
  
 06/08/2018 To Be Determined Appointment with Safia Corley RN at [place] [address]  
  
 06/08/2018 11:00 AM  
  Appointment with Juwan Tucker OT at [place] [address]  
  
 06/11/2018 To Be Determined Appointment with Safia Corley RN at [place] [address]  
  
 06/13/2018 To Be Determined Appointment with Safia Corley RN at [place] [address]  
  
 06/15/2018 To Be Determined Appointment with Safia Corley RN at [address] SERVICES! Dear Ortiz Urrutia: Thank you for requesting a [employer] account. Our records indicate that you already have an active [employer] account. You can access your account anytime at [email or web address] Did you know that you can access your hospital and ER discharge instructions at any time in [employer]? You can also review all of your test results from your hospital stay or ER visit. Additional Information If you have questions, please visit the Frequently Asked Questions section of the [employer] website at [email or web address] Remember, [employer] is NOT to be used for urgent needs. For medical emergencies, dial 911. Now available from your iPhone and Android! Please provide this summary of care documentation to your next provider. Your primary care clinician is listed as [facility]. If you have any questions after today's visit, please call [phone].

## 2018-06-05 NOTE — PROGRESS NOTES
Jayden Horn DNP, ANP-BC  Subjective/HPI: Heart failure bundle patient. Neli Amin is a 80 y.o. female is here for transitional care visit status post admission for acute on chronic diastolic heart failure, fluid overload, chronic atrial fibrillation. She responded well to diuresis weight is trending down she no longer is experiencing orthopnea or paroxysmal nocturnal dyspnea. Degree of dependent edema is also lessening. Tolerating current dosing of furosemide 80 mg in the morning 40 mg in the afternoon without leg cramping. Denies chest pain or dyspnea on exertion. PCP Provider  Kendra Encinas MD  Past Medical History:   Diagnosis Date    A-fib [facility])     Cancer [facility])     right breast CA, lung, colon    Gastrointestinal disorder     GERD    Hypertension     Other ill-defined conditions(799.89)     gout      Past Surgical History:   Procedure Laterality Date    BREAST SURGERY PROCEDURE UNLISTED  1995    Right mastectomy    HX APPENDECTOMY      HX HEENT      right glass eye    NH COLONOSCOPY W/BIOPSY SINGLE/MULTIPLE  3/29/2013         NH COLSC FLX W/RMVL OF TUMOR POLYP LESION SNARE TQ  3/29/2013          Allergies   Allergen Reactions    Penicillins Hives      Family History   Problem Relation Age of Onset    Colon Cancer Other     Hypertension Other       Current Outpatient Prescriptions   Medication Sig    OXYGEN-AIR DELIVERY SYSTEMS Take 2 L by inhalation as needed (shortness of breath).  apixaban (ELIQUIS) 2.5 mg tablet Take 1 Tab by mouth two (2) times a day.  furosemide (LASIX) 40 mg tablet Patient takes 80 mg every morning and 40 mg every night    budesonide-formoterol (SYMBICORT) 160-4.5 mcg/actuation HFAA Take 2 Puffs by inhalation two (2) times a day.  mv-mn/folic acid/calcium/vit K (ONE-A-DAY WOMEN'S 50 PLUS PO) Take 1 Tab by mouth daily.  dextran 70/hypromellose/PF (NATURAL TEARS, PF, OP) Administer 1 Drop to both eyes daily as needed (dry eyes).     mometasone (NASONEX) 50 mcg/actuation nasal spray 2 Sprays by Both Nostrils route daily as needed (congestion).  allopurinol (ZYLOPRIM) 100 mg tablet Patient takes 2 tablets in the morning and 1 tablet at night.  albuterol (PROVENTIL HFA, VENTOLIN HFA, PROAIR HFA) 90 mcg/actuation inhaler Take 2 Puffs by inhalation every four (4) hours as needed for Wheezing.  albuterol-ipratropium (DUO-NEB) 2.5 mg-0.5 mg/3 ml nebu 3 mL by Nebulization route every four (4) hours as needed.  potassium chloride (KLOR-CON) 20 mEq packet Take 20 mEq by mouth daily. Indications: pill    colchicine (COLCRYS) 0.6 mg tablet Take 0.6 mg by mouth daily.  acetaminophen (TYLENOL EXTRA STRENGTH) 500 mg tablet Take 1,000 mg by mouth every six (6) hours as needed for Pain.  amLODIPine (NORVASC) 2.5 mg tablet Take 2.5 mg by mouth daily. No current facility-administered medications for this visit. Vitals:    06/05/18 1054   BP: 140/70   Pulse: 90   Resp: 24   SpO2: 98%   Weight: 188 lb 3.2 oz (85.4 kg)   Height: 5' 2\" (1.575 m)     Social History     Social History    Marital status:      Spouse name: N/A    Number of children: N/A    Years of education: N/A     Occupational History    Not on file. Social History Main Topics    Smoking status: Former Smoker     Quit date: 1967    Smokeless tobacco: Never Used    Alcohol use No    Drug use: No    Sexual activity: Not Currently     Other Topics Concern    Not on file     Social History Narrative       I have reviewed the nurses notes, vitals, problem list, allergy list, medical history, family, social history and medications. Review of Symptoms:    General: Pt denies excessive weight gain or loss. Limitations in activities of daily life secondary to age and physical abilities. HEENT: Denies blurred vision, headaches, epistaxis and difficulty swallowing. Respiratory: Denies shortness of breath, WOOTEN, wheezing or stridor.   Cardiovascular: Denies precordial pain, palpitations, + edema or PND  Gastrointestinal: Denies poor appetite, indigestion, abdominal pain or blood in stool  Musculoskeletal: Denies pain or swelling from muscles or joints  Neurologic: Denies tremor, paresthesias, or sensory motor disturbance  Skin: Denies rash, itching or texture change. Physical Exam:      General: Well developed, in no acute distress, cooperative and alert  HEENT: No carotid bruits, no JVD, trach is midline. Neck Supple  Heart:  Irregularly irregular, no murmur.   Respiratory: Clear bilaterally x 4, no wheezing or rales  Abdomen:   Soft, non-tender, no masses, bowel sounds are active.   Extremities: +1 bilateral pitting ankle edema, +2 bilateral pedal edema normal cap refill, no cyanosis, atraumatic. Neuro: A&Ox3, speech clear, gait not assessed that she is in wheelchair. Skin: Skin color is normal. No rashes or lesions. Non diaphoretic there is no signs of skin breakdown on the bilateral lower extremities from the edema.   Vascular: 2+ pulses symmetric bilateral radial.    Cardiographics    ECG: Atrial fibrillation  Results for orders placed or performed during the hospital encounter of 05/29/18   EKG, 12 LEAD, INITIAL   Result Value Ref Range    Ventricular Rate 85 BPM    Atrial Rate 264 BPM    QRS Duration 76 ms    Q-T Interval 398 ms    QTC Calculation (Bezet) 473 ms    Calculated R Axis -4 degrees    Calculated T Axis 25 degrees    Diagnosis       Atrial flutter with variable AV block  Low voltage QRS  Inferior infarct , age undetermined  When compared with ECG of 13-APR-2018 19:59,  Atrial flutter has replaced Atrial fibrillation  Confirmed by Nabeel Feliz ([id number]) on 5/29/2018 6:01:44 PM           Cardiology Labs:  No results found for: CHOL, CHOLX, CHLST, CHOLV, 696546, HDL, LDL, LDLC, DLDLP, TGLX, TRIGL, TRIGP, CHHD, CHHDX    Lab Results   Component Value Date/Time    Sodium 141 05/31/2018 04:57 AM    Potassium 4.0 05/31/2018 04:57 AM    Chloride 104 05/31/2018 04:57 AM    CO2 30 05/31/2018 04:57 AM    Anion gap 7 05/31/2018 04:57 AM    Glucose 96 05/31/2018 04:57 AM    BUN 21 (H) 05/31/2018 04:57 AM    Creatinine 1.30 (H) 05/31/2018 04:57 AM    BUN/Creatinine ratio 16 05/31/2018 04:57 AM    GFR est AA 46 (L) 05/31/2018 04:57 AM    GFR est non-AA 38 (L) 05/31/2018 04:57 AM    Calcium 10.0 05/31/2018 04:57 AM    Bilirubin, total 0.6 05/29/2018 11:04 AM    AST (SGOT) 23 05/29/2018 11:04 AM    Alk. phosphatase 120 (H) 05/29/2018 11:04 AM    Protein, total 8.1 05/29/2018 11:04 AM    Albumin 3.0 (L) 05/29/2018 11:04 AM    Globulin 5.1 (H) 05/29/2018 11:04 AM    A-G Ratio 0.6 (L) 05/29/2018 11:04 AM    ALT (SGPT) 18 05/29/2018 11:04 AM           Assessment:     Assessment:     Diagnoses and all orders for this visit:    1. Diastolic CHF, chronic ([facility] [address]    2. Chronic a-fib (HCC)  -     AMB POC EKG ROUTINE W/ 12 LEADS, INTER & REP    3. Essential hypertension    4. Pedal edema    5. CKD (chronic kidney disease) stage 3, GFR 30-59 ml/min        ICD-10-CM PAQ-4-TV    1. Diastolic CHF, chronic (HCC) I50.32 428.32      428.0    2. Chronic a-fib (HCC) I48.2 427.31 AMB POC EKG ROUTINE W/ 12 LEADS, INTER & REP   3. Essential hypertension I10 401.9    4. Pedal edema R60.0 782.3    5. CKD (chronic kidney disease) stage 3, GFR 30-59 ml/min N18.3 585.3      Orders Placed This Encounter    AMB POC EKG ROUTINE W/ 12 LEADS, INTER & REP     Order Specific Question:   Reason for Exam:     Answer:   routine        Plan:     Patient is a 51-year-old female status post admission for acute on chronic diastolic heart failure presenting euvolemic and stable from cardiac perspective. Continue furosemide 80 mg in the morning, 40 mg in the afternoon. Will enroll patient in CHF telemedicine clinic for surveillance over the next 90 days while on the bundle.   Patient's friend Latasha Harmon who accompanied patient to the visit today will be the source of contact for virtual visit, patient verbally consented for her friend to be part of these visits. ([email or web address]). Will follow up with patient 9 AM Friday, Michelle 15 virtually. Julee Peabody, NP    This note was created using voice recognition software. Despite editing, there may be syntax errors. [facility]    6/5/2018         Patient seen, examined by me personally. Plan discussed as detailed. Agree with note as outlined by  NP. I confirm findings in history and physical exam. No additional findings noted. Agree with plan as outlined above.  Clinically mld CHF, [facility]/w [facility].    Ivana Santos MD

## 2018-06-06 ENCOUNTER — HOME CARE VISIT (OUTPATIENT)
Dept: SCHEDULING | Facility: HOME HEALTH | Age: 83
End: 2018-06-06
Payer: MEDICARE

## 2018-06-06 VITALS
DIASTOLIC BLOOD PRESSURE: 82 MMHG | RESPIRATION RATE: 18 BRPM | TEMPERATURE: 98.2 F | OXYGEN SATURATION: 93 % | SYSTOLIC BLOOD PRESSURE: 133 MMHG | HEART RATE: 89 BPM

## 2018-06-06 PROCEDURE — 3331090001 HH PPS REVENUE CREDIT

## 2018-06-06 PROCEDURE — 3331090002 HH PPS REVENUE DEBIT

## 2018-06-06 PROCEDURE — G0299 HHS/HOSPICE OF RN EA 15 MIN: HCPCS

## 2018-06-06 PROCEDURE — G0151 HHCP-SERV OF PT,EA 15 MIN: HCPCS

## 2018-06-07 ENCOUNTER — PATIENT OUTREACH (OUTPATIENT)
Dept: CARDIOLOGY CLINIC | Age: 83
End: 2018-06-07

## 2018-06-07 ENCOUNTER — HOME CARE VISIT (OUTPATIENT)
Dept: SCHEDULING | Facility: HOME HEALTH | Age: 83
End: 2018-06-07
Payer: MEDICARE

## 2018-06-07 VITALS
TEMPERATURE: 98.7 F | DIASTOLIC BLOOD PRESSURE: 98 MMHG | HEART RATE: 78 BPM | SYSTOLIC BLOOD PRESSURE: 164 MMHG | OXYGEN SATURATION: 91 % | RESPIRATION RATE: 16 BRPM

## 2018-06-07 VITALS
SYSTOLIC BLOOD PRESSURE: 132 MMHG | HEART RATE: 84 BPM | TEMPERATURE: 97.3 F | RESPIRATION RATE: 20 BRPM | OXYGEN SATURATION: 94 % | DIASTOLIC BLOOD PRESSURE: 74 MMHG

## 2018-06-07 PROCEDURE — G0152 HHCP-SERV OF OT,EA 15 MIN: HCPCS

## 2018-06-07 PROCEDURE — 3331090001 HH PPS REVENUE CREDIT

## 2018-06-07 PROCEDURE — 3331090002 HH PPS REVENUE DEBIT

## 2018-06-08 ENCOUNTER — HOME CARE VISIT (OUTPATIENT)
Dept: SCHEDULING | Facility: HOME HEALTH | Age: 83
End: 2018-06-08
Payer: MEDICARE

## 2018-06-08 PROCEDURE — 3331090001 HH PPS REVENUE CREDIT

## 2018-06-08 PROCEDURE — G0299 HHS/HOSPICE OF RN EA 15 MIN: HCPCS

## 2018-06-08 PROCEDURE — 3331090002 HH PPS REVENUE DEBIT

## 2018-06-09 PROCEDURE — 3331090002 HH PPS REVENUE DEBIT

## 2018-06-09 PROCEDURE — 3331090001 HH PPS REVENUE CREDIT

## 2018-06-10 PROCEDURE — 3331090001 HH PPS REVENUE CREDIT

## 2018-06-10 PROCEDURE — 3331090002 HH PPS REVENUE DEBIT

## 2018-06-11 ENCOUNTER — HOME CARE VISIT (OUTPATIENT)
Dept: SCHEDULING | Facility: HOME HEALTH | Age: 83
End: 2018-06-11
Payer: MEDICARE

## 2018-06-11 VITALS
DIASTOLIC BLOOD PRESSURE: 82 MMHG | OXYGEN SATURATION: 99 % | RESPIRATION RATE: 20 BRPM | HEART RATE: 88 BPM | SYSTOLIC BLOOD PRESSURE: 124 MMHG | TEMPERATURE: 97.7 F

## 2018-06-11 PROCEDURE — 3331090002 HH PPS REVENUE DEBIT

## 2018-06-11 PROCEDURE — G0299 HHS/HOSPICE OF RN EA 15 MIN: HCPCS

## 2018-06-11 PROCEDURE — 3331090001 HH PPS REVENUE CREDIT

## 2018-06-12 VITALS
WEIGHT: 187 LBS | RESPIRATION RATE: 20 BRPM | OXYGEN SATURATION: 97 % | SYSTOLIC BLOOD PRESSURE: 122 MMHG | BODY MASS INDEX: 34.2 KG/M2 | HEART RATE: 82 BPM | DIASTOLIC BLOOD PRESSURE: 74 MMHG | TEMPERATURE: 98.1 F

## 2018-06-12 PROCEDURE — 3331090002 HH PPS REVENUE DEBIT

## 2018-06-12 PROCEDURE — 3331090001 HH PPS REVENUE CREDIT

## 2018-06-13 ENCOUNTER — HOME CARE VISIT (OUTPATIENT)
Dept: SCHEDULING | Facility: HOME HEALTH | Age: 83
End: 2018-06-13
Payer: MEDICARE

## 2018-06-13 PROCEDURE — 3331090001 HH PPS REVENUE CREDIT

## 2018-06-13 PROCEDURE — G0299 HHS/HOSPICE OF RN EA 15 MIN: HCPCS

## 2018-06-13 PROCEDURE — G0157 HHC PT ASSISTANT EA 15: HCPCS

## 2018-06-13 PROCEDURE — 3331090002 HH PPS REVENUE DEBIT

## 2018-06-14 ENCOUNTER — HOME CARE VISIT (OUTPATIENT)
Dept: SCHEDULING | Facility: HOME HEALTH | Age: 83
End: 2018-06-14
Payer: MEDICARE

## 2018-06-14 ENCOUNTER — HOME CARE VISIT (OUTPATIENT)
Dept: HOME HEALTH SERVICES | Facility: HOME HEALTH | Age: 83
End: 2018-06-14
Payer: MEDICARE

## 2018-06-14 VITALS
OXYGEN SATURATION: 97 % | RESPIRATION RATE: 20 BRPM | WEIGHT: 188 LBS | DIASTOLIC BLOOD PRESSURE: 70 MMHG | SYSTOLIC BLOOD PRESSURE: 124 MMHG | HEART RATE: 88 BPM | TEMPERATURE: 98.2 F | BODY MASS INDEX: 34.39 KG/M2

## 2018-06-14 VITALS
RESPIRATION RATE: 16 BRPM | SYSTOLIC BLOOD PRESSURE: 119 MMHG | TEMPERATURE: 98.8 F | HEART RATE: 92 BPM | OXYGEN SATURATION: 96 % | DIASTOLIC BLOOD PRESSURE: 89 MMHG

## 2018-06-14 VITALS — BODY MASS INDEX: 34.39 KG/M2 | WEIGHT: 188 LBS

## 2018-06-14 VITALS
RESPIRATION RATE: 18 BRPM | SYSTOLIC BLOOD PRESSURE: 145 MMHG | OXYGEN SATURATION: 93 % | BODY MASS INDEX: 34.46 KG/M2 | DIASTOLIC BLOOD PRESSURE: 86 MMHG | WEIGHT: 188.4 LBS | HEART RATE: 56 BPM

## 2018-06-14 PROCEDURE — 3331090001 HH PPS REVENUE CREDIT

## 2018-06-14 PROCEDURE — G0152 HHCP-SERV OF OT,EA 15 MIN: HCPCS

## 2018-06-14 PROCEDURE — 3331090002 HH PPS REVENUE DEBIT

## 2018-06-14 NOTE — PROGRESS NOTES
Goals        Patient Stated     caregiver goals (pt-stated)            6/1/18 spoke to patients son in regards to importance of follow up appointments. LW    6/7/18 Spoke with pt's caregiver who states that pt weighs daily and reports that pt is following a low Na diet and has lost a lb. Caregiver reports pt's wt at 187 lbs today and 188 lbs yesterday. Pt's caregiver reports that pt has oxygen and wears it as needed and caregiver states that pt is able to ambulate around the house. Pt's caregiver reports that [facility]) is seeing the pt twice a week and pt has attended follow up appts at 6/1/18 and cardiologist on 6/5/18. Caregiver reports that pt has PCP appt on 7/2/18. We reviewed when to call for help - 2 to 3 lb wt gain overnight or if caregiver feels situation is emergent to call 911. We reviewed medications and caregiver states that pt uses nebulizer daily. Pt's caregiver states that pt has no needs and has all medications. Pt's caregiver states I may continue to call. We will discuss 3 signs and symptoms of HF in 7 days. AFP    6/14/18  Pt's caregiver Marc Lopez states that pt is wearing oxygen more often (Gracie states pt is wearing O2 almost continuously). Gracie reports wt at 188 lbs (no change) and caregiver reports more swelling and that pt is more winded. Pt's caregiver states that pt continues to follow low Na diet. Pt has a virtual visit with BRITTANY Spaulding tomorrow (6/15/18) and will see PCP Vernon Flores on 7/2/18. Pt has attended all physician follow ups as well as having [facility] visit. BRITTANY Urbina notified of subtle changes. Caregiver encouraged to call [facility] when pt has sublte changes as this may be a sign that pt is beginning to have a CHF exacerbation. We discussed that caregiver can check sock rings and report if rings appear deeper since swelling is difficutly to assess sometimes. Will call pt in 7 days to follow up on CHF symptoms.   AFP       describes resources and support systems (pt-stated)            6/1/18 patients son states patient sister will be with patient daily and patient will have a PCA daily form 9-2PM  LW

## 2018-06-15 ENCOUNTER — HOME CARE VISIT (OUTPATIENT)
Dept: SCHEDULING | Facility: HOME HEALTH | Age: 83
End: 2018-06-15
Payer: MEDICARE

## 2018-06-15 VITALS
OXYGEN SATURATION: 97 % | WEIGHT: 190 LBS | SYSTOLIC BLOOD PRESSURE: 120 MMHG | DIASTOLIC BLOOD PRESSURE: 78 MMHG | BODY MASS INDEX: 34.75 KG/M2 | RESPIRATION RATE: 17 BRPM | HEART RATE: 98 BPM

## 2018-06-15 PROCEDURE — 3331090002 HH PPS REVENUE DEBIT

## 2018-06-15 PROCEDURE — G0299 HHS/HOSPICE OF RN EA 15 MIN: HCPCS

## 2018-06-15 PROCEDURE — G0157 HHC PT ASSISTANT EA 15: HCPCS

## 2018-06-15 PROCEDURE — 3331090001 HH PPS REVENUE CREDIT

## 2018-06-16 PROCEDURE — 3331090001 HH PPS REVENUE CREDIT

## 2018-06-16 PROCEDURE — 3331090002 HH PPS REVENUE DEBIT

## 2018-06-17 VITALS
DIASTOLIC BLOOD PRESSURE: 70 MMHG | HEART RATE: 84 BPM | WEIGHT: 189 LBS | SYSTOLIC BLOOD PRESSURE: 124 MMHG | BODY MASS INDEX: 34.57 KG/M2 | OXYGEN SATURATION: 97 % | RESPIRATION RATE: 20 BRPM | TEMPERATURE: 98.4 F

## 2018-06-17 PROCEDURE — 3331090002 HH PPS REVENUE DEBIT

## 2018-06-17 PROCEDURE — 3331090001 HH PPS REVENUE CREDIT

## 2018-06-18 PROCEDURE — 3331090001 HH PPS REVENUE CREDIT

## 2018-06-18 PROCEDURE — 3331090002 HH PPS REVENUE DEBIT

## 2018-06-19 ENCOUNTER — HOME CARE VISIT (OUTPATIENT)
Dept: SCHEDULING | Facility: HOME HEALTH | Age: 83
End: 2018-06-19
Payer: MEDICARE

## 2018-06-19 ENCOUNTER — HOME CARE VISIT (OUTPATIENT)
Dept: HOME HEALTH SERVICES | Facility: HOME HEALTH | Age: 83
End: 2018-06-19
Payer: MEDICARE

## 2018-06-19 VITALS
OXYGEN SATURATION: 98 % | BODY MASS INDEX: 34.5 KG/M2 | DIASTOLIC BLOOD PRESSURE: 78 MMHG | TEMPERATURE: 98.4 F | SYSTOLIC BLOOD PRESSURE: 110 MMHG | WEIGHT: 188.6 LBS | HEART RATE: 81 BPM | RESPIRATION RATE: 18 BRPM

## 2018-06-19 PROCEDURE — G0152 HHCP-SERV OF OT,EA 15 MIN: HCPCS

## 2018-06-19 PROCEDURE — 3331090001 HH PPS REVENUE CREDIT

## 2018-06-19 PROCEDURE — G0299 HHS/HOSPICE OF RN EA 15 MIN: HCPCS

## 2018-06-19 PROCEDURE — G0157 HHC PT ASSISTANT EA 15: HCPCS

## 2018-06-19 PROCEDURE — 3331090002 HH PPS REVENUE DEBIT

## 2018-06-20 VITALS
BODY MASS INDEX: 34.57 KG/M2 | WEIGHT: 189 LBS | OXYGEN SATURATION: 97 % | SYSTOLIC BLOOD PRESSURE: 130 MMHG | TEMPERATURE: 97.1 F | RESPIRATION RATE: 20 BRPM | HEART RATE: 90 BPM | DIASTOLIC BLOOD PRESSURE: 70 MMHG

## 2018-06-20 VITALS
RESPIRATION RATE: 18 BRPM | HEART RATE: 81 BPM | TEMPERATURE: 98.4 F | SYSTOLIC BLOOD PRESSURE: 110 MMHG | DIASTOLIC BLOOD PRESSURE: 78 MMHG | OXYGEN SATURATION: 98 %

## 2018-06-20 PROCEDURE — 3331090002 HH PPS REVENUE DEBIT

## 2018-06-20 PROCEDURE — 3331090001 HH PPS REVENUE CREDIT

## 2018-06-21 ENCOUNTER — PATIENT OUTREACH (OUTPATIENT)
Dept: CARDIOLOGY CLINIC | Age: 83
End: 2018-06-21

## 2018-06-21 ENCOUNTER — TELEPHONE (OUTPATIENT)
Dept: CARDIOLOGY CLINIC | Age: 83
End: 2018-06-21

## 2018-06-21 ENCOUNTER — HOME CARE VISIT (OUTPATIENT)
Dept: HOME HEALTH SERVICES | Facility: HOME HEALTH | Age: 83
End: 2018-06-21
Payer: MEDICARE

## 2018-06-21 PROCEDURE — 3331090001 HH PPS REVENUE CREDIT

## 2018-06-21 PROCEDURE — 3331090002 HH PPS REVENUE DEBIT

## 2018-06-22 ENCOUNTER — HOME CARE VISIT (OUTPATIENT)
Dept: SCHEDULING | Facility: HOME HEALTH | Age: 83
End: 2018-06-22
Payer: MEDICARE

## 2018-06-22 VITALS
TEMPERATURE: 97.4 F | HEART RATE: 85 BPM | OXYGEN SATURATION: 92 % | SYSTOLIC BLOOD PRESSURE: 118 MMHG | DIASTOLIC BLOOD PRESSURE: 68 MMHG

## 2018-06-22 PROCEDURE — G0299 HHS/HOSPICE OF RN EA 15 MIN: HCPCS

## 2018-06-22 PROCEDURE — G0151 HHCP-SERV OF PT,EA 15 MIN: HCPCS

## 2018-06-22 PROCEDURE — 3331090001 HH PPS REVENUE CREDIT

## 2018-06-22 PROCEDURE — 3331090002 HH PPS REVENUE DEBIT

## 2018-06-23 PROCEDURE — 3331090002 HH PPS REVENUE DEBIT

## 2018-06-23 PROCEDURE — 3331090001 HH PPS REVENUE CREDIT

## 2018-06-24 PROCEDURE — 3331090002 HH PPS REVENUE DEBIT

## 2018-06-24 PROCEDURE — 3331090001 HH PPS REVENUE CREDIT

## 2018-06-25 VITALS
OXYGEN SATURATION: 97 % | WEIGHT: 189 LBS | HEART RATE: 88 BPM | BODY MASS INDEX: 34.57 KG/M2 | SYSTOLIC BLOOD PRESSURE: 124 MMHG | TEMPERATURE: 98.4 F | RESPIRATION RATE: 20 BRPM | DIASTOLIC BLOOD PRESSURE: 70 MMHG

## 2018-06-25 PROCEDURE — 3331090001 HH PPS REVENUE CREDIT

## 2018-06-25 PROCEDURE — 3331090002 HH PPS REVENUE DEBIT

## 2018-06-26 ENCOUNTER — HOME CARE VISIT (OUTPATIENT)
Dept: SCHEDULING | Facility: HOME HEALTH | Age: 83
End: 2018-06-26
Payer: MEDICARE

## 2018-06-26 PROCEDURE — 3331090002 HH PPS REVENUE DEBIT

## 2018-06-26 PROCEDURE — G0299 HHS/HOSPICE OF RN EA 15 MIN: HCPCS

## 2018-06-26 PROCEDURE — 3331090001 HH PPS REVENUE CREDIT

## 2018-06-27 PROCEDURE — 3331090002 HH PPS REVENUE DEBIT

## 2018-06-27 PROCEDURE — 3331090001 HH PPS REVENUE CREDIT

## 2018-06-28 VITALS
TEMPERATURE: 98.4 F | OXYGEN SATURATION: 97 % | DIASTOLIC BLOOD PRESSURE: 72 MMHG | HEART RATE: 88 BPM | RESPIRATION RATE: 20 BRPM | SYSTOLIC BLOOD PRESSURE: 122 MMHG

## 2018-06-28 PROCEDURE — 3331090002 HH PPS REVENUE DEBIT

## 2018-06-28 PROCEDURE — 3331090001 HH PPS REVENUE CREDIT

## 2018-06-29 ENCOUNTER — HOME CARE VISIT (OUTPATIENT)
Dept: SCHEDULING | Facility: HOME HEALTH | Age: 83
End: 2018-06-29
Payer: MEDICARE

## 2018-06-29 ENCOUNTER — OFFICE VISIT (OUTPATIENT)
Dept: CARDIOLOGY CLINIC | Age: 83
End: 2018-06-29

## 2018-06-29 VITALS — BODY MASS INDEX: 34.02 KG/M2 | WEIGHT: 186 LBS

## 2018-06-29 VITALS
SYSTOLIC BLOOD PRESSURE: 122 MMHG | HEART RATE: 85 BPM | RESPIRATION RATE: 22 BRPM | DIASTOLIC BLOOD PRESSURE: 84 MMHG | TEMPERATURE: 98.4 F | OXYGEN SATURATION: 93 %

## 2018-06-29 DIAGNOSIS — I50.32 DIASTOLIC CHF, CHRONIC (HCC): Primary | ICD-10-CM

## 2018-06-29 DIAGNOSIS — R60.0 PEDAL EDEMA: ICD-10-CM

## 2018-06-29 DIAGNOSIS — N18.30 CKD (CHRONIC KIDNEY DISEASE) STAGE 3, GFR 30-59 ML/MIN (HCC): ICD-10-CM

## 2018-06-29 PROCEDURE — 3331090002 HH PPS REVENUE DEBIT

## 2018-06-29 PROCEDURE — G0299 HHS/HOSPICE OF RN EA 15 MIN: HCPCS

## 2018-06-29 PROCEDURE — 3331090001 HH PPS REVENUE CREDIT

## 2018-06-29 RX ORDER — FUROSEMIDE 40 MG/1
60 TABLET ORAL DAILY
Qty: 135 TAB | Refills: 1 | Status: SHIPPED | OUTPATIENT
Start: 2018-06-29 | End: 2018-06-29 | Stop reason: SDUPTHER

## 2018-06-29 RX ORDER — FUROSEMIDE 40 MG/1
TABLET ORAL
Qty: 270 TAB | Refills: 1 | Status: ON HOLD | OUTPATIENT
Start: 2018-06-29 | End: 2020-01-01 | Stop reason: SDUPTHER

## 2018-06-29 NOTE — PROGRESS NOTES
Medication list in CC corrected. Patient has been on 80mg lasix in the AM and 40mg in the PM.  Sent E mail to care taker to increase to 80mg lasix BID for 3 days and will have nurse navigator check on her Monday.

## 2018-06-29 NOTE — PROGRESS NOTES
CARDIOLOGY TELEMEDICINE ENCOUNTER                                                                                      Yoni Angelito DNP, ANP. Subjective/HPI:     Dustin Fabian is a 80 y.o. female has been followed on-site in virtual clinic for diastolic heart failure. Her last visit virtually was 2 weeks ago. At the time she was in a euvolemic status without cough or any dyspnea. She reports today with the accompaniment of her caretaker that in the last 24 hours she has developed a reductive cough afebrile white frothy sputum, increasing dependent edema and in general just not feeling well today. He has been maintained on furosemide 40 mg daily without interruption. There is been no excessive intake and sodium or fluids. Reviewed home health care note from the 26th indicating that she has +2 dependent edema from the knees down bilaterally. Vitals were essentially stable with SPO2 97% on room air at the time.   Patient Active Problem List    Diagnosis Date Noted    CKD (chronic kidney disease) stage 3, GFR 30-59 ml/min 05/29/2018    Chronic a-fib ([address] 52/41/1842    Diastolic CHF, acute on chronic (HCC) 05/29/2018    Pleural effusion 05/29/2018    Pneumonia 04/18/2017    Sepsis(995.91) 04/18/2017    Dehydration 04/18/2017    Acute CHF ([address] 01/23/2015    SOB (shortness of breath) 01/21/2015    Pedal edema 01/21/2015    Squamous cell lung cancer ([address] 01/21/2015    Adenocarcinoma ([address] 04/05/2013    S/P right colectomy 04/05/2013    Malignant essential hypertension 04/01/2013    Colon cancer ([address] 03/30/2013    Ileitis, terminal ([address] 03/27/2013    A-fib ([address] 03/27/2013    HTN (hypertension) 03/27/2013      Liz Alfonso MD  Past Medical History:   Diagnosis Date    A-fib [facility])     Cancer [facility])     right breast CA, lung, colon    Gastrointestinal disorder     GERD    Hypertension     Other ill-defined conditions(799.89)     gout      Past Surgical History:   Procedure Laterality Date    BREAST SURGERY PROCEDURE UNLISTED  1995    Right mastectomy    HX APPENDECTOMY      HX HEENT      right glass eye    GA COLONOSCOPY W/BIOPSY SINGLE/MULTIPLE  3/29/2013         GA COLSC FLX W/RMVL OF TUMOR POLYP LESION SNARE TQ  3/29/2013          Allergies   Allergen Reactions    Penicillins Hives      Family History   Problem Relation Age of Onset    Colon Cancer Other     Hypertension Other       Current Outpatient Prescriptions   Medication Sig    albuterol-ipratropium (DUO-NEB) 2.5 mg-0.5 mg/3 ml nebu Take 3 mL by inhalation every four (4) hours as needed (shortness of breath).  furosemide (LASIX) 40 mg tablet Take 40 mg by mouth daily.  OXYGEN-AIR DELIVERY SYSTEMS Take 2 L by inhalation as needed (shortness of breath).  apixaban (ELIQUIS) 2.5 mg tablet Take 1 Tab by mouth two (2) times a day.  budesonide-formoterol (SYMBICORT) 160-4.5 mcg/actuation HFAA Take 2 Puffs by inhalation two (2) times a day.  mv-mn/folic acid/calcium/vit K (ONE-A-DAY WOMEN'S 50 PLUS PO) Take 1 Tab by mouth daily.  dextran 70/hypromellose/PF (NATURAL TEARS, PF, OP) Administer 1 Drop to both eyes daily as needed (dry eyes).  mometasone (NASONEX) 50 mcg/actuation nasal spray 2 Sprays by Both Nostrils route daily as needed (congestion).  allopurinol (ZYLOPRIM) 100 mg tablet Patient takes 2 tablets in the morning and 1 tablet at night.  albuterol (PROVENTIL HFA, VENTOLIN HFA, PROAIR HFA) 90 mcg/actuation inhaler Take 2 Puffs by inhalation every four (4) hours as needed for Wheezing.  albuterol-ipratropium (DUO-NEB) 2.5 mg-0.5 mg/3 ml nebu 3 mL by Nebulization route every four (4) hours as needed. (Patient not taking: Reported on 6/14/2018)    potassium chloride (KLOR-CON) 20 mEq packet Take 20 mEq by mouth daily. Indications: pill    colchicine (COLCRYS) 0.6 mg tablet Take 0.6 mg by mouth daily.     acetaminophen (TYLENOL EXTRA STRENGTH) 500 mg tablet Take 1,000 mg by mouth every six (6) hours as needed for Pain.  amLODIPine (NORVASC) 2.5 mg tablet Take 2.5 mg by mouth daily. No current facility-administered medications for this visit. Vitals:    06/29/18 0909   Weight: 186 lb (84.4 kg)         Review of Symptoms:    General: Increased fatigue and not feeling well today  HEENT: Denies blurred vision, headaches, epistaxis and difficulty swallowing. Respiratory: Denies shortness of breath, + WOOTEN denies wheezing or stridor. Cardiovascular: Denies precordial pain, palpitations, + edema denies PND  Gastrointestinal: Denies poor appetite, indigestion, abdominal pain or blood in stool  Musculoskeletal: Denies pain or swelling from muscles or joints  Neurologic: Denies tremor, paresthesias, or sensory motor disturbance  Skin: Denies rash, itching or texture change. Psyc: Denies depression or anxiety symptoms       Visual Physical Exam:      General: Well developed, cooperative, alert in no acute distress, appears states age. She does seem more tired today. Neuro: Normal mentation, speaking clearly, A&Ox3  General Cardiac: No visible JVD, on video exam there is evidence of bilateral dependent edema, I do not see any weeping I do not see any open sores on her legs.     Cardiographics    ECG:   Results for orders placed or performed during the hospital encounter of 05/29/18   EKG, 12 LEAD, INITIAL   Result Value Ref Range    Ventricular Rate 85 BPM    Atrial Rate 264 BPM    QRS Duration 76 ms    Q-T Interval 398 ms    QTC Calculation (Bezet) 473 ms    Calculated R Axis -4 degrees    Calculated T Axis 25 degrees    Diagnosis       Atrial flutter with variable AV block  Low voltage QRS  Inferior infarct , age undetermined  When compared with ECG of 13-APR-2018 19:59,  Atrial flutter has replaced Atrial fibrillation  Confirmed by [facility] ([id number]) on 5/29/2018 6:01:44 PM         Cardiology Labs:  No results found for: CHOL, CHOLX, CHLST, CHOLV, [id number], HDL, LDL, LDLC, DLDLP, TGLX, TRIGL, Shemar Bell [facility], [facility]    Lab Results   Component Value Date/Time    Sodium 142 06/26/2018 04:13 PM    Potassium 5.1 06/26/2018 04:13 PM    Chloride 103 06/26/2018 04:13 PM    CO2 36 (H) 06/26/2018 04:13 PM    Anion gap 3 (L) 06/26/2018 04:13 PM    Glucose 92 06/26/2018 04:13 PM    BUN 22 (H) 06/26/2018 04:13 PM    Creatinine 1.37 (H) 06/26/2018 04:13 PM    BUN/Creatinine ratio 16 06/26/2018 04:13 PM    GFR est AA 43 (L) 06/26/2018 04:13 PM    GFR est non-AA 36 (L) 06/26/2018 04:13 PM    Calcium 9.5 06/26/2018 04:13 PM    Bilirubin, total 0.5 06/26/2018 04:13 PM    AST (SGOT) 36 06/26/2018 04:13 PM    Alk. phosphatase 110 06/26/2018 04:13 PM    Protein, total 7.3 06/26/2018 04:13 PM    Albumin 2.6 (L) 06/26/2018 04:13 PM    Globulin 4.7 (H) 06/26/2018 04:13 PM    A-G Ratio 0.6 (L) 06/26/2018 04:13 PM    ALT (SGPT) 28 06/26/2018 04:13 PM           Assessment:     Assessment:     Patient is a 31-year-old female with diastolic heart failure. Recent BNP reading is 786 her renal function has been stable with creatinine at 1.37 BUN 22. Given her symptoms of increased fatigue cough and some dyspnea with notable edema will increase furosemide from 40 mg daily to 80 mg daily ×3 days then reduce to 60 mg as her daily standing dose until next visit. Plan to have home health care in the next 2-3 weeks redraw her labs. Due to the change in her condition will follow up with patient next Monday virtually. Advised patient and caretaker if there is no improvement here to call my office and can log on virtually or arrange for on-site visit     Follow Up: July 9 virtually 9 AM.  An email of all the medication changes were sent to the patient's primary caretaker. Nancy Robles DNP, ANP-BC    This note was created using voice recognition software. Despite editing, there may be syntax errors.

## 2018-06-30 PROCEDURE — 3331090002 HH PPS REVENUE DEBIT

## 2018-06-30 PROCEDURE — 3331090001 HH PPS REVENUE CREDIT

## 2018-07-01 ENCOUNTER — HOSPITAL ENCOUNTER (EMERGENCY)
Age: 83
Discharge: HOME | End: 2018-07-01
Attending: EMERGENCY MEDICINE | Admitting: EMERGENCY MEDICINE
Payer: MEDICARE

## 2018-07-01 ENCOUNTER — APPOINTMENT (OUTPATIENT)
Dept: GENERAL RADIOLOGY | Age: 83
End: 2018-07-01
Attending: EMERGENCY MEDICINE
Payer: MEDICARE

## 2018-07-01 VITALS
WEIGHT: 185 LBS | OXYGEN SATURATION: 95 % | HEIGHT: 62 IN | DIASTOLIC BLOOD PRESSURE: 75 MMHG | SYSTOLIC BLOOD PRESSURE: 120 MMHG | HEART RATE: 63 BPM | TEMPERATURE: 98.4 F | BODY MASS INDEX: 34.04 KG/M2 | RESPIRATION RATE: 16 BRPM

## 2018-07-01 DIAGNOSIS — N18.30 CKD (CHRONIC KIDNEY DISEASE) STAGE 3, GFR 30-59 ML/MIN (HCC): ICD-10-CM

## 2018-07-01 DIAGNOSIS — J18.9 PNEUMONIA OF BOTH LUNGS DUE TO INFECTIOUS ORGANISM, UNSPECIFIED PART OF LUNG: Primary | ICD-10-CM

## 2018-07-01 DIAGNOSIS — J94.8 HYDROPNEUMOTHORAX: ICD-10-CM

## 2018-07-01 LAB
ALBUMIN SERPL-MCNC: 3 G/DL (ref 3.5–5)
ALBUMIN/GLOB SERPL: 0.6 {RATIO} (ref 1.1–2.2)
ALP SERPL-CCNC: 106 U/L (ref 45–117)
ALT SERPL-CCNC: 23 U/L (ref 12–78)
ANION GAP SERPL CALC-SCNC: 10 MMOL/L (ref 5–15)
APPEARANCE UR: CLEAR
AST SERPL-CCNC: 31 U/L (ref 15–37)
BACTERIA URNS QL MICRO: ABNORMAL /HPF
BASOPHILS # BLD: 0.1 K/UL (ref 0–0.1)
BASOPHILS NFR BLD: 2 % (ref 0–1)
BILIRUB SERPL-MCNC: 0.7 MG/DL (ref 0.2–1)
BILIRUB UR QL: NEGATIVE
BNP SERPL-MCNC: 3150 PG/ML (ref 0–450)
BUN SERPL-MCNC: 22 MG/DL (ref 6–20)
BUN/CREAT SERPL: 15 (ref 12–20)
CALCIUM SERPL-MCNC: 9.6 MG/DL (ref 8.5–10.1)
CHLORIDE SERPL-SCNC: 100 MMOL/L (ref 97–108)
CK MB CFR SERPL CALC: 1.9 % (ref 0–2.5)
CK MB SERPL-MCNC: 1.6 NG/ML (ref 5–25)
CK SERPL-CCNC: 84 U/L (ref 26–192)
CO2 SERPL-SCNC: 32 MMOL/L (ref 21–32)
COLOR UR: ABNORMAL
CREAT SERPL-MCNC: 1.51 MG/DL (ref 0.55–1.02)
DIFFERENTIAL METHOD BLD: ABNORMAL
EOSINOPHIL # BLD: 0.1 K/UL (ref 0–0.4)
EOSINOPHIL NFR BLD: 2 % (ref 0–7)
EPITH CASTS URNS QL MICRO: ABNORMAL /LPF
ERYTHROCYTE [DISTWIDTH] IN BLOOD BY AUTOMATED COUNT: 19.9 % (ref 11.5–14.5)
GLOBULIN SER CALC-MCNC: 5 G/DL (ref 2–4)
GLUCOSE SERPL-MCNC: 86 MG/DL (ref 65–100)
GLUCOSE UR STRIP.AUTO-MCNC: NEGATIVE MG/DL
HCT VFR BLD AUTO: 42.6 % (ref 35–47)
HGB BLD-MCNC: 13.3 G/DL (ref 11.5–16)
HGB UR QL STRIP: NEGATIVE
HYALINE CASTS URNS QL MICRO: ABNORMAL /LPF (ref 0–5)
IMM GRANULOCYTES # BLD: 0 K/UL (ref 0–0.04)
IMM GRANULOCYTES NFR BLD AUTO: 0 % (ref 0–0.5)
INR PPP: 1.2 (ref 0.9–1.1)
KETONES UR QL STRIP.AUTO: NEGATIVE MG/DL
LACTATE SERPL-SCNC: 2 MMOL/L (ref 0.4–2)
LEUKOCYTE ESTERASE UR QL STRIP.AUTO: ABNORMAL
LYMPHOCYTES # BLD: 0.4 K/UL (ref 0.8–3.5)
LYMPHOCYTES NFR BLD: 9 % (ref 12–49)
MAGNESIUM SERPL-MCNC: 2.4 MG/DL (ref 1.6–2.4)
MCH RBC QN AUTO: 25.2 PG (ref 26–34)
MCHC RBC AUTO-ENTMCNC: 31.2 G/DL (ref 30–36.5)
MCV RBC AUTO: 80.8 FL (ref 80–99)
MONOCYTES # BLD: 0.6 K/UL (ref 0–1)
MONOCYTES NFR BLD: 14 % (ref 5–13)
NEUTS SEG # BLD: 3.4 K/UL (ref 1.8–8)
NEUTS SEG NFR BLD: 73 % (ref 32–75)
NITRITE UR QL STRIP.AUTO: NEGATIVE
NRBC # BLD: 0 K/UL (ref 0–0.01)
NRBC BLD-RTO: 0 PER 100 WBC
PH UR STRIP: 7 [PH] (ref 5–8)
PLATELET # BLD AUTO: 274 K/UL (ref 150–400)
PMV BLD AUTO: 10.8 FL (ref 8.9–12.9)
POTASSIUM SERPL-SCNC: 3.7 MMOL/L (ref 3.5–5.1)
PROT SERPL-MCNC: 8 G/DL (ref 6.4–8.2)
PROT UR STRIP-MCNC: NEGATIVE MG/DL
PROTHROMBIN TIME: 12.1 SEC (ref 9–11.1)
RBC # BLD AUTO: 5.27 M/UL (ref 3.8–5.2)
RBC #/AREA URNS HPF: ABNORMAL /HPF (ref 0–5)
RBC MORPH BLD: ABNORMAL
SODIUM SERPL-SCNC: 142 MMOL/L (ref 136–145)
SP GR UR REFRACTOMETRY: 1.02 (ref 1–1.03)
TROPONIN I SERPL-MCNC: <0.05 NG/ML
UROBILINOGEN UR QL STRIP.AUTO: 1 EU/DL (ref 0.2–1)
WBC # BLD AUTO: 4.6 K/UL (ref 3.6–11)
WBC URNS QL MICRO: ABNORMAL /HPF (ref 0–4)

## 2018-07-01 PROCEDURE — 81001 URINALYSIS AUTO W/SCOPE: CPT | Performed by: EMERGENCY MEDICINE

## 2018-07-01 PROCEDURE — 3331090002 HH PPS REVENUE DEBIT

## 2018-07-01 PROCEDURE — 3331090001 HH PPS REVENUE CREDIT

## 2018-07-01 PROCEDURE — 96365 THER/PROPH/DIAG IV INF INIT: CPT

## 2018-07-01 PROCEDURE — 99284 EMERGENCY DEPT VISIT MOD MDM: CPT

## 2018-07-01 PROCEDURE — 77030029684 HC NEB SM VOL KT MONA -A

## 2018-07-01 PROCEDURE — 80053 COMPREHEN METABOLIC PANEL: CPT | Performed by: EMERGENCY MEDICINE

## 2018-07-01 PROCEDURE — 87040 BLOOD CULTURE FOR BACTERIA: CPT | Performed by: EMERGENCY MEDICINE

## 2018-07-01 PROCEDURE — 83735 ASSAY OF MAGNESIUM: CPT | Performed by: EMERGENCY MEDICINE

## 2018-07-01 PROCEDURE — 83880 ASSAY OF NATRIURETIC PEPTIDE: CPT | Performed by: EMERGENCY MEDICINE

## 2018-07-01 PROCEDURE — 94640 AIRWAY INHALATION TREATMENT: CPT

## 2018-07-01 PROCEDURE — 74011000250 HC RX REV CODE- 250: Performed by: EMERGENCY MEDICINE

## 2018-07-01 PROCEDURE — 85025 COMPLETE CBC W/AUTO DIFF WBC: CPT | Performed by: EMERGENCY MEDICINE

## 2018-07-01 PROCEDURE — 74011250636 HC RX REV CODE- 250/636: Performed by: EMERGENCY MEDICINE

## 2018-07-01 PROCEDURE — 85610 PROTHROMBIN TIME: CPT | Performed by: EMERGENCY MEDICINE

## 2018-07-01 PROCEDURE — 84484 ASSAY OF TROPONIN QUANT: CPT | Performed by: EMERGENCY MEDICINE

## 2018-07-01 PROCEDURE — 83605 ASSAY OF LACTIC ACID: CPT | Performed by: EMERGENCY MEDICINE

## 2018-07-01 PROCEDURE — 82550 ASSAY OF CK (CPK): CPT | Performed by: EMERGENCY MEDICINE

## 2018-07-01 PROCEDURE — 71046 X-RAY EXAM CHEST 2 VIEWS: CPT

## 2018-07-01 PROCEDURE — 36415 COLL VENOUS BLD VENIPUNCTURE: CPT | Performed by: EMERGENCY MEDICINE

## 2018-07-01 RX ORDER — ALBUTEROL SULFATE 90 UG/1
2 AEROSOL, METERED RESPIRATORY (INHALATION)
Qty: 1 INHALER | Refills: 0 | Status: ON HOLD | OUTPATIENT
Start: 2018-07-01 | End: 2018-07-18

## 2018-07-01 RX ORDER — LEVOFLOXACIN 750 MG/1
750 TABLET ORAL EVERY OTHER DAY
Qty: 3 TAB | Refills: 0 | Status: SHIPPED | OUTPATIENT
Start: 2018-07-03 | End: 2018-07-10

## 2018-07-01 RX ORDER — BENZONATATE 100 MG/1
100 CAPSULE ORAL
Qty: 30 CAP | Refills: 0 | Status: SHIPPED | OUTPATIENT
Start: 2018-07-01 | End: 2018-07-08

## 2018-07-01 RX ORDER — IPRATROPIUM BROMIDE AND ALBUTEROL SULFATE 2.5; .5 MG/3ML; MG/3ML
3 SOLUTION RESPIRATORY (INHALATION)
Status: COMPLETED | OUTPATIENT
Start: 2018-07-01 | End: 2018-07-01

## 2018-07-01 RX ORDER — LEVOFLOXACIN 5 MG/ML
750 INJECTION, SOLUTION INTRAVENOUS
Status: COMPLETED | OUTPATIENT
Start: 2018-07-01 | End: 2018-07-01

## 2018-07-01 RX ADMIN — IPRATROPIUM BROMIDE AND ALBUTEROL SULFATE 3 ML: .5; 3 SOLUTION RESPIRATORY (INHALATION) at 12:29

## 2018-07-01 RX ADMIN — LEVOFLOXACIN 750 MG: 5 INJECTION, SOLUTION INTRAVENOUS at 14:40

## 2018-07-01 NOTE — DISCHARGE INSTRUCTIONS
Collapsed Lung: Care Instructions  Your Care Instructions    A collapsed lung (pneumothorax) is a buildup of air in the space between the lung and the chest wall. As more air builds up in this space, the pressure against the lung makes the lung collapse. This causes shortness of breath and chest pain because your lung cannot fully expand. A collapsed lung is usually caused by an injury to the chest, but it may also occur suddenly without an injury because of a lung illness, such as emphysema or lung fibrosis. Your lung may collapse after lung surgery or another medical procedure. Sometimes it happens for no known reason in an otherwise healthy person (spontaneous pneumothorax). Treatment depends on the cause of the collapse. It may heal with rest, although your doctor will want to keep track of your progress. It can take several days for the lung to expand again. Your doctor may have drained the air with a needle or tube inserted into the space between your chest and the collapsed lung. If you have a chest tube, be sure to follow your doctor's instructions about how to care for the tube. You may need further treatment if you are not getting better. Surgery is sometimes needed to keep the lung inflated. The doctor will want to keep track of your progress, so you will need a follow-up exam within a few days. The doctor has checked you carefully, but problems can develop later. If you notice any problems or new symptoms, get medical treatment right away. Follow-up care is a key part of your treatment and safety. Be sure to make and go to all appointments, and call your doctor if you are having problems. It's also a good idea to know your test results and keep a list of the medicines you take. How can you care for yourself at home? · Get plenty of rest and sleep. You may feel weak and tired for a while, but your energy level will improve with time.   · Hold a pillow against your chest when you cough or take deep breaths. This will support your chest and decrease your pain. · Take pain medicines exactly as directed. ¨ If the doctor gave you a prescription medicine for pain, take it as prescribed. ¨ If you are not taking a prescription pain medicine, ask your doctor if you can take an over-the-counter medicine. · If your doctor prescribed antibiotics, take them as directed. Do not stop taking them just because you feel better. You need to take the full course of antibiotics. · If you have a bandage over your chest tube, or the place where the chest tube was inserted, keep it clean and dry. Follow your doctor's instructions on bandage care. · If you go home with a tube in place, follow the doctor's directions. Do not adjust the tube in any way. This could break the seal or cause other problems. Keep the tube dry. · Avoid any movements that require your muscles, especially your chest muscles, to strain. Such movements include laughing hard, bearing down to have a bowel movement, and heavy lifting. Try not to cough. · Do not fly in an airplane until your doctor tells you it is okay. Avoid any situations where there is increased air pressure. · Do not smoke or allow others to smoke around you. If you need help quitting, talk to your doctor about stop-smoking programs and medicines. These can increase your chances of quitting for good. When should you call for help? Call 911 anytime you think you may need emergency care. For example, call if:  ? · You have severe trouble breathing. ? · You passed out (lost consciousness). ?Call your doctor now or seek immediate medical care if:  ? · You have new or worse trouble breathing. ? · You have new pain or your pain gets worse. ? · You have a fever. ? · You cough up blood. ? · Your chest tube starts to come out or falls out. ? · You are bleeding through the bandage where the tube was put in. ? Watch closely for changes in your health, and be sure to contact your doctor if:  ? · The skin around the place where the chest tube was put in is red or irritated. ? · You do not get better as expected. Where can you learn more? Go to http://natty-mirza.info/. Enter R746 in the search box to learn more about \"Collapsed Lung: Care Instructions. \"  Current as of: May 12, 2017  Content Version: 11.4  © 2006-2017 [employer]. Care instructions adapted under license by [employer] (which disclaims liability or warranty for this information). If you have questions about a medical condition or this instruction, always ask your healthcare professional. [place] [address] any warranty or liability for your use of this information. Pneumonia: Care Instructions  Your Care Instructions    Pneumonia is an infection of the lungs. Most cases are caused by infections from bacteria or viruses. Pneumonia may be mild or very severe. If it is caused by bacteria, you will be treated with antibiotics. It may take a few weeks to a few months to recover fully from pneumonia, depending on how sick you were and whether your overall health is good. Follow-up care is a key part of your treatment and safety. Be sure to make and go to all appointments, and call your doctor if you are having problems. It's also a good idea to know your test results and keep a list of the medicines you take. How can you care for yourself at home? · Take your antibiotics exactly as directed. Do not stop taking the medicine just because you are feeling better. You need to take the full course of antibiotics. · Take your medicines exactly as prescribed. Call your doctor if you think you are having a problem with your medicine. · Get plenty of rest and sleep. You may feel weak and tired for a while, but your energy level will improve with time.   · To prevent dehydration, drink plenty of fluids, enough so that your urine is light yellow or clear like water. Choose water and other caffeine-free clear liquids until you feel better. If you have kidney, heart, or liver disease and have to limit fluids, talk with your doctor before you increase the amount of fluids you drink. · Take care of your cough so you can rest. A cough that brings up mucus from your lungs is common with pneumonia. It is one way your body gets rid of the infection. But if coughing keeps you from resting or causes severe fatigue and chest-wall pain, talk to your doctor. He or she may suggest that you take a medicine to reduce the cough. · Use a vaporizer or humidifier to add moisture to your bedroom. Follow the directions for cleaning the machine. · Do not smoke or allow others to smoke around you. Smoke will make your cough last longer. If you need help quitting, talk to your doctor about stop-smoking programs and medicines. These can increase your chances of quitting for good. · Take an over-the-counter pain medicine, such as acetaminophen (Tylenol), ibuprofen (Advil, Motrin), or naproxen (Aleve). Read and follow all instructions on the label. · Do not take two or more pain medicines at the same time unless the doctor told you to. Many pain medicines have acetaminophen, which is Tylenol. Too much acetaminophen (Tylenol) can be harmful. · If you were given a spirometer to measure how well your lungs are working, use it as instructed. This can help your doctor tell how your recovery is going. · To prevent pneumonia in the future, talk to your doctor about getting a flu vaccine (once a year) and a pneumococcal vaccine (one time only for most people). When should you call for help? Call 911 anytime you think you may need emergency care. For example, call if:  ? · You have severe trouble breathing. ?Call your doctor now or seek immediate medical care if:  ? · You cough up dark brown or bloody mucus (sputum). ? · You have new or worse trouble breathing.    ? · You are dizzy or lightheaded, or you feel like you may faint. ? Watch closely for changes in your health, and be sure to contact your doctor if:  ? · You have a new or higher fever. ? · You are coughing more deeply or more often. ? · You are not getting better after 2 days (48 hours). ? · You do not get better as expected. Where can you learn more? Go to http://natty-mirza.info/. Enter [id number] in the search box to learn more about \"Pneumonia: Care Instructions. \"  Current as of: May 12, 2017  Content Version: 11.4  © 1758-3365 [email or web address]. Care instructions adapted under license by [employer] (which disclaims liability or warranty for this information). If you have questions about a medical condition or this instruction, always ask your healthcare professional. [address] any warranty or liability for your use of this information. Thank you! Thank you for allowing us to provide you with excellent care today. We hope we addressed all of your concerns and needs. We strive to provide excellent quality care in the Emergency Department. You may receive a survey after your visit to evaluate the care you were provided. Should you receive a survey from us, we invite you to share your experience and tell us what made it excellent. It was a pleasure serving you, we invite you to share your experience with us, in our pursuit for excellence, should you be selected to receive a survey. If you feel that you have not received excellent quality care or timely care, please ask to speak to the nurse manager. Please choose us in the future for your continued health care needs. ------------------------------------------------------------------------------------------------------------  The exam and treatment you received in the Emergency Department were for an urgent problem and are not intended as complete care.  It is important that you follow up with a doctor, nurse practitioner, or physician assistant for ongoing care. If your symptoms become worse or you do not improve as expected and you are unable to reach your usual health care provider, you should return to the Emergency Department. We are available 24 hours a day. Please take your discharge instructions with you when you go to your follow-up appointment. If you have any problem arranging a follow-up appointment, contact the Emergency Department immediately. If a prescription has been provided, please have it filled as soon as possible to prevent a delay in treatment. Read the entire medication instruction sheet provided to you by the pharmacy. If you have any questions or reservations about taking the medication due to side effects or interactions with other medications, please call your primary care physician or contact the ER to speak with the charge nurse. Make an appointment with your family doctor or the physician you were referred to for follow-up of this visit as instructed on your discharge paperwork, as this is mandatory follow-up. Return to the ER if you are unable to be seen or if you are unable to be seen in a timely manner. If you have any problem arranging the follow-up visit, contact the Emergency Department immediately.

## 2018-07-01 NOTE — ED PROVIDER NOTES
EMERGENCY DEPARTMENT HISTORY AND PHYSICAL EXAM      Date: 7/1/2018  Patient Name: Veda Quiroz    History of Presenting Illness     Chief Complaint   Patient presents with    Cough     productive cough x 2 weeks. Denies fever, n/v       History Provided By: Patient    HPI: Veda Quiroz, 80 y.o. female with PMHx significant for HTN, a-fib, CKD, GERD, breast CA s/p R mastectomy, colon CA s/p tumor resection, lung CA, presents ambulatory to the ED with cc of a clear phlegm productive cough with associated mild SOB and chest \"fullness\" and discomfort x 2 weeks. Pt's daughter notes that the pt recently had her Lasix increased 2 days ago. She denies any recent long travel, increased exercise/activity, or any recent surgeries. Pt denies any OTC medications or any other modifying factors for her sxs. She specifically denies any fevers, chills, N/V/D, HA, abdominal pain, back pain, dysuria or hematuria    There are no other complaints, changes, or physical findings at this time. PCP: Claudette Snider, MD   Social Hx: - EtOH; Former Smoker; - Illicit Drugs    Current Outpatient Prescriptions   Medication Sig Dispense Refill    [START ON 7/3/2018] levoFLOXacin (LEVAQUIN) 750 mg tablet Take 1 Tab by mouth every other day for 7 days. 3 Tab 0    albuterol (PROVENTIL HFA, VENTOLIN HFA, PROAIR HFA) 90 mcg/actuation inhaler Take 2 Puffs by inhalation every six (6) hours as needed for Wheezing. 1 Inhaler 0    benzonatate (TESSALON PERLES) 100 mg capsule Take 1 Cap by mouth three (3) times daily as needed for Cough for up to 7 days. 30 Cap 0    furosemide (LASIX) 40 mg tablet Take 2 tab 80mg in AM and 1 tab 40mg in PM (Corrected Rx) 270 Tab 1    albuterol-ipratropium (DUO-NEB) 2.5 mg-0.5 mg/3 ml nebu Take 3 mL by inhalation every four (4) hours as needed (shortness of breath).  OXYGEN-AIR DELIVERY SYSTEMS Take 2 L by inhalation as needed (shortness of breath).       apixaban (ELIQUIS) 2.5 mg tablet Take 1 Tab by mouth two (2) times a day. 60 Tab 11    budesonide-formoterol (SYMBICORT) 160-4.5 mcg/actuation HFAA Take 2 Puffs by inhalation two (2) times a day.  mv-mn/folic acid/calcium/vit K (ONE-A-DAY WOMEN'S 50 PLUS PO) Take 1 Tab by mouth daily.  dextran 70/hypromellose/PF (NATURAL TEARS, PF, OP) Administer 1 Drop to both eyes daily as needed (dry eyes).  mometasone (NASONEX) 50 mcg/actuation nasal spray 2 Sprays by Both Nostrils route daily as needed (congestion).  allopurinol (ZYLOPRIM) 100 mg tablet Patient takes 2 tablets in the morning and 1 tablet at night.  albuterol-ipratropium (DUO-NEB) 2.5 mg-0.5 mg/3 ml nebu 3 mL by Nebulization route every four (4) hours as needed. (Patient not taking: Reported on 6/14/2018) 30 Nebule 1    potassium chloride (KLOR-CON) 20 mEq packet Take 20 mEq by mouth daily. Indications: pill      colchicine (COLCRYS) 0.6 mg tablet Take 0.6 mg by mouth daily.  acetaminophen (TYLENOL EXTRA STRENGTH) 500 mg tablet Take 1,000 mg by mouth every six (6) hours as needed for Pain.  amLODIPine (NORVASC) 2.5 mg tablet Take 2.5 mg by mouth daily.          Past History     Past Medical History:  Past Medical History:   Diagnosis Date    [facility] ([facility]     Cancer ([facility]     right breast CA, lung, colon    Gastrointestinal disorder     GERD    Hypertension     Other ill-defined conditions(799.89)     gout       Past Surgical History:  Past Surgical History:   Procedure Laterality Date    BREAST SURGERY PROCEDURE UNLISTED  1995    Right mastectomy    HX APPENDECTOMY      HX HEENT      right glass eye    [place] COLONOSCOPY W/BIOPSY SINGLE/MULTIPLE  3/29/2013         ME COLSC FLX W/RMVL OF TUMOR POLYP LESION SNARE TQ  3/29/2013            Family History:  Family History   Problem Relation Age of Onset    Colon Cancer Other     Hypertension Other        Social History:  Social History   Substance Use Topics    Smoking status: Former Smoker     Quit date: 1967    Smokeless tobacco: Never Used    Alcohol use No       Allergies: Allergies   Allergen Reactions    Penicillins Hives         Review of Systems   Review of Systems   Constitutional: Negative for chills and fever. HENT: Negative for congestion. Eyes: Negative for visual disturbance. Respiratory: Positive for cough and shortness of breath. Cardiovascular: Negative for chest pain. Gastrointestinal: Negative for abdominal pain, constipation, diarrhea, nausea and vomiting. Endocrine: Negative for heat intolerance. Genitourinary: Negative. Negative for dysuria and hematuria. Musculoskeletal: Negative for back pain. Skin: Negative for rash. Allergic/Immunologic: Negative for immunocompromised state. Neurological: Negative for headaches. Hematological: Does not bruise/bleed easily. Psychiatric/Behavioral: Negative. All other systems reviewed and are negative. Physical Exam   Physical Exam   Constitutional: She is oriented to person, place, and time. She appears well-developed and well-nourished. No distress. Elevated BMI   HENT:   Head: Normocephalic and atraumatic. Eyes: EOM are normal. Pupils are equal, round, and reactive to light. Neck: Normal range of motion. Cardiovascular: Normal rate, regular rhythm and normal heart sounds. Pulmonary/Chest: Effort normal and breath sounds normal. No respiratory distress. Abdominal: Soft. Bowel sounds are normal. She exhibits no mass. There is no tenderness. Musculoskeletal: Normal range of motion. She exhibits no edema. 2+ edema BLLE   Neurological: She is alert and oriented to person, place, and time. Coordination normal.   Skin: Skin is warm and dry. Psychiatric: She has a normal mood and affect. Her behavior is normal.   Nursing note and vitals reviewed.       Diagnostic Study Results     Labs -     Recent Results (from the past 12 hour(s))   CBC WITH AUTOMATED DIFF    Collection Time: 07/01/18 12:05 PM   Result Value Ref Range WBC 4.6 3.6 - 11.0 K/uL    RBC 5.27 (H) 3.80 - 5.20 M/uL    HGB 13.3 11.5 - 16.0 g/dL    HCT 42.6 35.0 - 47.0 %    MCV 80.8 80.0 - 99.0 FL    MCH 25.2 (L) 26.0 - 34.0 PG    MCHC 31.2 30.0 - 36.5 g/dL    RDW 19.9 (H) 11.5 - 14.5 %    PLATELET 336 197 - 936 K/uL    MPV 10.8 8.9 - 12.9 FL    NRBC 0.0 0  WBC    ABSOLUTE NRBC 0.00 0.00 - 0.01 K/uL    NEUTROPHILS 73 32 - 75 %    LYMPHOCYTES 9 (L) 12 - 49 %    MONOCYTES 14 (H) 5 - 13 %    EOSINOPHILS 2 0 - 7 %    BASOPHILS 2 (H) 0 - 1 %    IMMATURE GRANULOCYTES 0 0.0 - 0.5 %    ABS. NEUTROPHILS 3.4 1.8 - 8.0 K/UL    ABS. LYMPHOCYTES 0.4 (L) 0.8 - 3.5 K/UL    ABS. MONOCYTES 0.6 0.0 - 1.0 K/UL    ABS. EOSINOPHILS 0.1 0.0 - 0.4 K/UL    ABS. BASOPHILS 0.1 0.0 - 0.1 K/UL    ABS. IMM. GRANS. 0.0 0.00 - 0.04 K/UL    DF AUTOMATED      RBC COMMENTS ANISOCYTOSIS  1+       METABOLIC PANEL, COMPREHENSIVE    Collection Time: 07/01/18 12:05 PM   Result Value Ref Range    Sodium 142 136 - 145 mmol/L    Potassium 3.7 3.5 - 5.1 mmol/L    Chloride 100 97 - 108 mmol/L    CO2 32 21 - 32 mmol/L    Anion gap 10 5 - 15 mmol/L    Glucose 86 65 - 100 mg/dL    BUN 22 (H) 6 - 20 MG/DL    Creatinine 1.51 (H) 0.55 - 1.02 MG/DL    BUN/Creatinine ratio 15 12 - 20      GFR est AA 38 (L) >60 ml/min/1.73m2    GFR est non-AA 32 (L) >60 ml/min/1.73m2    Calcium 9.6 8.5 - 10.1 MG/DL    Bilirubin, total 0.7 0.2 - 1.0 MG/DL    ALT (SGPT) 23 12 - 78 U/L    AST (SGOT) 31 15 - 37 U/L    Alk.  phosphatase 106 45 - 117 U/L    Protein, total 8.0 6.4 - 8.2 g/dL    Albumin 3.0 (L) 3.5 - 5.0 g/dL    Globulin 5.0 (H) 2.0 - 4.0 g/dL    A-G Ratio 0.6 (L) 1.1 - 2.2     NT-PRO BNP    Collection Time: 07/01/18 12:05 PM   Result Value Ref Range    NT pro-BNP 3150 (H) 0 - 450 PG/ML   MAGNESIUM    Collection Time: 07/01/18 12:05 PM   Result Value Ref Range    Magnesium 2.4 1.6 - 2.4 mg/dL   CK W/ CKMB & INDEX    Collection Time: 07/01/18 12:05 PM   Result Value Ref Range    CK 84 26 - 192 U/L    CK - MB 1.6 <3.6 NG/ML CK-MB Index 1.9 0 - 2.5     TROPONIN I    Collection Time: 07/01/18 12:05 PM   Result Value Ref Range    Troponin-I, Qt. <0.05 <0.05 ng/mL   PROTHROMBIN TIME + INR    Collection Time: 07/01/18 12:05 PM   Result Value Ref Range    INR 1.2 (H) 0.9 - 1.1      Prothrombin time 12.1 (H) 9.0 - 11.1 sec   URINALYSIS W/ RFLX MICROSCOPIC    Collection Time: 07/01/18  1:40 PM   Result Value Ref Range    Color YELLOW/STRAW      Appearance CLEAR CLEAR      Specific gravity 1.017 1.003 - 1.030      pH (UA) 7.0 5.0 - 8.0      Protein NEGATIVE  NEG mg/dL    Glucose NEGATIVE  NEG mg/dL    Ketone NEGATIVE  NEG mg/dL    Bilirubin NEGATIVE  NEG      Blood NEGATIVE  NEG      Urobilinogen 1.0 0.2 - 1.0 EU/dL    Nitrites NEGATIVE  NEG      Leukocyte Esterase SMALL (A) NEG      WBC 5-10 0 - 4 /hpf    RBC 0-5 0 - 5 /hpf    Epithelial cells MODERATE (A) FEW /lpf    Bacteria 1+ (A) NEG /hpf    Hyaline cast 0-2 0 - 5 /lpf   LACTIC ACID    Collection Time: 07/01/18  2:30 PM   Result Value Ref Range    Lactic acid 2.0 0.4 - 2.0 MMOL/L       Radiologic Studies -   XR CHEST PA LAT   Final Result        CT Results  (Last 48 hours)    None        CXR Results  (Last 48 hours)               07/01/18 1228  XR CHEST PA LAT Final result    Impression:  IMPRESSION:    1. Little significant change in the opacification left mid and lower thorax   consistent with effusion/atelectasis/airspace disease. There is a tiny left   hydropneumothorax left midlung. Soft tissue fullness in the left perihilar   region is unchanged. 2. There is suspicion of an early airspace process in the right mid lung. .                   Narrative:  EXAM:  XR CHEST PA LAT       INDICATION:   cough       COMPARISON: 5/29/2018. FINDINGS: AP and lateral radiographs of the chest demonstrate suspicion of an   early airspace process in the right midlung. . There is increased density in the   left mid and lower thorax similar to prior study consistent with left   effusion/left basilar atelectasis/airspace disease. There is a small focus of   hydropneumothorax in the left mid chest.. Soft tissue fullness in left perihilar   area could represent airspace disease/atelectasis. .. Heart size is slightly   enlarged and stable. .  Bony structures are unchanged. Medical Decision Making   I am the first provider for this patient. I reviewed the vital signs, available nursing notes, past medical history, past surgical history, family history and social history. Vital Signs-Reviewed the patient's vital signs. Patient Vitals for the past 12 hrs:   Temp Pulse Resp BP SpO2   07/01/18 1600 - - - 120/75 95 %   07/01/18 1414 - 63 16 (!) 104/33 100 %   07/01/18 1341 - 95 18 119/73 94 %   07/01/18 1145 - - - 120/60 93 %   07/01/18 1111 98.4 °F (36.9 °C) 87 20 140/58 94 %       Pulse Oximetry Analysis - 94% on RA    Cardiac Monitor:   Rate: 87 bpm  Rhythm: Normal Sinus Rhythm      Records Reviewed: Nursing Notes, Old Medical Records, Previous Radiology Studies and Previous Laboratory Studies    Provider Notes (Medical Decision Making):   DDx: PNA, CHF, bronchitis, CAD, URI, peripheral edema    ED Course:   Initial assessment performed. The patients presenting problems have been discussed, and they are in agreement with the care plan formulated and outlined with them. I have encouraged them to ask questions as they arise throughout their visit. 4:00 PM  Janis Guadalupe MD spoke with Dr. Linh Hurt and Dr. Oleg Villanueva (Hospitalists). They both noted that the pt could go home with abx and if anything changed that she come back to the ED. Critical Care Time:   0 minutes    Disposition:  Discharge Note:  5:07 PM  The pt is ready for discharge. The pt's signs, symptoms, diagnosis, and discharge instructions have been discussed and pt has conveyed their understanding. The pt is to follow up as recommended or return to ER should their symptoms worsen.  Plan has been discussed and pt is in agreement. PLAN:  1. Discharge Medication List as of 7/1/2018  5:07 PM      START taking these medications    Details   levoFLOXacin (LEVAQUIN) 750 mg tablet Take 1 Tab by mouth every other day for 7 days. , Normal, Disp-3 Tab, R-0         CONTINUE these medications which have CHANGED    Details   albuterol (PROVENTIL HFA, VENTOLIN HFA, PROAIR HFA) 90 mcg/actuation inhaler Take 2 Puffs by inhalation every six (6) hours as needed for Wheezing., Normal, Disp-1 Inhaler, R-0         CONTINUE these medications which have NOT CHANGED    Details   furosemide (LASIX) 40 mg tablet Take 2 tab 80mg in AM and 1 tab 40mg in PM (Corrected Rx), Normal, Disp-270 Tab, R-1      !! albuterol-ipratropium (DUO-NEB) 2.5 mg-0.5 mg/3 ml nebu Take 3 mL by inhalation every four (4) hours as needed (shortness of breath). , Historical Med      OXYGEN-AIR DELIVERY SYSTEMS Take 2 L by inhalation as needed (shortness of breath). , Historical Med      apixaban (ELIQUIS) 2.5 mg tablet Take 1 Tab by mouth two (2) times a day., Normal, Disp-60 Tab, R-11      budesonide-formoterol (SYMBICORT) 160-4.5 mcg/actuation HFAA Take 2 Puffs by inhalation two (2) times a day., Historical Med      mv-mn/folic acid/calcium/vit K (ONE-A-DAY WOMEN'S 50 PLUS PO) Take 1 Tab by mouth daily. , Historical Med      dextran 70/hypromellose/PF (NATURAL TEARS, PF, OP) Administer 1 Drop to both eyes daily as needed (dry eyes). , Historical Med      mometasone (NASONEX) 50 mcg/actuation nasal spray 2 Sprays by Both Nostrils route daily as needed (congestion). , Historical Med      allopurinol (ZYLOPRIM) 100 mg tablet Patient takes 2 tablets in the morning and 1 tablet at night., Historical Med      !! albuterol-ipratropium (DUO-NEB) 2.5 mg-0.5 mg/3 ml nebu 3 mL by Nebulization route every four (4) hours as needed. , Print, Disp-30 Nebule, R-1      potassium chloride (KLOR-CON) 20 mEq packet Take 20 mEq by mouth daily.  Indications: pill, Historical Med      colchicine (COLCRYS) 0.6 mg tablet Take 0.6 mg by mouth daily. , Historical Med      acetaminophen (TYLENOL EXTRA STRENGTH) 500 mg tablet Take 1,000 mg by mouth every six (6) hours as needed for Pain., Historical Med      amLODIPine (NORVASC) 2.5 mg tablet Take 2.5 mg by mouth daily. , Historical Med       !! - Potential duplicate medications found. Please discuss with provider. 2.   Follow-up Information     Follow up With Details Comments Contact Info    Chinyere Gomes MD Call in 1 day  [facility]  [phone]      [facility] EMERGENCY DEPT  If symptoms worsen [address]  [address]  [phone]        Return to ED if worse     Diagnosis     Clinical Impression:   1. Pneumonia of both lungs due to infectious organism, unspecified part of lung    2. Hydropneumothorax    3. CKD (chronic kidney disease) stage 3, GFR 30-59 ml/min        Attestations: This note is prepared by Westley Damon. Laron Arevalo, acting as Scribe for Aparna Simeon MD.    Aparna Simeon MD: The scribe's documentation has been prepared under my direction and personally reviewed by me in its entirety. I confirm that the note above accurately reflects all work, treatment, procedures, and medical decision making performed by me.

## 2018-07-01 NOTE — ED NOTES
Bedside report received from [facility]. Pts two sons at bedside. Pt placed in bed in position of comfort with side rails up x 2, call bell within reach. Pt instructed to call for nursing assistance with any ambulation or other needs.

## 2018-07-01 NOTE — ED NOTES
Assumed care of patient, pt placed on monitor, changed into gown with assistance, pillow provided for comfort , protocol chest xray entered, pt reports cough for one week with left rib pain on palpation

## 2018-07-02 PROCEDURE — 3331090001 HH PPS REVENUE CREDIT

## 2018-07-02 PROCEDURE — 3331090002 HH PPS REVENUE DEBIT

## 2018-07-03 ENCOUNTER — HOME CARE VISIT (OUTPATIENT)
Dept: SCHEDULING | Facility: HOME HEALTH | Age: 83
End: 2018-07-03
Payer: MEDICARE

## 2018-07-03 VITALS
HEART RATE: 74 BPM | OXYGEN SATURATION: 90 % | BODY MASS INDEX: 33.03 KG/M2 | WEIGHT: 180.6 LBS | RESPIRATION RATE: 28 BRPM | TEMPERATURE: 97.5 F | DIASTOLIC BLOOD PRESSURE: 95 MMHG | SYSTOLIC BLOOD PRESSURE: 145 MMHG

## 2018-07-03 PROCEDURE — 3331090001 HH PPS REVENUE CREDIT

## 2018-07-03 PROCEDURE — 3331090002 HH PPS REVENUE DEBIT

## 2018-07-03 PROCEDURE — G0299 HHS/HOSPICE OF RN EA 15 MIN: HCPCS

## 2018-07-04 PROCEDURE — 3331090002 HH PPS REVENUE DEBIT

## 2018-07-04 PROCEDURE — 3331090001 HH PPS REVENUE CREDIT

## 2018-07-05 ENCOUNTER — HOME CARE VISIT (OUTPATIENT)
Dept: SCHEDULING | Facility: HOME HEALTH | Age: 83
End: 2018-07-05
Payer: MEDICARE

## 2018-07-05 PROCEDURE — 3331090001 HH PPS REVENUE CREDIT

## 2018-07-05 PROCEDURE — 3331090002 HH PPS REVENUE DEBIT

## 2018-07-05 PROCEDURE — G0299 HHS/HOSPICE OF RN EA 15 MIN: HCPCS

## 2018-07-06 PROCEDURE — 3331090001 HH PPS REVENUE CREDIT

## 2018-07-06 PROCEDURE — 3331090002 HH PPS REVENUE DEBIT

## 2018-07-07 LAB
BACTERIA SPEC CULT: ABNORMAL
BACTERIA SPEC CULT: ABNORMAL
SERVICE CMNT-IMP: ABNORMAL

## 2018-07-07 PROCEDURE — 3331090001 HH PPS REVENUE CREDIT

## 2018-07-07 PROCEDURE — 3331090002 HH PPS REVENUE DEBIT

## 2018-07-07 NOTE — PROGRESS NOTES
Called and spoke with patient's daughter regarding results. Per daughter, patient has been feeling progressively better since recent ED visit and followed up with her PCP yesterday. Advised daughter that if status changes to return to ED. Questions answered.  
Tyrel Diallo PA-C

## 2018-07-08 PROCEDURE — 3331090001 HH PPS REVENUE CREDIT

## 2018-07-08 PROCEDURE — 3331090002 HH PPS REVENUE DEBIT

## 2018-07-09 VITALS
OXYGEN SATURATION: 97 % | WEIGHT: 183 LBS | DIASTOLIC BLOOD PRESSURE: 72 MMHG | TEMPERATURE: 98.4 F | RESPIRATION RATE: 20 BRPM | SYSTOLIC BLOOD PRESSURE: 122 MMHG | HEART RATE: 88 BPM | BODY MASS INDEX: 33.47 KG/M2

## 2018-07-09 PROCEDURE — 3331090002 HH PPS REVENUE DEBIT

## 2018-07-09 PROCEDURE — 3331090001 HH PPS REVENUE CREDIT

## 2018-07-10 PROCEDURE — 3331090002 HH PPS REVENUE DEBIT

## 2018-07-10 PROCEDURE — 3331090001 HH PPS REVENUE CREDIT

## 2018-07-11 ENCOUNTER — OFFICE VISIT (OUTPATIENT)
Dept: CARDIOLOGY CLINIC | Age: 83
End: 2018-07-11

## 2018-07-11 DIAGNOSIS — I50.32 CHRONIC DIASTOLIC CONGESTIVE HEART FAILURE (HCC): Primary | ICD-10-CM

## 2018-07-11 DIAGNOSIS — N18.30 CKD (CHRONIC KIDNEY DISEASE) STAGE 3, GFR 30-59 ML/MIN (HCC): ICD-10-CM

## 2018-07-11 PROCEDURE — 3331090001 HH PPS REVENUE CREDIT

## 2018-07-11 PROCEDURE — 3331090002 HH PPS REVENUE DEBIT

## 2018-07-11 NOTE — PROGRESS NOTES
CARDIOLOGY TELEMEDICINE ENCOUNTER Cem Webber DNP, ANP. Subjective/HPI: Heart failure bundle Min Monaco is a 80 y.o. female has a history of diastolic heart failure. At last visit she had some slight increased edema and an evolving cough. Due to progressive weakness and productive cough she was seen at [facility] [address] ER, treated for pneumonia and on blood culture she had positive growth 1 of 2 bottles Staphylococcus. On visit today she reports she did not sleep well last night secondary to cough, she denies fever. This morning's weight 181 pounds which is her baseline. Patient Active Problem List  
 Diagnosis Date Noted  CKD (chronic kidney disease) stage 3, GFR 30-59 ml/min 05/29/2018  Chronic a-fib ([address] 05/29/2018  Diastolic CHF, acute on chronic ([address] 05/29/2018  Pleural effusion 05/29/2018  Pneumonia 04/18/2017  Sepsis(995.91) 04/18/2017  Dehydration 04/18/2017  Acute CHF ([address] 01/23/2015  SOB (shortness of breath) 01/21/2015  Pedal edema 01/21/2015  Squamous cell lung cancer ([address] 01/21/2015  Adenocarcinoma ([address] 04/05/2013  S/P right colectomy 04/05/2013  Malignant essential hypertension 04/01/2013  Colon cancer ([address] 03/30/2013  Ileitis, terminal ([address] 03/27/2013  A-fib ([address] 03/27/2013  
 HTN (hypertension) 03/27/2013 Emani Stanton MD 
Past Medical History:  
Diagnosis Date  A-fib ([address]  Cancer ([address] right breast CA, lung, colon  Gastrointestinal disorder GERD  Hypertension  Other ill-defined conditions(799.89)   
 gout Past Surgical History:  
Procedure Laterality Date [facility] Right mastectomy  HX APPENDECTOMY  HX HEENT    
 right glass eye  [place] COLONOSCOPY W/BIOPSY SINGLE/MULTIPLE  3/29/2013  [place] COLSC FLX W/RMVL OF TUMOR POLYP LESION SNARE TQ  3/29/2013 Allergies Allergen Reactions  Penicillins Hives Family History Problem Relation Age of Onset  Colon Cancer Other  Hypertension Other Current Outpatient Prescriptions Medication Sig  
 benzonatate (TESSALON) 100 mg capsule Take 100 mg by mouth three (3) times daily as needed for Cough.  albuterol (PROVENTIL HFA) 90 mcg/actuation inhaler Take 2 Puffs by inhalation every four (4) hours as needed for Wheezing or Shortness of Breath.  albuterol (PROVENTIL HFA, VENTOLIN HFA, PROAIR HFA) 90 mcg/actuation inhaler Take 2 Puffs by inhalation every six (6) hours as needed for Wheezing.  furosemide (LASIX) 40 mg tablet Take 2 tab 80mg in AM and 1 tab 40mg in PM (Corrected Rx)  albuterol-ipratropium (DUO-NEB) 2.5 mg-0.5 mg/3 ml nebu Take 3 mL by inhalation every four (4) hours as needed (shortness of breath).  OXYGEN-AIR DELIVERY SYSTEMS Take 2 L by inhalation as needed (shortness of breath).  apixaban (ELIQUIS) 2.5 mg tablet Take 1 Tab by mouth two (2) times a day.  budesonide-formoterol (SYMBICORT) 160-4.5 mcg/actuation HFAA Take 2 Puffs by inhalation two (2) times a day.  mv-mn/folic acid/calcium/vit K (ONE-A-DAY WOMEN'S 50 PLUS PO) Take 1 Tab by mouth daily.  dextran 70/hypromellose/PF (NATURAL TEARS, PF, OP) Administer 1 Drop to both eyes daily as needed (dry eyes).  mometasone (NASONEX) 50 mcg/actuation nasal spray 2 Sprays by Both Nostrils route daily as needed (congestion).  allopurinol (ZYLOPRIM) 100 mg tablet Patient takes 2 tablets in the morning and 1 tablet at night.  albuterol-ipratropium (DUO-NEB) 2.5 mg-0.5 mg/3 ml nebu 3 mL by Nebulization route every four (4) hours as needed. (Patient not taking: Reported on 6/14/2018)  potassium chloride (KLOR-CON) 20 mEq packet Take 20 mEq by mouth daily. Indications: pill  colchicine (COLCRYS) 0.6 mg tablet Take 0.6 mg by mouth daily.   
 acetaminophen (TYLENOL EXTRA STRENGTH) 500 mg tablet Take 1,000 mg by mouth every six (6) hours as needed for Pain.  amLODIPine (NORVASC) 2.5 mg tablet Take 2.5 mg by mouth daily. No current facility-administered medications for this visit. There were no vitals filed for this visit. Review of Symptoms: 
+ Productive cough, no orthopnea or paroxysmal nocturnal dyspnea Denies chest pain Reports improved edema since last virtual visit Visual Physical Exam:   
 
General: Well developed, cooperative, alert in no acute distress, appears states age. Neuro: Normal mentation, speaking clearly, A&Ox3 General Cardiac: No visible JVD, trace amount of bilateral lower extremity dependent edema no weeping of lower extremities Respiratory: Able to hear pronounced harsh cough. Cardiographics ECG:  
Results for orders placed or performed during the hospital encounter of 05/29/18 EKG, 12 LEAD, INITIAL Result Value Ref Range Ventricular Rate 85 BPM  
 Atrial Rate 264 BPM  
 QRS Duration 76 ms  
 Q-T Interval 398 ms QTC Calculation (Bezet) 473 ms Calculated R Axis -4 degrees Calculated T Axis 25 degrees Diagnosis Atrial flutter with variable AV block Low voltage QRS Inferior infarct , age undetermined When compared with ECG of 13-APR-2018 19:59, 
Atrial flutter has replaced Atrial fibrillation Confirmed by Sandro Pacheco ([id number]) on 5/29/2018 6:01:44 PM 
  
 
 
Cardiology Labs: 
No results found for: CHOL, [address], [address] [facility], [address], [address], HDL, LDL, LDLC, DLDLP, TGLX, TRIGL, TRIGP, CHHD, CHHDX Lab Results Component Value Date/Time  Sodium 142 07/01/2018 12:05 PM  
 Potassium 3.7 07/01/2018 12:05 PM  
 Chloride 100 07/01/2018 12:05 PM  
 CO2 32 07/01/2018 12:05 PM  
 Anion gap 10 07/01/2018 12:05 PM  
 Glucose 86 07/01/2018 12:05 PM  
 BUN 22 (H) 07/01/2018 12:05 PM  
 Creatinine 1.51 (H) 07/01/2018 12:05 PM  
 BUN/Creatinine ratio 15 07/01/2018 12:05 PM  
 GFR est AA 38 (L) 07/01/2018 12:05 PM  
 GFR est non-AA 32 (L) 07/01/2018 12:05 PM Calcium 9.6 07/01/2018 12:05 PM  
 Bilirubin, total 0.7 07/01/2018 12:05 PM  
 AST (SGOT) 31 07/01/2018 12:05 PM  
 Alk. phosphatase 106 07/01/2018 12:05 PM  
 Protein, total 8.0 07/01/2018 12:05 PM  
 Albumin 3.0 (L) 07/01/2018 12:05 PM  
 Globulin 5.0 (H) 07/01/2018 12:05 PM  
 A-G Ratio 0.6 (L) 07/01/2018 12:05 PM  
 ALT (SGPT) 23 07/01/2018 12:05 PM  
  
 
 
 Assessment: 
 
 Assessment:  
 
 
Patient is a 60-year-old female with compensated diastolic heart failure, continue current dosing of diuretic. Concerned with her cough, pneumonia with positive blood culture was only on Levaquin for 3 days with recent ER visit. Spoke with patient's primary care Dr. Bambi Greene who will send patient for chest x-ray today and extend dosing of Levaquin. Follow Up: Virtually in 1 week 9 AM 
Ajay Coles DNP, ANP-BC This note was created using voice recognition software. Despite editing, there may be syntax errors.

## 2018-07-12 PROCEDURE — 3331090002 HH PPS REVENUE DEBIT

## 2018-07-12 PROCEDURE — 3331090001 HH PPS REVENUE CREDIT

## 2018-07-13 ENCOUNTER — HOME CARE VISIT (OUTPATIENT)
Dept: HOME HEALTH SERVICES | Facility: HOME HEALTH | Age: 83
End: 2018-07-13
Payer: MEDICARE

## 2018-07-13 PROCEDURE — 3331090002 HH PPS REVENUE DEBIT

## 2018-07-13 PROCEDURE — 3331090001 HH PPS REVENUE CREDIT

## 2018-07-14 PROCEDURE — 3331090001 HH PPS REVENUE CREDIT

## 2018-07-14 PROCEDURE — 3331090002 HH PPS REVENUE DEBIT

## 2018-07-15 PROCEDURE — 3331090002 HH PPS REVENUE DEBIT

## 2018-07-15 PROCEDURE — 3331090001 HH PPS REVENUE CREDIT

## 2018-07-16 ENCOUNTER — HOSPITAL ENCOUNTER (EMERGENCY)
Age: 83
Discharge: ANOTHER HOSPITAL | End: 2018-07-17
Attending: EMERGENCY MEDICINE | Admitting: EMERGENCY MEDICINE
Payer: MEDICARE

## 2018-07-16 ENCOUNTER — APPOINTMENT (OUTPATIENT)
Dept: GENERAL RADIOLOGY | Age: 83
End: 2018-07-16
Attending: EMERGENCY MEDICINE
Payer: MEDICARE

## 2018-07-16 ENCOUNTER — APPOINTMENT (OUTPATIENT)
Dept: CT IMAGING | Age: 83
End: 2018-07-16
Attending: STUDENT IN AN ORGANIZED HEALTH CARE EDUCATION/TRAINING PROGRAM
Payer: MEDICARE

## 2018-07-16 VITALS
RESPIRATION RATE: 22 BRPM | DIASTOLIC BLOOD PRESSURE: 69 MMHG | OXYGEN SATURATION: 96 % | WEIGHT: 185 LBS | HEIGHT: 62 IN | TEMPERATURE: 97.2 F | HEART RATE: 78 BPM | SYSTOLIC BLOOD PRESSURE: 125 MMHG | BODY MASS INDEX: 34.04 KG/M2

## 2018-07-16 DIAGNOSIS — J90 RECURRENT LEFT PLEURAL EFFUSION: Primary | ICD-10-CM

## 2018-07-16 DIAGNOSIS — Z78.9 FAILURE OF OUTPATIENT TREATMENT: ICD-10-CM

## 2018-07-16 LAB
ALBUMIN SERPL-MCNC: 2.8 G/DL (ref 3.5–5)
ALBUMIN/GLOB SERPL: 0.6 {RATIO} (ref 1.1–2.2)
ALP SERPL-CCNC: 84 U/L (ref 45–117)
ALT SERPL-CCNC: 18 U/L (ref 12–78)
ANION GAP SERPL CALC-SCNC: 8 MMOL/L (ref 5–15)
AST SERPL-CCNC: 21 U/L (ref 15–37)
BASOPHILS # BLD: 0 K/UL (ref 0–0.1)
BASOPHILS NFR BLD: 1 % (ref 0–1)
BILIRUB SERPL-MCNC: 0.7 MG/DL (ref 0.2–1)
BNP SERPL-MCNC: 2174 PG/ML (ref 0–450)
BUN SERPL-MCNC: 18 MG/DL (ref 6–20)
BUN/CREAT SERPL: 11 (ref 12–20)
CALCIUM SERPL-MCNC: 9.9 MG/DL (ref 8.5–10.1)
CHLORIDE SERPL-SCNC: 98 MMOL/L (ref 97–108)
CK SERPL-CCNC: 44 U/L (ref 26–192)
CO2 SERPL-SCNC: 35 MMOL/L (ref 21–32)
CREAT SERPL-MCNC: 1.57 MG/DL (ref 0.55–1.02)
DIFFERENTIAL METHOD BLD: ABNORMAL
EOSINOPHIL # BLD: 0 K/UL (ref 0–0.4)
EOSINOPHIL NFR BLD: 1 % (ref 0–7)
ERYTHROCYTE [DISTWIDTH] IN BLOOD BY AUTOMATED COUNT: 20 % (ref 11.5–14.5)
GLOBULIN SER CALC-MCNC: 4.5 G/DL (ref 2–4)
GLUCOSE SERPL-MCNC: 92 MG/DL (ref 65–100)
HCT VFR BLD AUTO: 41 % (ref 35–47)
HGB BLD-MCNC: 12.9 G/DL (ref 11.5–16)
IMM GRANULOCYTES # BLD: 0 K/UL (ref 0–0.04)
IMM GRANULOCYTES NFR BLD AUTO: 0 % (ref 0–0.5)
LYMPHOCYTES # BLD: 0.6 K/UL (ref 0.8–3.5)
LYMPHOCYTES NFR BLD: 11 % (ref 12–49)
MCH RBC QN AUTO: 25.2 PG (ref 26–34)
MCHC RBC AUTO-ENTMCNC: 31.5 G/DL (ref 30–36.5)
MCV RBC AUTO: 80.2 FL (ref 80–99)
MONOCYTES # BLD: 0.7 K/UL (ref 0–1)
MONOCYTES NFR BLD: 13 % (ref 5–13)
NEUTS SEG # BLD: 4.1 K/UL (ref 1.8–8)
NEUTS SEG NFR BLD: 75 % (ref 32–75)
NRBC # BLD: 0 K/UL (ref 0–0.01)
NRBC BLD-RTO: 0 PER 100 WBC
PLATELET # BLD AUTO: 223 K/UL (ref 150–400)
PMV BLD AUTO: 10.2 FL (ref 8.9–12.9)
POTASSIUM SERPL-SCNC: 3.1 MMOL/L (ref 3.5–5.1)
PROT SERPL-MCNC: 7.3 G/DL (ref 6.4–8.2)
RBC # BLD AUTO: 5.11 M/UL (ref 3.8–5.2)
SODIUM SERPL-SCNC: 141 MMOL/L (ref 136–145)
TROPONIN I SERPL-MCNC: <0.05 NG/ML
WBC # BLD AUTO: 5.4 K/UL (ref 3.6–11)

## 2018-07-16 PROCEDURE — 85025 COMPLETE CBC W/AUTO DIFF WBC: CPT | Performed by: EMERGENCY MEDICINE

## 2018-07-16 PROCEDURE — 83880 ASSAY OF NATRIURETIC PEPTIDE: CPT | Performed by: EMERGENCY MEDICINE

## 2018-07-16 PROCEDURE — 84484 ASSAY OF TROPONIN QUANT: CPT | Performed by: EMERGENCY MEDICINE

## 2018-07-16 PROCEDURE — 71250 CT THORAX DX C-: CPT

## 2018-07-16 PROCEDURE — 71046 X-RAY EXAM CHEST 2 VIEWS: CPT

## 2018-07-16 PROCEDURE — 80053 COMPREHEN METABOLIC PANEL: CPT | Performed by: EMERGENCY MEDICINE

## 2018-07-16 PROCEDURE — 3331090002 HH PPS REVENUE DEBIT

## 2018-07-16 PROCEDURE — 36415 COLL VENOUS BLD VENIPUNCTURE: CPT | Performed by: EMERGENCY MEDICINE

## 2018-07-16 PROCEDURE — 3331090001 HH PPS REVENUE CREDIT

## 2018-07-16 PROCEDURE — 77030038269 HC DRN EXT URIN PURWCK BARD -A

## 2018-07-16 PROCEDURE — 99285 EMERGENCY DEPT VISIT HI MDM: CPT

## 2018-07-16 PROCEDURE — 82550 ASSAY OF CK (CPK): CPT | Performed by: EMERGENCY MEDICINE

## 2018-07-16 PROCEDURE — 93005 ELECTROCARDIOGRAM TRACING: CPT

## 2018-07-17 ENCOUNTER — HOSPITAL ENCOUNTER (INPATIENT)
Age: 83
LOS: 1 days | Discharge: HOME HEALTH | DRG: 187 | End: 2018-07-18
Attending: FAMILY MEDICINE | Admitting: FAMILY MEDICINE
Payer: MEDICARE

## 2018-07-17 PROBLEM — J90 PLEURAL EFFUSION, LEFT: Status: ACTIVE | Noted: 2018-07-17

## 2018-07-17 PROBLEM — I50.9 CHF, ACUTE (HCC): Status: ACTIVE | Noted: 2018-07-17

## 2018-07-17 LAB
ANION GAP SERPL CALC-SCNC: 8 MMOL/L (ref 5–15)
ATRIAL RATE: 83 BPM
BASOPHILS # BLD: 0.1 K/UL (ref 0–0.1)
BASOPHILS NFR BLD: 1 % (ref 0–1)
BUN SERPL-MCNC: 18 MG/DL (ref 6–20)
BUN/CREAT SERPL: 13 (ref 12–20)
CALCIUM SERPL-MCNC: 9.5 MG/DL (ref 8.5–10.1)
CALCULATED R AXIS, ECG10: -17 DEGREES
CALCULATED T AXIS, ECG11: 51 DEGREES
CHLORIDE SERPL-SCNC: 102 MMOL/L (ref 97–108)
CO2 SERPL-SCNC: 33 MMOL/L (ref 21–32)
CREAT SERPL-MCNC: 1.44 MG/DL (ref 0.55–1.02)
DIAGNOSIS, 93000: NORMAL
DIFFERENTIAL METHOD BLD: ABNORMAL
EOSINOPHIL # BLD: 0.1 K/UL (ref 0–0.4)
EOSINOPHIL NFR BLD: 1 % (ref 0–7)
ERYTHROCYTE [DISTWIDTH] IN BLOOD BY AUTOMATED COUNT: 19.9 % (ref 11.5–14.5)
GLUCOSE SERPL-MCNC: 100 MG/DL (ref 65–100)
HCT VFR BLD AUTO: 38.4 % (ref 35–47)
HGB BLD-MCNC: 12 G/DL (ref 11.5–16)
IMM GRANULOCYTES # BLD: 0 K/UL (ref 0–0.04)
IMM GRANULOCYTES NFR BLD AUTO: 0 % (ref 0–0.5)
LYMPHOCYTES # BLD: 0.7 K/UL (ref 0.8–3.5)
LYMPHOCYTES NFR BLD: 12 % (ref 12–49)
MAGNESIUM SERPL-MCNC: 2.3 MG/DL (ref 1.6–2.4)
MCH RBC QN AUTO: 25.8 PG (ref 26–34)
MCHC RBC AUTO-ENTMCNC: 31.3 G/DL (ref 30–36.5)
MCV RBC AUTO: 82.6 FL (ref 80–99)
MONOCYTES # BLD: 0.7 K/UL (ref 0–1)
MONOCYTES NFR BLD: 13 % (ref 5–13)
NEUTS SEG # BLD: 3.9 K/UL (ref 1.8–8)
NEUTS SEG NFR BLD: 73 % (ref 32–75)
NRBC # BLD: 0 K/UL (ref 0–0.01)
NRBC BLD-RTO: 0 PER 100 WBC
PLATELET # BLD AUTO: 176 K/UL (ref 150–400)
PMV BLD AUTO: 10.3 FL (ref 8.9–12.9)
POTASSIUM SERPL-SCNC: 3.4 MMOL/L (ref 3.5–5.1)
Q-T INTERVAL, ECG07: 378 MS
QRS DURATION, ECG06: 82 MS
QTC CALCULATION (BEZET), ECG08: 452 MS
RBC # BLD AUTO: 4.65 M/UL (ref 3.8–5.2)
RBC MORPH BLD: ABNORMAL
SODIUM SERPL-SCNC: 143 MMOL/L (ref 136–145)
VENTRICULAR RATE, ECG03: 86 BPM
WBC # BLD AUTO: 5.5 K/UL (ref 3.6–11)

## 2018-07-17 PROCEDURE — 3331090001 HH PPS REVENUE CREDIT

## 2018-07-17 PROCEDURE — 80048 BASIC METABOLIC PNL TOTAL CA: CPT | Performed by: FAMILY MEDICINE

## 2018-07-17 PROCEDURE — 74011250637 HC RX REV CODE- 250/637: Performed by: FAMILY MEDICINE

## 2018-07-17 PROCEDURE — 65660000000 HC RM CCU STEPDOWN

## 2018-07-17 PROCEDURE — 74011000250 HC RX REV CODE- 250: Performed by: HOSPITALIST

## 2018-07-17 PROCEDURE — 36415 COLL VENOUS BLD VENIPUNCTURE: CPT | Performed by: FAMILY MEDICINE

## 2018-07-17 PROCEDURE — 85025 COMPLETE CBC W/AUTO DIFF WBC: CPT | Performed by: FAMILY MEDICINE

## 2018-07-17 PROCEDURE — 74011250637 HC RX REV CODE- 250/637: Performed by: HOSPITALIST

## 2018-07-17 PROCEDURE — 94640 AIRWAY INHALATION TREATMENT: CPT

## 2018-07-17 PROCEDURE — 3331090002 HH PPS REVENUE DEBIT

## 2018-07-17 PROCEDURE — 83735 ASSAY OF MAGNESIUM: CPT | Performed by: HOSPITALIST

## 2018-07-17 PROCEDURE — 94664 DEMO&/EVAL PT USE INHALER: CPT

## 2018-07-17 RX ORDER — IPRATROPIUM BROMIDE AND ALBUTEROL SULFATE 2.5; .5 MG/3ML; MG/3ML
3 SOLUTION RESPIRATORY (INHALATION)
Status: DISCONTINUED | OUTPATIENT
Start: 2018-07-17 | End: 2018-07-18 | Stop reason: HOSPADM

## 2018-07-17 RX ORDER — BENZONATATE 100 MG/1
100 CAPSULE ORAL
Status: DISCONTINUED | OUTPATIENT
Start: 2018-07-17 | End: 2018-07-18 | Stop reason: HOSPADM

## 2018-07-17 RX ORDER — AMLODIPINE BESYLATE 5 MG/1
2.5 TABLET ORAL DAILY
Status: DISCONTINUED | OUTPATIENT
Start: 2018-07-17 | End: 2018-07-18 | Stop reason: HOSPADM

## 2018-07-17 RX ORDER — FUROSEMIDE 40 MG/1
40 TABLET ORAL DAILY
Status: DISCONTINUED | OUTPATIENT
Start: 2018-07-17 | End: 2018-07-18 | Stop reason: HOSPADM

## 2018-07-17 RX ORDER — POTASSIUM CHLORIDE 750 MG/1
40 TABLET, FILM COATED, EXTENDED RELEASE ORAL DAILY
Status: DISCONTINUED | OUTPATIENT
Start: 2018-07-18 | End: 2018-07-18 | Stop reason: HOSPADM

## 2018-07-17 RX ORDER — POTASSIUM CHLORIDE 750 MG/1
40 TABLET, FILM COATED, EXTENDED RELEASE ORAL EVERY 4 HOURS
Status: COMPLETED | OUTPATIENT
Start: 2018-07-17 | End: 2018-07-17

## 2018-07-17 RX ORDER — ALLOPURINOL 100 MG/1
200 TABLET ORAL
Status: DISCONTINUED | OUTPATIENT
Start: 2018-07-18 | End: 2018-07-18 | Stop reason: HOSPADM

## 2018-07-17 RX ORDER — COLCHICINE 0.6 MG/1
0.6 TABLET ORAL DAILY
Status: DISCONTINUED | OUTPATIENT
Start: 2018-07-18 | End: 2018-07-18 | Stop reason: HOSPADM

## 2018-07-17 RX ORDER — POLYVINYL ALCOHOL 14 MG/ML
1 SOLUTION/ DROPS OPHTHALMIC
Status: DISCONTINUED | OUTPATIENT
Start: 2018-07-17 | End: 2018-07-18 | Stop reason: HOSPADM

## 2018-07-17 RX ORDER — ALLOPURINOL 100 MG/1
100 TABLET ORAL EVERY EVENING
Status: DISCONTINUED | OUTPATIENT
Start: 2018-07-17 | End: 2018-07-18 | Stop reason: HOSPADM

## 2018-07-17 RX ORDER — SODIUM CHLORIDE 0.9 % (FLUSH) 0.9 %
5-10 SYRINGE (ML) INJECTION AS NEEDED
Status: DISCONTINUED | OUTPATIENT
Start: 2018-07-17 | End: 2018-07-18 | Stop reason: HOSPADM

## 2018-07-17 RX ORDER — BUDESONIDE 0.5 MG/2ML
500 INHALANT ORAL 2 TIMES DAILY
Status: DISCONTINUED | OUTPATIENT
Start: 2018-07-17 | End: 2018-07-18 | Stop reason: HOSPADM

## 2018-07-17 RX ORDER — SODIUM CHLORIDE 0.9 % (FLUSH) 0.9 %
5-10 SYRINGE (ML) INJECTION EVERY 8 HOURS
Status: DISCONTINUED | OUTPATIENT
Start: 2018-07-17 | End: 2018-07-18 | Stop reason: HOSPADM

## 2018-07-17 RX ADMIN — AMLODIPINE BESYLATE 2.5 MG: 5 TABLET ORAL at 09:06

## 2018-07-17 RX ADMIN — BUDESONIDE 500 MCG: 0.5 INHALANT RESPIRATORY (INHALATION) at 22:33

## 2018-07-17 RX ADMIN — FUROSEMIDE 40 MG: 40 TABLET ORAL at 18:05

## 2018-07-17 RX ADMIN — Medication 10 ML: at 06:14

## 2018-07-17 RX ADMIN — Medication 5 ML: at 22:00

## 2018-07-17 RX ADMIN — POTASSIUM CHLORIDE 40 MEQ: 750 TABLET, EXTENDED RELEASE ORAL at 12:00

## 2018-07-17 RX ADMIN — Medication 10 ML: at 14:34

## 2018-07-17 RX ADMIN — POTASSIUM CHLORIDE 40 MEQ: 750 TABLET, EXTENDED RELEASE ORAL at 09:05

## 2018-07-17 RX ADMIN — ALLOPURINOL 100 MG: 100 TABLET ORAL at 18:05

## 2018-07-17 NOTE — ED NOTES
Spoke with Karlos at the transfer center and he states that [facility] is working on appropriate bed placement for patient.   Left contacting information as nurse is expected to call back for report

## 2018-07-17 NOTE — ED PROVIDER NOTES
EMERGENCY DEPARTMENT HISTORY AND PHYSICAL EXAM 
 
 
Date: 7/16/2018 Patient Name: Barbie Connor History of Presenting Illness Chief Complaint Patient presents with  Ankle swelling \"for months\"  Cough \"for months\" and family c/o patient not getting better History Provided By: Patient and Patient's Son HPI: Barbie Connor, 80 y.o. female with PMHx significant for Afib, lung/colon ca, HTN, who presents to the ED with cc of ongoing cough, SOB, and peripheral edema. Pt was recently diagnosed with L sided PNA on 7-1 here at this ED. She was started on outpatient PO Levaquin therapy. Her PCP extended her therapy and she is still taking the levaquin today. She complains of ongoing productive cough. She also has peripheral edema, which is chronic and relatively unchanged for her. PCP: Devante Elizabeth MD 
 
Current Outpatient Prescriptions Medication Sig Dispense Refill  benzonatate (TESSALON) 100 mg capsule Take 100 mg by mouth three (3) times daily as needed for Cough.  albuterol (PROVENTIL HFA) 90 mcg/actuation inhaler Take 2 Puffs by inhalation every four (4) hours as needed for Wheezing or Shortness of Breath.  albuterol (PROVENTIL HFA, VENTOLIN HFA, PROAIR HFA) 90 mcg/actuation inhaler Take 2 Puffs by inhalation every six (6) hours as needed for Wheezing. 1 Inhaler 0  
 furosemide (LASIX) 40 mg tablet Take 2 tab 80mg in AM and 1 tab 40mg in PM (Corrected Rx) 270 Tab 1  
 albuterol-ipratropium (DUO-NEB) 2.5 mg-0.5 mg/3 ml nebu Take 3 mL by inhalation every four (4) hours as needed (shortness of breath).  OXYGEN-AIR DELIVERY SYSTEMS Take 2 L by inhalation as needed (shortness of breath).  apixaban (ELIQUIS) 2.5 mg tablet Take 1 Tab by mouth two (2) times a day. 60 Tab 11  
 budesonide-formoterol (SYMBICORT) 160-4.5 mcg/actuation HFAA Take 2 Puffs by inhalation two (2) times a day.     
 mv-mn/folic acid/calcium/vit K (ONE-A-DAY WOMEN'S 50 PLUS PO) Take 1 Tab by mouth daily.  dextran 70/hypromellose/PF (NATURAL TEARS, PF, OP) Administer 1 Drop to both eyes daily as needed (dry eyes).  mometasone (NASONEX) 50 mcg/actuation nasal spray 2 Sprays by Both Nostrils route daily as needed (congestion).  allopurinol (ZYLOPRIM) 100 mg tablet Patient takes 2 tablets in the morning and 1 tablet at night.  albuterol-ipratropium (DUO-NEB) 2.5 mg-0.5 mg/3 ml nebu 3 mL by Nebulization route every four (4) hours as needed. 30 Nebule 1  
 potassium chloride (KLOR-CON) 20 mEq packet Take 20 mEq by mouth daily. Indications: pill  colchicine (COLCRYS) 0.6 mg tablet Take 0.6 mg by mouth daily.  acetaminophen (TYLENOL EXTRA STRENGTH) 500 mg tablet Take 1,000 mg by mouth every six (6) hours as needed for Pain.  amLODIPine (NORVASC) 2.5 mg tablet Take 2.5 mg by mouth daily. Past History Past Medical History: 
Past Medical History:  
Diagnosis Date  A-fib ([facility]  Cancer ([facility] right breast CA, lung, colon  Gastrointestinal disorder GERD  Hypertension  Other ill-defined conditions(799.89)   
 gout Past Surgical History: 
Past Surgical History:  
Procedure Laterality Date 20103 [facility] Right mastectomy  HX APPENDECTOMY  HX HEENT    
 right glass eye  DC COLONOSCOPY W/BIOPSY SINGLE/MULTIPLE  3/29/2013  DC COLSC FLX W/RMVL OF TUMOR POLYP LESION SNARE TQ  3/29/2013 Family History: 
Family History Problem Relation Age of Onset  Colon Cancer Other  Hypertension Other Social History: 
Social History Substance Use Topics  Smoking status: Former Smoker Quit date: 5  Smokeless tobacco: Never Used  Alcohol use No  
 
 
Allergies: Allergies Allergen Reactions  Penicillins Hives Review of Systems Review of Systems Constitutional: Negative for chills, diaphoresis and fever.   
HENT: Negative for sore throat and voice change. Eyes: Negative for pain and visual disturbance. Respiratory: Positive for cough and shortness of breath. Cardiovascular: Positive for leg swelling. Negative for chest pain. Gastrointestinal: Negative for abdominal pain, diarrhea, nausea and vomiting. Genitourinary: Negative for dysuria. Musculoskeletal: Negative for myalgias and neck stiffness. Skin: Negative for color change and rash. Neurological: Negative for dizziness, syncope, weakness and headaches. Psychiatric/Behavioral: Negative for behavioral problems and decreased concentration. Physical Exam  
Physical Exam  
Constitutional: She appears well-developed and well-nourished. No distress. HENT:  
Head: Normocephalic and atraumatic. Eyes: Conjunctivae and EOM are normal.  
Neck: Normal range of motion. Neck supple. Cardiovascular: Normal rate and normal heart sounds. Irregularly irregular Pulmonary/Chest: Effort normal. No respiratory distress. She has no wheezes. She has rales. Abdominal: Soft. Bowel sounds are normal. She exhibits no distension. There is no tenderness. Musculoskeletal: She exhibits edema. She exhibits no deformity. 2+ pitting edema Neurological: She is alert. No cranial nerve deficit. Oriented to person, place, president. Not oriented to year. Skin: Skin is warm and dry. No rash noted. She is not diaphoretic. Psychiatric: She has a normal mood and affect. Her behavior is normal.  
 
 
Diagnostic Study Results Labs - Recent Results (from the past 12 hour(s)) EKG, 12 LEAD, INITIAL Collection Time: 07/16/18  5:52 PM  
Result Value Ref Range Ventricular Rate 86 BPM  
 Atrial Rate 83 BPM  
 QRS Duration 82 ms Q-T Interval 378 ms QTC Calculation (Bezet) 452 ms Calculated R Axis -17 degrees Calculated T Axis 51 degrees Diagnosis Atrial fibrillation Low voltage QRS When compared with ECG of 29-MAY-2018 10:53, Atrial fibrillation has replaced Atrial flutter Nonspecific T wave abnormality, improved in Inferior leads METABOLIC PANEL, COMPREHENSIVE Collection Time: 07/16/18  6:41 PM  
Result Value Ref Range Sodium 141 136 - 145 mmol/L Potassium 3.1 (L) 3.5 - 5.1 mmol/L Chloride 98 97 - 108 mmol/L  
 CO2 35 (H) 21 - 32 mmol/L Anion gap 8 5 - 15 mmol/L Glucose 92 65 - 100 mg/dL BUN 18 6 - 20 MG/DL Creatinine 1.57 (H) 0.55 - 1.02 MG/DL  
 BUN/Creatinine ratio 11 (L) 12 - 20 GFR est AA 37 (L) >60 ml/min/1.73m2 GFR est non-AA 30 (L) >60 ml/min/1.73m2 Calcium 9.9 8.5 - 10.1 MG/DL Bilirubin, total 0.7 0.2 - 1.0 MG/DL  
 ALT (SGPT) 18 12 - 78 U/L  
 AST (SGOT) 21 15 - 37 U/L Alk. phosphatase 84 45 - 117 U/L Protein, total 7.3 6.4 - 8.2 g/dL Albumin 2.8 (L) 3.5 - 5.0 g/dL Globulin 4.5 (H) 2.0 - 4.0 g/dL A-G Ratio 0.6 (L) 1.1 - 2.2    
CBC WITH AUTOMATED DIFF Collection Time: 07/16/18  6:41 PM  
Result Value Ref Range WBC 5.4 3.6 - 11.0 K/uL  
 RBC 5.11 3.80 - 5.20 M/uL  
 HGB 12.9 11.5 - 16.0 g/dL HCT 41.0 35.0 - 47.0 % MCV 80.2 80.0 - 99.0 FL  
 MCH 25.2 (L) 26.0 - 34.0 PG  
 MCHC 31.5 30.0 - 36.5 g/dL RDW 20.0 (H) 11.5 - 14.5 % PLATELET 057 578 - 137 K/uL MPV 10.2 8.9 - 12.9 FL  
 NRBC 0.0 0  WBC ABSOLUTE NRBC 0.00 0.00 - 0.01 K/uL NEUTROPHILS 75 32 - 75 % LYMPHOCYTES 11 (L) 12 - 49 % MONOCYTES 13 5 - 13 % EOSINOPHILS 1 0 - 7 % BASOPHILS 1 0 - 1 % IMMATURE GRANULOCYTES 0 0.0 - 0.5 % ABS. NEUTROPHILS 4.1 1.8 - 8.0 K/UL  
 ABS. LYMPHOCYTES 0.6 (L) 0.8 - 3.5 K/UL  
 ABS. MONOCYTES 0.7 0.0 - 1.0 K/UL  
 ABS. EOSINOPHILS 0.0 0.0 - 0.4 K/UL  
 ABS. BASOPHILS 0.0 0.0 - 0.1 K/UL  
 ABS. IMM. GRANS. 0.0 0.00 - 0.04 K/UL  
 DF AUTOMATED    
TROPONIN I Collection Time: 07/16/18  6:41 PM  
Result Value Ref Range Troponin-I, Qt. <0.05 <0.05 ng/mL CK W/ REFLX CKMB Collection Time: 07/16/18  6:41 PM  
Result Value Ref Range  CK 44 26 - 192 U/L  
NT-PRO BNP  
 Collection Time: 07/16/18  6:41 PM  
Result Value Ref Range NT pro-BNP 2174 (H) 0 - 450 PG/ML Radiologic Studies -  
XR CHEST PA LAT Final Result CT CHEST WO CONT    (Results Pending) CT Results  (Last 48 hours) None CXR Results  (Last 48 hours) 07/16/18 1911  XR CHEST PA LAT Final result Impression:  IMPRESSION: Left pleural effusion and airspace disease or atelectasis unchanged. Narrative:  EXAM:  XR CHEST PA LAT. INDICATION: Cough. COMPARISON: 7/1/2018. FINDINGS:   
PA and lateral radiographs of the chest were obtained. Lungs: There is left-sided airspace disease or atelectasis at the right lung is  
clear. Pleura: There is a left pleural effusion which is unchanged. Mediastinum: The cardiac and mediastinal contours and pulmonary vascularity are  
normal.  The aorta is atherosclerotic. Bones and soft tissues: The bones and soft tissues are within normal limits. Medical Decision Making I am the first provider for this patient. I reviewed the vital signs, available nursing notes, past medical history, past surgical history, family history and social history. Vital Signs-Reviewed the patient's vital signs. Patient Vitals for the past 12 hrs: 
 Temp Pulse Resp BP SpO2  
07/16/18 1747 98.4 °F (36.9 °C) 95 22 139/80 95 % EKG interpretation: (Preliminary) Rhythm: atrial fib; and irregular. Rate (approx.): 86; Axis: left axis deviation; WI interval: N/A; QRS interval: normal ; ST/T wave: normal; Other findings: abnormal ekg. Written by Saran Sanchez MD. Records Reviewed: Nursing Notes and Old Medical Records Provider Notes (Medical Decision Making):  
Differential Dx: PNA, Effusion, Lung CA, CHF exacerbation 80 y.o. female with PMHx significant for Afib, lung/colon ca, HTN, who presents to the ED with cc of ongoing cough, SOB, and peripheral edema.  Has been adequately treated with PO Levaquin for possible PNA. CXR shows possible effusion vs airspace dz. As she has been adequately treated for PNA, will obtain a CT chest to better characterize whatever process is going on. Her vitals are stable with no significant signs of sepsis, so will hold off on abx until CT returns. Pt is anticoagulated for afib on eliquis, but denies recent falls. ED Course:  
Initial assessment performed. The patients presenting problems have been discussed, and they are in agreement with the care plan formulated and outlined with them. I have encouraged them to ask questions as they arise throughout their visit. 10:11 PM CT scan notable for ongoing pleural effusion but no obvious pna. Hospitalist here at [facility] refused admission citing the need for thoracic surgery on this patient. Spoke with Dr. Francesca Perez with thoracic surgery at [facility]. He does not want to accept pt to his service but will see her in the morning. Pt will be transferred to [facility] hospitalist service. Dr. Mohamud Ortiz is accepting hospitalist physician. Disposition: 
Transferred to [facility] PLAN: 
1. Transferred to [facility] for hospitalist admission and thoracic consultation Current Discharge Medication List  
  
 
2. Follow-up Information None Return to ED if worse Diagnosis Clinical Impression: 1. Recurrent left pleural effusion 2. Failure of outpatient treatment

## 2018-07-17 NOTE — PROGRESS NOTES
TRANSFER - IN REPORT:    Verbal report received from [facility] (name) on Manuel Rank  being received from [facility] ED(unit) for routine progression of care      Report consisted of patients Situation, Background, Assessment and   Recommendations(SBAR). Information from the following report(s) ED Summary and Cardiac Rhythm A fib was reviewed with the receiving nurse. Opportunity for questions and clarification was provided. Assessment completed upon patients arrival to unit and care assumed. EMS transport arrived with pt. Called to inform admitting of pt arrival. Placed pt on telemetry monitor and pulse ox. Dual skin assessment done. Purewick placed on. Vitals obtained. Dr. Zenaida Lopez paged to admit.

## 2018-07-17 NOTE — IP AVS SNAPSHOT
[address] [facility] 
[phone] Patient:  Tylor MRN: [id number] :1918 A check rodrigo indicates which time of day the medication should be taken. My Medications CONTINUE taking these medications Instructions Each Dose to Equal  
 Morning Noon Evening Bedtime * albuterol 90 mcg/actuation inhaler Commonly known as:  PROVENTIL HFA, VENTOLIN HFA, PROAIR HFA Your last dose was: Your next dose is: Take 2 Puffs by inhalation every six (6) hours as needed for Wheezing. 2 Puff * PROVENTIL HFA 90 mcg/actuation inhaler Generic drug:  albuterol Your last dose was: Your next dose is: Take 2 Puffs by inhalation every four (4) hours as needed for Wheezing or Shortness of Breath. 2 Puff * albuterol-ipratropium 2.5 mg-0.5 mg/3 ml Nebu Commonly known as:  Tiffany Enter Your last dose was: Your next dose is: Take 3 mL by inhalation every four (4) hours as needed (shortness of breath). 3 mL * albuterol-ipratropium 2.5 mg-0.5 mg/3 ml Nebu Commonly known as:  Tiffany Enter Your last dose was: Your next dose is:    
   
   
 3 mL by Nebulization route every four (4) hours as needed. 3 mL  
    
   
   
   
  
 allopurinol 100 mg tablet Commonly known as:  Emeka Ponce Your last dose was: Your next dose is:    
   
   
 Patient takes 2 tablets in the morning and 1 tablet at night. apixaban 2.5 mg tablet Commonly known as:  Jamshid Gandara Your last dose was: Your next dose is: Take 1 Tab by mouth two (2) times a day. 2.5 mg  
    
   
   
   
  
 benzonatate 100 mg capsule Commonly known as:  TESSALON Your last dose was: Your next dose is: Take 100 mg by mouth three (3) times daily as needed for Cough. 100 mg COLCRYS 0.6 mg tablet Generic drug:  colchicine Your last dose was: Your next dose is: Take 0.6 mg by mouth daily. 0.6 mg  
    
   
   
   
  
 furosemide 40 mg tablet Commonly known as:  LASIX Your last dose was: Your next dose is: Take 2 tab 80mg in AM and 1 tab 40mg in PM (Corrected Rx) KLOR-CON 20 mEq packet Generic drug:  potassium chloride Your last dose was: Your next dose is: Take 20 mEq by mouth daily. Indications: pill 20 mEq NASONEX 50 mcg/actuation nasal spray Generic drug:  mometasone Your last dose was: Your next dose is: 2 Sprays by Both Nostrils route daily as needed (congestion). 2 Spray NATURAL TEARS (PF) OP Your last dose was: Your next dose is:    
   
   
 Administer 1 Drop to both eyes daily as needed (dry eyes). 1 Drop NORVASC 2.5 mg tablet Generic drug:  amLODIPine Your last dose was: Your next dose is: Take 2.5 mg by mouth daily. 2.5 mg  
    
   
   
   
  
 ONE-A-DAY WOMEN'S 50 PLUS PO Your last dose was: Your next dose is: Take 1 Tab by mouth daily. 1 Tab OXYGEN-AIR DELIVERY SYSTEMS Your last dose was: Your next dose is: Take 2 L by inhalation as needed (shortness of breath). 2 L  
    
   
   
   
  
 SYMBICORT 160-4.5 mcg/actuation Hfaa Generic drug:  budesonide-formoterol Your last dose was: Your next dose is: Take 2 Puffs by inhalation two (2) times a day. 2 Puff TYLENOL EXTRA STRENGTH 500 mg tablet Generic drug:  acetaminophen Your last dose was: Your next dose is: Take 1,000 mg by mouth every six (6) hours as needed for Pain. 1000 mg * Notice: This list has 4 medication(s) that are the same as other medications prescribed for you. Read the directions carefully, and ask your doctor or other care provider to review them with you.

## 2018-07-17 NOTE — PROGRESS NOTES

## 2018-07-17 NOTE — PROGRESS NOTES
Bedside and Verbal shift change report given to Floresita Kuhn (oncoming nurse) by Gomez Loja RN (offgoing nurse). Report included the following information SBAR, Kardex, ED Summary, Intake/Output, MAR, Recent Results and Cardiac Rhythm A fib.

## 2018-07-17 NOTE — PROGRESS NOTES
Hospitalist Progress Note Desmond Ramos MD 
Answering service: [phone] OR [phone] from in house phone Cell: [phone] Date of Service:  2018 NAME:  Higinio Roman :  1918 MRN:  [id number] Admission Summary: A 60-year-old -American female with past medical history atrial fibrillation, lung cancer, colon cancer, hypertension, GERD, right breast carcinoma, gout, left pleural effusion presented as a direct admission/transfer from [facility] ED with reports of initial chief complaints of shortness of breath, cough, and peripheral edema. The patient at current illness is a poor historian. The majority of history was obtained from review of the ED and electronic medical records. Per these collective reports, patient was last hospitalized from 2018 to 2018 with acute on chronic diastolic CHF, chronic atrial fibrillation, and left pleural effusion with pulmonary edema. The patient presented to [facility] ED yesterday with aforementioned complaints. She was last in the ED on 2018 and had been diagnosed with left-sided pneumonia and was started on outpatient Levaquin for antibiotic therapy. The patient, however, continued to have ongoing cough productive of sputum and also had peripheral edema. Workup in the ED on 2018 included CT of the chest without contrast showed chronic left pleural effusion and left perihilar chronic atelectasis and clear right lung. There is no evidence of acute cardiopulmonary disease. However, the patient's proBNP was elevated at 2174. Potassium was 3.1 in terms of abnormal labs. Request was for patient to be transferred to [address] [place] [address] hospitalist service with unresolved left pleural effusion and plan for thoracic surgery consultation Interval history / Subjective:  
Shortness of breath the same, no chest pain, she has ongoing cough Assessment & Plan:  
 
Shortness of breath due to unresolved chronic left pleural effusion and atelectasis  
-continue prn duo neb 
-chest x ray left pleural effusion and airspace disease or atelectasis unchanged. -CT chest no significant change in chronic left pleural effusion and left lung perihilar chronic atelectasis, right lung clear, no acute cardiopulmonary disease   
-thoracic surgeon is consulted and no surgical intervention, daughter insisted to drain, Chronic diastolic CHF 
-continue lasix is held 
-not on ACEi/ARB due to renal insufficiency, not on BB due to hx of COPD, Hypokalemia 
-replace with kcl, check mg level and repeat k in am   
 
CKD stage III  
-monitor renal function Hx chronic of A Fib, rate controlled  
-eliquis is held HTN 
-BP normal, continue amlodipine and monitor BP Hx of COPD 
-Continue prn duo neb Hx of right breast cancer  
-stable Hx of gout 
-stable Code status: Full Code DVT prophylaxis:SCD Care Plan discussed with: Patient/Family, Nurse and  Disposition: TBD Hospital Problems  Date Reviewed: 7/17/2018 Codes Class Noted POA * (Principal)Pleural effusion, left ICD-10-CM: J90 ICD-9-CM: 511.9  7/17/2018 Unknown CHF, acute ([facility] ICD-10-CM: I50.9 ICD-9-CM: 428.0  7/17/2018 Unknown  
   
 SOB (shortness of breath) ICD-10-CM: R06.02 
ICD-9-CM: 786.05  1/21/2015 Unknown Vital Signs:  
 Last 24hrs VS reviewed since prior progress note. Most recent are: 
Visit Vitals  /58 (BP 1 Location: Left arm, BP Patient Position: At rest)  Pulse 69  Temp 98 °F (36.7 °C)  Resp 22  Wt 85.9 kg (189 lb 6 oz)  SpO2 100%  Breastfeeding No  
 BMI 34.64 kg/m2 Intake/Output Summary (Last 24 hours) at 07/17/18 0850 Last data filed at 07/17/18 9203 Gross per 24 hour Intake               60 ml Output                0 ml Net               60 ml Physical Examination:  
 
 
     
Constitutional:  No acute distress, cooperative, pleasant   
ENT:  Oral mucous moist, oropharynx benign. Neck supple, Resp:  Decrease bronchial breath sound bilaterally. No wheezing/rhonchi/rales. No accessory muscle use CV:  Regular rhythm, normal rate, no murmurs, gallops, rubs GI:  Soft, non distended, non tender. normoactive bowel sounds, no hepatosplenomegaly Musculoskeletal:  No edema Neurologic:  Conscious and alert, answer simple questions Skin:  Good turgor, no rashes or ulcers Data Review:  
 Review and/or order of clinical lab test 
 
 
Labs:  
 
Recent Labs  
   07/17/18 0617 07/16/18 1841 WBC  5.5  5.4 HGB  12.0  12.9 HCT  38.4  41.0  
PLT  176  223 Recent Labs  
   07/17/18 0617 07/16/18 1841 NA  143  141  
K  3.4*  3.1*  
CL  102  98 CO2  33*  35* BUN  18  18 CREA  1.44*  1.57* GLU  100  92 CA  9.5  9.9 Recent Labs  
   07/16/18 1841 SGOT  21 ALT  18 AP  84 TBILI  0.7 TP  7.3 ALB  2.8*  
GLOB  4.5* No results for input(s): INR, PTP, APTT in the last 72 hours. No lab exists for component: INREXT No results for input(s): FE, TIBC, PSAT, FERR in the last 72 hours. No results found for: FOL, RBCF No results for input(s): PH, PCO2, PO2 in the last 72 hours. Recent Labs  
   07/16/18 1841 TROIQ  <0.05 No results found for: CHOL, CHOLX, CHLST, CHOLV, HDL, LDL, LDLC, DLDLP, TGLX, TRIGL, TRIGP, CHHD, CHHDX Lab Results Component Value Date/Time Glucose (POC) 139 (H) 01/21/2015 09:24 PM  
 
Lab Results Component Value Date/Time  Color YELLOW/STRAW 07/01/2018 01:40 PM  
 Appearance CLEAR 07/01/2018 01:40 PM  
 Specific gravity 1.017 07/01/2018 01:40 PM  
 Specific gravity 1.010 12/13/2013 07:25 PM  
 pH (UA) 7.0 07/01/2018 01:40 PM  
 Protein NEGATIVE  07/01/2018 01:40 PM  
 Glucose NEGATIVE  07/01/2018 01:40 PM  
 Ketone NEGATIVE  07/01/2018 01:40 PM  
 Bilirubin NEGATIVE  07/01/2018 01:40 PM  
 Urobilinogen 1.0 07/01/2018 01:40 PM  
 Nitrites NEGATIVE  07/01/2018 01:40 PM  
 Leukocyte Esterase SMALL (A) 07/01/2018 01:40 PM  
 Epithelial cells MODERATE (A) 07/01/2018 01:40 PM  
 Bacteria 1+ (A) 07/01/2018 01:40 PM  
 WBC 5-10 07/01/2018 01:40 PM  
 RBC 0-5 07/01/2018 01:40 PM  
 
 
 
Medications Reviewed:  
 
Current Facility-Administered Medications Medication Dose Route Frequency  albuterol-ipratropium (DUO-NEB) 2.5 MG-0.5 MG/3 ML  3 mL Nebulization Q4H PRN  
 amLODIPine (NORVASC) tablet 2.5 mg  2.5 mg Oral DAILY  benzonatate (TESSALON) capsule 100 mg  100 mg Oral TID PRN  polyvinyl alcohol (LIQUIFILM TEARS) 1.4 % ophthalmic solution 1 Drop  1 Drop Both Eyes DAILY PRN  
 sodium chloride (NS) flush 5-10 mL  5-10 mL IntraVENous Q8H  
 sodium chloride (NS) flush 5-10 mL  5-10 mL IntraVENous PRN  potassium chloride SR (KLOR-CON 10) tablet 40 mEq  40 mEq Oral Q4H  
 
______________________________________________________________________ EXPECTED LENGTH OF STAY: - - - 
ACTUAL LENGTH OF STAY:          0 Adelaida Kent MD

## 2018-07-17 NOTE — ED NOTES
Assisted patient OOB and into bathroom to have a BM. Patient is weak and unsteady, requires 1 assist and walker use.

## 2018-07-17 NOTE — CDMP QUERY
Dear Dr. Aleah Howell,    Please clarify if this patient is (was) being treated/managed for:     => Malignant pleural effusion in the setting of hx pleural effusion and lung cancer requiring Thoracic surgeon consult, telemetry, pulse ox monitoring, supplemental oxygen and diagnostic testing  => Other explanation of clinical findings  => Clinically Undetermined (no explanation for clinical findings)    The medical record reflects the following clinical findings, treatment, and risk factors. Risk Factors:  cc SOB and cough, hx pleural effusion, lung cancer  Clinical Indicators: per H&P- Left pleural effusion, unresolved, cxr-Left pleural effusion and airspace disease or atelectasis unchanged  Treatment: Thoracic surgeon consult, telemetry, pulse ox monitoring, O2 @2L, CXR    Please clarify and document your clinical opinion in the progress notes and discharge summary including the definitive and/or presumptive diagnosis, (suspected or probable), related to the above clinical findings. Please include clinical findings supporting your diagnosis.     Thank You  Gold Naqvi,MSN,BSN,RN,[facility]  [phone]

## 2018-07-17 NOTE — IP AVS SNAPSHOT
[address] [facility] [address] 13 
[phone] Patient: Nadia Barakat MRN: [id number] :1918 About your hospitalization You were admitted on:  2018 You last received care in the:  [facility] 6S NEURO-SCI TELE You were discharged on:  2018 Why you were hospitalized Your primary diagnosis was:  Pleural Effusion, Left Your diagnoses also included:  Sob (Shortness Of Breath), Chf, Acute (Hcc) Follow-up Information Follow up With Details Comments Contact Info Mary Melo MD   [address] [address] [address] [address] 
[phone] [address]  Please call this agency when you get home. [phone] Discharge Orders None A check rodrigo indicates which time of day the medication should be taken. My Medications CONTINUE taking these medications Instructions Each Dose to Equal  
 Morning Noon Evening Bedtime * albuterol 90 mcg/actuation inhaler Commonly known as:  PROVENTIL HFA, VENTOLIN HFA, PROAIR HFA Your last dose was: Your next dose is: Take 2 Puffs by inhalation every six (6) hours as needed for Wheezing. 2 Puff * PROVENTIL HFA 90 mcg/actuation inhaler Generic drug:  albuterol Your last dose was: Your next dose is: Take 2 Puffs by inhalation every four (4) hours as needed for Wheezing or Shortness of Breath. 2 Puff * albuterol-ipratropium 2.5 mg-0.5 mg/3 ml Nebu Commonly known as:  Smiley Krabbe Your last dose was: Your next dose is: Take 3 mL by inhalation every four (4) hours as needed (shortness of breath). 3 mL * albuterol-ipratropium 2.5 mg-0.5 mg/3 ml Nebu Commonly known as:  Smiley Krabbe Your last dose was: Your next dose is: 3 mL by Nebulization route every four (4) hours as needed. 3 mL  
    
   
   
   
  
 allopurinol 100 mg tablet Commonly known as:  Bob Night Your last dose was: Your next dose is:    
   
   
 Patient takes 2 tablets in the morning and 1 tablet at night. apixaban 2.5 mg tablet Commonly known as:  Jamia Dumont Your last dose was: Your next dose is: Take 1 Tab by mouth two (2) times a day. 2.5 mg  
    
   
   
   
  
 benzonatate 100 mg capsule Commonly known as:  TESSALON Your last dose was: Your next dose is: Take 100 mg by mouth three (3) times daily as needed for Cough. 100 mg COLCRYS 0.6 mg tablet Generic drug:  colchicine Your last dose was: Your next dose is: Take 0.6 mg by mouth daily. 0.6 mg  
    
   
   
   
  
 furosemide 40 mg tablet Commonly known as:  LASIX Your last dose was: Your next dose is: Take 2 tab 80mg in AM and 1 tab 40mg in PM (Corrected Rx) KLOR-CON 20 mEq packet Generic drug:  potassium chloride Your last dose was: Your next dose is: Take 20 mEq by mouth daily. Indications: pill 20 mEq NASONEX 50 mcg/actuation nasal spray Generic drug:  mometasone Your last dose was: Your next dose is: 2 Sprays by Both Nostrils route daily as needed (congestion). 2 Spray NATURAL TEARS (PF) OP Your last dose was: Your next dose is:    
   
   
 Administer 1 Drop to both eyes daily as needed (dry eyes). 1 Drop NORVASC 2.5 mg tablet Generic drug:  amLODIPine Your last dose was: Your next dose is: Take 2.5 mg by mouth daily. 2.5 mg  
    
   
   
   
  
 ONE-A-DAY WOMEN'S 50 PLUS PO Your last dose was: Your next dose is: Take 1 Tab by mouth daily. 1 Tab OXYGEN-AIR DELIVERY SYSTEMS Your last dose was: Your next dose is: Take 2 L by inhalation as needed (shortness of breath). 2 L  
    
   
   
   
  
 SYMBICORT 160-4.5 mcg/actuation Hfaa Generic drug:  budesonide-formoterol Your last dose was: Your next dose is: Take 2 Puffs by inhalation two (2) times a day. 2 Puff TYLENOL EXTRA STRENGTH 500 mg tablet Generic drug:  acetaminophen Your last dose was: Your next dose is: Take 1,000 mg by mouth every six (6) hours as needed for Pain. 1000 mg * Notice: This list has 4 medication(s) that are the same as other medications prescribed for you. Read the directions carefully, and ask your doctor or other care provider to review them with you. Discharge Instructions Discharge Instructions PATIENT ID: Indio Morris MRN: [id number] YOB: 1918 DATE OF ADMISSION: 7/17/2018  1:27 AM   
DATE OF DISCHARGE: 7/18/2018 PRIMARY CARE PROVIDER: James Sinha MD  
 
ATTENDING PHYSICIAN: Palmira Azevedo MD 
DISCHARGING PROVIDER: Palmira Azevedo MD   
To contact this individual call [phone] and ask the  to page. If unavailable ask to be transferred the Adult Hospitalist Department. DISCHARGE DIAGNOSES Shortness of breath due to unresolved chronic left pleural effusion and atelectasis Chronic diastolic CHF Hypokalemia CKD stage III Hx chronic of A Fib, rate controlled HTN Hx of COPD Hx of right breast cancer Hx of gout CONSULTATIONS: IP CONSULT TO THORACIC SURGERY 
 
PROCEDURES/SURGERIES: * No surgery found * PENDING TEST RESULTS:  
At the time of discharge the following test results are still pending: none FOLLOW UP APPOINTMENTS:  
Follow-up Information Follow up With Details Comments Contact Info Joana Jaramillo MD In one week   [address] [address] 
[phone] ADDITIONAL CARE RECOMMENDATIONS:  
 
DIET: Cardiac Diet ACTIVITY: Activity as tolerated WOUND CARE: none EQUIPMENT needed: none DISCHARGE MEDICATIONS: 
 See Medication Reconciliation Form · It is important that you take the medication exactly as they are prescribed. · Keep your medication in the bottles provided by the pharmacist and keep a list of the medication names, dosages, and times to be taken in your wallet. · Do not take other medications without consulting your doctor. NOTIFY YOUR PHYSICIAN FOR ANY OF THE FOLLOWING:  
Fever over 101 degrees for 24 hours. Chest pain, shortness of breath, fever, chills, nausea, vomiting, diarrhea, change in mentation, falling, weakness, bleeding. Severe pain or pain not relieved by medications. Or, any other signs or symptoms that you may have questions about. DISPOSITION: 
 x Home With: 
x OT x PT x HH  RN  
  
 SNF/Inpatient Rehab/LTAC Independent/assisted living Hospice Other: CDMP Checked:  
Yes x PROBLEM LIST Updated: 
Yes x Signed:  
Shonda Patel MD 
7/18/2018 12:32 PM 
 
  
  
  
[employer] Announcement We are excited to announce that we are making your provider's discharge notes available to you in [employer]. You will see these notes when they are completed and signed by the physician that discharged you from your recent hospital stay. If you have any questions or concerns about any information you see in [employer], please call the Health Information Department where you were seen or reach out to your Primary Care Provider for more information about your plan of care. Introducing [facility]! Dear Kendra Guido: Thank you for requesting a [employer] account. Our records indicate that you already have an active [employer] account.   You can access your account anytime at [email or web address] Did you know that you can access your hospital and ER discharge instructions at any time in [employer]? You can also review all of your test results from your hospital stay or ER visit. Additional Information If you have questions, please visit the Frequently Asked Questions section of the [employer] website at [email or web address] Remember, [employer] is NOT to be used for urgent needs. For medical emergencies, dial 911. Now available from your iPhone and Android! Introducing [employer] As a [facility] patient, I wanted to make you aware of our electronic visit tool called [employer]. [facility] [employer] 24/7 allows you to connect within minutes with a medical provider 24 hours a day, seven days a week via a mobile device or tablet or logging into a secure website from your computer. You can access [employer] from anywhere in the [place]. A virtual visit might be right for you when you have a simple condition and feel like you just dont want to get out of bed, or cant get away from work for an appointment, when your regular [facility] provider is not available (evenings, weekends or holidays), or when youre out of town and need minor care. Electronic visits cost only $49 and if the [employer] [facility] [employer] provider determines a prescription is needed to treat your condition, one can be electronically transmitted to a nearby pharmacy*. Please take a moment to enroll today if you have not already done so. The enrollment process is free and takes just a few minutes. To enroll, please download the [employer] chito to your tablet or phone, or visit [email or web address] to enroll on your computer.    
And, as an [address] [place] [address] patient with a [employer] account, the results of your visits will be scanned into your electronic medical record and your primary care provider will be able to view the scanned results. We urge you to continue to see your regular [employer] provider for your ongoing medical care. And while your primary care provider may not be the one available when you seek a [employer] virtual visit, the peace of mind you get from getting a real diagnosis real time can be priceless. For more information on [employer], view our Frequently Asked Questions (FAQs) at [email or web address]. Sincerely, 
 
Margarette Fenton MD 
Chief Medical Officer Darrin Vinson *:  certain medications cannot be prescribed via [employer] Providers Seen During Your Hospitalization Provider Specialty Primary office phone Tana Sanchez MD Hospitalist [phone] Desmond Ramos MD Internal Medicine [phone] Your Primary Care Physician (PCP) Primary Care Physician Office Phone Office Fax Berna Ortiz [phone] You are allergic to the following Allergen Reactions Penicillins Hives Recent Documentation Height Weight Breastfeeding? BMI OB Status Smoking Status 1.575 m 85.9 kg No 34.63 kg/m2 Postmenopausal Former Smoker Emergency Contacts Name Discharge Info Relation Home Work Mobile [address] [place] [address] Av CAREGIVER [3] Child [2] [phone] [address] [place] [address] Rd CAREGIVER [3] Daughter [21] [phone] Patient Belongings The following personal items are in your possession at time of discharge: 
  Dental Appliances: None  Visual Aid: Glasses, With patient      Home Medications: Sent home      Clothing: Footwear, Socks, Undergarments    Other Valuables: None  Personal Items Sent to Safe: no 
 
  
  
 Please provide this summary of care documentation to your next provider.  
  
  
 
  
Signatures-by signing, you are acknowledging that this After Visit Summary has been reviewed with you and you have received a copy. Patient Signature:  ____________________________________________________________ Date:  ____________________________________________________________  
  
Uzbek Pih Provider Signature:  ____________________________________________________________ Date:  ____________________________________________________________

## 2018-07-17 NOTE — PROGRESS NOTES
Bedside and Verbal shift change report given to [facility]  (oncoming nurse) by Edgard Copeland (offgoing nurse). Report included the following information SBAR, Kardex, Intake/Output, MAR and Recent Results.

## 2018-07-17 NOTE — H&P
[address]   HISTORY AND PHYSICAL      Brittney Case.  MR#: [id number]  : 1918  ACCOUNT #: [de-identified]   ADMIT DATE: 2018    CHIEF COMPLAINT:  Patient does not provide. HISTORY OF PRESENT ILLNESS:  A 40-year-old -American female with past medical history atrial fibrillation, lung cancer, colon cancer, hypertension, GERD, right breast carcinoma, gout, left pleural effusion presented as a direct admission/transfer from [facility] ED with reports of initial chief complaints of shortness of breath, cough, and peripheral edema. The patient at current illness is a poor historian. The majority of history was obtained from review of the ED and electronic medical records. Per these collective reports, patient was last hospitalized from 2018 to 2018 with acute on chronic diastolic CHF, chronic atrial fibrillation, and left pleural effusion with pulmonary edema. The patient presented to [facility] ED yesterday with aforementioned complaints. She was last in the ED on 2018 and had been diagnosed with left-sided pneumonia and was started on outpatient Levaquin for antibiotic therapy. The patient, however, continued to have ongoing cough productive of sputum and also had peripheral edema. Workup in the ED on 2018 included CT of the chest without contrast showed chronic left pleural effusion and left perihilar chronic atelectasis and clear right lung. There is no evidence of acute cardiopulmonary disease. However, the patient's proBNP was elevated at 2174. Potassium was 3.1 in terms of abnormal labs. Request was for patient [facility] with unresolved left pleural effusion and plan for thoracic surgery consultation. Current bedside evaluation, patient does not complain of any chest pain, worsening shortness of breath.   No reports of dizziness, lightheadedness, focal weakness, numbness, paresthesias, slurred speech, facial droop, headache, neck pain, back pain, chest pain, palpitation, abdominal pain, nausea, vomiting, diarrhea, melena, dysuria, hematuria, calf pain, increased swelling, fever, chills, or rash. PAST MEDICAL HISTORY:     1. Atrial fibrillation. 2.  Long-term anticoagulation on Eliquis. 3.  Cough. 4.  Peripheral edema. 5.  Left pleural effusion. 6.  GERD. 7.  Lung cancer. 8.  Colon cancer. 9.  Right breast cancer. 10.  Hypertension. 11.  Pneumonia. 12.  Gout. PAST SURGICAL HISTORY:    1. Right breast mastectomy in 1995.    2.  Appendectomy. 3.  Enucleation of right eye. 4.  Colonoscopy, 03/29/2013. MEDICATIONS:  Complete medication list reviewed and noted on chart records.     acetaminophen (TYLENOL EXTRA STRENGTH) 500 mg tablet    --  --  Marky Dejesus MD        Take 1,000 mg by mouth every six (6) hours as needed for Pain.      albuterol (PROVENTIL HFA) 90 mcg/actuation inhaler    07/01/18  -- Jose Contreras MD      Take 2 Puffs by inhalation every four (4) hours as needed for Wheezing or Shortness of Breath.      albuterol (PROVENTIL HFA, VENTOLIN HFA, PROAIR HFA) 90 mcg/actuation inhaler    07/01/18  -- Juanito Collado MD      Take 2 Puffs by inhalation every six (6) hours as needed for Wheezing.      albuterol-ipratropium (DUO-NEB) 2.5 mg-0.5 mg/3 ml nebu    04/24/17  -- Alma Huerta MD      3 mL by Nebulization route every four (4) hours as needed.      albuterol-ipratropium (DUO-NEB) 2.5 mg-0.5 mg/3 ml nebu    --  --  Historical Provider      Take 3 mL by inhalation every four (4) hours as needed (shortness of breath).      allopurinol (ZYLOPRIM) 100 mg tablet    --  --  Historical Provider      Patient takes 2 tablets in the morning and 1 tablet at night.      amLODIPine (NORVASC) 2.5 mg tablet    --  --  Marky Dejesus MD      Take 2.5 mg by mouth daily.      apixaban (ELIQUIS) 2.5 mg tablet    05/31/18  -- Berkley Brown NP      Take 1 Tab by mouth two (2) times a day.      benzonatate (TESSALON) 100 mg capsule    07/01/18  -- Qing Short MD      Take 100 mg by mouth three (3) times daily as needed for Cough.      budesonide-formoterol (SYMBICORT) 160-4.5 mcg/actuation HFAA    --  --  Marky Dejesus MD      Take 2 Puffs by inhalation two (2) times a day.      colchicine (COLCRYS) 0.6 mg tablet    --  --  Maryk Dejesus MD      Take 0.6 mg by mouth daily.      dextran 70/hypromellose/PF (NATURAL TEARS, PF, OP)    --  --  Marky Dejesus MD      Administer 1 Drop to both eyes daily as needed (dry eyes).      furosemide (LASIX) 40 mg tablet    06/29/18  -- Praveen Huff NP      Take 2 tab 80mg in AM and 1 tab 40mg in PM (Corrected Rx)      mometasone (NASONEX) 50 mcg/actuation nasal spray    --  --  Marky Dejesus MD      2 Sprays by Both Nostrils route daily as needed (congestion).      mv-mn/folic acid/calcium/vit K (ONE-A-DAY WOMEN'S 50 PLUS PO)    --  --  Marky Dejesus MD      Take 1 Tab by mouth daily.      OXYGEN-AIR DELIVERY SYSTEMS    06/01/18  --  Historical Provider      Take 2 L by inhalation as needed (shortness of breath).      potassium chloride (KLOR-CON) 20 mEq packet    --  --  Marky Dejesus MD      Take 20 mEq by mouth daily. Indications: pill           ALLERGIES:  PENICILLIN. SOCIAL HISTORY:  Former smoker of cigarettes, fully quit 1967. No  reports of alcohol or illicit drugs. FAMILY HISTORY:  Colon cancer, hypertension. REVIEW OF SYSTEMS:  Pertinent positives as noted in the HPI. All other systems were reviewed and negative. PHYSICAL EXAMINATION:    VITAL SIGNS:  Temperature 98.0 degrees Fahrenheit, blood pressure 121/59, heart rate of 67, respiration rate of 20, O2 saturation 100% on 2 liters of O2 nasal cannula, recorded weight 189 pounds (85.9 kg), recorded height of 5 feet 2 inches tall.     GENERAL:  Obese elderly female in no acute respiratory distress. PSYCHIATRIC:  The patient was initially asleep but then was awakened to stimuli, oriented x1 to person, confused the place and year. NEUROLOGIC:  GCS of 13 (E4, V3, M6). Spontaneous eye opening, confused speech and follows some commands, moves extremities x4 with generalized weakness. Sensation grossly intact without slurred speech, facial droop. HEENT:  Normocephalic, atraumatic. Right eye prosthesis. Pupils 2 mm, reactive on the left. Sclerae are anicteric. Conjunctivae clear. Nares are patent. Oropharynx clear. Tongue is midline, not edematous. NECK:  Supple, without lymphadenopathy, JVD, carotid bruits, thyromegaly, nontender. No acute palpable soft tissue or bony deformity. LYMPHATIC:  Negative for cervical or supraclavicular lymphadenopathy. RESPIRATORY:  Lungs are clear to auscultation bilaterally. CARDIOVASCULAR:  Heart regular rate and rhythm. Normal S1, S2, without murmur, rubs, or gallops. GASTROINTESTINAL:  Abdomen is soft, nontender, nondistended. Normoactive bowel sounds. No rebound, guarding, rigidity. No auscultated abdominal bruits or pulsatile mass. BACK:  No CVA tenderness. No step-off deformity. MUSCULOSKELETAL:  No acute palpable bony deformity. Negative for calf tenderness. VASCULAR:  2+ radial to 1+ dorsalis pedis pulses without cyanosis, clubbing. There is nonpitting edema of bilateral lower extremities. SKIN:  Warm and dry. LABORATORY DATA:  I reviewed. Sodium 141, potassium 3.1, chloride 98, CO2 of 35, BUN 18, creatinine 1.57, glucose of 92, anion gap of 8, calcium of 9.9, GFR 37, total bilirubin 0.7, total protein 7.3, albumin is 2.8, ALT 18, AST of 21, alkaline phosphatase 84, CK total of 44, troponin I of less than 0.05, proBNP of 2174, WBC 5.4, hemoglobin 12.9, hematocrit of 41.0, platelets of 508, neutrophils of 75%. CT of the chest without contrast results noted in the HPI.   Chest x-ray, PA and lateral, left lower effusion airspace disease or atelectasis unchanged. Twelve-lead EKG:  Atrial fibrillation at 86 beats per minute, which I reviewed. A 2D echocardiogram, 05/31/2018, showed left ventricular ejection fraction of 55%-60%. IMPRESSION AND PLAN:    1. Left pleural effusion, unresolved. Admitted to telemetry. Continue on pulse oximetry monitoring. Consult with thoracic surgeon. 2.  Shortness of breath. Provide supplemental oxygen, pulse oximetry monitoring. 3.  Peripheral edema. Place on strict I's and O's, daily weights, and monitor fluid status closely. 4.  Cough, persistent. Provide symptomatic cares. May order [facility]. 5.  Acute hypokalemia. Awaiting repeat lab results from potassium level. Provide potassium replacement accordingly. Continue telemetry. 6.  Chronic kidney disease. Monitor closely. Currently stage III. Repeat renal panel in the a.m.    7.  Chronic diastolic congestive heart failure. Monitor fluid status closely. 8.  Generalized weakness. Place on fall precautions. 9.  Atrial fibrillation, currently on Eliquis. Continue telemetry monitoring. 10.  Hypertension. Continue current home medications. Monitor blood pressure closely. 11.  Venous thromboembolism prophylaxis. Patient is on Eliquis. CODE STATUS:  FULL CODE. There is no other advanced directive on chart to the contrary at this time. MD DELFINA Ospina/  D: 07/17/2018 04:52     T: 07/17/2018 05:38  JOB #: [id number]

## 2018-07-17 NOTE — CONSULTS
Asked to see Ms. Milena Junior re: left pleural effusion. Ms. Milena Junior was transferred from [facility] for evaluation of a left pleural effusion. She is a 81 y/o lady, poor historian with a  Complex past medical history which includes Afib, CHF, lung cancer, colon cancer and breast cancer. She was recently admitted in May with a CJF exacerbation and pulmonary edema. She was then evaluated earlier this month for pneumonia and a pleural effusion. Allergies: PCN    PAST MEDICAL HISTORY:     1. Atrial fibrillation. 2.  Long-term anticoagulation on Eliquis. 3.  Cough. 4.  Peripheral edema. 5.  Left pleural effusion. 6.  GERD. 7.  Lung cancer. 8.  Colon cancer. 9.  Right breast cancer. 10.  Hypertension. 11.  Pneumonia. 12.  Gout.       PAST SURGICAL HISTORY:    1. Right breast mastectomy in 1995.    2.  Appendectomy. 3.  Enucleation of right eye. SocHx: remote tob use    FamHx:  Colon cancer, HTN    Meds: bezonatate, albuterol, lasix, Eliquis, symbicort, nasonex, allopurinol, amlodipine, colchicine    ROS:    Unable to obtain directly from patient. Per EMR: she presented with cough and dyspnea      Afebrile  P 60  /70    On exam she is laying in bed  Appears her stated age  Soft spoken  Oriented to person place  Not tachypneic  Diminished breath sounds left side  Chest wall non tedner  Irregular rate, rhythm  No murmurs  Radial pulses palp b/l  Abd SNTND, no organomegaly  LE non edematous    ==========================    WBC 5.5  Hgb 12.0  Plts 176    Cr 1.44    BNP 2174    ==========================    I have personally reviewed her CXR and Chest CT. She has a chronic, complex left pleural effusion which is unchanged from previous imaging.   There is evidence of a pleural effusion dating back to 2017.    =============================    Diagnoses  1: Chronic left pleural effusion  2: Acute on chronic mixed systolic and diastolic heart failure  3: Afib  4: Frailty    =============================    Ms. Nory Shipley has a chronic left pleural effusion. The etiology is unclear as it has been present dating back to 2017. It is probably multifactorial related to her heart failure as well as recurrent pneumonias. There is the small chance that this effusion is related to her previous left sided lung cancer. 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 In reviewing the chest CT there is thickening of the pleura. Since this is a chronic process I am not sure how symptomatic she is from this effusion as she has other reasons for dyspnea. An attempt could be made at ultrasound guided thoracentesis but given the imaging characteristics there is no guarantee that would be successful. The question is whether a surgical decortication to manage this effusion is warranted. She is currently satting 100% on 2L NC whic is her baseline at home. She is not in any respiratory distress. Based on her presentation and condition I do not think that a full pulmonary decortication will significantly improve her condition or more importantly her quality of life. Since the effusion is chronic it would be an extensive decortication and would risk significant blood loss etc not to mention the overall yokasta-operative stress and cardiac risk it would presume for her. Also, she has had radiation to the left chest which would make any operation more extensive and challenging. At this time I recommend continued conservative management. Consideration could be given to an evaluation by radiology for ultrasound guided thoracentesis but there is little guarantee of success. I called Ms. Howard's daughter Mckayla Flores [[phone]] multiple times to discuss her mother's care but she was unable to speak with me. Thank you for allowing us to care for Ms. Nory Shipley.

## 2018-07-18 VITALS
SYSTOLIC BLOOD PRESSURE: 133 MMHG | RESPIRATION RATE: 24 BRPM | OXYGEN SATURATION: 100 % | TEMPERATURE: 97.9 F | WEIGHT: 189.38 LBS | BODY MASS INDEX: 34.85 KG/M2 | HEART RATE: 87 BPM | DIASTOLIC BLOOD PRESSURE: 90 MMHG | HEIGHT: 62 IN

## 2018-07-18 LAB
ALBUMIN SERPL-MCNC: 2.5 G/DL (ref 3.5–5)
ALBUMIN/GLOB SERPL: 0.6 {RATIO} (ref 1.1–2.2)
ALP SERPL-CCNC: 77 U/L (ref 45–117)
ALT SERPL-CCNC: 16 U/L (ref 12–78)
ANION GAP SERPL CALC-SCNC: 7 MMOL/L (ref 5–15)
AST SERPL-CCNC: 22 U/L (ref 15–37)
BILIRUB SERPL-MCNC: 0.7 MG/DL (ref 0.2–1)
BUN SERPL-MCNC: 18 MG/DL (ref 6–20)
BUN/CREAT SERPL: 14 (ref 12–20)
CALCIUM SERPL-MCNC: 9.4 MG/DL (ref 8.5–10.1)
CHLORIDE SERPL-SCNC: 102 MMOL/L (ref 97–108)
CO2 SERPL-SCNC: 32 MMOL/L (ref 21–32)
CREAT SERPL-MCNC: 1.29 MG/DL (ref 0.55–1.02)
ERYTHROCYTE [DISTWIDTH] IN BLOOD BY AUTOMATED COUNT: 20.1 % (ref 11.5–14.5)
GLOBULIN SER CALC-MCNC: 4.2 G/DL (ref 2–4)
GLUCOSE SERPL-MCNC: 97 MG/DL (ref 65–100)
HCT VFR BLD AUTO: 41.1 % (ref 35–47)
HGB BLD-MCNC: 12.8 G/DL (ref 11.5–16)
MCH RBC QN AUTO: 25.9 PG (ref 26–34)
MCHC RBC AUTO-ENTMCNC: 31.1 G/DL (ref 30–36.5)
MCV RBC AUTO: 83 FL (ref 80–99)
NRBC # BLD: 0 K/UL (ref 0–0.01)
NRBC BLD-RTO: 0 PER 100 WBC
PLATELET # BLD AUTO: 190 K/UL (ref 150–400)
PMV BLD AUTO: 10.1 FL (ref 8.9–12.9)
POTASSIUM SERPL-SCNC: 4.1 MMOL/L (ref 3.5–5.1)
PROT SERPL-MCNC: 6.7 G/DL (ref 6.4–8.2)
RBC # BLD AUTO: 4.95 M/UL (ref 3.8–5.2)
SODIUM SERPL-SCNC: 141 MMOL/L (ref 136–145)
WBC # BLD AUTO: 5.3 K/UL (ref 3.6–11)

## 2018-07-18 PROCEDURE — 3331090001 HH PPS REVENUE CREDIT

## 2018-07-18 PROCEDURE — 97165 OT EVAL LOW COMPLEX 30 MIN: CPT

## 2018-07-18 PROCEDURE — 80053 COMPREHEN METABOLIC PANEL: CPT | Performed by: HOSPITALIST

## 2018-07-18 PROCEDURE — 97530 THERAPEUTIC ACTIVITIES: CPT

## 2018-07-18 PROCEDURE — 74011250637 HC RX REV CODE- 250/637: Performed by: FAMILY MEDICINE

## 2018-07-18 PROCEDURE — 74011250637 HC RX REV CODE- 250/637: Performed by: HOSPITALIST

## 2018-07-18 PROCEDURE — 3331090002 HH PPS REVENUE DEBIT

## 2018-07-18 PROCEDURE — 77010033678 HC OXYGEN DAILY

## 2018-07-18 PROCEDURE — G8978 MOBILITY CURRENT STATUS: HCPCS

## 2018-07-18 PROCEDURE — G8987 SELF CARE CURRENT STATUS: HCPCS

## 2018-07-18 PROCEDURE — 94640 AIRWAY INHALATION TREATMENT: CPT

## 2018-07-18 PROCEDURE — 97116 GAIT TRAINING THERAPY: CPT

## 2018-07-18 PROCEDURE — 85027 COMPLETE CBC AUTOMATED: CPT | Performed by: HOSPITALIST

## 2018-07-18 PROCEDURE — 36415 COLL VENOUS BLD VENIPUNCTURE: CPT | Performed by: HOSPITALIST

## 2018-07-18 PROCEDURE — 97535 SELF CARE MNGMENT TRAINING: CPT

## 2018-07-18 PROCEDURE — G8988 SELF CARE GOAL STATUS: HCPCS

## 2018-07-18 PROCEDURE — 74011000250 HC RX REV CODE- 250: Performed by: HOSPITALIST

## 2018-07-18 PROCEDURE — G8979 MOBILITY GOAL STATUS: HCPCS

## 2018-07-18 PROCEDURE — 94761 N-INVAS EAR/PLS OXIMETRY MLT: CPT

## 2018-07-18 PROCEDURE — 97161 PT EVAL LOW COMPLEX 20 MIN: CPT

## 2018-07-18 RX ORDER — ALLOPURINOL 100 MG/1
200 TABLET ORAL DAILY
COMMUNITY
End: 2019-03-15

## 2018-07-18 RX ORDER — ALLOPURINOL 100 MG/1
100 TABLET ORAL EVERY EVENING
COMMUNITY
End: 2019-03-02

## 2018-07-18 RX ADMIN — Medication 10 ML: at 06:52

## 2018-07-18 RX ADMIN — ALLOPURINOL 200 MG: 100 TABLET ORAL at 06:52

## 2018-07-18 RX ADMIN — COLCHICINE 0.6 MG: 0.6 TABLET, FILM COATED ORAL at 09:33

## 2018-07-18 RX ADMIN — AMLODIPINE BESYLATE 2.5 MG: 5 TABLET ORAL at 09:33

## 2018-07-18 RX ADMIN — POTASSIUM CHLORIDE 40 MEQ: 750 TABLET, EXTENDED RELEASE ORAL at 09:33

## 2018-07-18 RX ADMIN — FUROSEMIDE 40 MG: 40 TABLET ORAL at 09:33

## 2018-07-18 RX ADMIN — BUDESONIDE 500 MCG: 0.5 INHALANT RESPIRATORY (INHALATION) at 08:10

## 2018-07-18 NOTE — PROGRESS NOTES
Problem: Falls - Risk of  Goal: *Absence of Falls  Document Shane Fall Risk and appropriate interventions in the flowsheet. Outcome: Progressing Towards Goal  Fall Risk Interventions:  Mobility Interventions: Assess mobility with egress test, OT consult for ADLs, Patient to call before getting OOB, PT Consult for assist device competence, PT Consult for mobility concerns         Medication Interventions: Assess postural VS orthostatic hypotension, Patient to call before getting OOB, Teach patient to arise slowly    Elimination Interventions: Bed/chair exit alarm, Call light in reach, Patient to call for help with toileting needs, Toileting schedule/hourly rounds             Problem: Pressure Injury - Risk of  Goal: *Prevention of pressure injury  Document Michael Scale and appropriate interventions in the flowsheet. Outcome: Progressing Towards Goal  Pressure Injury Interventions:  Sensory Interventions: Assess changes in LOC, Minimize linen layers, Turn and reposition approx.  every two hours (pillows and wedges if needed)    Moisture Interventions: Absorbent underpads, Limit adult briefs, Minimize layers, Moisture barrier, Offer toileting Q_hr    Activity Interventions: Assess need for specialty bed, PT/OT evaluation    Mobility Interventions: Assess need for specialty bed, PT/OT evaluation, Pressure redistribution bed/mattress (bed type)    Nutrition Interventions: Document food/fluid/supplement intake, Discuss nutritional consult with provider    Friction and Shear Interventions: Apply protective barrier, creams and emollients, Minimize layers, Lift sheet

## 2018-07-18 NOTE — PROGRESS NOTES
Problem: Self Care Deficits Care Plan (Adult)  Goal: *Acute Goals and Plan of Care (Insert Text)  Occupational Therapy Goals  Initiated 7/18/2018     1. Patient will perform bathing seated in chair with Gavin within 7 days. 2.  Patient will perform UB dressing with supervision/set-up within 7 days. 3.  Patient will perform functional bathroom mobility, including toilet/tub transfer with Gavin within 7 days. 4.  Patient will tolerate standing ADL for >3 minutes with no LOB or fatigue with SBA and rollator within 7 days. 5.  Patient will participate in upper extremity therapeutic exercise/activities with supervision/set-up for 10 minutes within 7 day(s). 6.  Patient will utilize energy conservation techniques during functional activities with verbal cues within 7 day(s). Occupational Therapy EVALUATION  Patient: Sahra Rudd (76 y.o. female)  Date: 7/18/2018  Primary Diagnosis: Plureal effusion  Pleural effusion, left  SOB (shortness of breath)  CHF, acute (HCC)        Precautions:        ASSESSMENT :  Based on the objective data described below, the patient presents with generally decreased functional strength, cardiopulmonary tolerance (2L/min NC upon assessment and at baseline with sats >95% at rest, no desatting during activity, however WOOTEN noted and c/o SOB;  during activity), impaired standing balance and tolerance, following admission for chronic pleural effusion, CHF and SOB. Pt alert, oriented x3; well-looking 79 yo female. ADLs overall supervision/set-up to modA, CGA bed mobility; CGA for functional transfers with RW. Pt states she lives with daughter, uses rollator, is Princess to Gavin for basic ADLs, wears 2L at baseline and family home with her 24/7. Pt provided education on pacing, PLB, energy conservation and provided incentive spirometer. Anticipate pt to discharge home with family assistance and no OT needs.     Patient will benefit from skilled intervention to address the above impairments. Patients rehabilitation potential is considered to be Good  Factors which may influence rehabilitation potential include:   []             None noted  []             Mental ability/status  [x]             Medical condition  []             Home/family situation and support systems  []             Safety awareness  []             Pain tolerance/management  []             Other:      PLAN :  Recommendations and Planned Interventions:  [x]               Self Care Training                  [x]        Therapeutic Activities  [x]               Functional Mobility Training    []        Cognitive Retraining  [x]               Therapeutic Exercises           [x]        Endurance Activities  [x]               Balance Training                   []        Neuromuscular Re-Education  []               Visual/Perceptual Training     [x]   Home Safety Training  [x]               Patient Education                 [x]        Family Training/Education  []               Other (comment):    Frequency/Duration: Patient will be followed by occupational therapy 3 times a week to address goals. Discharge Recommendations: None for OT  Further Equipment Recommendations for Discharge: none     SUBJECTIVE:   Patient stated I am a little short [of breath].     OBJECTIVE DATA SUMMARY:   HISTORY:   Past Medical History:   Diagnosis Date    [facility]fib ([facility] [address]     Cancer ([facility] [address]     right breast CA, lung, colon    Gastrointestinal disorder     GERD    Hypertension     Other ill-defined conditions(799.89)     gout     Past Surgical History:   Procedure Laterality Date    BREAST SURGERY PROCEDURE UNLISTED  1995    Right mastectomy    HX APPENDECTOMY      HX HEENT      right glass eye    [place] COLONOSCOPY W/BIOPSY SINGLE/MULTIPLE  3/29/2013         [place] COLSC FLX W/RMVL OF TUMOR POLYP LESION SNARE TQ  3/29/2013            Prior Level of Function/Environment/Context: Pt lives with daughter, uses rollator, Princess to [employer] ADLs.  2L/min NC at baseline  Occupations in which the patient is/was successful, what are the barriers preventing that success:   Performance Patterns (routines, roles, habits, and rituals):   Personal Interests and/or values:   Expanded or extensive additional review of patient history:     Home Situation  Home Environment: Private residence  # Steps to Enter: 1  Rails to Enter: No  One/Two Story Residence: One story  Living Alone: No  Support Systems: Child(indra), Family member(s)  Patient Expects to be Discharged to[de-identified] Private residence  Current DME Used/Available at Home: Katrin Singletary, rolling, Vida Hinton, straight, Katrin Singletary, rollator, Wheelchair  Tub or Shower Type: Tub/Shower combination    Hand dominance: Right    EXAMINATION OF PERFORMANCE DEFICITS:  Cognitive/Behavioral Status:  Neurologic State: Alert; Appropriate for age  Orientation Level: Oriented to person;Oriented to place;Oriented to situation  Cognition: Follows commands;Decreased attention/concentration  Perception: Appears intact  Perseveration: No perseveration noted  Safety/Judgement: Awareness of environment    Skin: appears intact    Edema: none noted in BUEs    Hearing: Auditory  Auditory Impairment: Hard of hearing, bilateral    Vision/Perceptual:    Tracking: Able to track stimulus in all quadrants w/o difficulty (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)                 Diplopia: No         Corrective Lenses: Glasses    Range of Motion:    AROM: Generally decreased, functional  PROM: Generally decreased, functional                      Strength:    Strength: Generally decreased, functional                Coordination:  Coordination: Within functional limits  Fine Motor Skills-Upper: Left Intact; Right Intact    Gross Motor Skills-Upper: Left Intact; Right Intact    Tone & Sensation:    Tone: Normal  Sensation: Intact                      Balance:  Sitting: Intact  Standing: Intact; With support (used RW support)  Standing - Static: Good  Standing - Dynamic : Fair    Functional Mobility and Transfers for ADLs:  Bed Mobility:  Supine to Sit: Stand-by assistance; Additional time  Scooting: Supervision    Transfers:  Sit to Stand: Contact guard assistance; Additional time  Stand to Sit: Contact guard assistance; Additional time  Bed to Chair: Contact guard assistance; Adaptive equipment; Additional time;Assist x1 (RW)  Toilet Transfer : Contact guard assistance; Adaptive equipment; Additional time;Assist x1 (inferred from functional transfers)    ADL Assessment:  Feeding: Supervision;Setup    Oral Facial Hygiene/Grooming: Supervision;Setup    Bathing: Minimum assistance; Adaptive equipment; Additional time;Assist x1 (inferred if seated)    Upper Body Dressing: Minimum assistance; Adaptive equipment; Additional time;Assist x1    Lower Body Dressing: Moderate assistance; Adaptive equipment; Additional time;Assist x1    Toileting: Moderate assistance; Adaptive equipment; Additional time;Assist x1                ADL Intervention and task modifications:                                     Cognitive Retraining  Safety/Judgement: Awareness of environment     Functional Measure:  Barthel Index:    Bathin  Bladder: 5  Bowels: 5  Groomin  Dressin  Feeding: 10  Mobility: 0  Stairs: 0  Toilet Use: 5  Transfer (Bed to Chair and Back): 10  Total: 45       Barthel and G-code impairment scale:  Percentage of impairment CH  0% CI  1-19% CJ  20-39% CK  40-59% CL  60-79% CM  80-99% CN  100%   Barthel Score 0-100 100 99-80 79-60 59-40 20-39 1-19   0   Barthel Score 0-20 20 17-19 13-16 9-12 5-8 1-4 0      The Barthel ADL Index: Guidelines  1. The index should be used as a record of what a patient does, not as a record of what a patient could do. 2. The main aim is to establish degree of independence from any help, physical or verbal, however minor and for whatever reason. 3. The need for supervision renders the patient not independent. 4. A patient's performance should be established using the best available evidence.  Asking the patient, friends/relatives and nurses are the usual sources, but direct observation and common sense are also important. However direct testing is not needed. 5. Usually the patient's performance over the preceding 24-48 hours is important, but occasionally longer periods will be relevant. 6. Middle categories imply that the patient supplies over 50 per cent of the effort. 7. Use of aids to be independent is allowed. Indu Sanchez., Barthel, D.W. (9645). Functional evaluation: the Barthel Index. [facility] (14)2. Иван Miller dana ADRIANA Dimas, Guillaume Slater., Kezia Leigh., [place], [address] (1999). Measuring the change indisability after inpatient rehabilitation; comparison of the responsiveness of the Barthel Index and Functional Whitman Measure. Journal of Neurology, Neurosurgery, and Psychiatry, 66(4), 137-652. Cali Gomez NMAUREEN, ALFREDO Posada, & Ladan Flores, MMARIANN. (2004.) Assessment of post-stroke quality of life in cost-effectiveness studies: The usefulness of the Barthel Index and the EuroQoL-5D. Quality of Life Research, 13, 141-79         G codes: In compliance with CMSs Claims Based Outcome Reporting, the following G-code set was chosen for this patient based on their primary functional limitation being treated: The outcome measure chosen to determine the severity of the functional limitation was the Barthel Index with a score of 45/100 which was correlated with the impairment scale. ?  Self Care:     - CURRENT STATUS: CK - 40%-59% impaired, limited or restricted    - GOAL STATUS: CJ - 20%-39% impaired, limited or restricted    - D/C STATUS:  ---------------To be determined---------------     Occupational Therapy Evaluation Charge Determination   History Examination Decision-Making   LOW Complexity : Brief history review  MEDIUM Complexity : 3-5 performance deficits relating to physical, cognitive , or psychosocial skils that result in activity limitations and / or participation restrictions MEDIUM Complexity : Patient may present with comorbidities that affect occupational performnce. Miniml to moderate modification of tasks or assistance (eg, physical or verbal ) with assesment(s) is necessary to enable patient to complete evaluation       Based on the above components, the patient evaluation is determined to be of the following complexity level: LOW         Activity Tolerance:   VSS, fair tolerance    Please refer to the flowsheet for vital signs taken during this treatment. After treatment:   [x] Patient left in no apparent distress sitting up in chair  [] Patient left in no apparent distress in bed  [x] Call bell left within reach  [x] Nursing notified  [] Caregiver present  [x] Bed alarm activated    COMMUNICATION/EDUCATION:   The patients plan of care was discussed with: Physical Therapist and Registered Nurse. [x] Home safety education was provided and the patient/caregiver indicated understanding. [x] Patient/family have participated as able in goal setting and plan of care. [x] Patient/family agree to work toward stated goals and plan of care. [] Patient understands intent and goals of therapy, but is neutral about his/her participation. [] Patient is unable to participate in goal setting and plan of care. This patients plan of care is appropriate for delegation to [facility].     Thank you for this referral.  John Sin OT  Time Calculation: 31 mins

## 2018-07-18 NOTE — PROGRESS NOTES
Hospitalist Progress Note Shonda Patel MD 
Answering service: [phone] OR [phone] from in house phone Cell: [phone] Date of Service:  2018 NAME:  Hernando Snider :  1918 MRN:  [id number] Admission Summary: A 80-year-old -American female with past medical history atrial fibrillation, lung cancer, colon cancer, hypertension, GERD, right breast carcinoma, gout, left pleural effusion presented as a direct admission/transfer from [facility] ED with reports of initial chief complaints of shortness of breath, cough, and peripheral edema. The patient at current illness is a poor historian. The majority of history was obtained from review of the ED and electronic medical records. Per these collective reports, patient was last hospitalized from 2018 to 2018 with acute on chronic diastolic CHF, chronic atrial fibrillation, and left pleural effusion with pulmonary edema. The patient presented to [facility] ED yesterday with aforementioned complaints. She was last in the ED on 2018 and had been diagnosed with left-sided pneumonia and was started on outpatient Levaquin for antibiotic therapy. The patient, however, continued to have ongoing cough productive of sputum and also had peripheral edema. Workup in the ED on 2018 included CT of the chest without contrast showed chronic left pleural effusion and left perihilar chronic atelectasis and clear right lung. There is no evidence of acute cardiopulmonary disease. However, the patient's proBNP was elevated at 2174. Potassium was 3.1 in terms of abnormal labs. Request was for patient to be transferred to [facility] hospitalist service with unresolved left pleural effusion and plan for thoracic surgery consultation Interval history / Subjective: She said her shortness of breath improved, no chest pain, she has ongoing cough Assessment & Plan:  
 
Shortness of breath due to unresolved chronic left pleural effusion and atelectasis  
-continue prn duo neb 
-SaO2 100% on RA 
-chest x ray left pleural effusion and airspace disease or atelectasis unchanged. -CT chest no significant change in chronic left pleural effusion and left lung perihilar chronic atelectasis, right lung clear, no acute cardiopulmonary disease   
-seen by thoracic surgeon is consulted and no surgical intervention,  
-daughter insisted to drain,  
-will consult IR Chronic diastolic CHF 
-continue lasix  
-not on ACEi/ARB due to renal insufficiency, not on BB due to hx of COPD, Hypokalemia 
-replaced and resolved CKD stage III  
-creatinine improved , monitor renal function Hx chronic of A Fib, rate controlled  
-eliquis is held HTN 
-BP normal, continue amlodipine and monitor BP Hx of COPD 
-Continue prn duo neb Hx of right breast cancer  
-stable Hx of gout 
-stable Code status: Full Code DVT prophylaxis:SCD Care Plan discussed with: Patient/Family, Nurse and  Disposition: TBD Discussed with her daughter at bedside on 7/17 insisted to drain her left pleural effusion, Hospital Problems  Date Reviewed: 7/17/2018 Codes Class Noted POA * (Principal)Pleural effusion, left ICD-10-CM: J90 ICD-9-CM: 511.9  7/17/2018 Unknown CHF, acute ([facility] [address] ICD-10-CM: I50.9 ICD-9-CM: 428.0  7/17/2018 Unknown  
   
 SOB (shortness of breath) ICD-10-CM: R06.02 
ICD-9-CM: 786.05  1/21/2015 Unknown Vital Signs:  
 Last 24hrs VS reviewed since prior progress note. Most recent are: 
Visit Vitals  BP (!) 107/35 (BP 1 Location: Left arm, BP Patient Position: At rest)  Pulse 73  Temp 97.5 °F (36.4 °C)  Resp 22  
 Ht 5' 2.01\" (1.575 m)  Wt 85.9 kg (189 lb 6 oz)  SpO2 100%  Breastfeeding No  
 BMI 34.63 kg/m2 No intake or output data in the 24 hours ending 07/18/18 0932 Physical Examination:  
 
 
     
Constitutional:  No acute distress, cooperative, pleasant   
ENT:  Oral mucous moist, oropharynx benign. Neck supple, Resp:  Decrease bronchial breath sound bilaterally. No wheezing/rhonchi/rales. No accessory muscle use CV:  Regular rhythm, normal rate, no murmurs, gallops, rubs GI:  Soft, non distended, non tender. normoactive bowel sounds, no hepatosplenomegaly Musculoskeletal:  No edema Neurologic:  Conscious and alert, answer simple questions Skin:  Good turgor, no rashes or ulcers Data Review:  
 Review and/or order of clinical lab test 
 
 
Labs:  
 
Recent Labs  
   07/18/18 0254 07/17/18 
 0617 WBC  5.3  5.5 HGB  12.8  12.0 HCT  41.1  38.4 PLT  190  176 Recent Labs  
   07/18/18 0254  07/17/18 
 0617  07/16/18 
 1841 NA  141  143  141  
K  4.1  3.4*  3.1*  
CL  102  102  98 CO2  32  33*  35* BUN  18 18  18 CREA  1.29*  1.44*  1.57* GLU  97  100  92 CA  9.4  9.5  9.9 MG   --   2.3   --   
 
Recent Labs  
   07/18/18 0254  07/16/18 
 1841 SGOT  22  21 ALT  16  18 AP  77  84 TBILI  0.7  0.7 TP  6.7  7.3 ALB  2.5*  2.8*  
GLOB  4.2*  4.5* No results for input(s): INR, PTP, APTT in the last 72 hours. No lab exists for component: INREXT, INREXT No results for input(s): FE, TIBC, PSAT, FERR in the last 72 hours. No results found for: FOL, RBCF No results for input(s): PH, PCO2, PO2 in the last 72 hours. Recent Labs  
   07/16/18 
 1841 TROIQ  <0.05 No results found for: CHOL, CHOLX, CHLST, CHOLV, HDL, LDL, LDLC, DLDLP, TGLX, TRIGL, TRIGP, CHHD, CHHDX Lab Results Component Value Date/Time Glucose (POC) 139 (H) 01/21/2015 09:24 PM  
 
Lab Results Component Value Date/Time  Color YELLOW/STRAW 07/01/2018 01:40 PM  
 Appearance CLEAR 07/01/2018 01:40 PM  
 Specific gravity 1.017 07/01/2018 01:40 PM  
 Specific gravity 1.010 12/13/2013 07:25 PM  
 pH (UA) 7.0 07/01/2018 01:40 PM  
 Protein NEGATIVE  07/01/2018 01:40 PM  
 Glucose NEGATIVE  07/01/2018 01:40 PM  
 Ketone NEGATIVE  07/01/2018 01:40 PM  
 Bilirubin NEGATIVE  07/01/2018 01:40 PM  
 Urobilinogen 1.0 07/01/2018 01:40 PM  
 Nitrites NEGATIVE  07/01/2018 01:40 PM  
 Leukocyte Esterase SMALL (A) 07/01/2018 01:40 PM  
 Epithelial cells MODERATE (A) 07/01/2018 01:40 PM  
 Bacteria 1+ (A) 07/01/2018 01:40 PM  
 WBC 5-10 07/01/2018 01:40 PM  
 RBC 0-5 07/01/2018 01:40 PM  
 
 
 
Medications Reviewed:  
 
Current Facility-Administered Medications Medication Dose Route Frequency  albuterol-ipratropium (DUO-NEB) 2.5 MG-0.5 MG/3 ML  3 mL Nebulization Q4H PRN  
 amLODIPine (NORVASC) tablet 2.5 mg  2.5 mg Oral DAILY  benzonatate (TESSALON) capsule 100 mg  100 mg Oral TID PRN  polyvinyl alcohol (LIQUIFILM TEARS) 1.4 % ophthalmic solution 1 Drop  1 Drop Both Eyes DAILY PRN  
 sodium chloride (NS) flush 5-10 mL  5-10 mL IntraVENous Q8H  
 sodium chloride (NS) flush 5-10 mL  5-10 mL IntraVENous PRN  
 furosemide (LASIX) tablet 40 mg  40 mg Oral DAILY  colchicine tablet 0.6 mg  0.6 mg Oral DAILY  allopurinol (ZYLOPRIM) tablet 100 mg  100 mg Oral QPM  
 allopurinol (ZYLOPRIM) tablet 200 mg  200 mg Oral 7am  
 budesonide (PULMICORT) 500 mcg/2 ml nebulizer suspension  500 mcg Nebulization BID  potassium chloride SR (KLOR-CON 10) tablet 40 mEq  40 mEq Oral DAILY  
 
______________________________________________________________________ EXPECTED LENGTH OF STAY: 3d 7h 
ACTUAL LENGTH OF STAY:          1 Geovani Morrissey MD

## 2018-07-18 NOTE — PROGRESS NOTES
Problem: Mobility Impaired (Adult and Pediatric)  Goal: *Acute Goals and Plan of Care (Insert Text)  Physical Therapy Goals  Initiated 7/18/2018  1. Patient will move from supine to sit and sit to supine , scoot up and down and roll side to side in bed with modified independence within 7 day(s). 2.  Patient will transfer from bed to chair and chair to bed with supervision/set-up using the least restrictive device within 7 day(s). 3.  Patient will perform sit to stand with supervision/set-up within 7 day(s). 4.  Patient will ambulate with supervision/set-up for 100 feet with the least restrictive device within 7 day(s). 5.  Patient will ascend/descend 2 stairs with handrail(s), per home set up, with modified independence within 7 day(s). physical Therapy EVALUATION  Patient: Min Monaco (11 y.o. female)  Date: 7/18/2018  Primary Diagnosis: Plureal effusion  Pleural effusion, left  SOB (shortness of breath)  CHF, acute ([facility])  Precautions: Fall    ASSESSMENT :  Based on the objective data described below, the patient presents slightly below her functional baseline with decreased strength, poor exercise tolerance, impaired immediate standing balance, and decreased overall functional mobility. Patient was cleared for therapy by RN, and patient was received lying supine in bed on 2L NC and agreeable to PT evaluation. She lives in a one story home with her daughter who provides 24/7 supervision, assistance with grooming and medication management. She reports some SOB at rest but reports she would get slightly SOB at baseline (pt was on portable oxygen PTA, but was unaware of L amount-other hospital notes state 2L). She mainly uses a RW around the home and in community, however does own various assistive devices (wheelchair, rollator, and SPC). Patient was able to transfer supine <> sitting EOB with stand by assistance, requiring additional time.   She was able to scoot further EOB with supervision and then requesting to stand to change out her brief (was wet upon arrival of session). Patient was able to transfer sitting <> standing with CGA x1 and RW support. Assist was needed upon standing for steadying COG over her SANAZ and to correct posture. She was able to tolerate ~3 minutes standing while her brief was changed and reported no dizziness, just some SOB (sats 99%). She requested to take a seated rest break before proceeding with further ambulation. She was able to decrease her dyspnea with ~5 minutes of sitting EOB. Patient was able to again stand with CGA x1 and RW support and required less cueing upon standing for balance and posture. She was able to ambulate approximately 20 ft with the RW noting a widened SANAZ, decreased step clearance, lurched posture, increased trunk sway and minor path deviations. She was able to turn around in the russell with no signs of a LOB. She was slightly SOB with exertion (sats 97% on 2L portable O2,  bpm)  She was able to demonstrate proper RW management when returning to the chair and transferred safely with CGA, sats >95%. Patient did have a productive cough throughout entire session, she was given an incentive spirometer and educated on how to use it, duration, frequency, and it's importance in maintaining proper airway/lung function. Patient reports being close to her functional baseline that her daughter helps her with many of her home activities. Patient is safe to discharge home with 24/7 supervision of family member once medically stable. Will continue to follow. Patient will benefit from skilled intervention to address the above impairments.   Patients rehabilitation potential is considered to be Good  Factors which may influence rehabilitation potential include:   []         None noted  []         Mental ability/status  [x]         Medical condition  []         Home/family situation and support systems  [x]         Safety awareness  []         Pain tolerance/management  []         Other:      PLAN :  Recommendations and Planned Interventions:  [x]           Bed Mobility Training             []    Neuromuscular Re-Education  [x]           Transfer Training                   []    Orthotic/Prosthetic Training  [x]           Gait Training                         []    Modalities  []           Therapeutic Exercises           []    Edema Management/Control  [x]           Therapeutic Activities            [x]    Patient and Family Training/Education  []           Other (comment):    Frequency/Duration: Patient will be followed by physical therapy  5 times a week to address goals. Discharge Recommendations: Home with 24/7 supervision  Further Equipment Recommendations for Discharge: None     SUBJECTIVE:   Patient stated I move pretty good.     OBJECTIVE DATA SUMMARY:   HISTORY:    Past Medical History:   Diagnosis Date    [facility]fib ([facility]     Cancer ([facility]     right breast CA, lung, colon    Gastrointestinal disorder     GERD    Hypertension     Other ill-defined conditions(799.89)     gout     Past Surgical History:   Procedure Laterality Date    BREAST SURGERY PROCEDURE UNLISTED  1995    Right mastectomy    HX APPENDECTOMY      HX HEENT      right glass eye    NE COLONOSCOPY W/BIOPSY SINGLE/MULTIPLE  3/29/2013         NE COLSC FLX W/RMVL OF TUMOR POLYP LESION SNARE TQ  3/29/2013          Prior Level of Function/Home Situation: Lives in a one story with daughter who provides 24/7 supervision  Personal factors and/or comorbidities impacting plan of care: HTN    Home Situation  Home Environment: Private residence  # Steps to Enter: 1  Rails to Enter: No  One/Two Story Residence: One story  Living Alone: No  Support Systems: Child(indra), Family member(s)  Patient Expects to be Discharged to[de-identified] Private residence  Current DME Used/Available at Home: [id number], Mendez Bolanos, Pavel Powell, karla, Walker, rollator, Wheelchair  Tub or Shower Type: Tub/Shower combination    EXAMINATION/PRESENTATION/DECISION MAKING:   Critical Behavior:  Neurologic State: Alert, Appropriate for age  Orientation Level: Oriented to person, Oriented to place, Oriented to situation  Cognition: Follows commands, Decreased attention/concentration  Safety/Judgement: Awareness of environment  Hearing: Auditory  Auditory Impairment: Hard of hearing, bilateral  Skin:  Intact   Edema: None noted  Range Of Motion:  AROM: Generally decreased, functional  PROM: Generally decreased, functional     Strength:    Strength: Generally decreased, functional     Tone & Sensation:   Tone: Normal  Sensation: Intact       Coordination:  Coordination: Within functional limits  Vision:   Tracking: Able to track stimulus in all quadrants w/o difficulty (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)  Diplopia: No  Corrective Lenses: Glasses  Functional Mobility:  Bed Mobility:  Supine to Sit: Stand-by assistance; Additional time  Scooting: Supervision  Transfers:  Sit to Stand: Contact guard assistance; Additional time  Stand to Sit: Contact guard assistance; Additional time  Bed to Chair: Contact guard assistance; Adaptive equipment; Additional time;Assist x1 (RW)        Balance:   Sitting: Intact  Standing: Intact; With support (used RW support)  Standing - Static: Good  Standing - Dynamic : Fair     Ambulation/Gait Training:  Distance (ft): 20 Feet (ft)  Assistive Device: Gait belt;Walker, rolling  Ambulation - Level of Assistance: Contact guard assistance; Additional time  Gait Abnormalities: Path deviations;Trunk sway increased;Lurching  Base of Support: Widened  Speed/[place]: Slow;Pace decreased (<100 feet/min)  Step Length: Left shortened;Right shortened    Functional Measure:  Tinetti test:    Sitting Balance: 1  Arises: 1  Attempts to Rise: 1  Immediate Standing Balance: 1  Standing Balance: 1  Nudged: 1  Eyes Closed: 0  Turn 360 Degrees - Continuous/Discontinuous: 0  Turn 360 Degrees - Steady/Unsteady: 1  Sitting Down: 1  Balance Score: 8  Indication of Gait: 1  R Step Length/Height: 1  L Step Length/Height: 1  R Foot Clearance: 1  L Foot Clearance: 1  Step Symmetry: 1  Step Continuity: 1  Path: 1  Trunk: 0  Walking Time: 0  Gait Score: 8  Total Score: 16       Tinetti Test and G-code impairment scale:  Percentage of Impairment CH    0%   CI    1-19% CJ    20-39% CK    40-59% CL    60-79% CM    80-99% CN     100%   Tinetti  Score 0-28 28 23-27 17-22 12-16 6-11 1-5 0       Tinetti Tool Score Risk of Falls  <19 = High Fall Risk  19-24 = Moderate Fall Risk  25-28 = Low Fall Risk  Tinetti ME. Performance-Oriented Assessment of Mobility Problems in Elderly Patients. Martinez 66; [id number]. (Scoring Description: PT Bulletin Feb. 10, 1993)    Older adults: Delayne Schlatter et al, 2009; n = 1000 [facility] elderly evaluated with ABC, TASHA, ADL, and IADL)  · Mean TASHA score for males aged 69-68 years = 26.21(3.40)  · Mean TASHA score for females age 69-68 years = 25.16(4.30)  · Mean TASHA score for males over 80 years = 23.29(6.02)  · Mean TASHA score for females over 80 years = 17.20(8.32)         G codes: In compliance with CMSs Claims Based Outcome Reporting, the following G-code set was chosen for this patient based on their primary functional limitation being treated: The outcome measure chosen to determine the severity of the functional limitation was the Tinetti with a score of 16/28 which was correlated with the impairment scale.     ? Mobility - Walking and Moving Around:     - CURRENT STATUS: CK - 40%-59% impaired, limited or restricted    - GOAL STATUS: CK - 40%-59% impaired, limited or restricted    - D/C STATUS:  ---------------To be determined---------------      Physical Therapy Evaluation Charge Determination   History Examination Presentation Decision-Making   HIGH Complexity :3+ comorbidities / personal factors will impact the outcome/ POC  HIGH Complexity : 4+ Standardized tests and measures addressing body structure, function, activity limitation and / or participation in recreation  LOW Complexity : Stable, uncomplicated  Other outcome measures Tinetti  MEDIUM      Based on the above components, the patient evaluation is determined to be of the following complexity level: LOW     Pain:  Pain Scale 1: Numeric (0 - 10)  Pain Intensity 1: 0     Activity Tolerance:   VSS, NAD  Please refer to the flowsheet for vital signs taken during this treatment. After treatment:   [x]         Patient left in no apparent distress sitting up in chair  []         Patient left in no apparent distress in bed  [x]         Call bell left within reach  [x]         Nursing notified  []         Caregiver present  [x]         Bed/chair alarm activated    COMMUNICATION/EDUCATION:   The patients plan of care was discussed with: Physical Therapist, Occupational Therapist and Registered Nurse.  []         Fall prevention education was provided and the patient/caregiver indicated understanding. []         Patient/family have participated as able in goal setting and plan of care. []         Patient/family agree to work toward stated goals and plan of care. []         Patient understands intent and goals of therapy, but is neutral about his/her participation. []         Patient is unable to participate in goal setting and plan of care. Thank you for this referral.  Mikael Pal, PT, DPT    Regarding student involvement in patient care:  A student participated in this treatment session. Per CMS Medicare statements and APTA guidelines I certify that the following was true:  1. I was present and directly observed the entire session. 2. I made all skilled judgments and clinical decisions regarding care. 3. I am the practitioner responsible for assessment, treatment, and documentation.

## 2018-07-18 NOTE — ROUTINE PROCESS
I have reviewed discharge instructions with the patient. The patient verbalized understanding. Discharge medications reviewed with patient and appropriate educational materials and side effects teaching were provided. PIV and Telemetry discontinued. Pt discharged home by her daughter via car. No complaints voiced, all questions answered. Care plan and education closed. No s/s of visible distress.

## 2018-07-18 NOTE — DISCHARGE SUMMARY
Discharge Summary PATIENT ID: Donnia Sandhoff MRN: [id number] YOB: 1918 DATE OF ADMISSION: 7/17/2018  1:27 AM   
DATE OF DISCHARGE: 7/18/2018 PRIMARY CARE PROVIDER: Raghu Mckeon MD  
 
ATTENDING PHYSICIAN: Chris Faroqo MD 
DISCHARGING PROVIDER: Lucas Godwin MD   
To contact this individual call [phone] and ask the  to page. If unavailable ask to be transferred the Adult Hospitalist Department. CONSULTATIONS: IP CONSULT TO THORACIC SURGERY 
 
PROCEDURES/SURGERIES: * No surgery found * [id number] COURSE:  
 
A 80-year-old -American female with past medical history atrial fibrillation, lung cancer, colon cancer, hypertension, GERD, right breast carcinoma, gout, left pleural effusion presented as a direct admission/transfer from [facility] ED with reports of initial chief complaints of shortness of breath, cough, and peripheral edema.  The patient at current illness is a poor historian. [facility] majority of history was obtained from review of the ED and electronic medical records.  Per these collective reports, patient was last hospitalized from 05/29/2018 to 05/31/2018 with acute on chronic diastolic CHF, chronic atrial fibrillation, and left pleural effusion with pulmonary edema.  The patient presented to [facility] ED yesterday with aforementioned complaints. Patsy Hunt was last in the ED on 07/01/2018 and had been diagnosed with left-sided pneumonia and was started on outpatient Levaquin for antibiotic therapy.  The patient, however, continued to have ongoing cough productive of sputum and also had peripheral edema.  Workup in the ED on 07/16/2018 included CT of the chest without contrast showed chronic left pleural effusion and left perihilar chronic atelectasis and clear right lung. Ronnald Sovereign is no evidence of acute cardiopulmonary disease.  However, the patient's proBNP was elevated at 2174.  Potassium was 3.1 in terms of abnormal labs.  Request was for patient to be transferred to [facility] hospitalist service with unresolved left pleural effusion and plan for thoracic surgery consultation Shortness of breath due to possible due to chronic left pleural effusion and atelectasis, atelectasis and COPD 
-shortness of breath improved, SaO2 100% on 2 l/m 
-chest x ray left pleural effusion and airspace disease or atelectasis unchanged. -CT chest no significant change in chronic left pleural effusion and left lung perihilar chronic atelectasis, right lung clear, no acute cardiopulmonary disease   
-seen by thoracic surgeon is consulted and no surgical intervention,  
-continue prn duo neb,   
Chronic diastolic CHF  
-undetermined her NYHA Class, because of her COPD  
-continue lasix  
-not on ACEi/ARB due to renal insufficiency, not on BB due to hx of COPD, Hypokalemia 
-replaced and resolved CKD stage III  
-creatinine improved , monitor renal function   
Hx chronic of A Fib, rate controlled  
-resume eliquis  
HTN 
-BP normal, continue amlodipine and monitor BP  Hx of COPD 
-Continue prn duo neb  
Hx of right breast cancer  
-stable  
Hx of gout 
-stable 
  
  
Code status: Full Code DVT prophylaxis:SCD DISCHARGE DIAGNOSES / PLAN:   
 
Shortness of breath due to possible due to chronic left pleural effusion and atelectasis, atelectasis and COPD 
-shortness of breath improved, SaO2 100% on 2 l/m 
-continue home oxygen via nasal canula 
-prn duo neb   
Chronic diastolic CHF  
-undetermined her NYHA Class, because of her COPD  
-continue lasix  
-not on ACEi/ARB due to renal insufficiency, not on BB due to hx of COPD, Hypokalemia 
-resolved CKD stage III  
-creatinine improved , monitor renal function   
Hx chronic of A Fib, rate controlled  
-resume eliquis  
HTN 
-continue amlodipine and monitor BP  Hx of COPD 
-Continue prn duo neb  
Hx of right breast cancer  
-stable  Hx of gout 
-stable PENDING TEST RESULTS:  
At the time of discharge the following test results are still pending: none FOLLOW UP APPOINTMENTS:   
Follow-up Information Follow up With Details Comments Contact Info Bell Venegas MD   [id number] [facility] [address] [phone] ADDITIONAL CARE RECOMMENDATIONS:  
 
DIET: Cardiac Diet ACTIVITY: Activity as tolerated WOUND CARE: none EQUIPMENT needed: none DISCHARGE MEDICATIONS: 
Current Discharge Medication List  
  
CONTINUE these medications which have NOT CHANGED Details  
benzonatate (TESSALON) 100 mg capsule Take 100 mg by mouth three (3) times daily as needed for Cough. !! albuterol (PROVENTIL HFA) 90 mcg/actuation inhaler Take 2 Puffs by inhalation every four (4) hours as needed for Wheezing or Shortness of Breath. !! albuterol (PROVENTIL HFA, VENTOLIN HFA, PROAIR HFA) 90 mcg/actuation inhaler Take 2 Puffs by inhalation every six (6) hours as needed for Wheezing. Qty: 1 Inhaler, Refills: 0  
  
furosemide (LASIX) 40 mg tablet Take 2 tab 80mg in AM and 1 tab 40mg in PM (Corrected Rx) Qty: 270 Tab, Refills: 1  
  
!! albuterol-ipratropium (DUO-NEB) 2.5 mg-0.5 mg/3 ml nebu Take 3 mL by inhalation every four (4) hours as needed (shortness of breath). OXYGEN-AIR DELIVERY SYSTEMS Take 2 L by inhalation as needed (shortness of breath). apixaban (ELIQUIS) 2.5 mg tablet Take 1 Tab by mouth two (2) times a day. Qty: 60 Tab, Refills: 11  
  
budesonide-formoterol (SYMBICORT) 160-4.5 mcg/actuation HFAA Take 2 Puffs by inhalation two (2) times a day. mv-mn/folic acid/calcium/vit K (ONE-A-DAY WOMEN'S 50 PLUS PO) Take 1 Tab by mouth daily. dextran 70/hypromellose/PF (NATURAL TEARS, PF, OP) Administer 1 Drop to both eyes daily as needed (dry eyes). mometasone (NASONEX) 50 mcg/actuation nasal spray 2 Sprays by Both Nostrils route daily as needed (congestion).   
  
allopurinol (ZYLOPRIM) 100 mg tablet Patient takes 2 tablets in the morning and 1 tablet at night. !! albuterol-ipratropium (DUO-NEB) 2.5 mg-0.5 mg/3 ml nebu 3 mL by Nebulization route every four (4) hours as needed. Qty: 30 Nebule, Refills: 1  
  
potassium chloride (KLOR-CON) 20 mEq packet Take 20 mEq by mouth daily. Indications: pill  
  
colchicine (COLCRYS) 0.6 mg tablet Take 0.6 mg by mouth daily. acetaminophen (TYLENOL EXTRA STRENGTH) 500 mg tablet Take 1,000 mg by mouth every six (6) hours as needed for Pain. amLODIPine (NORVASC) 2.5 mg tablet Take 2.5 mg by mouth daily. !! - Potential duplicate medications found. Please discuss with provider. NOTIFY YOUR PHYSICIAN FOR ANY OF THE FOLLOWING:  
Fever over 101 degrees for 24 hours. Chest pain, shortness of breath, fever, chills, nausea, vomiting, diarrhea, change in mentation, falling, weakness, bleeding. Severe pain or pain not relieved by medications. Or, any other signs or symptoms that you may have questions about. DISPOSITION: 
 x Home With: 
 OT x PT x HH  RN  
  
 Long term SNF/Inpatient Rehab Independent/assisted living Hospice Other:  
 
 
PATIENT CONDITION AT DISCHARGE:  
 
Functional status Poor   
x Deconditioned Independent Cognition Sharlett Putty Forgetful   
x Dementia Catheters/lines (plus indication) [place] PICC   
 PEG   
x None Code status  
x  Full code DNR   
 
PHYSICAL EXAMINATION AT DISCHARGE: 
 Refer to Progress Note CHRONIC MEDICAL DIAGNOSES: 
Problem List as of 7/18/2018  Date Reviewed: 7/17/2018 Codes Class Noted - Resolved * (Principal)Pleural effusion, left ICD-10-CM: J90 ICD-9-CM: 511.9  7/17/2018 - Present CHF, acute ([facility] ICD-10-CM: I50.9 ICD-9-CM: 428.0  7/17/2018 - Present CKD (chronic kidney disease) stage 3, GFR 30-59 ml/min ICD-10-CM: N18.3 ICD-9-CM: 585.3  5/29/2018 - Present Chronic a-fib (HCC) ICD-10-CM: Q09.9 ICD-9-CM: 427.31 5/29/2018 - Present Diastolic CHF, acute on chronic (HCC) ICD-10-CM: I50.33 ICD-9-CM: 428.33, 428.0  5/29/2018 - Present Pleural effusion ICD-10-CM: J90 ICD-9-CM: 511.9  5/29/2018 - Present Pneumonia ICD-10-CM: J18.9 ICD-9-CM: 709  4/18/2017 - Present Sepsis(995.91) ICD-10-CM: A41.9 ICD-9-CM: 995.91  4/18/2017 - Present Dehydration ICD-10-CM: E86.0 ICD-9-CM: 276.51  4/18/2017 - Present Acute CHF ([facility] ICD-10-CM: I50.9 ICD-9-CM: 428.0  1/23/2015 - Present SOB (shortness of breath) ICD-10-CM: R06.02 
ICD-9-CM: 786.05  1/21/2015 - Present Pedal edema ICD-10-CM: R60.0 ICD-9-CM: 782.3  1/21/2015 - Present Squamous cell lung cancer (HCC) (Chronic) ICD-10-CM: C34.90 ICD-9-CM: 162.9  1/21/2015 - Present Adenocarcinoma ([facility] ICD-10-CM: C80.1 ICD-9-CM: 199.1  4/5/2013 - Present S/P right colectomy ICD-10-CM: Z90.49 ICD-9-CM: V45.89  4/5/2013 - Present Malignant essential hypertension ICD-10-CM: I10 
ICD-9-CM: 401.0  4/1/2013 - Present Colon cancer [facility]) ICD-10-CM: C18.9 ICD-9-CM: 153.9  3/30/2013 - Present Ileitis, terminal ([facility] ICD-10-CM: K50.00 ICD-9-CM: 555.0  3/27/2013 - Present A-fib [facility]) ICD-10-CM: I48.91 
ICD-9-CM: 427.31  3/27/2013 - Present Overview Signed 3/30/2013  6:56 PM by Nataly Curtis MD  
  Chronic and followed by Dr. Antionette Harp HTN (hypertension) ICD-10-CM: I10 
ICD-9-CM: 401.9  3/27/2013 - Present Greater than 35 minutes were spent with the patient on counseling and coordination of care Signed:  
Jose Fortune MD 
7/18/2018 12:35 PM

## 2018-07-18 NOTE — PROGRESS NOTES
Bedside and Verbal shift change report given to Valentine Scheuermann, RN (oncoming nurse) by Carlos Bowen RN (offgoing nurse). Report included the following information SBAR, Kardex, Procedure Summary, Intake/Output, MAR, Accordion and Recent Results.

## 2018-07-18 NOTE — PROGRESS NOTES
This pt is being discharged to home today. Pt's family is in her room. CM inquired as to whether they brought a portable tank for transport but they did not. Cm asked which company they obtained o2 from and they don't know. Per pt's therapist, this pt only desats when ambulating. CM asked MD if it is ok for pt to travel home without portable O2 and he agreed. Resume hh orders written and sent to [facility] in the [facility].  This information was placed on pt's AVS. Polina Otto

## 2018-07-18 NOTE — PROGRESS NOTES
Pharmacist Discharge Medication Reconciliation    Discharging Provider: Dr. Nurys South PMH: hypertension, breast cancer, AFIB, gout   Chief Complaint for this Admission: chronic pleural effusion   Allergies: Penicillins    Discharge Medications:   Current Discharge Medication List        CONTINUE these medications which have NOT CHANGED    Details   albuterol (PROVENTIL HFA) 90 mcg/actuation inhaler Take 2 Puffs by inhalation every four (4) hours as needed for Wheezing or Shortness of Breath. furosemide (LASIX) 40 mg tablet Take 2 tab 80mg in AM and 1 tab 40mg in PM (Corrected Rx)  Qty: 270 Tab, Refills: 1      albuterol-ipratropium (DUO-NEB) 2.5 mg-0.5 mg/3 ml nebu Take 3 mL by inhalation every four (4) hours as needed (shortness of breath). apixaban (ELIQUIS) 2.5 mg tablet Take 1 Tab by mouth two (2) times a day. Qty: 60 Tab, Refills: 11      budesonide-formoterol (SYMBICORT) 160-4.5 mcg/actuation HFAA Take 2 Puffs by inhalation two (2) times a day. mv-mn/folic acid/calcium/vit K (ONE-A-DAY WOMEN'S 50 PLUS PO) Take 1 Tab by mouth daily. dextran 70/hypromellose/PF (NATURAL TEARS, PF, OP) Administer 1 Drop to both eyes daily as needed (dry eyes). mometasone (NASONEX) 50 mcg/actuation nasal spray 2 Sprays by Both Nostrils route daily as needed (congestion). potassium chloride (KLOR-CON) 20 mEq packet Take 20 mEq by mouth daily. colchicine (COLCRYS) 0.6 mg tablet Take 0.6 mg by mouth daily. acetaminophen (TYLENOL EXTRA STRENGTH) 500 mg tablet Take 1,000 mg by mouth every six (6) hours as needed for Pain. amLODIPine (NORVASC) 2.5 mg tablet Take 2.5 mg by mouth daily. STOP taking these medications       benzonatate (TESSALON) 100 mg capsule Comments:   Reason for Stopping:               The patient's chart, MAR and AVS were reviewed by Magdaleno Guadalupe., BCPS, BCPPS.

## 2018-07-18 NOTE — PROGRESS NOTES
Dr. Praveen Marie practice closes at 12:00 p.m. [facility] not [facility] patient [facility].     Lucas Arboleda CM Specialist

## 2018-07-18 NOTE — DISCHARGE INSTRUCTIONS
Discharge Instructions       PATIENT ID: Nadia Barakat  MRN: [id number]   YOB: 1918    DATE OF ADMISSION: 7/17/2018  1:27 AM    DATE OF DISCHARGE: 7/18/2018    PRIMARY CARE PROVIDER: Mary Melo MD     ATTENDING PHYSICIAN: Juan Allison MD  DISCHARGING PROVIDER: uJan Allison MD    To contact this individual call [phone] and ask the  to page. If unavailable ask to be transferred the Adult Hospitalist Department. DISCHARGE DIAGNOSES   Shortness of breath due to unresolved chronic left pleural effusion and atelectasis   Chronic diastolic CHF  Hypokalemia  CKD stage III   Hx chronic of A Fib, rate controlled   HTN  Hx of COPD  Hx of right breast cancer   Hx of gout    CONSULTATIONS: IP CONSULT TO THORACIC SURGERY    PROCEDURES/SURGERIES: * No surgery found *    PENDING TEST RESULTS:   At the time of discharge the following test results are still pending: none    FOLLOW UP APPOINTMENTS:   Follow-up Information     Follow up With Details Comments Contact Info    Mary Melo MD In one week   [facility]  [phone]             ADDITIONAL CARE RECOMMENDATIONS:     DIET: Cardiac Diet    ACTIVITY: Activity as tolerated    WOUND CARE: none    EQUIPMENT needed: none      DISCHARGE MEDICATIONS:   See Medication Reconciliation Form    · It is important that you take the medication exactly as they are prescribed. · Keep your medication in the bottles provided by the pharmacist and keep a list of the medication names, dosages, and times to be taken in your wallet. · Do not take other medications without consulting your doctor. NOTIFY YOUR PHYSICIAN FOR ANY OF THE FOLLOWING:   Fever over 101 degrees for 24 hours. Chest pain, shortness of breath, fever, chills, nausea, vomiting, diarrhea, change in mentation, falling, weakness, bleeding. Severe pain or pain not relieved by medications.   Or, any other signs or symptoms that you may have questions about.       DISPOSITION:   x Home With:  x OT x PT x HH  RN       SNF/Inpatient Rehab/LTAC    Independent/assisted living    Hospice    Other:     CDMP Checked:   Yes x     PROBLEM LIST Updated:  Yes x       Signed:   Frantz Elam MD  7/18/2018  12:32 PM

## 2018-07-19 ENCOUNTER — HOME CARE VISIT (OUTPATIENT)
Dept: HOME HEALTH SERVICES | Facility: HOME HEALTH | Age: 83
End: 2018-07-19
Payer: MEDICARE

## 2018-07-19 PROCEDURE — 3331090001 HH PPS REVENUE CREDIT

## 2018-07-19 PROCEDURE — 3331090002 HH PPS REVENUE DEBIT

## 2018-07-20 ENCOUNTER — HOME CARE VISIT (OUTPATIENT)
Dept: HOME HEALTH SERVICES | Facility: HOME HEALTH | Age: 83
End: 2018-07-20
Payer: MEDICARE

## 2018-07-20 ENCOUNTER — HOME CARE VISIT (OUTPATIENT)
Dept: SCHEDULING | Facility: HOME HEALTH | Age: 83
End: 2018-07-20
Payer: MEDICARE

## 2018-07-20 ENCOUNTER — PATIENT OUTREACH (OUTPATIENT)
Dept: CARDIOLOGY CLINIC | Age: 83
End: 2018-07-20

## 2018-07-20 PROCEDURE — G0299 HHS/HOSPICE OF RN EA 15 MIN: HCPCS

## 2018-07-20 PROCEDURE — 3331090001 HH PPS REVENUE CREDIT

## 2018-07-20 PROCEDURE — 3331090002 HH PPS REVENUE DEBIT

## 2018-07-21 PROCEDURE — 3331090001 HH PPS REVENUE CREDIT

## 2018-07-21 PROCEDURE — 3331090002 HH PPS REVENUE DEBIT

## 2018-07-22 PROCEDURE — 3331090002 HH PPS REVENUE DEBIT

## 2018-07-22 PROCEDURE — 3331090001 HH PPS REVENUE CREDIT

## 2018-07-23 ENCOUNTER — PATIENT OUTREACH (OUTPATIENT)
Dept: CARDIOLOGY CLINIC | Age: 83
End: 2018-07-23

## 2018-07-23 VITALS
HEART RATE: 88 BPM | TEMPERATURE: 98.4 F | WEIGHT: 185 LBS | BODY MASS INDEX: 33.83 KG/M2 | OXYGEN SATURATION: 98 % | DIASTOLIC BLOOD PRESSURE: 70 MMHG | SYSTOLIC BLOOD PRESSURE: 124 MMHG | RESPIRATION RATE: 20 BRPM

## 2018-07-23 VITALS — WEIGHT: 183 LBS | BODY MASS INDEX: 33.46 KG/M2

## 2018-07-23 PROCEDURE — 3331090001 HH PPS REVENUE CREDIT

## 2018-07-23 PROCEDURE — 3331090002 HH PPS REVENUE DEBIT

## 2018-07-23 NOTE — PROGRESS NOTES
LAURA on sons number listed, also called and spoke to patient she states her daughter has ran up to the store and will be back soon. Patient states Mendez Hooper came over the weekend and will be back Wednesday 7/25/18. Patient states she is weighing daily but does not have access at this time to the weight, it is being documented. Patient states she is doing much better since being home, and received her oxygen. Patient thinks she has a follow up with her PCP her daughter would know that. Advised the patient she needs a follow up with cardiology, and sent a message to [facility] to call patient to schedule a virtual visit, has a follow up with [place] which should have been Dr Oj Sanchez will reschedule    Patients daughter returned call went over medications and discussed low sodium diet and daily weights. today's weight 183.0lb. Daughter states follow up with PCP 7/24/18. Patient has a caregiver 4 hours in the daytime and 2 hours in the evening. Daughter has came from [place] to stay with mother while she is recuperating. Patient has oxygen through [address] and uses it with increased activity.

## 2018-07-24 ENCOUNTER — HOME CARE VISIT (OUTPATIENT)
Dept: HOME HEALTH SERVICES | Facility: HOME HEALTH | Age: 83
End: 2018-07-24
Payer: MEDICARE

## 2018-07-24 PROCEDURE — 3331090001 HH PPS REVENUE CREDIT

## 2018-07-24 PROCEDURE — 3331090002 HH PPS REVENUE DEBIT

## 2018-07-25 PROCEDURE — 3331090001 HH PPS REVENUE CREDIT

## 2018-07-25 PROCEDURE — 3331090002 HH PPS REVENUE DEBIT

## 2018-07-26 PROCEDURE — 3331090001 HH PPS REVENUE CREDIT

## 2018-07-26 PROCEDURE — 3331090002 HH PPS REVENUE DEBIT

## 2018-07-27 ENCOUNTER — HOME CARE VISIT (OUTPATIENT)
Dept: HOME HEALTH SERVICES | Facility: HOME HEALTH | Age: 83
End: 2018-07-27
Payer: MEDICARE

## 2018-07-27 ENCOUNTER — OFFICE VISIT (OUTPATIENT)
Dept: CARDIOLOGY CLINIC | Age: 83
End: 2018-07-27

## 2018-07-27 DIAGNOSIS — I50.32 DIASTOLIC CHF, CHRONIC (HCC): Primary | ICD-10-CM

## 2018-07-27 DIAGNOSIS — I48.20 CHRONIC A-FIB (HCC): ICD-10-CM

## 2018-07-27 DIAGNOSIS — J90 PLEURAL EFFUSION: ICD-10-CM

## 2018-07-27 DIAGNOSIS — N18.30 CKD (CHRONIC KIDNEY DISEASE) STAGE 3, GFR 30-59 ML/MIN (HCC): ICD-10-CM

## 2018-07-27 PROCEDURE — 3331090001 HH PPS REVENUE CREDIT

## 2018-07-27 PROCEDURE — 3331090002 HH PPS REVENUE DEBIT

## 2018-07-27 RX ORDER — METOLAZONE 2.5 MG/1
TABLET ORAL
Qty: 6 TAB | Refills: 0 | Status: SHIPPED | OUTPATIENT
Start: 2018-07-27 | End: 2019-03-02

## 2018-07-27 NOTE — PROGRESS NOTES
Patient is a 79-year-old female discharge from [facility]. Ligia's due to left pleural effusion, history of diastolic heart failure. Virtual visit was attempted today however there was video failure. Spoke with patient's primary caretaker to evaluate how she is doing since discharge. Patient was seen yesterday by primary care Dr. William Ponce weight was 185. 4 pounds lab work was drawn. She remains on furosemide 80 mg in the morning 40 mg in the evening. Her cough is improved. No reports of fever. Legs are swollen but significantly down prior to her hospitalization. She is not on beta blockers due to COPD nor ACE or arm secondary to azotemia. She remains on Eliquis 2.5 mg twice a day. During her hospitalization for her left pleural effusion CT surgery consulted with patient for drainage however felt surgical intervention was not an option for Ms. Radha Warren. Discussed with caretaker I am going to call in a prescription for Zaroxolyn 2.5 mg with the direction of to take as directed by cardiology for weight gain/shortness of breath/edema. There is a clear understanding this medication is to be used only as directed from cardiology and with an understanding that use of Zaroxolyn may worsen Ms. Howard's chronic kidney disease however having to keep her euvolemic is essential for avoiding rehospitalization or flash pulmonary edema. We will try again next week for virtual visit.  
Chantel Phillips NP

## 2018-07-28 PROCEDURE — 3331090001 HH PPS REVENUE CREDIT

## 2018-07-28 PROCEDURE — 3331090002 HH PPS REVENUE DEBIT

## 2018-07-29 PROCEDURE — 3331090002 HH PPS REVENUE DEBIT

## 2018-07-29 PROCEDURE — 3331090001 HH PPS REVENUE CREDIT

## 2018-07-30 ENCOUNTER — PATIENT OUTREACH (OUTPATIENT)
Dept: CARDIOLOGY CLINIC | Age: 83
End: 2018-07-30

## 2018-07-30 ENCOUNTER — HOME CARE VISIT (OUTPATIENT)
Dept: SCHEDULING | Facility: HOME HEALTH | Age: 83
End: 2018-07-30
Payer: MEDICARE

## 2018-07-30 PROCEDURE — 3331090003 HH PPS REVENUE ADJ

## 2018-07-30 PROCEDURE — G0299 HHS/HOSPICE OF RN EA 15 MIN: HCPCS

## 2018-07-30 PROCEDURE — 3331090001 HH PPS REVENUE CREDIT

## 2018-07-30 PROCEDURE — 3331090002 HH PPS REVENUE DEBIT

## 2018-07-31 PROCEDURE — 3331090002 HH PPS REVENUE DEBIT

## 2018-07-31 PROCEDURE — 3331090001 HH PPS REVENUE CREDIT

## 2018-08-01 VITALS
TEMPERATURE: 97.3 F | RESPIRATION RATE: 20 BRPM | HEART RATE: 88 BPM | WEIGHT: 181 LBS | SYSTOLIC BLOOD PRESSURE: 132 MMHG | OXYGEN SATURATION: 95 % | BODY MASS INDEX: 33.1 KG/M2 | DIASTOLIC BLOOD PRESSURE: 72 MMHG

## 2018-08-01 PROCEDURE — 3331090002 HH PPS REVENUE DEBIT

## 2018-08-01 PROCEDURE — 3331090001 HH PPS REVENUE CREDIT

## 2018-08-02 ENCOUNTER — OFFICE VISIT (OUTPATIENT)
Dept: CARDIOLOGY CLINIC | Age: 83
End: 2018-08-02

## 2018-08-02 VITALS — WEIGHT: 183 LBS | BODY MASS INDEX: 33.46 KG/M2

## 2018-08-02 DIAGNOSIS — N18.30 CKD (CHRONIC KIDNEY DISEASE) STAGE 3, GFR 30-59 ML/MIN (HCC): ICD-10-CM

## 2018-08-02 DIAGNOSIS — I50.32 DIASTOLIC CHF, CHRONIC (HCC): Primary | ICD-10-CM

## 2018-08-02 DIAGNOSIS — J90 PLEURAL EFFUSION: ICD-10-CM

## 2018-08-02 PROCEDURE — 3331090002 HH PPS REVENUE DEBIT

## 2018-08-02 PROCEDURE — 3331090001 HH PPS REVENUE CREDIT

## 2018-08-02 NOTE — PROGRESS NOTES
CARDIOLOGY TELEMEDICINE ENCOUNTER Blair Nicholson DNP, ANP. Subjective/HPI: 
  
Donnia Sandhoff is a 80 y.o. female with diastolic heart failure currently on Medicare bundle. She was assessed today via telemedicine consult. Her weight stable 183 pounds. Denies orthopnea or paroxysmal nocturnal dyspnea. Continues to have bilateral lower extremity edema. Previous cough and pulmonary congestion which was secondary to pneumonia and persistent pleural effusion per patient she is coughing significantly less and breathing easier. Family has picked up as needed Zaroxolyn prescription from last encounter. Most recent creatinine 1.29 July 17 Patient Active Problem List  
 Diagnosis Date Noted  Pleural effusion, left 07/17/2018  CHF, acute ([address] 07/17/2018  CKD (chronic kidney disease) stage 3, GFR 30-59 ml/min 05/29/2018  Chronic a-fib ([address] 05/29/2018  Diastolic CHF, acute on chronic ([address] 05/29/2018  Pleural effusion 05/29/2018  Pneumonia 04/18/2017  Sepsis(995.91) 04/18/2017  Dehydration 04/18/2017  Acute CHF ([address] 01/23/2015  SOB (shortness of breath) 01/21/2015  Pedal edema 01/21/2015  Squamous cell lung cancer ([address] 01/21/2015  Adenocarcinoma ([address] 04/05/2013  S/P right colectomy 04/05/2013  Malignant essential hypertension 04/01/2013  Colon cancer ([address] 03/30/2013  Ileitis, terminal ([address] 03/27/2013  A-fib ([address] 03/27/2013  
 HTN (hypertension) 03/27/2013 Raghu Mckeon MD 
Past Medical History:  
Diagnosis Date  A-fib ([address]  Cancer ([address] right breast CA, lung, colon  Gastrointestinal disorder GERD  Hypertension  Other ill-defined conditions(799.89)   
 gout Past Surgical History:  
Procedure Laterality Date [facility] Right mastectomy  HX APPENDECTOMY  HX HEENT    
 right glass eye  NE COLONOSCOPY W/BIOPSY SINGLE/MULTIPLE  3/29/2013  NE COLSC FLX W/RMVL OF TUMOR POLYP LESION SNARE TQ  3/29/2013 Allergies Allergen Reactions  Penicillins Hives Family History Problem Relation Age of Onset  Colon Cancer Other  Hypertension Other Current Outpatient Prescriptions Medication Sig  
 metOLazone (ZAROXOLYN) 2.5 mg tablet Take 1 tab only as directed by Cardiology for edema/shortness of breath/swelling.  allopurinol (ZYLOPRIM) 100 mg tablet Take 200 mg by mouth daily. (200 mg in the morning; 100 mg at night)  allopurinol (ZYLOPRIM) 100 mg tablet Take 100 mg by mouth every evening. (200 mg in the morning; 100 mg at night)  albuterol (PROVENTIL HFA) 90 mcg/actuation inhaler Take 2 Puffs by inhalation every four (4) hours as needed for Wheezing or Shortness of Breath.  furosemide (LASIX) 40 mg tablet Take 2 tab 80mg in AM and 1 tab 40mg in PM (Corrected Rx)  albuterol-ipratropium (DUO-NEB) 2.5 mg-0.5 mg/3 ml nebu Take 3 mL by inhalation every four (4) hours as needed (shortness of breath).  apixaban (ELIQUIS) 2.5 mg tablet Take 1 Tab by mouth two (2) times a day.  budesonide-formoterol (SYMBICORT) 160-4.5 mcg/actuation HFAA Take 2 Puffs by inhalation two (2) times a day.  mv-mn/folic acid/calcium/vit K (ONE-A-DAY WOMEN'S 50 PLUS PO) Take 1 Tab by mouth daily.  dextran 70/hypromellose/PF (NATURAL TEARS, PF, OP) Administer 1 Drop to both eyes daily as needed (dry eyes).  mometasone (NASONEX) 50 mcg/actuation nasal spray 2 Sprays by Both Nostrils route daily as needed (congestion).  potassium chloride (KLOR-CON) 20 mEq packet Take 20 mEq by mouth daily.  colchicine (COLCRYS) 0.6 mg tablet Take 0.6 mg by mouth daily.  acetaminophen (TYLENOL EXTRA STRENGTH) 500 mg tablet Take 1,000 mg by mouth every six (6) hours as needed for Pain.  amLODIPine (NORVASC) 2.5 mg tablet Take 2.5 mg by mouth daily.   
 
No current facility-administered medications for this visit. Vitals:  
 08/02/18 1447 Weight: 183 lb (83 kg) Review of Symptoms: 
 
General: Pt denies excessive weight gain or loss. HEENT: Denies blurred vision, headaches, epistaxis and difficulty swallowing. Respiratory: Denies shortness of breath, + WOOTEN, denies wheezing or stridor. Cardiovascular: Denies precordial pain, palpitations, + edema denies PND Visual Physical Exam:   
 
General: Well developed, cooperative, alert in no acute distress, appears states age. Neuro: Normal mentation, speaking clearly, A&Ox3 General Cardiac: No visible JVD, bilateral dependent edema from the ankles down, visually unchanged from last assessment Cardiographics ECG:  
Results for orders placed or performed during the hospital encounter of 07/16/18 EKG, 12 LEAD, INITIAL Result Value Ref Range Ventricular Rate 86 BPM  
 Atrial Rate 83 BPM  
 QRS Duration 82 ms Q-T Interval 378 ms QTC Calculation (Bezet) 452 ms Calculated R Axis -17 degrees Calculated T Axis 51 degrees Diagnosis Atrial fibrillation Low voltage QRS When compared with ECG of 29-MAY-2018 10:53, Atrial fibrillation has replaced Atrial flutter Nonspecific T wave abnormality, improved in Inferior leads Confirmed by Amara Class ([id number]) on 7/17/2018 11:21:54 AM 
  
 
 
Cardiology Labs: 
No results found for: CHOL, [address], [address] [facility], [address], [address], HDL, LDL, LDLC, DLDLP, TGLX, TRIGL, [address], [address] [facility], [address]  [facility] Lab Results Component Value Date/Time  Sodium 141 07/18/2018 02:54 AM  
 Potassium 4.1 07/18/2018 02:54 AM  
 Chloride 102 07/18/2018 02:54 AM  
 CO2 32 07/18/2018 02:54 AM  
 Anion gap 7 07/18/2018 02:54 AM  
 Glucose 97 07/18/2018 02:54 AM  
 BUN 18 07/18/2018 02:54 AM  
 Creatinine 1.29 (H) 07/18/2018 02:54 AM  
 BUN/Creatinine ratio 14 07/18/2018 02:54 AM  
 GFR est AA 46 (L) 07/18/2018 02:54 AM  
 GFR est non-AA 38 (L) 07/18/2018 02:54 AM  
 Calcium 9.4 07/18/2018 02:54 AM  
 Bilirubin, total 0.7 07/18/2018 02:54 AM  
 AST (SGOT) 22 07/18/2018 02:54 AM  
 Alk. phosphatase 77 07/18/2018 02:54 AM  
 Protein, total 6.7 07/18/2018 02:54 AM  
 Albumin 2.5 (L) 07/18/2018 02:54 AM  
 Globulin 4.2 (H) 07/18/2018 02:54 AM  
 A-G Ratio 0.6 (L) 07/18/2018 02:54 AM  
 ALT (SGPT) 16 07/18/2018 02:54 AM  
  
 
 
 Assessment: 
 
 Assessment:  
 
 
Patient presents with compensated diastolic heart failure, weight 183 pounds. Advised family to contact if weight exceeds 185 pounds as this will be the trigger point to use Zaroxolyn. Follow Up: Virtual clinic August 17, 2018 8:45 AM 
 
Roxy Owens DNP, ANP-BC This note was created using voice recognition software. Despite editing, there may be syntax errors.

## 2018-08-02 NOTE — LETTER
8/2/2018 2:52 PM 
 
Patient:  Osmani Iglesias YOB: 1918 Date of Visit: 8/2/2018 Dear Bell Venegas MD 
[id number] [facility] [address] VIA Facsimile: [phone] 
 : Thank you for referring Ms. Rick Ruiz to me for evaluation/treatment. Below are the relevant portions of my assessment and plan of care. If you have questions, please do not hesitate to call me. I look forward to following Ms. Paulina Claudio along with you. Sincerely, Craven Cogan, NP

## 2018-08-03 ENCOUNTER — HOME CARE VISIT (OUTPATIENT)
Dept: SCHEDULING | Facility: HOME HEALTH | Age: 83
End: 2018-08-03
Payer: MEDICARE

## 2018-08-03 PROCEDURE — 3331090002 HH PPS REVENUE DEBIT

## 2018-08-03 PROCEDURE — 3331090001 HH PPS REVENUE CREDIT

## 2018-08-03 PROCEDURE — 400014 HH F/U

## 2018-08-03 PROCEDURE — G0299 HHS/HOSPICE OF RN EA 15 MIN: HCPCS

## 2018-08-04 PROCEDURE — 3331090002 HH PPS REVENUE DEBIT

## 2018-08-04 PROCEDURE — 3331090001 HH PPS REVENUE CREDIT

## 2018-08-05 PROCEDURE — 3331090001 HH PPS REVENUE CREDIT

## 2018-08-05 PROCEDURE — 3331090002 HH PPS REVENUE DEBIT

## 2018-08-06 VITALS
DIASTOLIC BLOOD PRESSURE: 70 MMHG | SYSTOLIC BLOOD PRESSURE: 124 MMHG | BODY MASS INDEX: 33.1 KG/M2 | OXYGEN SATURATION: 95 % | TEMPERATURE: 98.4 F | HEART RATE: 89 BPM | RESPIRATION RATE: 24 BRPM | WEIGHT: 181 LBS

## 2018-08-06 PROCEDURE — 3331090002 HH PPS REVENUE DEBIT

## 2018-08-06 PROCEDURE — 3331090001 HH PPS REVENUE CREDIT

## 2018-08-07 ENCOUNTER — HOME CARE VISIT (OUTPATIENT)
Dept: HOME HEALTH SERVICES | Facility: HOME HEALTH | Age: 83
End: 2018-08-07
Payer: MEDICARE

## 2018-08-07 PROCEDURE — 3331090002 HH PPS REVENUE DEBIT

## 2018-08-07 PROCEDURE — 3331090001 HH PPS REVENUE CREDIT

## 2018-08-08 PROCEDURE — 3331090001 HH PPS REVENUE CREDIT

## 2018-08-08 PROCEDURE — 3331090002 HH PPS REVENUE DEBIT

## 2018-08-09 PROCEDURE — 3331090001 HH PPS REVENUE CREDIT

## 2018-08-09 PROCEDURE — 3331090002 HH PPS REVENUE DEBIT

## 2018-08-10 ENCOUNTER — PATIENT OUTREACH (OUTPATIENT)
Dept: CARDIOLOGY CLINIC | Age: 83
End: 2018-08-10

## 2018-08-10 ENCOUNTER — HOME CARE VISIT (OUTPATIENT)
Dept: SCHEDULING | Facility: HOME HEALTH | Age: 83
End: 2018-08-10
Payer: MEDICARE

## 2018-08-10 VITALS — WEIGHT: 179 LBS | BODY MASS INDEX: 32.73 KG/M2

## 2018-08-10 PROCEDURE — 3331090002 HH PPS REVENUE DEBIT

## 2018-08-10 PROCEDURE — G0299 HHS/HOSPICE OF RN EA 15 MIN: HCPCS

## 2018-08-10 PROCEDURE — 3331090001 HH PPS REVENUE CREDIT

## 2018-08-10 NOTE — PROGRESS NOTES
Spoke to patient daughter Nacho Olmedo on PHI states her mother has not been feeling well, has had an upset stomach for a few days. Patient has not had any other issues, weight today is at 179.0lb states her swelling in legs remains but no increase. Patient was suppose to have [place] today but they have not showed. Given number to [place], daughter will call to see why they have not came. Mendez Hooper last saw patient 8/3/18. Besides upset stomach daughter states patient has been doing well. Denies any shortness of breath. Patient has a follow up virtual appt with cardiology 8/17/18. Advised to call with any questions or concerns.

## 2018-08-11 PROCEDURE — 3331090002 HH PPS REVENUE DEBIT

## 2018-08-11 PROCEDURE — 3331090001 HH PPS REVENUE CREDIT

## 2018-08-12 PROCEDURE — 3331090002 HH PPS REVENUE DEBIT

## 2018-08-12 PROCEDURE — 3331090001 HH PPS REVENUE CREDIT

## 2018-08-13 ENCOUNTER — HOME CARE VISIT (OUTPATIENT)
Dept: SCHEDULING | Facility: HOME HEALTH | Age: 83
End: 2018-08-13
Payer: MEDICARE

## 2018-08-13 PROCEDURE — 3331090002 HH PPS REVENUE DEBIT

## 2018-08-13 PROCEDURE — 3331090001 HH PPS REVENUE CREDIT

## 2018-08-13 PROCEDURE — G0299 HHS/HOSPICE OF RN EA 15 MIN: HCPCS

## 2018-08-14 VITALS
HEART RATE: 74 BPM | TEMPERATURE: 98.2 F | DIASTOLIC BLOOD PRESSURE: 72 MMHG | BODY MASS INDEX: 32.55 KG/M2 | SYSTOLIC BLOOD PRESSURE: 118 MMHG | OXYGEN SATURATION: 98 % | RESPIRATION RATE: 20 BRPM | WEIGHT: 178 LBS

## 2018-08-14 PROCEDURE — 3331090002 HH PPS REVENUE DEBIT

## 2018-08-14 PROCEDURE — 3331090001 HH PPS REVENUE CREDIT

## 2018-08-15 ENCOUNTER — HOME CARE VISIT (OUTPATIENT)
Dept: SCHEDULING | Facility: HOME HEALTH | Age: 83
End: 2018-08-15
Payer: MEDICARE

## 2018-08-15 VITALS
WEIGHT: 176 LBS | SYSTOLIC BLOOD PRESSURE: 122 MMHG | HEART RATE: 84 BPM | OXYGEN SATURATION: 96 % | RESPIRATION RATE: 20 BRPM | TEMPERATURE: 98.4 F | DIASTOLIC BLOOD PRESSURE: 70 MMHG | BODY MASS INDEX: 32.18 KG/M2

## 2018-08-15 PROCEDURE — 3331090001 HH PPS REVENUE CREDIT

## 2018-08-15 PROCEDURE — G0299 HHS/HOSPICE OF RN EA 15 MIN: HCPCS

## 2018-08-15 PROCEDURE — 3331090002 HH PPS REVENUE DEBIT

## 2018-08-16 PROCEDURE — 3331090001 HH PPS REVENUE CREDIT

## 2018-08-16 PROCEDURE — 3331090002 HH PPS REVENUE DEBIT

## 2018-08-17 ENCOUNTER — FACE TO FACE ENCOUNTER (OUTPATIENT)
Dept: CARDIOLOGY CLINIC | Age: 83
End: 2018-08-17

## 2018-08-17 VITALS — WEIGHT: 177 LBS | BODY MASS INDEX: 32.37 KG/M2 | OXYGEN SATURATION: 97 %

## 2018-08-17 DIAGNOSIS — I50.32 CHRONIC DIASTOLIC HEART FAILURE (HCC): Primary | ICD-10-CM

## 2018-08-17 PROCEDURE — 3331090002 HH PPS REVENUE DEBIT

## 2018-08-17 PROCEDURE — 3331090001 HH PPS REVENUE CREDIT

## 2018-08-17 NOTE — PROGRESS NOTES
CARDIOLOGY TELEMEDICINE ENCOUNTER                                                                                      Fremonnavi Orozcogeena ISAAC, ANP. Subjective/HPI:     Garnett Halsted is a 80 y.o. female has been followed by [facility] heart failure for diastolic heart failure. She has celebrated her 89th birthday, she reports feeling well today without coughing, dyspnea on exertion orthopnea or paroxysmal nocturnal dyspnea.   Patient Active Problem List    Diagnosis Date Noted    Pleural effusion, left 07/17/2018    CHF, acute ([address] 07/17/2018    CKD (chronic kidney disease) stage 3, GFR 30-59 ml/min 05/29/2018    Chronic a-fib ([address] 82/54/9091    Diastolic CHF, acute on chronic (HCC) 05/29/2018    Pleural effusion 05/29/2018    Pneumonia 04/18/2017    Sepsis(995.91) 04/18/2017    Dehydration 04/18/2017    Acute CHF ([address] 01/23/2015    SOB (shortness of breath) 01/21/2015    Pedal edema 01/21/2015    Squamous cell lung [facility] 01/21/2015    Adenocarcinoma ([address] 04/05/2013    S/P right colectomy 04/05/2013    Malignant essential hypertension 04/01/2013    Colon cancer ([address] 03/30/2013    Ileitis, terminal ([address] 03/27/2013    A-fib ([facility] 03/27/2013    HTN (hypertension) 03/27/2013      Anya Marx MD  Past Medical History:   Diagnosis Date    A-fib [facility])     Cancer [facility])     right breast CA, lung, colon    Gastrointestinal disorder     GERD    Hypertension     Other ill-defined conditions(799.89)     gout      Past Surgical History:   Procedure Laterality Date    BREAST SURGERY PROCEDURE UNLISTED  1995    Right mastectomy    HX APPENDECTOMY      HX HEENT      right glass eye    TX COLONOSCOPY W/BIOPSY SINGLE/MULTIPLE  3/29/2013         TX COLSC FLX W/RMVL OF TUMOR POLYP LESION SNARE TQ  3/29/2013          Allergies   Allergen Reactions    Penicillins Hives      Family History   Problem Relation Age of Onset    Colon Cancer Other     Hypertension Other       Current Outpatient Prescriptions   Medication Sig    OXYGEN-AIR DELIVERY SYSTEMS Take 2 L by inhalation continuous.  furosemide (LASIX) 40 mg tablet Take 80 mg by mouth daily. pt. to take 2 tabs for total dose of 80mg every morning and then 1 tab (40mg) every evening    metOLazone (ZAROXOLYN) 2.5 mg tablet Take 1 tab only as directed by Cardiology for edema/shortness of breath/swelling.  allopurinol (ZYLOPRIM) 100 mg tablet Take 200 mg by mouth daily. (200 mg in the morning; 100 mg at night)    allopurinol (ZYLOPRIM) 100 mg tablet Take 100 mg by mouth every evening. (200 mg in the morning; 100 mg at night)    albuterol (PROVENTIL HFA) 90 mcg/actuation inhaler Take 2 Puffs by inhalation every four (4) hours as needed for Wheezing or Shortness of Breath.  furosemide (LASIX) 40 mg tablet Take 2 tab 80mg in AM and 1 tab 40mg in PM (Corrected Rx) (Patient not taking: Reported on 8/14/2018)    albuterol-ipratropium (DUO-NEB) 2.5 mg-0.5 mg/3 ml nebu Take 3 mL by inhalation every four (4) hours as needed (shortness of breath).  apixaban (ELIQUIS) 2.5 mg tablet Take 1 Tab by mouth two (2) times a day.  budesonide-formoterol (SYMBICORT) 160-4.5 mcg/actuation HFAA Take 2 Puffs by inhalation two (2) times a day.  mv-mn/folic acid/calcium/vit K (ONE-A-DAY WOMEN'S 50 PLUS PO) Take 1 Tab by mouth daily.  dextran 70/hypromellose/PF (NATURAL TEARS, PF, OP) Administer 1 Drop to both eyes daily as needed (dry eyes).  mometasone (NASONEX) 50 mcg/actuation nasal spray 2 Sprays by Both Nostrils route daily as needed (congestion).  potassium chloride (KLOR-CON) 20 mEq packet Take 20 mEq by mouth daily.  colchicine (COLCRYS) 0.6 mg tablet Take 0.6 mg by mouth daily.  acetaminophen (TYLENOL EXTRA STRENGTH) 500 mg tablet Take 1,000 mg by mouth every six (6) hours as needed for Pain.  amLODIPine (NORVASC) 2.5 mg tablet Take 2.5 mg by mouth daily. No current facility-administered medications for this visit. There were no vitals filed for this visit. Review of Symptoms:    General: Pt denies excessive weight gain or loss. Pt is able to conduct ADL's  HEENT: Denies blurred vision, headaches, epistaxis and difficulty swallowing. Respiratory: Denies shortness of breath, WOOTEN, wheezing or stridor. Cardiovascular: Denies precordial pain, palpitations, edema or PND  Gastrointestinal: Denies poor appetite, indigestion, abdominal pain or blood in stool  Musculoskeletal: Denies pain or swelling from muscles or joints  Neurologic: Denies tremor, paresthesias, or sensory motor disturbance  Skin: Denies rash, itching or texture change. Psyc: Denies depression or anxiety symptoms       Visual Physical Exam:      General: Well developed, cooperative, alert in no acute distress, appears states age.   Neuro: Normal mentation, speaking clearly, A&Ox3  General Cardiac: No visible JVD, trace amount of dependent edema no weeping of lower extremities    Cardiographics    ECG:   Results for orders placed or performed during the hospital encounter of 07/16/18   EKG, 12 LEAD, INITIAL   Result Value Ref Range    Ventricular Rate 86 BPM    Atrial Rate 83 BPM    QRS Duration 82 ms    Q-T Interval 378 ms    QTC Calculation (Bezet) 452 ms    Calculated R Axis -17 degrees    Calculated T Axis 51 degrees    Diagnosis       Atrial fibrillation  Low voltage QRS  When compared with ECG of 29-MAY-2018 10:53,  Atrial fibrillation has replaced Atrial flutter  Nonspecific T wave abnormality, improved in Inferior leads  Confirmed by Yan Sorenson ([id number]) on 7/17/2018 11:21:54 AM         Cardiology Labs:  No results found for: CHOL, CHOLX, CHLST, CHOLV, 412242, HDL, LDL, LDLC, DLDLP, TGLX, TRIGL, TRIGP, CHHD, [facility]    Lab Results   Component Value Date/Time    Sodium 141 07/18/2018 02:54 AM    Potassium 4.1 07/18/2018 02:54 AM    Chloride 102 07/18/2018 02:54 AM    CO2 32 07/18/2018 02:54 AM    Anion gap 7 07/18/2018 02:54 AM    Glucose 97 07/18/2018 02:54 AM    BUN 18 07/18/2018 02:54 AM    Creatinine 1.29 (H) 07/18/2018 02:54 AM    BUN/Creatinine ratio 14 07/18/2018 02:54 AM    GFR est AA 46 (L) 07/18/2018 02:54 AM    GFR est non-AA 38 (L) 07/18/2018 02:54 AM    Calcium 9.4 07/18/2018 02:54 AM    Bilirubin, total 0.7 07/18/2018 02:54 AM    AST (SGOT) 22 07/18/2018 02:54 AM    Alk. phosphatase 77 07/18/2018 02:54 AM    Protein, total 6.7 07/18/2018 02:54 AM    Albumin 2.5 (L) 07/18/2018 02:54 AM    Globulin 4.2 (H) 07/18/2018 02:54 AM    A-G Ratio 0.6 (L) 07/18/2018 02:54 AM    ALT (SGPT) 16 07/18/2018 02:54 AM           Assessment:     Assessment:       Patient presents with compensated diastolic heart failure with current regimen. Continue current medications. Will follow up with patient on-site in approximately 1 month. Advised patient/daughter/caretaker to contact our office for any weight gain greater than 3 pounds, edema shortness of breath or coughing and will see sooner if virtually. Follow Up: 1 month    Ashia Sheffield DNP, ANP-BC    This note was created using voice recognition software. Despite editing, there may be syntax errors.

## 2018-08-18 PROCEDURE — 3331090002 HH PPS REVENUE DEBIT

## 2018-08-18 PROCEDURE — 3331090001 HH PPS REVENUE CREDIT

## 2018-08-19 PROCEDURE — 3331090001 HH PPS REVENUE CREDIT

## 2018-08-19 PROCEDURE — 3331090002 HH PPS REVENUE DEBIT

## 2018-08-20 VITALS
HEART RATE: 68 BPM | TEMPERATURE: 98.5 F | OXYGEN SATURATION: 98 % | DIASTOLIC BLOOD PRESSURE: 70 MMHG | RESPIRATION RATE: 20 BRPM | SYSTOLIC BLOOD PRESSURE: 120 MMHG

## 2018-08-20 PROCEDURE — 3331090002 HH PPS REVENUE DEBIT

## 2018-08-20 PROCEDURE — 3331090001 HH PPS REVENUE CREDIT

## 2018-08-21 ENCOUNTER — HOME CARE VISIT (OUTPATIENT)
Dept: HOME HEALTH SERVICES | Facility: HOME HEALTH | Age: 83
End: 2018-08-21
Payer: MEDICARE

## 2018-08-21 PROCEDURE — 3331090001 HH PPS REVENUE CREDIT

## 2018-08-21 PROCEDURE — 3331090002 HH PPS REVENUE DEBIT

## 2018-08-22 PROCEDURE — 3331090001 HH PPS REVENUE CREDIT

## 2018-08-22 PROCEDURE — 3331090002 HH PPS REVENUE DEBIT

## 2018-08-23 ENCOUNTER — HOME CARE VISIT (OUTPATIENT)
Dept: SCHEDULING | Facility: HOME HEALTH | Age: 83
End: 2018-08-23
Payer: MEDICARE

## 2018-08-23 PROCEDURE — 3331090002 HH PPS REVENUE DEBIT

## 2018-08-23 PROCEDURE — 3331090001 HH PPS REVENUE CREDIT

## 2018-08-23 PROCEDURE — G0299 HHS/HOSPICE OF RN EA 15 MIN: HCPCS

## 2018-08-24 ENCOUNTER — PATIENT OUTREACH (OUTPATIENT)
Dept: CARDIOLOGY CLINIC | Age: 83
End: 2018-08-24

## 2018-08-24 VITALS — WEIGHT: 175 LBS | BODY MASS INDEX: 32 KG/M2

## 2018-08-24 PROCEDURE — 3331090002 HH PPS REVENUE DEBIT

## 2018-08-24 PROCEDURE — 3331090001 HH PPS REVENUE CREDIT

## 2018-08-24 NOTE — PROGRESS NOTES
Contacted patient daughter Nisha Husbands on [place]. States patient is doing well this week. Patient has increased energy and decreased swelling. HH is currently visiting 2 times per week. Patients current weight 175.0lb. Patient has follow up with cardiology 9/28/18. Will call with any questions or concerns.

## 2018-08-25 PROCEDURE — 3331090002 HH PPS REVENUE DEBIT

## 2018-08-25 PROCEDURE — 3331090001 HH PPS REVENUE CREDIT

## 2018-08-26 PROCEDURE — 3331090001 HH PPS REVENUE CREDIT

## 2018-08-26 PROCEDURE — 3331090002 HH PPS REVENUE DEBIT

## 2018-08-27 VITALS
OXYGEN SATURATION: 97 % | DIASTOLIC BLOOD PRESSURE: 84 MMHG | WEIGHT: 177 LBS | RESPIRATION RATE: 20 BRPM | TEMPERATURE: 97.8 F | BODY MASS INDEX: 32.37 KG/M2 | SYSTOLIC BLOOD PRESSURE: 122 MMHG | HEART RATE: 92 BPM

## 2018-08-27 PROCEDURE — 3331090002 HH PPS REVENUE DEBIT

## 2018-08-27 PROCEDURE — 3331090001 HH PPS REVENUE CREDIT

## 2018-08-28 PROCEDURE — 3331090001 HH PPS REVENUE CREDIT

## 2018-08-28 PROCEDURE — 3331090002 HH PPS REVENUE DEBIT

## 2018-08-29 ENCOUNTER — HOME CARE VISIT (OUTPATIENT)
Dept: SCHEDULING | Facility: HOME HEALTH | Age: 83
End: 2018-08-29
Payer: MEDICARE

## 2018-08-29 PROCEDURE — G0299 HHS/HOSPICE OF RN EA 15 MIN: HCPCS

## 2018-08-29 PROCEDURE — 3331090002 HH PPS REVENUE DEBIT

## 2018-08-29 PROCEDURE — 3331090001 HH PPS REVENUE CREDIT

## 2018-08-30 PROCEDURE — 3331090001 HH PPS REVENUE CREDIT

## 2018-08-30 PROCEDURE — 3331090002 HH PPS REVENUE DEBIT

## 2018-08-31 ENCOUNTER — PATIENT OUTREACH (OUTPATIENT)
Dept: CARDIOLOGY CLINIC | Age: 83
End: 2018-08-31

## 2018-08-31 PROCEDURE — 3331090001 HH PPS REVENUE CREDIT

## 2018-08-31 PROCEDURE — 3331090002 HH PPS REVENUE DEBIT

## 2018-08-31 NOTE — PROGRESS NOTES
Spoke to patients daughter Arlette Olvera on [address] patient doing well, will continue to monitor her daily weights and low sodium diet. Patient has not been readmitted in last 60 days. Episode resolved

## 2018-09-01 PROCEDURE — 3331090002 HH PPS REVENUE DEBIT

## 2018-09-01 PROCEDURE — 3331090001 HH PPS REVENUE CREDIT

## 2018-09-02 PROCEDURE — 3331090002 HH PPS REVENUE DEBIT

## 2018-09-02 PROCEDURE — 3331090001 HH PPS REVENUE CREDIT

## 2018-09-03 VITALS
BODY MASS INDEX: 32.37 KG/M2 | WEIGHT: 177 LBS | DIASTOLIC BLOOD PRESSURE: 68 MMHG | HEART RATE: 76 BPM | SYSTOLIC BLOOD PRESSURE: 115 MMHG | OXYGEN SATURATION: 98 % | RESPIRATION RATE: 20 BRPM | TEMPERATURE: 98.3 F

## 2018-09-03 PROCEDURE — 3331090002 HH PPS REVENUE DEBIT

## 2018-09-03 PROCEDURE — 3331090001 HH PPS REVENUE CREDIT

## 2018-09-04 ENCOUNTER — HOME CARE VISIT (OUTPATIENT)
Dept: SCHEDULING | Facility: HOME HEALTH | Age: 83
End: 2018-09-04
Payer: MEDICARE

## 2018-09-04 PROCEDURE — 3331090001 HH PPS REVENUE CREDIT

## 2018-09-04 PROCEDURE — G0299 HHS/HOSPICE OF RN EA 15 MIN: HCPCS

## 2018-09-04 PROCEDURE — 3331090002 HH PPS REVENUE DEBIT

## 2018-09-05 PROCEDURE — 3331090001 HH PPS REVENUE CREDIT

## 2018-09-05 PROCEDURE — 3331090002 HH PPS REVENUE DEBIT

## 2018-09-06 ENCOUNTER — HOME CARE VISIT (OUTPATIENT)
Dept: HOME HEALTH SERVICES | Facility: HOME HEALTH | Age: 83
End: 2018-09-06

## 2018-09-06 PROCEDURE — 3331090001 HH PPS REVENUE CREDIT

## 2018-09-06 PROCEDURE — 3331090002 HH PPS REVENUE DEBIT

## 2018-09-07 PROCEDURE — 3331090001 HH PPS REVENUE CREDIT

## 2018-09-07 PROCEDURE — 3331090002 HH PPS REVENUE DEBIT

## 2018-09-08 PROCEDURE — 3331090002 HH PPS REVENUE DEBIT

## 2018-09-08 PROCEDURE — 3331090001 HH PPS REVENUE CREDIT

## 2018-09-09 VITALS
HEART RATE: 84 BPM | SYSTOLIC BLOOD PRESSURE: 122 MMHG | WEIGHT: 176 LBS | BODY MASS INDEX: 32.18 KG/M2 | TEMPERATURE: 98.8 F | RESPIRATION RATE: 20 BRPM | DIASTOLIC BLOOD PRESSURE: 82 MMHG | OXYGEN SATURATION: 98 %

## 2018-09-09 PROCEDURE — 3331090001 HH PPS REVENUE CREDIT

## 2018-09-09 PROCEDURE — 3331090002 HH PPS REVENUE DEBIT

## 2018-09-10 PROCEDURE — 3331090002 HH PPS REVENUE DEBIT

## 2018-09-10 PROCEDURE — 3331090001 HH PPS REVENUE CREDIT

## 2018-09-11 PROCEDURE — 3331090001 HH PPS REVENUE CREDIT

## 2018-09-11 PROCEDURE — 3331090002 HH PPS REVENUE DEBIT

## 2018-09-12 ENCOUNTER — HOME CARE VISIT (OUTPATIENT)
Dept: SCHEDULING | Facility: HOME HEALTH | Age: 83
End: 2018-09-12
Payer: MEDICARE

## 2018-09-12 PROCEDURE — 3331090001 HH PPS REVENUE CREDIT

## 2018-09-12 PROCEDURE — 3331090002 HH PPS REVENUE DEBIT

## 2018-09-12 PROCEDURE — G0299 HHS/HOSPICE OF RN EA 15 MIN: HCPCS

## 2018-09-12 PROCEDURE — 3331090003 HH PPS REVENUE ADJ

## 2018-09-13 PROCEDURE — 3331090002 HH PPS REVENUE DEBIT

## 2018-09-13 PROCEDURE — 3331090001 HH PPS REVENUE CREDIT

## 2018-09-14 VITALS
TEMPERATURE: 98.2 F | WEIGHT: 177 LBS | OXYGEN SATURATION: 98 % | HEART RATE: 72 BPM | BODY MASS INDEX: 32.37 KG/M2 | RESPIRATION RATE: 20 BRPM | DIASTOLIC BLOOD PRESSURE: 70 MMHG | SYSTOLIC BLOOD PRESSURE: 122 MMHG

## 2018-10-19 ENCOUNTER — OFFICE VISIT (OUTPATIENT)
Dept: CARDIOLOGY CLINIC | Age: 83
End: 2018-10-19

## 2018-10-19 VITALS
OXYGEN SATURATION: 92 % | HEIGHT: 62 IN | HEART RATE: 75 BPM | SYSTOLIC BLOOD PRESSURE: 114 MMHG | DIASTOLIC BLOOD PRESSURE: 70 MMHG | WEIGHT: 176.1 LBS | BODY MASS INDEX: 32.41 KG/M2

## 2018-10-19 DIAGNOSIS — I50.32 CHRONIC DIASTOLIC HEART FAILURE (HCC): Primary | ICD-10-CM

## 2018-10-19 DIAGNOSIS — N18.30 CKD (CHRONIC KIDNEY DISEASE) STAGE 3, GFR 30-59 ML/MIN (HCC): ICD-10-CM

## 2018-10-19 DIAGNOSIS — I10 ESSENTIAL HYPERTENSION: ICD-10-CM

## 2018-10-19 DIAGNOSIS — I48.20 CHRONIC A-FIB (HCC): ICD-10-CM

## 2018-10-19 NOTE — PROGRESS NOTES
Chief Complaint Patient presents with  CHF  Shortness of Breath  Foot Swelling 1. Have you been to the ER, urgent care clinic since your last visit? Hospitalized since your last visit? NO 
 
2. Have you seen or consulted any other health care providers outside of the [address] [place] [address] since your last visit? Include any pap smears or colon screening.  NO

## 2018-10-28 NOTE — PROGRESS NOTES
NAME:  Earnestine Goss :   1918 MRN:   [id number] PCP:  Pretty Britton MD 
 
    
 
Subjective: The patient is a 8 y.o. year old female  who returns for a routine follow-up. Since the last visit, patient reports no change in exercise tolerance, chest pain, edema, medication intolerance, palpitations, shortness of breath, PND/orthopnea wheezing, sputum, syncope, dizziness or light headedness. Doing well. Past Medical History:  
Diagnosis Date  A-fib ([facility]  Cancer ([facility] right breast CA, lung, colon  Gastrointestinal disorder GERD  Hypertension  Other ill-defined conditions(799.89)   
 gout ICD-10-CM ICD-9-CM 1. Chronic diastolic heart failure (HCC) I50.32 428.32   
2. Essential hypertension I10 401.9 AMB POC EKG ROUTINE W/ 12 LEADS, INTER & REP 3. CKD (chronic kidney disease) stage 3, GFR 30-59 ml/min ([facility]) N18.3 585.3 4. Chronic a-fib ([facility]) I48.2 427.31 Social History Tobacco Use  Smoking status: Former Smoker Last attempt to quit: 1967 Years since quittin.8  Smokeless tobacco: Never Used Substance Use Topics  Alcohol use: No  
  
Family History Problem Relation Age of Onset  Colon Cancer Other  Hypertension Other Review of SystemsGeneral: Pt denies excessive weight gain or loss. Pt is able to conduct ADL's HEENT: Denies blurred vision, headaches, epistaxis and difficulty swallowing. Respiratory: Denies shortness of breath, WOOTEN, wheezing or stridor. Cardiovascular: Denies precordial pain, palpitations, edema or PND Gastrointestinal: Denies poor appetite, indigestion, abdominal pain or blood in stool Musculoskeletal: Denies pain or swelling from muscles or joints Neurologic: Denies tremor, paresthesias, or sensory motor disturbance Skin: Denies rash, itching or texture change. Objective:  
 
 
Vitals:  
 10/19/18 1109 BP: 114/70 Pulse: 75 SpO2: 92% Weight: 176 lb 1.6 oz (79.9 kg) Height: 5' 2\" (1.575 m) Body mass index is 32.21 kg/m². [facility] Mental Status - Alert. General Appearance - Not in acute distress. Chest and Lung Exam  Inspection: Accessory muscles - No use of accessory muscles in breathing. Auscultation:  
Breath sounds: - Normal. 
 
Cardiovascular  
Inspection: Jugular vein - Bilateral - Inspection Normal. 
Palpation/Percussion:  
Apical Impulse: - Normal. 
Auscultation: Rhythm - Regular. Heart Sounds - S1 WNL and S2 WNL. No S3 or S4. Murmurs & Other Heart Sounds: Auscultation of the heart reveals - No Murmurs. Peripheral Vascular  
Upper Extremity: Inspection - Bilateral - No Cyanotic nailbeds or Digital clubbing. Lower Extremity:  
Palpation: Edema - Bilateral - No edema. Data Review:  
 
EKG -EKG: atrial fibrillation, rate 65. Medications reviewed Current Outpatient Medications Medication Sig  
 OXYGEN-AIR DELIVERY SYSTEMS Take 2 L by inhalation as needed.  furosemide (LASIX) 40 mg tablet Take 80 mg by mouth daily. pt. to take 2 tabs for total dose of 80mg every morning and then 1 tab (40mg) every evening  metOLazone (ZAROXOLYN) 2.5 mg tablet Take 1 tab only as directed by Cardiology for edema/shortness of breath/swelling.  albuterol (PROVENTIL HFA) 90 mcg/actuation inhaler Take 2 Puffs by inhalation every four (4) hours as needed for Wheezing or Shortness of Breath.  furosemide (LASIX) 40 mg tablet Take 2 tab 80mg in AM and 1 tab 40mg in PM (Corrected Rx)  albuterol-ipratropium (DUO-NEB) 2.5 mg-0.5 mg/3 ml nebu Take 3 mL by inhalation every four (4) hours as needed (shortness of breath).  apixaban (ELIQUIS) 2.5 mg tablet Take 1 Tab by mouth two (2) times a day.  budesonide-formoterol (SYMBICORT) 160-4.5 mcg/actuation HFAA Take 2 Puffs by inhalation two (2) times a day.  mv-mn/folic acid/calcium/vit K (ONE-A-DAY WOMEN'S 50 PLUS PO) Take 1 Tab by mouth daily.  dextran 70/hypromellose/PF (NATURAL TEARS, PF, OP) Administer 1 Drop to both eyes daily as needed (dry eyes).  mometasone (NASONEX) 50 mcg/actuation nasal spray 2 Sprays by Both Nostrils route daily as needed (congestion).  potassium chloride (KLOR-CON) 20 mEq packet Take 20 mEq by mouth daily.  colchicine (COLCRYS) 0.6 mg tablet Take 0.6 mg by mouth daily.  acetaminophen (TYLENOL EXTRA STRENGTH) 500 mg tablet Take 1,000 mg by mouth every six (6) hours as needed for Pain.  amLODIPine (NORVASC) 2.5 mg tablet Take 2.5 mg by mouth daily.  allopurinol (ZYLOPRIM) 100 mg tablet Take 200 mg by mouth daily. (200 mg in the morning; 100 mg at night)  allopurinol (ZYLOPRIM) 100 mg tablet Take 100 mg by mouth every evening. (200 mg in the morning; 100 mg at night) No current facility-administered medications for this visit. Assessment: ICD-10-CM ICD-9-CM 1. Chronic diastolic heart failure (HCC) I50.32 428.32   
2. Essential hypertension I10 401.9 AMB POC EKG ROUTINE W/ 12 LEADS, INTER & REP 3. CKD (chronic kidney disease) stage 3, GFR 30-59 ml/min (HCC) N18.3 585.3 4. Chronic a-fib ([facility]) I48.2 427.31 Plan:  
 
Patient presents doing well and stable from cardiac standpoint. CHF well compensated. Congratulated on completing 100 years!! Continue current care and follow up in 6 months.  
 
Chanel Ferguson MD

## 2019-01-01 ENCOUNTER — HOME CARE VISIT (OUTPATIENT)
Dept: SCHEDULING | Facility: HOME HEALTH | Age: 84
End: 2019-01-01
Payer: MEDICARE

## 2019-01-01 ENCOUNTER — HOSPITAL ENCOUNTER (INPATIENT)
Age: 84
LOS: 4 days | Discharge: HOME HEALTH | DRG: 291 | End: 2019-10-04
Attending: EMERGENCY MEDICINE | Admitting: INTERNAL MEDICINE
Payer: MEDICARE

## 2019-01-01 ENCOUNTER — PATIENT OUTREACH (OUTPATIENT)
Dept: FAMILY MEDICINE CLINIC | Age: 84
End: 2019-01-01

## 2019-01-01 ENCOUNTER — HOME CARE VISIT (OUTPATIENT)
Dept: HOME HEALTH SERVICES | Facility: HOME HEALTH | Age: 84
End: 2019-01-01
Payer: MEDICARE

## 2019-01-01 ENCOUNTER — APPOINTMENT (OUTPATIENT)
Dept: GENERAL RADIOLOGY | Age: 84
DRG: 291 | End: 2019-01-01
Attending: EMERGENCY MEDICINE
Payer: MEDICARE

## 2019-01-01 ENCOUNTER — HOME HEALTH ADMISSION (OUTPATIENT)
Dept: HOME HEALTH SERVICES | Facility: HOME HEALTH | Age: 84
End: 2019-01-01
Payer: MEDICARE

## 2019-01-01 ENCOUNTER — TELEPHONE (OUTPATIENT)
Dept: CARDIOLOGY CLINIC | Age: 84
End: 2019-01-01

## 2019-01-01 ENCOUNTER — HOSPITAL ENCOUNTER (EMERGENCY)
Age: 84
Discharge: HOME | End: 2019-11-23
Attending: EMERGENCY MEDICINE
Payer: MEDICARE

## 2019-01-01 ENCOUNTER — OFFICE VISIT (OUTPATIENT)
Dept: CARDIOLOGY CLINIC | Age: 84
End: 2019-01-01

## 2019-01-01 ENCOUNTER — APPOINTMENT (OUTPATIENT)
Dept: ULTRASOUND IMAGING | Age: 84
DRG: 291 | End: 2019-01-01
Attending: INTERNAL MEDICINE
Payer: MEDICARE

## 2019-01-01 ENCOUNTER — APPOINTMENT (OUTPATIENT)
Dept: CT IMAGING | Age: 84
DRG: 291 | End: 2019-01-01
Attending: EMERGENCY MEDICINE
Payer: MEDICARE

## 2019-01-01 VITALS
BODY MASS INDEX: 26.7 KG/M2 | SYSTOLIC BLOOD PRESSURE: 124 MMHG | HEART RATE: 78 BPM | SYSTOLIC BLOOD PRESSURE: 116 MMHG | OXYGEN SATURATION: 97 % | WEIGHT: 146 LBS | RESPIRATION RATE: 20 BRPM | HEART RATE: 82 BPM | WEIGHT: 145 LBS | TEMPERATURE: 98.4 F | TEMPERATURE: 97.7 F | DIASTOLIC BLOOD PRESSURE: 70 MMHG | DIASTOLIC BLOOD PRESSURE: 68 MMHG | BODY MASS INDEX: 26.52 KG/M2 | OXYGEN SATURATION: 98 % | RESPIRATION RATE: 20 BRPM

## 2019-01-01 VITALS
HEIGHT: 62 IN | HEART RATE: 80 BPM | DIASTOLIC BLOOD PRESSURE: 64 MMHG | RESPIRATION RATE: 14 BRPM | BODY MASS INDEX: 27.6 KG/M2 | WEIGHT: 150 LBS | SYSTOLIC BLOOD PRESSURE: 102 MMHG | OXYGEN SATURATION: 97 %

## 2019-01-01 VITALS
RESPIRATION RATE: 20 BRPM | DIASTOLIC BLOOD PRESSURE: 70 MMHG | HEART RATE: 66 BPM | SYSTOLIC BLOOD PRESSURE: 116 MMHG | TEMPERATURE: 97.4 F | WEIGHT: 152 LBS | BODY MASS INDEX: 27.8 KG/M2 | OXYGEN SATURATION: 98 %

## 2019-01-01 VITALS
TEMPERATURE: 98.4 F | HEART RATE: 78 BPM | WEIGHT: 151 LBS | SYSTOLIC BLOOD PRESSURE: 122 MMHG | DIASTOLIC BLOOD PRESSURE: 68 MMHG | OXYGEN SATURATION: 98 % | BODY MASS INDEX: 27.62 KG/M2 | RESPIRATION RATE: 20 BRPM

## 2019-01-01 VITALS
HEART RATE: 78 BPM | DIASTOLIC BLOOD PRESSURE: 70 MMHG | RESPIRATION RATE: 20 BRPM | SYSTOLIC BLOOD PRESSURE: 118 MMHG | OXYGEN SATURATION: 97 % | TEMPERATURE: 98.2 F | BODY MASS INDEX: 27.62 KG/M2 | WEIGHT: 151 LBS

## 2019-01-01 VITALS
RESPIRATION RATE: 18 BRPM | TEMPERATURE: 98.6 F | OXYGEN SATURATION: 98 % | SYSTOLIC BLOOD PRESSURE: 102 MMHG | HEART RATE: 68 BPM | DIASTOLIC BLOOD PRESSURE: 68 MMHG

## 2019-01-01 VITALS
DIASTOLIC BLOOD PRESSURE: 72 MMHG | WEIGHT: 156 LBS | SYSTOLIC BLOOD PRESSURE: 122 MMHG | RESPIRATION RATE: 20 BRPM | HEART RATE: 70 BPM | TEMPERATURE: 98.7 F | OXYGEN SATURATION: 97 % | BODY MASS INDEX: 28.53 KG/M2

## 2019-01-01 VITALS
TEMPERATURE: 98.4 F | WEIGHT: 157 LBS | RESPIRATION RATE: 20 BRPM | BODY MASS INDEX: 28.72 KG/M2 | WEIGHT: 148 LBS | HEART RATE: 78 BPM | SYSTOLIC BLOOD PRESSURE: 124 MMHG | DIASTOLIC BLOOD PRESSURE: 68 MMHG | OXYGEN SATURATION: 98 % | BODY MASS INDEX: 27.07 KG/M2 | TEMPERATURE: 98.4 F | SYSTOLIC BLOOD PRESSURE: 106 MMHG | DIASTOLIC BLOOD PRESSURE: 70 MMHG | HEART RATE: 64 BPM | OXYGEN SATURATION: 98 % | RESPIRATION RATE: 20 BRPM

## 2019-01-01 VITALS
HEART RATE: 83 BPM | TEMPERATURE: 97.5 F | OXYGEN SATURATION: 99 % | HEIGHT: 62 IN | RESPIRATION RATE: 20 BRPM | SYSTOLIC BLOOD PRESSURE: 141 MMHG | BODY MASS INDEX: 27.6 KG/M2 | DIASTOLIC BLOOD PRESSURE: 89 MMHG | WEIGHT: 150 LBS

## 2019-01-01 VITALS
WEIGHT: 153 LBS | TEMPERATURE: 97.2 F | BODY MASS INDEX: 27.98 KG/M2 | SYSTOLIC BLOOD PRESSURE: 110 MMHG | DIASTOLIC BLOOD PRESSURE: 80 MMHG | OXYGEN SATURATION: 97 % | RESPIRATION RATE: 20 BRPM | HEART RATE: 62 BPM

## 2019-01-01 VITALS
DIASTOLIC BLOOD PRESSURE: 70 MMHG | WEIGHT: 149 LBS | RESPIRATION RATE: 20 BRPM | HEART RATE: 78 BPM | SYSTOLIC BLOOD PRESSURE: 124 MMHG | OXYGEN SATURATION: 98 % | BODY MASS INDEX: 27.25 KG/M2 | TEMPERATURE: 98.2 F

## 2019-01-01 VITALS
SYSTOLIC BLOOD PRESSURE: 100 MMHG | HEART RATE: 79 BPM | OXYGEN SATURATION: 97 % | TEMPERATURE: 98.2 F | DIASTOLIC BLOOD PRESSURE: 70 MMHG | RESPIRATION RATE: 20 BRPM

## 2019-01-01 VITALS
TEMPERATURE: 98.4 F | SYSTOLIC BLOOD PRESSURE: 134 MMHG | RESPIRATION RATE: 16 BRPM | DIASTOLIC BLOOD PRESSURE: 78 MMHG | HEART RATE: 77 BPM | OXYGEN SATURATION: 97 %

## 2019-01-01 VITALS
SYSTOLIC BLOOD PRESSURE: 111 MMHG | OXYGEN SATURATION: 97 % | TEMPERATURE: 97.9 F | HEART RATE: 78 BPM | DIASTOLIC BLOOD PRESSURE: 81 MMHG

## 2019-01-01 VITALS — BODY MASS INDEX: 29.45 KG/M2 | WEIGHT: 161 LBS

## 2019-01-01 VITALS
OXYGEN SATURATION: 98 % | TEMPERATURE: 98.3 F | WEIGHT: 153 LBS | RESPIRATION RATE: 20 BRPM | BODY MASS INDEX: 27.98 KG/M2 | HEART RATE: 66 BPM | SYSTOLIC BLOOD PRESSURE: 122 MMHG | DIASTOLIC BLOOD PRESSURE: 68 MMHG

## 2019-01-01 VITALS
TEMPERATURE: 98.9 F | DIASTOLIC BLOOD PRESSURE: 74 MMHG | SYSTOLIC BLOOD PRESSURE: 142 MMHG | OXYGEN SATURATION: 98 % | HEART RATE: 77 BPM | BODY MASS INDEX: 27.44 KG/M2 | WEIGHT: 150 LBS

## 2019-01-01 VITALS — WEIGHT: 164.4 LBS | BODY MASS INDEX: 30.07 KG/M2

## 2019-01-01 VITALS
DIASTOLIC BLOOD PRESSURE: 70 MMHG | OXYGEN SATURATION: 97 % | SYSTOLIC BLOOD PRESSURE: 100 MMHG | HEART RATE: 81 BPM | TEMPERATURE: 97.8 F

## 2019-01-01 VITALS
TEMPERATURE: 98.6 F | HEART RATE: 76 BPM | DIASTOLIC BLOOD PRESSURE: 72 MMHG | SYSTOLIC BLOOD PRESSURE: 118 MMHG | WEIGHT: 150 LBS | OXYGEN SATURATION: 95 % | RESPIRATION RATE: 20 BRPM | BODY MASS INDEX: 27.44 KG/M2

## 2019-01-01 VITALS
SYSTOLIC BLOOD PRESSURE: 130 MMHG | WEIGHT: 151 LBS | DIASTOLIC BLOOD PRESSURE: 72 MMHG | TEMPERATURE: 97.4 F | RESPIRATION RATE: 20 BRPM | BODY MASS INDEX: 27.62 KG/M2 | OXYGEN SATURATION: 96 % | HEART RATE: 84 BPM

## 2019-01-01 VITALS
DIASTOLIC BLOOD PRESSURE: 75 MMHG | HEART RATE: 80 BPM | OXYGEN SATURATION: 98 % | TEMPERATURE: 98.5 F | SYSTOLIC BLOOD PRESSURE: 117 MMHG

## 2019-01-01 VITALS
RESPIRATION RATE: 18 BRPM | DIASTOLIC BLOOD PRESSURE: 70 MMHG | TEMPERATURE: 97.7 F | HEIGHT: 62 IN | SYSTOLIC BLOOD PRESSURE: 127 MMHG | BODY MASS INDEX: 29.06 KG/M2 | WEIGHT: 157.9 LBS | HEART RATE: 88 BPM | OXYGEN SATURATION: 98 %

## 2019-01-01 VITALS
HEART RATE: 85 BPM | SYSTOLIC BLOOD PRESSURE: 101 MMHG | OXYGEN SATURATION: 97 % | DIASTOLIC BLOOD PRESSURE: 63 MMHG | TEMPERATURE: 97 F

## 2019-01-01 DIAGNOSIS — I48.20 CHRONIC A-FIB (HCC): ICD-10-CM

## 2019-01-01 DIAGNOSIS — M79.641 BILATERAL HAND PAIN: Primary | ICD-10-CM

## 2019-01-01 DIAGNOSIS — N18.30 CKD (CHRONIC KIDNEY DISEASE) STAGE 3, GFR 30-59 ML/MIN (HCC): ICD-10-CM

## 2019-01-01 DIAGNOSIS — M79.642 BILATERAL HAND PAIN: Primary | ICD-10-CM

## 2019-01-01 DIAGNOSIS — N17.9 AKI (ACUTE KIDNEY INJURY) (HCC): Primary | ICD-10-CM

## 2019-01-01 DIAGNOSIS — R26.2 AMBULATORY DYSFUNCTION: ICD-10-CM

## 2019-01-01 DIAGNOSIS — M10.9 ACUTE GOUT OF HAND, UNSPECIFIED CAUSE, UNSPECIFIED LATERALITY: ICD-10-CM

## 2019-01-01 DIAGNOSIS — I50.32 DIASTOLIC CHF, CHRONIC (HCC): Primary | ICD-10-CM

## 2019-01-01 DIAGNOSIS — I10 ESSENTIAL HYPERTENSION: ICD-10-CM

## 2019-01-01 LAB
ALBUMIN SERPL-MCNC: 3.1 G/DL (ref 3.5–5)
ALBUMIN SERPL-MCNC: 3.3 G/DL (ref 3.5–5)
ALBUMIN/GLOB SERPL: 0.6 {RATIO} (ref 1.1–2.2)
ALBUMIN/GLOB SERPL: 0.7 {RATIO} (ref 1.1–2.2)
ALP SERPL-CCNC: 125 U/L (ref 45–117)
ALP SERPL-CCNC: 129 U/L (ref 45–117)
ALT SERPL-CCNC: 18 U/L (ref 12–78)
ALT SERPL-CCNC: 18 U/L (ref 12–78)
ANION GAP BLD CALC-SCNC: 11 MMOL/L (ref 10–20)
ANION GAP SERPL CALC-SCNC: 4 MMOL/L (ref 5–15)
ANION GAP SERPL CALC-SCNC: 5 MMOL/L (ref 5–15)
ANION GAP SERPL CALC-SCNC: 5 MMOL/L (ref 5–15)
ANION GAP SERPL CALC-SCNC: 6 MMOL/L (ref 5–15)
ANION GAP SERPL CALC-SCNC: 7 MMOL/L (ref 5–15)
APPEARANCE UR: CLEAR
AST SERPL-CCNC: 27 U/L (ref 15–37)
AST SERPL-CCNC: 28 U/L (ref 15–37)
ATRIAL RATE: 61 BPM
BACTERIA URNS QL MICRO: NEGATIVE /HPF
BASOPHILS # BLD: 0.1 K/UL (ref 0–0.1)
BASOPHILS # BLD: 0.1 K/UL (ref 0–0.1)
BASOPHILS NFR BLD: 1 % (ref 0–1)
BASOPHILS NFR BLD: 1 % (ref 0–1)
BILIRUB SERPL-MCNC: 0.6 MG/DL (ref 0.2–1)
BILIRUB SERPL-MCNC: 0.9 MG/DL (ref 0.2–1)
BILIRUB UR QL: NEGATIVE
BNP SERPL-MCNC: 3016 PG/ML
BUN BLD-MCNC: 37 MG/DL (ref 9–20)
BUN SERPL-MCNC: 33 MG/DL (ref 6–20)
BUN SERPL-MCNC: 34 MG/DL (ref 6–20)
BUN SERPL-MCNC: 38 MG/DL (ref 6–20)
BUN SERPL-MCNC: 39 MG/DL (ref 6–20)
BUN SERPL-MCNC: 40 MG/DL (ref 6–20)
BUN/CREAT SERPL: 17 (ref 12–20)
BUN/CREAT SERPL: 21 (ref 12–20)
BUN/CREAT SERPL: 22 (ref 12–20)
BUN/CREAT SERPL: 24 (ref 12–20)
BUN/CREAT SERPL: 25 (ref 12–20)
CA-I BLD-MCNC: 1.18 MMOL/L (ref 1.12–1.32)
CALCIUM SERPL-MCNC: 10 MG/DL (ref 8.5–10.1)
CALCIUM SERPL-MCNC: 10.3 MG/DL (ref 8.5–10.1)
CALCIUM SERPL-MCNC: 9.4 MG/DL (ref 8.5–10.1)
CALCIUM SERPL-MCNC: 9.8 MG/DL (ref 8.5–10.1)
CALCIUM SERPL-MCNC: 9.9 MG/DL (ref 8.5–10.1)
CALCULATED R AXIS, ECG10: -5 DEGREES
CALCULATED T AXIS, ECG11: 23 DEGREES
CHLORIDE BLD-SCNC: 97 MMOL/L (ref 98–107)
CHLORIDE SERPL-SCNC: 100 MMOL/L (ref 97–108)
CHLORIDE SERPL-SCNC: 101 MMOL/L (ref 97–108)
CHLORIDE SERPL-SCNC: 98 MMOL/L (ref 97–108)
CO2 BLD-SCNC: 37 MMOL/L (ref 21–32)
CO2 SERPL-SCNC: 33 MMOL/L (ref 21–32)
CO2 SERPL-SCNC: 34 MMOL/L (ref 21–32)
CO2 SERPL-SCNC: 35 MMOL/L (ref 21–32)
CO2 SERPL-SCNC: 35 MMOL/L (ref 21–32)
CO2 SERPL-SCNC: 38 MMOL/L (ref 21–32)
COLOR UR: ABNORMAL
CREAT BLD-MCNC: 1.7 MG/DL (ref 0.6–1.3)
CREAT SERPL-MCNC: 1.4 MG/DL (ref 0.55–1.02)
CREAT SERPL-MCNC: 1.59 MG/DL (ref 0.55–1.02)
CREAT SERPL-MCNC: 1.6 MG/DL (ref 0.55–1.02)
CREAT SERPL-MCNC: 1.75 MG/DL (ref 0.55–1.02)
CREAT SERPL-MCNC: 2.25 MG/DL (ref 0.55–1.02)
DIAGNOSIS, 93000: NORMAL
DIFFERENTIAL METHOD BLD: ABNORMAL
DIFFERENTIAL METHOD BLD: ABNORMAL
EOSINOPHIL # BLD: 0 K/UL (ref 0–0.4)
EOSINOPHIL # BLD: 0 K/UL (ref 0–0.4)
EOSINOPHIL NFR BLD: 0 % (ref 0–7)
EOSINOPHIL NFR BLD: 0 % (ref 0–7)
EPITH CASTS URNS QL MICRO: ABNORMAL /LPF
ERYTHROCYTE [DISTWIDTH] IN BLOOD BY AUTOMATED COUNT: 17.2 % (ref 11.5–14.5)
ERYTHROCYTE [DISTWIDTH] IN BLOOD BY AUTOMATED COUNT: 17.4 % (ref 11.5–14.5)
ERYTHROCYTE [DISTWIDTH] IN BLOOD BY AUTOMATED COUNT: 17.5 % (ref 11.5–14.5)
GLOBULIN SER CALC-MCNC: 4.9 G/DL (ref 2–4)
GLOBULIN SER CALC-MCNC: 5.1 G/DL (ref 2–4)
GLUCOSE BLD-MCNC: 107 MG/DL (ref 65–100)
GLUCOSE SERPL-MCNC: 101 MG/DL (ref 65–100)
GLUCOSE SERPL-MCNC: 103 MG/DL (ref 65–100)
GLUCOSE SERPL-MCNC: 114 MG/DL (ref 65–100)
GLUCOSE SERPL-MCNC: 178 MG/DL (ref 65–100)
GLUCOSE SERPL-MCNC: 96 MG/DL (ref 65–100)
GLUCOSE UR STRIP.AUTO-MCNC: NEGATIVE MG/DL
HCT VFR BLD AUTO: 38.9 % (ref 35–47)
HCT VFR BLD AUTO: 40.9 % (ref 35–47)
HCT VFR BLD AUTO: 43.8 % (ref 35–47)
HCT VFR BLD CALC: 42 % (ref 35–47)
HGB BLD-MCNC: 12.6 G/DL (ref 11.5–16)
HGB BLD-MCNC: 13.3 G/DL (ref 11.5–16)
HGB BLD-MCNC: 13.8 G/DL (ref 11.5–16)
HGB UR QL STRIP: ABNORMAL
HYALINE CASTS URNS QL MICRO: ABNORMAL /LPF (ref 0–5)
IMM GRANULOCYTES # BLD AUTO: 0 K/UL (ref 0–0.04)
IMM GRANULOCYTES # BLD AUTO: 0 K/UL (ref 0–0.04)
IMM GRANULOCYTES NFR BLD AUTO: 0 % (ref 0–0.5)
IMM GRANULOCYTES NFR BLD AUTO: 0 % (ref 0–0.5)
KETONES UR QL STRIP.AUTO: NEGATIVE MG/DL
LEUKOCYTE ESTERASE UR QL STRIP.AUTO: NEGATIVE
LYMPHOCYTES # BLD: 0.4 K/UL (ref 0.8–3.5)
LYMPHOCYTES # BLD: 0.6 K/UL (ref 0.8–3.5)
LYMPHOCYTES NFR BLD: 12 % (ref 12–49)
LYMPHOCYTES NFR BLD: 8 % (ref 12–49)
MAGNESIUM SERPL-MCNC: 1.9 MG/DL (ref 1.6–2.4)
MCH RBC QN AUTO: 26.8 PG (ref 26–34)
MCH RBC QN AUTO: 26.9 PG (ref 26–34)
MCH RBC QN AUTO: 27.6 PG (ref 26–34)
MCHC RBC AUTO-ENTMCNC: 31.5 G/DL (ref 30–36.5)
MCHC RBC AUTO-ENTMCNC: 32.4 G/DL (ref 30–36.5)
MCHC RBC AUTO-ENTMCNC: 32.5 G/DL (ref 30–36.5)
MCV RBC AUTO: 83.1 FL (ref 80–99)
MCV RBC AUTO: 84.9 FL (ref 80–99)
MCV RBC AUTO: 85 FL (ref 80–99)
MONOCYTES # BLD: 0.5 K/UL (ref 0–1)
MONOCYTES # BLD: 0.7 K/UL (ref 0–1)
MONOCYTES NFR BLD: 14 % (ref 5–13)
MONOCYTES NFR BLD: 9 % (ref 5–13)
NEUTS SEG # BLD: 3.8 K/UL (ref 1.8–8)
NEUTS SEG # BLD: 4.1 K/UL (ref 1.8–8)
NEUTS SEG NFR BLD: 73 % (ref 32–75)
NEUTS SEG NFR BLD: 82 % (ref 32–75)
NITRITE UR QL STRIP.AUTO: NEGATIVE
NRBC # BLD: 0 K/UL (ref 0–0.01)
NRBC BLD-RTO: 0 PER 100 WBC
PH UR STRIP: 7 [PH] (ref 5–8)
PLATELET # BLD AUTO: 217 K/UL (ref 150–400)
PLATELET # BLD AUTO: 219 K/UL (ref 150–400)
PLATELET # BLD AUTO: 244 K/UL (ref 150–400)
PMV BLD AUTO: 10.5 FL (ref 8.9–12.9)
PMV BLD AUTO: 10.6 FL (ref 8.9–12.9)
PMV BLD AUTO: 10.8 FL (ref 8.9–12.9)
POTASSIUM BLD-SCNC: 3 MMOL/L (ref 3.5–5.1)
POTASSIUM SERPL-SCNC: 3 MMOL/L (ref 3.5–5.1)
POTASSIUM SERPL-SCNC: 3 MMOL/L (ref 3.5–5.1)
POTASSIUM SERPL-SCNC: 3.5 MMOL/L (ref 3.5–5.1)
POTASSIUM SERPL-SCNC: 3.7 MMOL/L (ref 3.5–5.1)
POTASSIUM SERPL-SCNC: 3.7 MMOL/L (ref 3.5–5.1)
PROCALCITONIN SERPL-MCNC: 0.1 NG/ML
PROT SERPL-MCNC: 8.2 G/DL (ref 6.4–8.2)
PROT SERPL-MCNC: 8.2 G/DL (ref 6.4–8.2)
PROT UR STRIP-MCNC: NEGATIVE MG/DL
Q-T INTERVAL, ECG07: 390 MS
QRS DURATION, ECG06: 78 MS
QTC CALCULATION (BEZET), ECG08: 460 MS
RBC # BLD AUTO: 4.68 M/UL (ref 3.8–5.2)
RBC # BLD AUTO: 4.82 M/UL (ref 3.8–5.2)
RBC # BLD AUTO: 5.15 M/UL (ref 3.8–5.2)
RBC #/AREA URNS HPF: ABNORMAL /HPF (ref 0–5)
RBC MORPH BLD: ABNORMAL
SERVICE CMNT-IMP: ABNORMAL
SODIUM BLD-SCNC: 141 MMOL/L (ref 136–145)
SODIUM SERPL-SCNC: 138 MMOL/L (ref 136–145)
SODIUM SERPL-SCNC: 140 MMOL/L (ref 136–145)
SODIUM SERPL-SCNC: 140 MMOL/L (ref 136–145)
SODIUM SERPL-SCNC: 141 MMOL/L (ref 136–145)
SODIUM SERPL-SCNC: 142 MMOL/L (ref 136–145)
SP GR UR REFRACTOMETRY: 1.01 (ref 1–1.03)
TROPONIN I SERPL-MCNC: <0.05 NG/ML
TSH SERPL DL<=0.05 MIU/L-ACNC: 1.34 UIU/ML (ref 0.36–3.74)
UA: UC IF INDICATED,UAUC: ABNORMAL
UROBILINOGEN UR QL STRIP.AUTO: 1 EU/DL (ref 0.2–1)
VENTRICULAR RATE, ECG03: 84 BPM
WBC # BLD AUTO: 5.1 K/UL (ref 3.6–11)
WBC # BLD AUTO: 5.2 K/UL (ref 3.6–11)
WBC # BLD AUTO: 5.3 K/UL (ref 3.6–11)
WBC URNS QL MICRO: ABNORMAL /HPF (ref 0–4)

## 2019-01-01 PROCEDURE — 3331090001 HH PPS REVENUE CREDIT

## 2019-01-01 PROCEDURE — G0299 HHS/HOSPICE OF RN EA 15 MIN: HCPCS

## 2019-01-01 PROCEDURE — 96374 THER/PROPH/DIAG INJ IV PUSH: CPT

## 2019-01-01 PROCEDURE — 84484 ASSAY OF TROPONIN QUANT: CPT

## 2019-01-01 PROCEDURE — 74011250637 HC RX REV CODE- 250/637: Performed by: NURSE PRACTITIONER

## 2019-01-01 PROCEDURE — 71250 CT THORAX DX C-: CPT

## 2019-01-01 PROCEDURE — 3331090002 HH PPS REVENUE DEBIT

## 2019-01-01 PROCEDURE — 81001 URINALYSIS AUTO W/SCOPE: CPT

## 2019-01-01 PROCEDURE — A6212 FOAM DRG <=16 SQ IN W/BORDER: HCPCS

## 2019-01-01 PROCEDURE — 97110 THERAPEUTIC EXERCISES: CPT

## 2019-01-01 PROCEDURE — 85025 COMPLETE CBC W/AUTO DIFF WBC: CPT

## 2019-01-01 PROCEDURE — 400013 HH SOC

## 2019-01-01 PROCEDURE — 97116 GAIT TRAINING THERAPY: CPT

## 2019-01-01 PROCEDURE — 80047 BASIC METABLC PNL IONIZED CA: CPT

## 2019-01-01 PROCEDURE — 77030037877 HC DRSG MEPILEX >48IN BORD MOLN -A

## 2019-01-01 PROCEDURE — 36415 COLL VENOUS BLD VENIPUNCTURE: CPT

## 2019-01-01 PROCEDURE — 94760 N-INVAS EAR/PLS OXIMETRY 1: CPT

## 2019-01-01 PROCEDURE — 74011250636 HC RX REV CODE- 250/636: Performed by: INTERNAL MEDICINE

## 2019-01-01 PROCEDURE — 84145 PROCALCITONIN (PCT): CPT

## 2019-01-01 PROCEDURE — G0300 HHS/HOSPICE OF LPN EA 15 MIN: HCPCS

## 2019-01-01 PROCEDURE — 80048 BASIC METABOLIC PNL TOTAL CA: CPT

## 2019-01-01 PROCEDURE — 65660000000 HC RM CCU STEPDOWN

## 2019-01-01 PROCEDURE — 90686 IIV4 VACC NO PRSV 0.5 ML IM: CPT | Performed by: INTERNAL MEDICINE

## 2019-01-01 PROCEDURE — 51701 INSERT BLADDER CATHETER: CPT

## 2019-01-01 PROCEDURE — 90471 IMMUNIZATION ADMIN: CPT

## 2019-01-01 PROCEDURE — 99284 EMERGENCY DEPT VISIT MOD MDM: CPT

## 2019-01-01 PROCEDURE — 80053 COMPREHEN METABOLIC PANEL: CPT

## 2019-01-01 PROCEDURE — 84443 ASSAY THYROID STIM HORMONE: CPT

## 2019-01-01 PROCEDURE — 97530 THERAPEUTIC ACTIVITIES: CPT | Performed by: OCCUPATIONAL THERAPIST

## 2019-01-01 PROCEDURE — 93005 ELECTROCARDIOGRAM TRACING: CPT

## 2019-01-01 PROCEDURE — 74011250637 HC RX REV CODE- 250/637: Performed by: INTERNAL MEDICINE

## 2019-01-01 PROCEDURE — 85027 COMPLETE CBC AUTOMATED: CPT

## 2019-01-01 PROCEDURE — 99285 EMERGENCY DEPT VISIT HI MDM: CPT

## 2019-01-01 PROCEDURE — 83735 ASSAY OF MAGNESIUM: CPT

## 2019-01-01 PROCEDURE — 73502 X-RAY EXAM HIP UNI 2-3 VIEWS: CPT

## 2019-01-01 PROCEDURE — A6250 SKIN SEAL PROTECT MOISTURIZR: HCPCS

## 2019-01-01 PROCEDURE — G0151 HHCP-SERV OF PT,EA 15 MIN: HCPCS

## 2019-01-01 PROCEDURE — 97165 OT EVAL LOW COMPLEX 30 MIN: CPT | Performed by: OCCUPATIONAL THERAPIST

## 2019-01-01 PROCEDURE — 77030038269 HC DRN EXT URIN PURWCK BARD -A

## 2019-01-01 PROCEDURE — 83880 ASSAY OF NATRIURETIC PEPTIDE: CPT

## 2019-01-01 PROCEDURE — 97162 PT EVAL MOD COMPLEX 30 MIN: CPT

## 2019-01-01 PROCEDURE — 74011250636 HC RX REV CODE- 250/636: Performed by: EMERGENCY MEDICINE

## 2019-01-01 PROCEDURE — 3331090003 HH PPS REVENUE ADJ

## 2019-01-01 PROCEDURE — 74011636637 HC RX REV CODE- 636/637: Performed by: EMERGENCY MEDICINE

## 2019-01-01 PROCEDURE — 65270000029 HC RM PRIVATE

## 2019-01-01 PROCEDURE — G0157 HHC PT ASSISTANT EA 15: HCPCS

## 2019-01-01 PROCEDURE — 77030011943

## 2019-01-01 PROCEDURE — 71045 X-RAY EXAM CHEST 1 VIEW: CPT

## 2019-01-01 PROCEDURE — 93970 EXTREMITY STUDY: CPT

## 2019-01-01 PROCEDURE — 74011250637 HC RX REV CODE- 250/637: Performed by: EMERGENCY MEDICINE

## 2019-01-01 RX ORDER — TRAMADOL HYDROCHLORIDE 50 MG/1
100 TABLET ORAL
Status: COMPLETED | OUTPATIENT
Start: 2019-01-01 | End: 2019-01-01

## 2019-01-01 RX ORDER — NYSTATIN 100000 [USP'U]/G
POWDER TOPICAL 2 TIMES DAILY
Status: DISCONTINUED | OUTPATIENT
Start: 2019-01-01 | End: 2019-01-01 | Stop reason: HOSPADM

## 2019-01-01 RX ORDER — POTASSIUM CHLORIDE 750 MG/1
20 TABLET, FILM COATED, EXTENDED RELEASE ORAL
Status: COMPLETED | OUTPATIENT
Start: 2019-01-01 | End: 2019-01-01

## 2019-01-01 RX ORDER — ACETAMINOPHEN 325 MG/1
650 TABLET ORAL
Status: DISCONTINUED | OUTPATIENT
Start: 2019-01-01 | End: 2019-01-01 | Stop reason: HOSPADM

## 2019-01-01 RX ORDER — TRAMADOL HYDROCHLORIDE 50 MG/1
50 TABLET ORAL
Status: DISPENSED | OUTPATIENT
Start: 2019-01-01 | End: 2019-01-01

## 2019-01-01 RX ORDER — FACIAL-BODY WIPES
10 EACH TOPICAL DAILY PRN
Status: DISCONTINUED | OUTPATIENT
Start: 2019-01-01 | End: 2019-01-01 | Stop reason: HOSPADM

## 2019-01-01 RX ORDER — NALOXONE HYDROCHLORIDE 0.4 MG/ML
0.4 INJECTION, SOLUTION INTRAMUSCULAR; INTRAVENOUS; SUBCUTANEOUS AS NEEDED
Status: DISCONTINUED | OUTPATIENT
Start: 2019-01-01 | End: 2019-01-01 | Stop reason: HOSPADM

## 2019-01-01 RX ORDER — DILTIAZEM HYDROCHLORIDE 30 MG/1
30 TABLET, FILM COATED ORAL EVERY 6 HOURS
Status: DISCONTINUED | OUTPATIENT
Start: 2019-01-01 | End: 2019-01-01 | Stop reason: HOSPADM

## 2019-01-01 RX ORDER — FUROSEMIDE 40 MG/1
40 TABLET ORAL EVERY EVENING
Status: DISCONTINUED | OUTPATIENT
Start: 2019-01-01 | End: 2019-01-01 | Stop reason: HOSPADM

## 2019-01-01 RX ORDER — PREDNISONE 20 MG/1
20 TABLET ORAL
Status: COMPLETED | OUTPATIENT
Start: 2019-01-01 | End: 2019-01-01

## 2019-01-01 RX ORDER — HYDRALAZINE HYDROCHLORIDE 20 MG/ML
10 INJECTION INTRAMUSCULAR; INTRAVENOUS
Status: DISCONTINUED | OUTPATIENT
Start: 2019-01-01 | End: 2019-01-01 | Stop reason: HOSPADM

## 2019-01-01 RX ORDER — METOLAZONE 2.5 MG/1
2.5 TABLET ORAL
Qty: 90 TAB | Refills: 1 | Status: SHIPPED | OUTPATIENT
Start: 2019-01-01 | End: 2020-01-01

## 2019-01-01 RX ORDER — NYSTATIN 100000 U/G
CREAM TOPICAL 2 TIMES DAILY
COMMUNITY
End: 2019-01-01

## 2019-01-01 RX ORDER — HYDRALAZINE HYDROCHLORIDE 10 MG/1
10 TABLET, FILM COATED ORAL 3 TIMES DAILY
Status: DISCONTINUED | OUTPATIENT
Start: 2019-01-01 | End: 2019-01-01 | Stop reason: HOSPADM

## 2019-01-01 RX ORDER — SODIUM CHLORIDE 0.9 % (FLUSH) 0.9 %
5-40 SYRINGE (ML) INJECTION EVERY 8 HOURS
Status: DISCONTINUED | OUTPATIENT
Start: 2019-01-01 | End: 2019-01-01 | Stop reason: HOSPADM

## 2019-01-01 RX ORDER — AMOXICILLIN 250 MG
1 CAPSULE ORAL
Status: DISCONTINUED | OUTPATIENT
Start: 2019-01-01 | End: 2019-01-01 | Stop reason: HOSPADM

## 2019-01-01 RX ORDER — TRAMADOL HYDROCHLORIDE 50 MG/1
50 TABLET ORAL
Qty: 15 TAB | Refills: 0 | Status: SHIPPED | OUTPATIENT
Start: 2019-01-01 | End: 2019-01-01

## 2019-01-01 RX ORDER — ONDANSETRON 2 MG/ML
4 INJECTION INTRAMUSCULAR; INTRAVENOUS
Status: DISCONTINUED | OUTPATIENT
Start: 2019-01-01 | End: 2019-01-01 | Stop reason: HOSPADM

## 2019-01-01 RX ORDER — SODIUM CHLORIDE 0.9 % (FLUSH) 0.9 %
5-40 SYRINGE (ML) INJECTION AS NEEDED
Status: DISCONTINUED | OUTPATIENT
Start: 2019-01-01 | End: 2019-01-01 | Stop reason: HOSPADM

## 2019-01-01 RX ORDER — METOLAZONE 2.5 MG/1
2.5 TABLET ORAL
Status: DISCONTINUED | OUTPATIENT
Start: 2019-01-01 | End: 2019-01-01 | Stop reason: HOSPADM

## 2019-01-01 RX ORDER — POTASSIUM CHLORIDE 750 MG/1
40 TABLET, FILM COATED, EXTENDED RELEASE ORAL
Status: COMPLETED | OUTPATIENT
Start: 2019-01-01 | End: 2019-01-01

## 2019-01-01 RX ORDER — HYDRALAZINE HYDROCHLORIDE 10 MG/1
10 TABLET, FILM COATED ORAL 3 TIMES DAILY
Qty: 90 TAB | Refills: 0 | Status: SHIPPED | OUTPATIENT
Start: 2019-01-01 | End: 2019-01-01

## 2019-01-01 RX ORDER — FUROSEMIDE 10 MG/ML
40 INJECTION INTRAMUSCULAR; INTRAVENOUS ONCE
Status: COMPLETED | OUTPATIENT
Start: 2019-01-01 | End: 2019-01-01

## 2019-01-01 RX ORDER — PREDNISONE 20 MG/1
20 TABLET ORAL DAILY
Qty: 7 TAB | Refills: 0 | Status: SHIPPED | OUTPATIENT
Start: 2019-01-01 | End: 2019-01-01

## 2019-01-01 RX ORDER — POTASSIUM CHLORIDE 750 MG/1
10 TABLET, FILM COATED, EXTENDED RELEASE ORAL
Status: COMPLETED | OUTPATIENT
Start: 2019-01-01 | End: 2019-01-01

## 2019-01-01 RX ORDER — FUROSEMIDE 80 MG/1
80 TABLET ORAL DAILY
Status: DISCONTINUED | OUTPATIENT
Start: 2019-01-01 | End: 2019-01-01 | Stop reason: HOSPADM

## 2019-01-01 RX ADMIN — POTASSIUM CHLORIDE 10 MEQ: 750 TABLET, FILM COATED, EXTENDED RELEASE ORAL at 12:06

## 2019-01-01 RX ADMIN — DILTIAZEM HYDROCHLORIDE 30 MG: 30 TABLET, FILM COATED ORAL at 22:10

## 2019-01-01 RX ADMIN — Medication 10 ML: at 21:35

## 2019-01-01 RX ADMIN — FUROSEMIDE 40 MG: 40 TABLET ORAL at 17:45

## 2019-01-01 RX ADMIN — Medication 10 ML: at 22:00

## 2019-01-01 RX ADMIN — NYSTATIN: 100000 POWDER TOPICAL at 13:25

## 2019-01-01 RX ADMIN — ACETAMINOPHEN 650 MG: 325 TABLET ORAL at 17:50

## 2019-01-01 RX ADMIN — FUROSEMIDE 80 MG: 80 TABLET ORAL at 09:47

## 2019-01-01 RX ADMIN — Medication 10 ML: at 03:01

## 2019-01-01 RX ADMIN — SODIUM CHLORIDE 1000 ML: 900 INJECTION, SOLUTION INTRAVENOUS at 20:26

## 2019-01-01 RX ADMIN — HYDRALAZINE HYDROCHLORIDE 10 MG: 10 TABLET, FILM COATED ORAL at 21:12

## 2019-01-01 RX ADMIN — HYDRALAZINE HYDROCHLORIDE 10 MG: 10 TABLET, FILM COATED ORAL at 22:35

## 2019-01-01 RX ADMIN — NYSTATIN: 100000 POWDER TOPICAL at 12:07

## 2019-01-01 RX ADMIN — SENNOSIDES, DOCUSATE SODIUM 1 TABLET: 50; 8.6 TABLET, FILM COATED ORAL at 16:04

## 2019-01-01 RX ADMIN — PREDNISONE 20 MG: 20 TABLET ORAL at 18:47

## 2019-01-01 RX ADMIN — NYSTATIN: 100000 POWDER TOPICAL at 17:45

## 2019-01-01 RX ADMIN — HYDRALAZINE HYDROCHLORIDE 10 MG: 10 TABLET, FILM COATED ORAL at 21:34

## 2019-01-01 RX ADMIN — DILTIAZEM HYDROCHLORIDE 30 MG: 30 TABLET, FILM COATED ORAL at 12:45

## 2019-01-01 RX ADMIN — ACETAMINOPHEN 650 MG: 325 TABLET ORAL at 21:12

## 2019-01-01 RX ADMIN — APIXABAN 2.5 MG: 2.5 TABLET, FILM COATED ORAL at 09:46

## 2019-01-01 RX ADMIN — HYDRALAZINE HYDROCHLORIDE 10 MG: 10 TABLET, FILM COATED ORAL at 09:35

## 2019-01-01 RX ADMIN — TRAMADOL HYDROCHLORIDE 100 MG: 50 TABLET, FILM COATED ORAL at 18:47

## 2019-01-01 RX ADMIN — HYDRALAZINE HYDROCHLORIDE 10 MG: 10 TABLET, FILM COATED ORAL at 09:16

## 2019-01-01 RX ADMIN — FUROSEMIDE 40 MG: 10 INJECTION, SOLUTION INTRAMUSCULAR; INTRAVENOUS at 22:08

## 2019-01-01 RX ADMIN — NYSTATIN: 100000 POWDER TOPICAL at 17:23

## 2019-01-01 RX ADMIN — Medication 10 ML: at 01:38

## 2019-01-01 RX ADMIN — FUROSEMIDE 40 MG: 40 TABLET ORAL at 17:50

## 2019-01-01 RX ADMIN — HYDRALAZINE HYDROCHLORIDE 10 MG: 10 TABLET, FILM COATED ORAL at 09:47

## 2019-01-01 RX ADMIN — POTASSIUM CHLORIDE 20 MEQ: 750 TABLET, FILM COATED, EXTENDED RELEASE ORAL at 13:25

## 2019-01-01 RX ADMIN — BISACODYL 10 MG: 10 SUPPOSITORY RECTAL at 20:09

## 2019-01-01 RX ADMIN — POTASSIUM CHLORIDE 40 MEQ: 750 TABLET, FILM COATED, EXTENDED RELEASE ORAL at 09:23

## 2019-01-01 RX ADMIN — FUROSEMIDE 80 MG: 80 TABLET ORAL at 09:35

## 2019-01-01 RX ADMIN — FUROSEMIDE 40 MG: 40 TABLET ORAL at 17:23

## 2019-01-01 RX ADMIN — HYDRALAZINE HYDROCHLORIDE 10 MG: 10 TABLET, FILM COATED ORAL at 16:05

## 2019-01-01 RX ADMIN — Medication 10 ML: at 05:43

## 2019-01-01 RX ADMIN — HYDRALAZINE HYDROCHLORIDE 10 MG: 10 TABLET, FILM COATED ORAL at 17:44

## 2019-01-01 RX ADMIN — Medication 10 ML: at 05:21

## 2019-01-01 RX ADMIN — Medication 10 ML: at 13:26

## 2019-01-01 RX ADMIN — TRAMADOL HYDROCHLORIDE 50 MG: 50 TABLET ORAL at 11:59

## 2019-01-01 RX ADMIN — DILTIAZEM HYDROCHLORIDE 30 MG: 30 TABLET, FILM COATED ORAL at 23:54

## 2019-01-01 RX ADMIN — DILTIAZEM HYDROCHLORIDE 30 MG: 30 TABLET, FILM COATED ORAL at 05:40

## 2019-01-01 RX ADMIN — APIXABAN 2.5 MG: 2.5 TABLET, FILM COATED ORAL at 09:19

## 2019-01-01 RX ADMIN — APIXABAN 2.5 MG: 2.5 TABLET, FILM COATED ORAL at 17:49

## 2019-01-01 RX ADMIN — DILTIAZEM HYDROCHLORIDE 30 MG: 30 TABLET, FILM COATED ORAL at 17:44

## 2019-01-01 RX ADMIN — POTASSIUM CHLORIDE 40 MEQ: 750 TABLET, FILM COATED, EXTENDED RELEASE ORAL at 09:36

## 2019-01-01 RX ADMIN — FUROSEMIDE 80 MG: 80 TABLET ORAL at 09:19

## 2019-01-01 RX ADMIN — HYDRALAZINE HYDROCHLORIDE 10 MG: 10 TABLET, FILM COATED ORAL at 09:26

## 2019-01-01 RX ADMIN — Medication 10 ML: at 21:15

## 2019-01-01 RX ADMIN — NYSTATIN: 100000 POWDER TOPICAL at 09:47

## 2019-01-01 RX ADMIN — APIXABAN 2.5 MG: 2.5 TABLET, FILM COATED ORAL at 17:44

## 2019-01-01 RX ADMIN — APIXABAN 2.5 MG: 2.5 TABLET, FILM COATED ORAL at 09:34

## 2019-01-01 RX ADMIN — METOLAZONE 2.5 MG: 2.5 TABLET ORAL at 17:23

## 2019-01-01 RX ADMIN — SENNOSIDES, DOCUSATE SODIUM 1 TABLET: 50; 8.6 TABLET, FILM COATED ORAL at 21:12

## 2019-01-01 RX ADMIN — SENNOSIDES, DOCUSATE SODIUM 1 TABLET: 50; 8.6 TABLET, FILM COATED ORAL at 12:00

## 2019-01-01 RX ADMIN — APIXABAN 2.5 MG: 2.5 TABLET, FILM COATED ORAL at 17:23

## 2019-01-01 RX ADMIN — INFLUENZA VIRUS VACCINE 0.5 ML: 15; 15; 15; 15 SUSPENSION INTRAMUSCULAR at 17:49

## 2019-01-01 RX ADMIN — HYDRALAZINE HYDROCHLORIDE 10 MG: 10 TABLET, FILM COATED ORAL at 17:49

## 2019-01-01 RX ADMIN — APIXABAN 2.5 MG: 2.5 TABLET, FILM COATED ORAL at 09:16

## 2019-01-01 RX ADMIN — FUROSEMIDE 80 MG: 80 TABLET ORAL at 13:50

## 2019-01-01 RX ADMIN — Medication 10 ML: at 13:52

## 2019-01-01 RX ADMIN — DILTIAZEM HYDROCHLORIDE 30 MG: 30 TABLET, FILM COATED ORAL at 12:06

## 2019-01-01 RX ADMIN — Medication 10 ML: at 17:44

## 2019-03-02 ENCOUNTER — HOSPITAL ENCOUNTER (INPATIENT)
Age: 84
LOS: 2 days | Discharge: HOME HEALTH | DRG: 291 | End: 2019-03-04
Attending: EMERGENCY MEDICINE | Admitting: INTERNAL MEDICINE
Payer: MEDICARE

## 2019-03-02 ENCOUNTER — APPOINTMENT (OUTPATIENT)
Dept: GENERAL RADIOLOGY | Age: 84
DRG: 291 | End: 2019-03-02
Attending: EMERGENCY MEDICINE
Payer: MEDICARE

## 2019-03-02 DIAGNOSIS — I50.33 ACUTE ON CHRONIC DIASTOLIC CHF (CONGESTIVE HEART FAILURE) (HCC): Primary | ICD-10-CM

## 2019-03-02 PROBLEM — I50.9 CHF (CONGESTIVE HEART FAILURE) (HCC): Status: ACTIVE | Noted: 2019-03-02

## 2019-03-02 LAB
ALBUMIN SERPL-MCNC: 3.2 G/DL (ref 3.5–5)
ALBUMIN/GLOB SERPL: 0.7 {RATIO} (ref 1.1–2.2)
ALP SERPL-CCNC: 122 U/L (ref 45–117)
ALT SERPL-CCNC: 17 U/L (ref 12–78)
ANION GAP SERPL CALC-SCNC: 9 MMOL/L (ref 5–15)
APPEARANCE UR: CLEAR
AST SERPL-CCNC: 23 U/L (ref 15–37)
BACTERIA URNS QL MICRO: ABNORMAL /HPF
BASOPHILS # BLD: 0 K/UL (ref 0–0.1)
BASOPHILS NFR BLD: 1 % (ref 0–1)
BILIRUB SERPL-MCNC: 0.6 MG/DL (ref 0.2–1)
BILIRUB UR QL: NEGATIVE
BNP SERPL-MCNC: 1672 PG/ML
BUN SERPL-MCNC: 18 MG/DL (ref 6–20)
BUN/CREAT SERPL: 14 (ref 12–20)
CALCIUM SERPL-MCNC: 9.4 MG/DL (ref 8.5–10.1)
CHLORIDE SERPL-SCNC: 103 MMOL/L (ref 97–108)
CK MB CFR SERPL CALC: 3.1 % (ref 0–2.5)
CK MB SERPL-MCNC: 1.4 NG/ML (ref 5–25)
CK SERPL-CCNC: 45 U/L (ref 26–192)
CO2 SERPL-SCNC: 29 MMOL/L (ref 21–32)
COLOR UR: ABNORMAL
CREAT SERPL-MCNC: 1.25 MG/DL (ref 0.55–1.02)
DIFFERENTIAL METHOD BLD: ABNORMAL
EOSINOPHIL # BLD: 0.1 K/UL (ref 0–0.4)
EOSINOPHIL NFR BLD: 1 % (ref 0–7)
EPITH CASTS URNS QL MICRO: ABNORMAL /LPF
ERYTHROCYTE [DISTWIDTH] IN BLOOD BY AUTOMATED COUNT: 18.2 % (ref 11.5–14.5)
GLOBULIN SER CALC-MCNC: 4.6 G/DL (ref 2–4)
GLUCOSE SERPL-MCNC: 131 MG/DL (ref 65–100)
GLUCOSE UR STRIP.AUTO-MCNC: NEGATIVE MG/DL
HCT VFR BLD AUTO: 41.2 % (ref 35–47)
HGB BLD-MCNC: 13.2 G/DL (ref 11.5–16)
HGB UR QL STRIP: NEGATIVE
HYALINE CASTS URNS QL MICRO: ABNORMAL /LPF (ref 0–5)
IMM GRANULOCYTES # BLD AUTO: 0 K/UL (ref 0–0.04)
IMM GRANULOCYTES NFR BLD AUTO: 0 % (ref 0–0.5)
KETONES UR QL STRIP.AUTO: NEGATIVE MG/DL
LACTATE SERPL-SCNC: 1.4 MMOL/L (ref 0.4–2)
LACTATE SERPL-SCNC: 2.5 MMOL/L (ref 0.4–2)
LEUKOCYTE ESTERASE UR QL STRIP.AUTO: ABNORMAL
LYMPHOCYTES # BLD: 0.5 K/UL (ref 0.8–3.5)
LYMPHOCYTES NFR BLD: 13 % (ref 12–49)
MCH RBC QN AUTO: 27.2 PG (ref 26–34)
MCHC RBC AUTO-ENTMCNC: 32 G/DL (ref 30–36.5)
MCV RBC AUTO: 84.8 FL (ref 80–99)
MONOCYTES # BLD: 0.4 K/UL (ref 0–1)
MONOCYTES NFR BLD: 10 % (ref 5–13)
NEUTS SEG # BLD: 3.3 K/UL (ref 1.8–8)
NEUTS SEG NFR BLD: 75 % (ref 32–75)
NITRITE UR QL STRIP.AUTO: NEGATIVE
NRBC # BLD: 0 K/UL (ref 0–0.01)
NRBC BLD-RTO: 0 PER 100 WBC
PH UR STRIP: 7 [PH] (ref 5–8)
PLATELET # BLD AUTO: 252 K/UL (ref 150–400)
PMV BLD AUTO: 11.4 FL (ref 8.9–12.9)
POTASSIUM SERPL-SCNC: 4.1 MMOL/L (ref 3.5–5.1)
PROT SERPL-MCNC: 7.8 G/DL (ref 6.4–8.2)
PROT UR STRIP-MCNC: NEGATIVE MG/DL
RBC # BLD AUTO: 4.86 M/UL (ref 3.8–5.2)
RBC #/AREA URNS HPF: ABNORMAL /HPF (ref 0–5)
SODIUM SERPL-SCNC: 141 MMOL/L (ref 136–145)
SP GR UR REFRACTOMETRY: 1.01 (ref 1–1.03)
TROPONIN I SERPL-MCNC: <0.05 NG/ML
UA: UC IF INDICATED,UAUC: ABNORMAL
UROBILINOGEN UR QL STRIP.AUTO: 1 EU/DL (ref 0.2–1)
WBC # BLD AUTO: 4.3 K/UL (ref 3.6–11)
WBC URNS QL MICRO: ABNORMAL /HPF (ref 0–4)

## 2019-03-02 PROCEDURE — 80053 COMPREHEN METABOLIC PANEL: CPT

## 2019-03-02 PROCEDURE — 84484 ASSAY OF TROPONIN QUANT: CPT

## 2019-03-02 PROCEDURE — 93005 ELECTROCARDIOGRAM TRACING: CPT

## 2019-03-02 PROCEDURE — 65660000000 HC RM CCU STEPDOWN

## 2019-03-02 PROCEDURE — 99285 EMERGENCY DEPT VISIT HI MDM: CPT

## 2019-03-02 PROCEDURE — 36415 COLL VENOUS BLD VENIPUNCTURE: CPT

## 2019-03-02 PROCEDURE — 94761 N-INVAS EAR/PLS OXIMETRY MLT: CPT

## 2019-03-02 PROCEDURE — 85025 COMPLETE CBC W/AUTO DIFF WBC: CPT

## 2019-03-02 PROCEDURE — 87040 BLOOD CULTURE FOR BACTERIA: CPT

## 2019-03-02 PROCEDURE — 81001 URINALYSIS AUTO W/SCOPE: CPT

## 2019-03-02 PROCEDURE — 71045 X-RAY EXAM CHEST 1 VIEW: CPT

## 2019-03-02 PROCEDURE — 74011250636 HC RX REV CODE- 250/636: Performed by: EMERGENCY MEDICINE

## 2019-03-02 PROCEDURE — 82550 ASSAY OF CK (CPK): CPT

## 2019-03-02 PROCEDURE — 87086 URINE CULTURE/COLONY COUNT: CPT

## 2019-03-02 PROCEDURE — 83880 ASSAY OF NATRIURETIC PEPTIDE: CPT

## 2019-03-02 PROCEDURE — 83605 ASSAY OF LACTIC ACID: CPT

## 2019-03-02 PROCEDURE — 96374 THER/PROPH/DIAG INJ IV PUSH: CPT

## 2019-03-02 RX ORDER — GUAIFENESIN 100 MG/5ML
81 LIQUID (ML) ORAL DAILY
Status: DISCONTINUED | OUTPATIENT
Start: 2019-03-03 | End: 2019-03-02

## 2019-03-02 RX ORDER — POTASSIUM CHLORIDE 1.5 G/1.77G
20 POWDER, FOR SOLUTION ORAL DAILY
Status: DISCONTINUED | OUTPATIENT
Start: 2019-03-03 | End: 2019-03-04 | Stop reason: HOSPADM

## 2019-03-02 RX ORDER — AMLODIPINE BESYLATE 2.5 MG/1
2.5 TABLET ORAL DAILY
Status: DISCONTINUED | OUTPATIENT
Start: 2019-03-03 | End: 2019-03-04 | Stop reason: HOSPADM

## 2019-03-02 RX ORDER — ALLOPURINOL 100 MG/1
200 TABLET ORAL DAILY
Status: DISCONTINUED | OUTPATIENT
Start: 2019-03-03 | End: 2019-03-04 | Stop reason: HOSPADM

## 2019-03-02 RX ORDER — FUROSEMIDE 10 MG/ML
80 INJECTION INTRAMUSCULAR; INTRAVENOUS
Status: COMPLETED | OUTPATIENT
Start: 2019-03-02 | End: 2019-03-02

## 2019-03-02 RX ORDER — FLUTICASONE FUROATE AND VILANTEROL 100; 25 UG/1; UG/1
1 POWDER RESPIRATORY (INHALATION) DAILY
Status: DISCONTINUED | OUTPATIENT
Start: 2019-03-03 | End: 2019-03-04 | Stop reason: HOSPADM

## 2019-03-02 RX ORDER — IPRATROPIUM BROMIDE AND ALBUTEROL SULFATE 2.5; .5 MG/3ML; MG/3ML
3 SOLUTION RESPIRATORY (INHALATION)
Status: DISCONTINUED | OUTPATIENT
Start: 2019-03-02 | End: 2019-03-04 | Stop reason: HOSPADM

## 2019-03-02 RX ORDER — NALOXONE HYDROCHLORIDE 0.4 MG/ML
0.4 INJECTION, SOLUTION INTRAMUSCULAR; INTRAVENOUS; SUBCUTANEOUS AS NEEDED
Status: DISCONTINUED | OUTPATIENT
Start: 2019-03-02 | End: 2019-03-04 | Stop reason: HOSPADM

## 2019-03-02 RX ORDER — ACETAMINOPHEN 325 MG/1
650 TABLET ORAL
Status: DISCONTINUED | OUTPATIENT
Start: 2019-03-02 | End: 2019-03-04 | Stop reason: HOSPADM

## 2019-03-02 RX ORDER — SODIUM CHLORIDE 0.9 % (FLUSH) 0.9 %
5-40 SYRINGE (ML) INJECTION AS NEEDED
Status: DISCONTINUED | OUTPATIENT
Start: 2019-03-02 | End: 2019-03-04 | Stop reason: HOSPADM

## 2019-03-02 RX ORDER — SODIUM CHLORIDE 0.9 % (FLUSH) 0.9 %
5-40 SYRINGE (ML) INJECTION EVERY 8 HOURS
Status: DISCONTINUED | OUTPATIENT
Start: 2019-03-02 | End: 2019-03-04 | Stop reason: HOSPADM

## 2019-03-02 RX ORDER — ONDANSETRON 2 MG/ML
4 INJECTION INTRAMUSCULAR; INTRAVENOUS
Status: DISCONTINUED | OUTPATIENT
Start: 2019-03-02 | End: 2019-03-04 | Stop reason: HOSPADM

## 2019-03-02 RX ORDER — OXYCODONE AND ACETAMINOPHEN 5; 325 MG/1; MG/1
1 TABLET ORAL
Status: DISCONTINUED | OUTPATIENT
Start: 2019-03-02 | End: 2019-03-04 | Stop reason: HOSPADM

## 2019-03-02 RX ORDER — FUROSEMIDE 10 MG/ML
80 INJECTION INTRAMUSCULAR; INTRAVENOUS 2 TIMES DAILY
Status: DISCONTINUED | OUTPATIENT
Start: 2019-03-03 | End: 2019-03-03

## 2019-03-02 RX ORDER — FACIAL-BODY WIPES
10 EACH TOPICAL DAILY PRN
Status: DISCONTINUED | OUTPATIENT
Start: 2019-03-02 | End: 2019-03-04 | Stop reason: HOSPADM

## 2019-03-02 RX ADMIN — Medication 10 ML: at 23:11

## 2019-03-02 RX ADMIN — FUROSEMIDE 80 MG: 10 INJECTION, SOLUTION INTRAMUSCULAR; INTRAVENOUS at 18:32

## 2019-03-02 NOTE — ED NOTES
Patient presents to ED accompanied by her daughter who states that her cardiologist reduced her fluid pill and patient has been declining since. States patient has not required use of her home O2 until yesterday as well as the use of her nebulizer.

## 2019-03-02 NOTE — ED PROVIDER NOTES
EMERGENCY DEPARTMENT HISTORY AND PHYSICAL EXAM 
 
 
Date: 3/2/2019 Patient Name: Adriana Noel History of Presenting Illness Chief Complaint Patient presents with  Shortness of Breath  Ankle swelling Increase in right sided leg swelling over the past 3 days. Pt recently decreased the mg dosase of her fluid mediation. History Provided By: Patient and Patient's Daughter HPI: Adriana Noel, 80 y.o. female with PMHx significant for a-fib, adenocarcinoma, terminal ileitis, CKD, GERD, HTN, lung CA, presents ambulatory to the ED with cc of SOB and increased BLE swelling onset 3 days. Per chart review, Dr. Smita Partida, cardiology, instructed pt to decrease Lasix dosage from BID 80mg, to 80mg AM and 40mg PM. Daughter reports that since dosage change, pt has been \"declining. \" Pt reports that her SOB is exacerbated by laying down and is alleviated by sitting up. Pt also endorses productive cough and WOOTEN. Pt is on Eliquis for a-fib. There are no other complaints, changes, or physical findings at this time. PCP: Tk Thomas MD 
 
Social Hx:  
Social History Tobacco Use Smoking Status Former Smoker  Last attempt to quit: 1967  Years since quittin.2 Smokeless Tobacco Never Used  
,  
Social History Substance and Sexual Activity Alcohol Use No  
,  
Social History Substance and Sexual Activity Drug Use No  
 
 
Past History Past Surgical History: 
Past Surgical History:  
Procedure Laterality Date [facility] Right mastectomy  HX APPENDECTOMY  HX HEENT    
 right glass eye  SD COLONOSCOPY W/BIOPSY SINGLE/MULTIPLE  3/29/2013  SD COLSC FLX W/RMVL OF TUMOR POLYP LESION SNARE TQ  3/29/2013 Family History: 
Family History Problem Relation Age of Onset  Colon Cancer Other  Hypertension Other Allergies: Allergies Allergen Reactions  Penicillins Hives Review of Systems Review of Systems Constitutional: Negative for activity change, chills and fever. HENT: Negative for congestion and sore throat. Eyes: Negative for pain and redness. Respiratory: Positive for cough and shortness of breath. Negative for chest tightness. Cardiovascular: Positive for leg swelling. Negative for chest pain and palpitations. Gastrointestinal: Negative for abdominal pain, diarrhea, nausea and vomiting. Genitourinary: Negative for dysuria, frequency and urgency. Musculoskeletal: Negative for back pain and neck pain. Skin: Negative for rash. Neurological: Negative for syncope, light-headedness and headaches. Psychiatric/Behavioral: Negative for confusion. All other systems reviewed and are negative. Physical Exam  
Physical Exam  
Constitutional: She is oriented to person, place, and time. She appears well-developed and well-nourished. No distress. HENT:  
Head: Normocephalic. Nose: Nose normal.  
Mouth/Throat: Oropharynx is clear and moist. No oropharyngeal exudate. Eyes: Conjunctivae are normal. Pupils are equal, round, and reactive to light. No scleral icterus. Neck: Normal range of motion. Neck supple. No JVD present. No tracheal deviation present. No thyromegaly present. Cardiovascular: Normal rate, regular rhythm and intact distal pulses. Exam reveals no gallop and no friction rub. No murmur heard. Pulmonary/Chest: Effort normal. No stridor. No respiratory distress. She has no wheezes. Bibasilar rales Abdominal: Soft. Bowel sounds are normal. She exhibits no distension. There is no tenderness. There is no rebound and no guarding. Musculoskeletal: Normal range of motion. She exhibits no edema. Lymphadenopathy:  
  She has no cervical adenopathy. Neurological: She is alert and oriented to person, place, and time. No cranial nerve deficit. She exhibits normal muscle tone.  Coordination normal.  
 Skin: Skin is warm and dry. No rash noted. She is not diaphoretic. No erythema. Psychiatric: She has a normal mood and affect. Her behavior is normal.  
Nursing note and vitals reviewed. Diagnostic Study Results Labs - Recent Results (from the past 12 hour(s)) EKG, 12 LEAD, INITIAL Collection Time: 03/02/19  4:20 PM  
Result Value Ref Range Ventricular Rate 84 BPM  
 Atrial Rate 187 BPM  
 QRS Duration 78 ms Q-T Interval 374 ms QTC Calculation (Bezet) 441 ms Calculated R Axis -10 degrees Calculated T Axis 46 degrees Diagnosis Atrial fibrillation Low voltage QRS Cannot rule out Anterior infarct , age undetermined When compared with ECG of 16-JUL-2018 17:52, Minimal criteria for Anterior infarct are now present Nonspecific T wave abnormality, worse in Lateral leads CBC WITH AUTOMATED DIFF Collection Time: 03/02/19  4:30 PM  
Result Value Ref Range WBC 4.3 3.6 - 11.0 K/uL  
 RBC 4.86 3.80 - 5.20 M/uL  
 HGB 13.2 11.5 - 16.0 g/dL HCT 41.2 35.0 - 47.0 % MCV 84.8 80.0 - 99.0 FL  
 MCH 27.2 26.0 - 34.0 PG  
 MCHC 32.0 30.0 - 36.5 g/dL  
 RDW 18.2 (H) 11.5 - 14.5 % PLATELET 568 432 - 435 K/uL MPV 11.4 8.9 - 12.9 FL  
 NRBC 0.0 0  WBC ABSOLUTE NRBC 0.00 0.00 - 0.01 K/uL NEUTROPHILS 75 32 - 75 % LYMPHOCYTES 13 12 - 49 % MONOCYTES 10 5 - 13 % EOSINOPHILS 1 0 - 7 % BASOPHILS 1 0 - 1 % IMMATURE GRANULOCYTES 0 0.0 - 0.5 % ABS. NEUTROPHILS 3.3 1.8 - 8.0 K/UL  
 ABS. LYMPHOCYTES 0.5 (L) 0.8 - 3.5 K/UL  
 ABS. MONOCYTES 0.4 0.0 - 1.0 K/UL  
 ABS. EOSINOPHILS 0.1 0.0 - 0.4 K/UL  
 ABS. BASOPHILS 0.0 0.0 - 0.1 K/UL  
 ABS. IMM. GRANS. 0.0 0.00 - 0.04 K/UL  
 DF AUTOMATED METABOLIC PANEL, COMPREHENSIVE Collection Time: 03/02/19  4:30 PM  
Result Value Ref Range Sodium 141 136 - 145 mmol/L Potassium 4.1 3.5 - 5.1 mmol/L Chloride 103 97 - 108 mmol/L  
 CO2 29 21 - 32 mmol/L  Anion gap 9 5 - 15 mmol/L  
 Glucose 131 (H) 65 - 100 mg/dL BUN 18 6 - 20 MG/DL Creatinine 1.25 (H) 0.55 - 1.02 MG/DL  
 BUN/Creatinine ratio 14 12 - 20 GFR est AA 48 (L) >60 ml/min/1.73m2 GFR est non-AA 39 (L) >60 ml/min/1.73m2 Calcium 9.4 8.5 - 10.1 MG/DL Bilirubin, total 0.6 0.2 - 1.0 MG/DL  
 ALT (SGPT) 17 12 - 78 U/L  
 AST (SGOT) 23 15 - 37 U/L Alk. phosphatase 122 (H) 45 - 117 U/L Protein, total 7.8 6.4 - 8.2 g/dL Albumin 3.2 (L) 3.5 - 5.0 g/dL Globulin 4.6 (H) 2.0 - 4.0 g/dL A-G Ratio 0.7 (L) 1.1 - 2.2    
TROPONIN I Collection Time: 03/02/19  4:30 PM  
Result Value Ref Range Troponin-I, Qt. <0.05 <0.05 ng/mL CK W/ CKMB & INDEX Collection Time: 03/02/19  4:30 PM  
Result Value Ref Range CK 45 26 - 192 U/L  
 CK - MB 1.4 <3.6 NG/ML  
 CK-MB Index 3.1 (H) 0.0 - 2.5 NT-PRO BNP Collection Time: 03/02/19  4:30 PM  
Result Value Ref Range NT pro-BNP 1,672 (H) <450 PG/ML  
URINALYSIS W/ REFLEX CULTURE Collection Time: 03/02/19  6:25 PM  
Result Value Ref Range Color YELLOW/STRAW Appearance CLEAR CLEAR Specific gravity 1.011 1.003 - 1.030    
 pH (UA) 7.0 5.0 - 8.0 Protein NEGATIVE  NEG mg/dL Glucose NEGATIVE  NEG mg/dL Ketone NEGATIVE  NEG mg/dL Bilirubin NEGATIVE  NEG Blood NEGATIVE  NEG Urobilinogen 1.0 0.2 - 1.0 EU/dL Nitrites NEGATIVE  NEG Leukocyte Esterase TRACE (A) NEG    
 WBC 5-10 0 - 4 /hpf  
 RBC 0-5 0 - 5 /hpf Epithelial cells FEW FEW /lpf Bacteria 1+ (A) NEG /hpf  
 UA:UC IF INDICATED URINE CULTURE ORDERED (A) CNI Hyaline cast 0-2 0 - 5 /lpf Radiologic Studies -  
XR CHEST PORT Final Result IMPRESSION: Left midlung and left basilar patchy airspace consolidation and  
small left pleural effusion. Mild right basilar atelectasis and very small right  
pleural effusion. CT Results  (Last 48 hours) None CXR Results  (Last 48 hours) 03/02/19 1921  XR CHEST PORT Final result Impression:  IMPRESSION: Left midlung and left basilar patchy airspace consolidation and  
small left pleural effusion. Mild right basilar atelectasis and very small right  
pleural effusion. Narrative:  INDICATION: Chest pain. Portable AP semiupright view of the chest.  
   
Direct comparison made to prior chest x-ray dated July 16, 2018. Cardiomediastinal silhouette is stable. There is a small left pleural effusion. There is patchy airspace consolidation within the left midlung and left lung  
base. There is blunting of the right costophrenic angle, likely representing a  
very small right pleural effusion. There is mild right basilar atelectasis but Medical Decision Making I am the first provider for this patient. I reviewed the vital signs, available nursing notes, past medical history, past surgical history, family history and social history. Vital Signs-Reviewed the patient's vital signs. Patient Vitals for the past 12 hrs: 
 Temp Pulse Resp BP SpO2  
03/02/19 1832  81  153/90   
03/02/19 1733     97 % 03/02/19 1716    (!) 139/93 95 % 03/02/19 1617 97.8 °F (36.6 °C) 85 16 159/88 98 % Pulse Oximetry Analysis - 98% on RA 
 
EKG interpretation: (Preliminary) 16:25 Rhythm: atrial fib; and irregular. Rate (approx.): 84; Axis: normal; CO interval: n/a; QRS interval: low voltage QRS; ST/T wave: normal; Other findings: abnormal ekg. Written by Errol Wayne ED Scribe, as dictated by Yvonne Pereyra MD. Records Reviewed: Nursing Notes, Old Medical Records, Previous Radiology Studies and Previous Laboratory Studies Provider Notes (Medical Decision Making): DDx: CHF, ACS, PNA 
 
ED Course:  
Initial assessment performed. The patients presenting problems have been discussed, and they are in agreement with the care plan formulated and outlined with them. I have encouraged them to ask questions as they arise throughout their visit. Progress Notes: 
8:25 PM 
Per nursing, pt dropped her sats with minimal ambulation just around the bed. Saturated at 87% on RA. 
 
8:28 PM 
CXR with pleural effusions and patchy airspace disease. Suspect CHF rather than PNA. No increased WBC, afebrile, BCx and lactate sent. Holding abx at this time. Will admit to hospitalist. 
 
CONSULT NOTE:  
8:30 PM 
Anuj Jordan MD spoke with Dr. Carlo Butler, Specialty: Hospitalist 
Discussed pt's hx, disposition, and available diagnostic and imaging results. Reviewed care plans. Consultant will evaluate pt for admission. Written by Jose Bui, ED Scribe, as dictated by Anuj Jordan MD. Critical Care Time:  
0 minutes. Disposition: PLAN FOR ADMISSION: 
8:30 PM 
The patient is being admitted to the hospital. The results of their tests and reasons for their admission have been discussed with the patient and/or available family. The patient/family has conveyed agreement and understanding for the need to be admitted and for their admission diagnosis. Consultation has been made with the inpatient physician specialist for hospitalization. Written by Jose Bui, ED Scribe, as dictated by Anuj Jordan MD 
Diagnosis Clinical Impression: 1. Acute on chronic diastolic CHF (congestive heart failure) ([facility] Attestations: This note is prepared by Jose Bui, acting as scribe for MD Anuj Rios MD: The scribe's documentation has been prepared under my direction and personally reviewed by me in its entirety. I confirm that the note above accurately reflects all work, treatment, procedures, and medical decision making performed by me.

## 2019-03-03 ENCOUNTER — APPOINTMENT (OUTPATIENT)
Dept: GENERAL RADIOLOGY | Age: 84
DRG: 291 | End: 2019-03-03
Attending: INTERNAL MEDICINE
Payer: MEDICARE

## 2019-03-03 LAB
ALBUMIN SERPL-MCNC: 3 G/DL (ref 3.5–5)
ALBUMIN/GLOB SERPL: 0.7 {RATIO} (ref 1.1–2.2)
ALP SERPL-CCNC: 116 U/L (ref 45–117)
ALT SERPL-CCNC: 17 U/L (ref 12–78)
ANION GAP SERPL CALC-SCNC: 7 MMOL/L (ref 5–15)
AST SERPL-CCNC: 22 U/L (ref 15–37)
ATRIAL RATE: 187 BPM
BASOPHILS # BLD: 0 K/UL (ref 0–0.1)
BASOPHILS NFR BLD: 1 % (ref 0–1)
BILIRUB SERPL-MCNC: 0.7 MG/DL (ref 0.2–1)
BUN SERPL-MCNC: 18 MG/DL (ref 6–20)
BUN/CREAT SERPL: 15 (ref 12–20)
CALCIUM SERPL-MCNC: 9.7 MG/DL (ref 8.5–10.1)
CALCULATED R AXIS, ECG10: -10 DEGREES
CALCULATED T AXIS, ECG11: 46 DEGREES
CHLORIDE SERPL-SCNC: 106 MMOL/L (ref 97–108)
CO2 SERPL-SCNC: 32 MMOL/L (ref 21–32)
CREAT SERPL-MCNC: 1.18 MG/DL (ref 0.55–1.02)
DIAGNOSIS, 93000: NORMAL
DIFFERENTIAL METHOD BLD: ABNORMAL
EOSINOPHIL # BLD: 0.1 K/UL (ref 0–0.4)
EOSINOPHIL NFR BLD: 2 % (ref 0–7)
ERYTHROCYTE [DISTWIDTH] IN BLOOD BY AUTOMATED COUNT: 18.6 % (ref 11.5–14.5)
GLOBULIN SER CALC-MCNC: 4.6 G/DL (ref 2–4)
GLUCOSE SERPL-MCNC: 89 MG/DL (ref 65–100)
HCT VFR BLD AUTO: 40.8 % (ref 35–47)
HGB BLD-MCNC: 13.1 G/DL (ref 11.5–16)
IMM GRANULOCYTES # BLD AUTO: 0 K/UL (ref 0–0.04)
IMM GRANULOCYTES NFR BLD AUTO: 0 % (ref 0–0.5)
LYMPHOCYTES # BLD: 0.5 K/UL (ref 0.8–3.5)
LYMPHOCYTES NFR BLD: 12 % (ref 12–49)
MCH RBC QN AUTO: 26.9 PG (ref 26–34)
MCHC RBC AUTO-ENTMCNC: 32.1 G/DL (ref 30–36.5)
MCV RBC AUTO: 83.8 FL (ref 80–99)
MONOCYTES # BLD: 0.6 K/UL (ref 0–1)
MONOCYTES NFR BLD: 16 % (ref 5–13)
NEUTS SEG # BLD: 2.7 K/UL (ref 1.8–8)
NEUTS SEG NFR BLD: 69 % (ref 32–75)
NRBC # BLD: 0 K/UL (ref 0–0.01)
NRBC BLD-RTO: 0 PER 100 WBC
PLATELET # BLD AUTO: 213 K/UL (ref 150–400)
PMV BLD AUTO: 10.6 FL (ref 8.9–12.9)
POTASSIUM SERPL-SCNC: 3.7 MMOL/L (ref 3.5–5.1)
PROT SERPL-MCNC: 7.6 G/DL (ref 6.4–8.2)
Q-T INTERVAL, ECG07: 374 MS
QRS DURATION, ECG06: 78 MS
QTC CALCULATION (BEZET), ECG08: 441 MS
RBC # BLD AUTO: 4.87 M/UL (ref 3.8–5.2)
RBC MORPH BLD: ABNORMAL
SODIUM SERPL-SCNC: 145 MMOL/L (ref 136–145)
VENTRICULAR RATE, ECG03: 84 BPM
WBC # BLD AUTO: 3.9 K/UL (ref 3.6–11)

## 2019-03-03 PROCEDURE — 85025 COMPLETE CBC W/AUTO DIFF WBC: CPT

## 2019-03-03 PROCEDURE — 80053 COMPREHEN METABOLIC PANEL: CPT

## 2019-03-03 PROCEDURE — 71046 X-RAY EXAM CHEST 2 VIEWS: CPT

## 2019-03-03 PROCEDURE — 94760 N-INVAS EAR/PLS OXIMETRY 1: CPT

## 2019-03-03 PROCEDURE — 74011250636 HC RX REV CODE- 250/636: Performed by: INTERNAL MEDICINE

## 2019-03-03 PROCEDURE — 36415 COLL VENOUS BLD VENIPUNCTURE: CPT

## 2019-03-03 PROCEDURE — 77030038269 HC DRN EXT URIN PURWCK BARD -A

## 2019-03-03 PROCEDURE — 74011250637 HC RX REV CODE- 250/637: Performed by: INTERNAL MEDICINE

## 2019-03-03 PROCEDURE — 65660000000 HC RM CCU STEPDOWN

## 2019-03-03 RX ORDER — FUROSEMIDE 10 MG/ML
60 INJECTION INTRAMUSCULAR; INTRAVENOUS 2 TIMES DAILY
Status: DISCONTINUED | OUTPATIENT
Start: 2019-03-03 | End: 2019-03-04

## 2019-03-03 RX ADMIN — FUROSEMIDE 60 MG: 10 INJECTION, SOLUTION INTRAMUSCULAR; INTRAVENOUS at 08:56

## 2019-03-03 RX ADMIN — Medication 10 ML: at 05:29

## 2019-03-03 RX ADMIN — Medication 10 ML: at 21:48

## 2019-03-03 RX ADMIN — ALLOPURINOL 200 MG: 100 TABLET ORAL at 08:56

## 2019-03-03 RX ADMIN — Medication 10 ML: at 17:33

## 2019-03-03 RX ADMIN — APIXABAN 2.5 MG: 2.5 TABLET, FILM COATED ORAL at 17:33

## 2019-03-03 RX ADMIN — FLUTICASONE FUROATE AND VILANTEROL TRIFENATATE 1 PUFF: 100; 25 POWDER RESPIRATORY (INHALATION) at 10:31

## 2019-03-03 RX ADMIN — POTASSIUM CHLORIDE 20 MEQ: 1.5 POWDER, FOR SOLUTION ORAL at 08:54

## 2019-03-03 RX ADMIN — APIXABAN 2.5 MG: 2.5 TABLET, FILM COATED ORAL at 08:56

## 2019-03-03 RX ADMIN — BISACODYL 10 MG: 10 SUPPOSITORY RECTAL at 17:32

## 2019-03-03 RX ADMIN — FUROSEMIDE 60 MG: 10 INJECTION, SOLUTION INTRAMUSCULAR; INTRAVENOUS at 17:33

## 2019-03-03 RX ADMIN — AMLODIPINE BESYLATE 2.5 MG: 2.5 TABLET ORAL at 08:56

## 2019-03-03 NOTE — CONSULTS
CARDIOLOGY CONSULT Date of  Admission: 3/2/2019  5:05 PM  
 
Admission type:Emergency Consult for:  HF  
Consulted by: Dr. Jen Nicholas Subjective:  
 
Izzy Lunsford is a 80 y.o. female with PMHx significant for a-fib, adenocarcinoma, terminal ileitis, CKD, GERD, HTN, lung CA, presents ambulatory to the ED with cc of SOB and increased BLE swelling onset 3 days. Per chart review, Dr. Tanya Jean, cardiology, instructed pt to decrease Lasix dosage from BID 80mg, to 80mg AM and 40mg PM. Daughter reports that since dosage change, pt has been \"declining. \" Pt reports that her SOB is exacerbated by laying down and is alleviated by sitting up. Pt also endorses productive cough and WOOTEN. Pt is on Eliquis for a-fib. The patient denies chest pain, palpitations, syncope, or presyncope. The patient was admitted to the hospitalist with IV diuretics given overnight. She is 1.8 L negative overnight, feeling significantly better. Patient Active Problem List  
 Diagnosis Date Noted  CHF (congestive heart failure) ([address] 03/02/2019  Pleural effusion, left 07/17/2018  CHF, acute ([address] 07/17/2018  CKD (chronic kidney disease) stage 3, GFR 30-59 ml/min ([facility]) 05/29/2018  Chronic a-fib ([address] 05/29/2018  Diastolic CHF, acute on chronic ([address] 05/29/2018  Pleural effusion 05/29/2018  Pneumonia 04/18/2017  Sepsis(995.91) 04/18/2017  Dehydration 04/18/2017  Acute CHF ([address] 01/23/2015  SOB (shortness of breath) 01/21/2015  Pedal edema 01/21/2015  Squamous cell lung cancer ([address] 01/21/2015  Adenocarcinoma ([address] 04/05/2013  S/P right colectomy 04/05/2013  Malignant essential hypertension 04/01/2013  Colon cancer ([address] 03/30/2013  Ileitis, terminal ([address] 03/27/2013  A-fib ([address] 03/27/2013  
 HTN (hypertension) 03/27/2013 Boris Garces MD 
Past Medical History:  
Diagnosis Date  A-fib ([facility]  Adenocarcinoma ([facility]  Cancer ([facility] right breast CA, lung, colon  Chronic kidney disease  Gastrointestinal disorder GERD  Hypertension  Ileitis, terminal ([facility]  Other ill-defined conditions(799.89)   
 gout  Squamous cell lung cancer ([facility] Social History Socioeconomic History  Marital status:  Spouse name: Not on file  Number of children: Not on file  Years of education: Not on file  Highest education level: Not on file Tobacco Use  Smoking status: Former Smoker Last attempt to quit: 1967 Years since quittin.2  Smokeless tobacco: Never Used Substance and Sexual Activity  Alcohol use: No  
 Drug use: No  
 Sexual activity: Not Currently Allergies Allergen Reactions  Penicillins Hives Family History Problem Relation Age of Onset  Colon Cancer Other  Hypertension Other Current Facility-Administered Medications Medication Dose Route Frequency  furosemide (LASIX) injection 60 mg  60 mg IntraVENous BID  sodium chloride (NS) flush 5-40 mL  5-40 mL IntraVENous Q8H  
 sodium chloride (NS) flush 5-40 mL  5-40 mL IntraVENous PRN  
 acetaminophen (TYLENOL) tablet 650 mg  650 mg Oral Q6H PRN  
 oxyCODONE-acetaminophen (PERCOCET) 5-325 mg per tablet 1 Tab  1 Tab Oral Q6H PRN  
 naloxone (NARCAN) injection 0.4 mg  0.4 mg IntraVENous PRN  
 ondansetron (ZOFRAN) injection 4 mg  4 mg IntraVENous Q4H PRN  
 bisacodyl (DULCOLAX) suppository 10 mg  10 mg Rectal DAILY PRN  
 albuterol-ipratropium (DUO-NEB) 2.5 MG-0.5 MG/3 ML  3 mL Nebulization Q4H PRN  
 allopurinol (ZYLOPRIM) tablet 200 mg  200 mg Oral DAILY  amLODIPine (NORVASC) tablet 2.5 mg  2.5 mg Oral DAILY  apixaban (ELIQUIS) tablet 2.5 mg  2.5 mg Oral BID  fluticasone-vilanterol (BREO ELLIPTA) 100mcg-25mcg/puff  1 Puff Inhalation DAILY  potassium chloride (KLOR-CON) packet for solution 20 mEq  20 mEq Oral DAILY Review of Symptoms: 11 systems reviewed, negative other than as stated in HPI Subjective:  
 
  
Visit Vitals /76 Pulse 81 Temp 97.6 °F (36.4 °C) Resp 18 Ht 5' 2\" (1.575 m) Wt 177 lb 7.5 oz (80.5 kg) SpO2 96% BMI 32.46 kg/m² Physical: 
Gen:  NAD Heart: regular rhythm, no murmur, gallop or rub Lungs: Generally decreased breath sounds without overt crackles Neck: supple, symmetrical, trachea midline, no adenopathy, thyroid: not enlarged, symmetric, no tenderness/mass/nodules, no carotid bruit and no JVD Abdomen: soft, non-tender. Bowel sounds normal. No masses,  no organomegaly Extremities: Trace bilateral edema, normal dp pulses Neurologic: CN, motor and speech grossly normal 
 
Data Review:  
Recent Labs 03/03/19 
0255 03/02/19 
1630 WBC 3.9 4.3 HGB 13.1 13.2 HCT 40.8 41.2  252 Recent Labs 03/03/19 
0255 03/02/19 
1630  141  
K 3.7 4.1  103 CO2 32 29 GLU 89 131* BUN 18 18 CREA 1.18* 1.25* CA 9.7 9.4 ALB 3.0* 3.2* TBILI 0.7 0.6 SGOT 22 23 ALT 17 17 Recent Labs 03/02/19 
1630 TROIQ <0.05 CPK 45 CKMB 1.4 No results found for: CHOL, CHOLPOCT, CHOLX, CHLST, CHOLV, HDL, HDLPOC, LDL, LDLCPOC, LDLC, DLDLP, VLDLC, VLDL, TGLX, TRIGL, TRIGP, TGLPOCT, CHHD, CHHDX Intake/Output Summary (Last 24 hours) at 3/3/2019 3416 Last data filed at 3/3/2019 0830 Gross per 24 hour Intake  Output 1750 ml Net -1750 ml  
  
 
Cardiographics Telemetry: AFIB 
 
ECG:  
Atrial fibrillation Low voltage QRS Poor R wave progression Echocardiogram 5/31/18: 
SUMMARY: 
Left ventricle: Systolic function was normal. Ejection fraction was 
estimated in the range of 55 % to 60 %. There were no regional wall motion 
abnormalities. There was mild concentric hypertrophy. Left atrium: The atrium was moderately dilated. Right atrium: The atrium was severely dilated. Mitral valve: There was moderate regurgitation. Tricuspid valve: There was severe regurgitation. Pulmonary artery systolic 
pressure: 54 mmHg. There was moderate pulmonary hypertension. Pericardium: There was a large left pleural effusion. Assessment:  
  
 Principal Problem: 
  Diastolic CHF, acute on chronic ([facility] (5/29/2018) Active Problems: 
  Colon cancer ([facility] (3/30/2013) Malignant essential hypertension (4/1/2013) S/P right colectomy (4/5/2013) CKD (chronic kidney disease) stage 3, GFR 30-59 ml/min ([facility]) (5/29/2018) Chronic a-fib ([facility] (5/29/2018) Plan:  
 
80!!! y/o admitted with Acute on Chronic Diastolic Heart failure: 
IV diuretics as tolerated/ needed by creatinine and physical exam 
Strict I's/O's, weights, sign on door- 1.8 L negative so far and feeling much better  
follow lytes and replete as needed HTN control ACEI/ beta blocker not critical as HF is diastolic Echo as it has been almost 1 year Cardiac rehab consult Chronic AF: 
Rate controlled on Eliquis Thanks for the consult. I appreciate the opportunity to participate in this patient's care. Dr. Samir Quiroz will follow with you starting tomorrow.

## 2019-03-03 NOTE — PROGRESS NOTES
Pharmacy Automatic Direct Oral Anticoagulant Renal Dosing Protocol Patient currently receiving Apixaban 2.5 mg bid with an indication of afib. Start date: pta Major interacting medications:  
 
Platelet inhibitors (dose/frequency):  
 
Labs: 
Recent Labs 03/02/19 
1630 CREA 1.25* HGB 13.2  Wt Readings from Last 1 Encounters:  
03/02/19 77.6 kg (171 lb) Creatinine Clearance: ~20 ml/min Impression/Plan:  
Continue home dose Cbc and bmp morning of 3/3 Pharmacy will follow daily and adjust as appropriate. Thank you, Sebas Villavicencio, PHARMD  
 
?  Child Henderson Score calculator 
[facility]. ? Apixaban ? Dabigatran 
? Edoxaban ? Rivaroxaban 
 
http://[facility]/[place]/[facility]/Pharmacy/Clinical%20Companion/DOAC%20Dosing. pdf

## 2019-03-03 NOTE — ED NOTES
Md had a conversation with patient and family. Patient agreed to ambulate at this time. Patient ambulated at this time and SATs dropped to 87% with ambulation.

## 2019-03-03 NOTE — PROGRESS NOTES
Problem: Falls - Risk of 
Goal: *Absence of Falls Document [place] Fall Risk and appropriate interventions in the flowsheet. Outcome: Progressing Towards Goal 
Fall Risk Interventions: 
Mobility Interventions: Bed/chair exit alarm, Communicate number of staff needed for ambulation/transfer, OT consult for ADLs, Patient to call before getting OOB, PT Consult for mobility concerns, PT Consult for assist device competence, Strengthening exercises (ROM-active/passive), Utilize walker, cane, or other assistive device Medication Interventions: Bed/chair exit alarm, Patient to call before getting OOB, Teach patient to arise slowly

## 2019-03-03 NOTE — PROGRESS NOTES
Received call from telemetry that Pt run 8 beats of V Tach. Patient alert, no SOB Patient Vitals for the past 4 hrs: 
 Temp Pulse Resp BP SpO2  
03/03/19 0955 97.4 °F (36.3 °C) 78 16 141/86 97 % 03/03/19 0830 97.6 °F (36.4 °C) 81 18 154/76 96 % 03/03/19 0828     91 % Will notify Dr. Lamb Push  at bedside.

## 2019-03-03 NOTE — PROGRESS NOTES
Hospitalist Progress Note NAME: Nicole Toribio :  1918 MRN:  [id number] Assessment / Plan: Avel Moreno Acute on chronic Diastolic CHF POA Shortness of breath is improving, still volume overload. Cont diuresis. Appreciate cardiology input ACEI/ beta blocker not critical as HF is diastolic. Avel Moreno Chronic AFib POA Stable cont eliquis. .   Chronic respiratory failure POA Dur to COPD no acute exacerbation, Cont PRN oxygen at home 
  
.    CKD stage 2 Stable. Avoid nephrotoxic medications. 
  
.    Hypertension Stable, Cont. Norvasc 
 
30.0 - 39.9 Obese / Body mass index is 32.46 kg/m². Code status: Full Prophylaxis: Eliquis Recommended Disposition: TBD Subjective: Chief Complaint / Reason for Physician Visit \"Shortness of breath\". Discussed with RN events overnight. Objective: VITALS:  
Last 24hrs VS reviewed since prior progress note. Most recent are: 
Patient Vitals for the past 24 hrs: 
 Temp Pulse Resp BP SpO2  
19 0955 97.4 °F (36.3 °C) 78 16 141/86 97 % 19 0830 97.6 °F (36.4 °C) 81 18 154/76 96 % 19 0828     91 % 19 0206 97.3 °F (36.3 °C) 78 19 (!) 142/97 97 % 19 2335 97.3 °F (36.3 °C) 83 19 (!) 173/92 97 % 19 2230  79 19 (!) 144/94 94 % 19 2100  81 28 (!) 172/102 95 % 19 1915  84 30 156/78 95 % 19 1832  81  153/90   
19 1733     97 % 19 1730  84 27 (!) 126/101 93 % 19 1716    (!) 139/93 95 % 19 1617 97.8 °F (36.6 °C) 85 16 159/88 98 % Intake/Output Summary (Last 24 hours) at 3/3/2019 1050 Last data filed at 3/3/2019 9580 Gross per 24 hour Intake 150 ml Output 1750 ml Net -1600 ml PHYSICAL EXAM: 
General: WD, WN. Alert, cooperative, no acute distress   
EENT:  EOMI. Anicteric sclerae. MMM Resp:  Decreased breath sound with no wheezing or rales. No accessory muscle use CV:  IRIR,  No edema GI:  Soft, Non distended, Non tender. +Bowel sounds Neurologic:  Alert and oriented X 3, normal speech, Psych:   Good insight. Not anxious nor agitated Skin:  No rashes. No jaundice Reviewed most current lab test results and cultures  YES Reviewed most current radiology test results   YES Review and summation of old records today    NO Reviewed patient's current orders and MAR    YES 
PMH/SH reviewed - no change compared to H&P 
________________________________________________________________________ Care Plan discussed with: 
  Comments Patient y Family RN y   
Care Manager Consultant     
                    
________________________________________________________________________ Comments >50% of visit spent in counseling and coordination of care y   
________________________________________________________________________ Oz Pickard MD  
 
Procedures: see electronic medical records for all procedures/Xrays and details which were not copied into this note but were reviewed prior to creation of Plan. LABS: 
I reviewed today's most current labs and imaging studies. Pertinent labs include: 
Recent Labs 03/03/19 
0255 03/02/19 
1630 WBC 3.9 4.3 HGB 13.1 13.2 HCT 40.8 41.2  252 Recent Labs 03/03/19 
0255 03/02/19 
1630  141  
K 3.7 4.1  103 CO2 32 29 GLU 89 131* BUN 18 18 CREA 1.18* 1.25* CA 9.7 9.4 ALB 3.0* 3.2* TBILI 0.7 0.6 SGOT 22 23 ALT 17 17 Signed: Oz Pickard MD

## 2019-03-03 NOTE — ED NOTES
TRANSFER - OUT REPORT: 
 
Verbal report given to Tristen White RN  on Kevon Croft  being transferred to [address] [place] [address] for routine progression of care Report consisted of patients Situation, Background, Assessment and  
Recommendations(SBAR). Information from the following report(s) SBAR, Kardex and Recent Results was reviewed with the receiving nurse. Lines:  
Peripheral IV 03/02/19 Left Antecubital (Active) Opportunity for questions and clarification was provided. Patient transported with: 
 [employer]

## 2019-03-03 NOTE — PROGRESS NOTES
TRANSFER - IN REPORT: 
 
Verbal report received from Shae RN(name) on [place] Nephew  being received from ED(unit) for routine progression of care Report consisted of patients Situation, Background, Assessment and  
Recommendations(SBAR). Information from the following report(s) SBAR, Kardex, Intake/Output, MAR and Cardiac Rhythm Afib was reviewed with the receiving nurse. Opportunity for questions and clarification was provided. Assessment completed upon patients arrival to unit and care assumed.

## 2019-03-04 ENCOUNTER — PATIENT OUTREACH (OUTPATIENT)
Dept: CASE MANAGEMENT | Age: 84
End: 2019-03-04

## 2019-03-04 VITALS
OXYGEN SATURATION: 96 % | RESPIRATION RATE: 17 BRPM | SYSTOLIC BLOOD PRESSURE: 116 MMHG | DIASTOLIC BLOOD PRESSURE: 68 MMHG | HEART RATE: 86 BPM | HEIGHT: 62 IN | TEMPERATURE: 97.7 F | WEIGHT: 163.8 LBS | BODY MASS INDEX: 30.14 KG/M2

## 2019-03-04 LAB
ANION GAP SERPL CALC-SCNC: 6 MMOL/L (ref 5–15)
BACTERIA SPEC CULT: NORMAL
BUN SERPL-MCNC: 21 MG/DL (ref 6–20)
BUN/CREAT SERPL: 15 (ref 12–20)
CALCIUM SERPL-MCNC: 9.7 MG/DL (ref 8.5–10.1)
CC UR VC: NORMAL
CHLORIDE SERPL-SCNC: 103 MMOL/L (ref 97–108)
CO2 SERPL-SCNC: 32 MMOL/L (ref 21–32)
CREAT SERPL-MCNC: 1.36 MG/DL (ref 0.55–1.02)
GLUCOSE SERPL-MCNC: 91 MG/DL (ref 65–100)
POTASSIUM SERPL-SCNC: 4.9 MMOL/L (ref 3.5–5.1)
SERVICE CMNT-IMP: NORMAL
SODIUM SERPL-SCNC: 141 MMOL/L (ref 136–145)

## 2019-03-04 PROCEDURE — 97116 GAIT TRAINING THERAPY: CPT

## 2019-03-04 PROCEDURE — 36415 COLL VENOUS BLD VENIPUNCTURE: CPT

## 2019-03-04 PROCEDURE — 97162 PT EVAL MOD COMPLEX 30 MIN: CPT

## 2019-03-04 PROCEDURE — 80048 BASIC METABOLIC PNL TOTAL CA: CPT

## 2019-03-04 PROCEDURE — 77010033678 HC OXYGEN DAILY

## 2019-03-04 PROCEDURE — 74011250636 HC RX REV CODE- 250/636: Performed by: INTERNAL MEDICINE

## 2019-03-04 PROCEDURE — 74011250637 HC RX REV CODE- 250/637: Performed by: INTERNAL MEDICINE

## 2019-03-04 PROCEDURE — 94760 N-INVAS EAR/PLS OXIMETRY 1: CPT

## 2019-03-04 RX ORDER — FUROSEMIDE 40 MG/1
40 TABLET ORAL
Status: DISCONTINUED | OUTPATIENT
Start: 2019-03-04 | End: 2019-03-04 | Stop reason: HOSPADM

## 2019-03-04 RX ADMIN — FUROSEMIDE 60 MG: 10 INJECTION, SOLUTION INTRAMUSCULAR; INTRAVENOUS at 08:44

## 2019-03-04 RX ADMIN — APIXABAN 2.5 MG: 2.5 TABLET, FILM COATED ORAL at 08:44

## 2019-03-04 RX ADMIN — FLUTICASONE FUROATE AND VILANTEROL TRIFENATATE 1 PUFF: 100; 25 POWDER RESPIRATORY (INHALATION) at 08:48

## 2019-03-04 RX ADMIN — AMLODIPINE BESYLATE 2.5 MG: 2.5 TABLET ORAL at 08:44

## 2019-03-04 RX ADMIN — ALLOPURINOL 200 MG: 100 TABLET ORAL at 08:44

## 2019-03-04 NOTE — PROGRESS NOTES
Pt discharge to home with family. Discharge instructions and follow up appointments reviewed. IV removed prior to discharge

## 2019-03-04 NOTE — PROGRESS NOTES
Chart reviewed. Per MD H and P, please ask MD to discuss code status with pt and son Damaso De La O. Presently pt is a full code.

## 2019-03-04 NOTE — PROGRESS NOTES
Transitional Care Discharge HUG Note ED [facility] Patient: Vidhya Camargo MRN: [id number]  SSN: [id number] YOB: 1918  Age: 80 y.o. Sex: female Admit Date: 3/2/2019 LOS: 2 days Assessment /Risk 
 
HA Diagnosis: 1. Acute Diastolic HF chronic improved 2. SOB- resolved 3. Pedal edema - resolved 4. CKD stage 3 GFR stable Readmission Risks:  MODERATE 1. Resumed lasix PTA dosing 2. Chronic diastolic HF  
3. Increased risk for fluid overload 4. Advanced Age Fluid Status: negative 1.6L Nurse Navigator: Paulo Maki HA appointment with  Dr Shira Rios on  [phone] ; 3/5/2019 
 
25.0 - 29.9 Overweight / Body mass index is 29.96 kg/m². Code status: Durable DNR sent with patient Recommended Disposition: Home w/Family Subjective:  
 
 
 
 
Number of Admissions this year    4 Heart Failure Bundle  YES Advance Care Plan:  
 
Persons included in Conversation:  patient and family Length of ACP Conversation in minutes:  45 minutes Authorized Decision Maker (if patient is incapable of making informed decisions): This person is: Other Legally Authorized Decision Maker (e.g. Next of Kin) Daughter, [facility] ACP for ALL Patients with Decision Making Capacity: 
 Importance of advance care planning, including choosing a healthcare agent to communicate patient's healthcare decisions if patient lost the ability to make decisions, such as after a sudden illness or accident Understanding of the healthcare agent role was assessed and information provided Exploration of values, goals, and preferences if recovery is not expected, even with continued medical treatment in the event of: Imminent death For Serious or Chronic Illness: 
Understanding of CPR, goals and expected outcomes, benefits and burdens discussed.  
Information on CPR success rates provided (e.g. for CPR in hospital, survival to d/c at two weeks is 22%, for chronically ill or elderly/frail survival is less than 3%); Individual asked to communicate understanding of information in his/her own words. Interventions Provided: 
Recommended completion of Advance Directive form after review of ACP materials and conversation with prospective healthcare agent Completed new Advance Directive Entered DNR order (If yes, complete Durable DNR form) Recommended communicating the plan and making copies for the healthcare agent, personal physician, and others as appropriate (e.g., health system) Recommended review of completed ACP document annually or upon change in health status ROS:  
 
A comprehensive ROS was performed and was negative except for as per HPI. Objective: Allergies Allergen Reactions  Penicillins Hives Past Medical History:  
Diagnosis Date  A-fib ([facility]  Adenocarcinoma ([facility]  Cancer ([facility] [facility] right breast CA, lung, colon  Chronic kidney disease  Gastrointestinal disorder GERD  Hypertension  Ileitis, terminal ([facility]  Other ill-defined conditions(799.89)   
 gout  Squamous cell lung cancer ([facility] Past Surgical History:  
Procedure Laterality Date [facility] Right mastectomy  HX APPENDECTOMY  HX HEENT    
 right glass eye  [facility] COLONOSCOPY W/BIOPSY SINGLE/MULTIPLE  3/29/2013  [facility] COLSC FLX W/RMVL OF TUMOR POLYP LESION SNARE TQ  3/29/2013 Social History Tobacco Use Smoking Status Former Smoker  Last attempt to quit: 1967  Years since quittin.2 Smokeless Tobacco Never Used Current Facility-Administered Medications Medication Dose Route Frequency  furosemide (LASIX) injection 60 mg  60 mg IntraVENous BID  sodium chloride (NS) flush 5-40 mL  5-40 mL IntraVENous Q8H  
 sodium chloride (NS) flush 5-40 mL  5-40 mL IntraVENous PRN  
  acetaminophen (TYLENOL) tablet 650 mg  650 mg Oral Q6H PRN  
 oxyCODONE-acetaminophen (PERCOCET) 5-325 mg per tablet 1 Tab  1 Tab Oral Q6H PRN  
 naloxone (NARCAN) injection 0.4 mg  0.4 mg IntraVENous PRN  
 ondansetron (ZOFRAN) injection 4 mg  4 mg IntraVENous Q4H PRN  
 bisacodyl (DULCOLAX) suppository 10 mg  10 mg Rectal DAILY PRN  
 albuterol-ipratropium (DUO-NEB) 2.5 MG-0.5 MG/3 ML  3 mL Nebulization Q4H PRN  
 allopurinol (ZYLOPRIM) tablet 200 mg  200 mg Oral DAILY  amLODIPine (NORVASC) tablet 2.5 mg  2.5 mg Oral DAILY  apixaban (ELIQUIS) tablet 2.5 mg  2.5 mg Oral BID  fluticasone-vilanterol (BREO ELLIPTA) 100mcg-25mcg/puff  1 Puff Inhalation DAILY  potassium chloride (KLOR-CON) packet for solution 20 mEq  20 mEq Oral DAILY Prior to Admission Medications Prescriptions Last Dose Informant Patient Reported? Taking? OXYGEN-AIR DELIVERY SYSTEMS 3/1/2019 at Unknown time  Yes Yes Sig: Take 2 L by inhalation as needed. acetaminophen (TYLENOL EXTRA STRENGTH) 500 mg tablet 2/23/2019 at Unknown time  Yes Yes Sig: Take 1,000 mg by mouth every six (6) hours as needed for Pain. albuterol (PROVENTIL HFA) 90 mcg/actuation inhaler 3/2/2019 at Unknown time  Yes Yes Sig: Take 2 Puffs by inhalation every four (4) hours as needed for Wheezing or Shortness of Breath. albuterol-ipratropium (DUO-NEB) 2.5 mg-0.5 mg/3 ml nebu 3/2/2019 at Unknown time  Yes Yes Sig: Take 3 mL by inhalation every four (4) hours as needed (shortness of breath). allopurinol (ZYLOPRIM) 100 mg tablet 3/2/2019 at Unknown time  Yes Yes Sig: Take 200 mg by mouth daily. (200 mg in the morning; 100 mg at night) amLODIPine (NORVASC) 2.5 mg tablet 3/2/2019 at Unknown time  Yes Yes Sig: Take 2.5 mg by mouth daily. apixaban (ELIQUIS) 2.5 mg tablet 3/2/2019 at Unknown time  No Yes Sig: Take 1 Tab by mouth two (2) times a day.   
budesonide-formoterol (SYMBICORT) 160-4.5 mcg/actuation HF 3/2/2019 at Unknown time  Yes Yes Sig: Take 2 Puffs by inhalation two (2) times a day. colchicine (COLCRYS) 0.6 mg tablet 3/2/2019 at Unknown time  Yes Yes Sig: Take 0.6 mg by mouth daily. dextran 70/hypromellose/PF (NATURAL TEARS, PF, OP) 2019 at Unknown time  Yes Yes Sig: Administer 1 Drop to both eyes daily as needed (dry eyes). furosemide (LASIX) 40 mg tablet 3/2/2019 at Unknown time Self No Yes Sig: Take 2 tab 80mg in AM and 1 tab 40mg in PM (Corrected Rx)  
mometasone (NASONEX) 50 mcg/actuation nasal spray 2019 at Unknown time  Yes Yes Si Sprays by Both Nostrils route daily as needed (congestion). mv-mn/folic acid/calcium/vit K (ONE-A-DAY WOMEN'S 50 PLUS PO) 3/2/2019 at Unknown time  Yes Yes Sig: Take 1 Tab by mouth daily. potassium chloride (KLOR-CON) 20 mEq packet 3/2/2019 at Unknown time  Yes Yes Sig: Take 20 mEq by mouth daily. Facility-Administered Medications: None Patient Vitals for the past 24 hrs: 
 Temp Pulse Resp BP SpO2  
19 1035 97.7 °F (36.5 °C) 86 17 116/68 96 % 19 0743 98.1 °F (36.7 °C) 73 17 130/66 97 % 19 0323 97.2 °F (36.2 °C) 76 18 117/74 95 % 19 0019 97.2 °F (36.2 °C) 82 18 124/62 94 % 19 1945 97.4 °F (36.3 °C) 85 17 143/81 96 % 19 1505 97.8 °F (36.6 °C) 73 16 132/69 93 % Intake and Output: 
 
Intake/Output Summary (Last 24 hours) at 3/4/2019 1435 Last data filed at 3/4/2019 1136 Gross per 24 hour Intake 720 ml Output 1000 ml Net -280 ml PHYSICAL EXAM: 
 
Visit Vitals /68 (BP 1 Location: Left arm, BP Patient Position: Sitting) Pulse 86 Temp 97.7 °F (36.5 °C) Resp 17 Ht 5' 2\" (1.575 m) Wt 74.3 kg (163 lb 12.8 oz) SpO2 96% BMI 29.96 kg/m² PHYSICAL EXAM: 
General appearance: alert, cooperative, no distress, appears stated age Head: Normocephalic, without obvious abnormality, atraumatic Eyes: conjunctivae/corneas clear. PERRL, EOM's intact. Ears: hearing intact Throat: Lips, mucosa, and tongue normal. Teeth and gums normal 
Lungs: clear to auscultation bilaterally Heart: regular rate and rhythm, S1, S2 normal, no murmur, click, rub or gallop Abdomen: soft, non-tender. Bowel sounds normal. No masses,  no organomegaly Extremities: extremities normal, atraumatic, no cyanosis or edema Pulses: 2+ and symmetric Skin: Skin color, texture, turgor normal. No rashes or lesions Neurologic: Alert and oriented X 3, 4/5 RUE 5/5 ABBI. RLE, LLE strength and tone. Lab/Data Review:  
Recent Results (from the past 24 hour(s)) METABOLIC PANEL, BASIC Collection Time: 03/04/19  3:35 AM  
Result Value Ref Range Sodium 141 136 - 145 mmol/L Potassium 4.9 3.5 - 5.1 mmol/L Chloride 103 97 - 108 mmol/L  
 CO2 32 21 - 32 mmol/L Anion gap 6 5 - 15 mmol/L Glucose 91 65 - 100 mg/dL BUN 21 (H) 6 - 20 MG/DL Creatinine 1.36 (H) 0.55 - 1.02 MG/DL  
 BUN/Creatinine ratio 15 12 - 20 GFR est AA 43 (L) >60 ml/min/1.73m2 GFR est non-AA 36 (L) >60 ml/min/1.73m2 Calcium 9.7 8.5 - 10.1 MG/DL Imaging Reviewed:  
 
Xr Chest Pa Lat Result Date: 3/3/2019 Impression: No substantial change in the left pleural effusion and underlying consolidation. Persistent trace right effusion Xr Chest [facility] Result Date: 3/2/2019 IMPRESSION: Left midlung and left basilar patchy airspace consolidation and small left pleural effusion. Mild right basilar atelectasis and very small right pleural effusion. . 
Assessment:  
 
Principal Problem: 
  Diastolic CHF, acute on chronic ([facility] (5/29/2018) Active Problems: 
  Colon cancer ([address] (3/30/2013) Malignant essential hypertension (4/1/2013) S/P right colectomy (4/5/2013) CKD (chronic kidney disease) stage 3, GFR 30-59 ml/min (HCC) (5/29/2018) Chronic a-fib ([facility] [address] [facility] (5/29/2018) Plan:  
 
The objective of todays visit was to review the transitional care plan with the patient. We discussed the importance of transitional care as it relates to reducing the likelihood of readmission. We reviewed the goals for the first 30 days following hospital discharge. The patient and I discussed wrap around services including nurse navigation, care coordination, home health, transitional care appointments with their primary care provider and specialist team and the role of dispatch health. Patient education focused on readmission zones as described as The Red Zone: High risk for readmission, days 1-21 The Yellow Zone: Moderate risk for readmission, days 22-29 The Green Zone: Lower risk for readmission, days 30 and after 1. Extensive conversation on ACP, amd AND DDNR completed today with all copies distributed to to patient  and EMR. Pt informed to keep all copies in a safe, easily accessible location. Updated on portability and the necessity of possible limitations of documents if  she travels out of state. Requested she give copies to all mPOAs. Patient  given opportunity to ask  questions with information given during this assessment. My contact information given to patient if any future concerns or questions. 2. All labs, I/O's and imaging have been reviewed. 3. Medication Reconciliation  performed at discharge. Accessiblity  NO CHALLENGES 
 RX rationale explained RESUMED PTA lasix Compliancy good 4. Collaborated with  CM and POA, Patient on this plan of care. The patient  And POAexpressed understanding of the disease process and management plan, and all questions were answered. Greater than 45 minutes were spent in patient management, greater than half of which was spent in counseling and coordination of care. 
   
Signed By: Travon Jamison NP March 4, 2019

## 2019-03-04 NOTE — PROGRESS NOTES
Bedside shift change report given to Susan Mcwilliams RN (oncoming nurse) by Rudi Espinoza RN (offgoing nurse). Report included the following information SBAR, Kardex, Procedure Summary, Intake/Output, MAR and Recent Results.

## 2019-03-04 NOTE — PROGRESS NOTES
[facility] met with patient today to provide an introduction to self, the [address] at [facility]. Bundled Payment Care Program: 
 
Patient was provided a copy of the [facility] letter. Patient states that they have a functioning scale at home. Patient was not provided a scale during this visit. Reinforced the importance of checking their weight at the same time daily and calling the cardiologist if their weight is up 3 lbs. in a day or 5 lbs. in a week (or per MD guidelines). Patient  has received a \"Living with Heart Failure\" patient education book. [facility] At Home Program: 
 
Provided patient demonstration of CoreFlow program on training iPad. Patient was not interested in the CoreFlow program.  
Daughter states she is not interested Appointments:  
 
Patient stated best day(s) of the week for provider appointment(s):anyday Patient stated best time of day for provider appointment(s):  AM

## 2019-03-04 NOTE — ACP (ADVANCE CARE PLANNING)
I have discussed pt code status with pt, her son who is DPOA and daughter who takes care of her at home. Pt and family wants to cont with Full code. If she is intubated and no improvement in 3 days, then make her DNR. But for now full code. Time spend 15 minutes face to face.

## 2019-03-04 NOTE — DISCHARGE INSTRUCTIONS
Discharge Instructions       PATIENT ID: Brownshagufta Mustafa  MRN: [id number]   YOB: 1918    DATE OF ADMISSION: 3/2/2019  5:05 PM    DATE OF DISCHARGE: 3/4/2019    PRIMARY CARE PROVIDER: James Godwin MD     ATTENDING PHYSICIAN: Toby Matta MD  DISCHARGING PROVIDER: Hank Huggins MD    To contact this individual call [phone] and ask the  to page. If unavailable ask to be transferred the Adult Hospitalist Department. DISCHARGE DIAGNOSES   Congestive heart failure  acute on chronic  Atrial fibrillation    CONSULTATIONS: IP CONSULT TO CARDIOLOGY    PROCEDURES/SURGERIES: * No surgery found *    PENDING TEST RESULTS:   At the time of discharge the following test results are still pending: No    FOLLOW UP APPOINTMENTS:   Follow-up Information     Follow up With Specialties Details Why Contact Info    James Godwin, [address] [facility]  [phone]      Bandar Brown MD Cardiology, [address] [place] [address] Vascular Surgery, Internal Medicine Schedule an appointment as soon as possible for a visit in 1 week  [address] [facility]  [phone]        Schedule an appointment as soon as possible for a visit in 1 week             ADDITIONAL CARE RECOMMENDATIONS: No    DIET: Cardiac Diet    ACTIVITY: Activity as tolerated    DISCHARGE MEDICATIONS:   See Medication Reconciliation Form    · It is important that you take the medication exactly as they are prescribed. · Keep your medication in the bottles provided by the pharmacist and keep a list of the medication names, dosages, and times to be taken in your wallet. · Do not take other medications without consulting your doctor. NOTIFY YOUR PHYSICIAN FOR ANY OF THE FOLLOWING:   Fever over 101 degrees for 24 hours. Chest pain, shortness of breath, fever, chills, nausea, vomiting, diarrhea, change in mentation, falling, weakness, bleeding.  Severe pain or pain not relieved by medications. Or, any other signs or symptoms that you may have questions about.       DISPOSITION:    Home With:   OT  PT y [facility]  RN       SNF/Inpatient Rehab/LTAC    Independent/assisted living    Hospice    Other:     CDMP Checked:   Yes y     PROBLEM LIST Updated:  Yes y       Signed:   Israel Ross MD  3/4/2019  12:50 PM

## 2019-03-04 NOTE — PHYSICIAN ADVISORY
Letter of Status Determination: Current Status INPATIENT is Appropriate Pt Name:  Haile Urrutia MR#  [id number] [facility]#   [id number] Room and Hospital  [facility]/  @ [facility] Hospitalization date  3/2/2019  5:05 PM  
Current Attending Physician  Elana Pennington MD  
Principal diagnosis  Diastolic CHF, acute on chronic ([facility] Clinicals  89 y. o.AA female Known chronic diastolic CHF 
CRF on as needed oxygen at home per her report Notes increasing SOB x 3 days, worse with exertion and laying down Associated with increased BLE swelling Minimal cough, no fevers, sore throat, myalgias Recently Lasix dosage decreased from BID 80mg, to 80 mg AM and 40 mg PM. 
Came to ED due to increased SOB 
  
ED Afebrile, normal WBC Lactate mildly increased 2.5 pBNP [id number] pCXR Left midlung and left basilar patchy airspace consolidation and 
     small left pleural effusion. Mild right basilar atelectasis and very small right 
      pleural effusion. IV lasix with improvement We were called to admit the patient Pt continues to need IV diuresis, resp support, Renal function worsening [place] MCG criteria STATUS DETERMINATION  On the basis of clinical data, available documentaion, we believe that the current status of this patient as INPATIENT is Appropriate The final decision of the patient's hospitalization status depends on the attending physician's judgment Additional comments Insurance  Payor: [place] MED[place]ARE / Plan: [place] MEDICARE PART A & B / Product Type: Medicare / Insurance Information [facility] PART A & B Phone:   
 Subscriber: Cinthya Kent Subscriber#: [id number] Group#:  Precert#:   
   
 [employer]/[facility] Phone:   
 Subscriber: Cinthya Kent Subscriber#: [id number] Group#: [id number] Precert#:   
  
 
  
 
 
 
 
Doyle Randolph MD 
Cell: [phone] Physician Advisor

## 2019-03-04 NOTE — PROGRESS NOTES
Problem: Falls - Risk of 
Goal: *Absence of Falls Document Dilan Olivass Fall Risk and appropriate interventions in the flowsheet. Outcome: Progressing Towards Goal 
Fall Risk Interventions: 
Mobility Interventions: Bed/chair exit alarm, Communicate number of staff needed for ambulation/transfer, OT consult for ADLs, Patient to call before getting OOB, PT Consult for mobility concerns, PT Consult for assist device competence, Utilize walker, cane, or other assistive device Medication Interventions: Bed/chair exit alarm, Evaluate medications/consider consulting pharmacy, Patient to call before getting OOB, Teach patient to arise slowly

## 2019-03-04 NOTE — PROGRESS NOTES
Problem: Pressure Injury - Risk of 
Goal: *Prevention of pressure injury Document Michael Scale and appropriate interventions in the flowsheet. Outcome: Progressing Towards Goal 
Pressure Injury Interventions: 
Sensory Interventions: Assess need for specialty bed, Assess changes in LOC, Chair cushion, Check visual cues for pain, Discuss PT/OT consult with provider, Float heels, Keep linens dry and wrinkle-free, Maintain/enhance activity level, Minimize linen layers, Pressure redistribution bed/mattress (bed type) Moisture Interventions: Absorbent underpads, Check for incontinence Q2 hours and as needed, Internal/External urinary devices, Limit adult briefs, Maintain skin hydration (lotion/cream), Offer toileting Q_hr Activity Interventions: Chair cushion, Increase time out of bed, Pressure redistribution bed/mattress(bed type), PT/OT evaluation Mobility Interventions: Chair cushion, Float heels, HOB 30 degrees or less, Pressure redistribution bed/mattress (bed type), PT/OT evaluation, Turn and reposition approx. every two hours(pillow and wedges) Nutrition Interventions: Document food/fluid/supplement intake

## 2019-03-04 NOTE — PROGRESS NOTES
CARDIOLOGY Progress Note Date:  3/4/19 at 2:15PM  
 
 
 Subjective:  
 
Jetdarrius Gutiérrez is feeling better, no further c/o SOB. Diuresed well since admission. Remains in afib rate controlled Social History Socioeconomic History  Marital status:  Spouse name: Not on file  Number of children: Not on file  Years of education: Not on file  Highest education level: Not on file Tobacco Use  Smoking status: Former Smoker Last attempt to quit: 1967 Years since quittin.2  Smokeless tobacco: Never Used Substance and Sexual Activity  Alcohol use: No  
 Drug use: No  
 Sexual activity: Not Currently Allergies Allergen Reactions  Penicillins Hives Family History Problem Relation Age of Onset  Colon Cancer Other  Hypertension Other Current Facility-Administered Medications Medication Dose Route Frequency  furosemide (LASIX) tablet 40 mg  40 mg Oral ACB&D  
 sodium chloride (NS) flush 5-40 mL  5-40 mL IntraVENous Q8H  
 sodium chloride (NS) flush 5-40 mL  5-40 mL IntraVENous PRN  
 acetaminophen (TYLENOL) tablet 650 mg  650 mg Oral Q6H PRN  
 oxyCODONE-acetaminophen (PERCOCET) 5-325 mg per tablet 1 Tab  1 Tab Oral Q6H PRN  
 naloxone (NARCAN) injection 0.4 mg  0.4 mg IntraVENous PRN  
 ondansetron (ZOFRAN) injection 4 mg  4 mg IntraVENous Q4H PRN  
 bisacodyl (DULCOLAX) suppository 10 mg  10 mg Rectal DAILY PRN  
 albuterol-ipratropium (DUO-NEB) 2.5 MG-0.5 MG/3 ML  3 mL Nebulization Q4H PRN  
 allopurinol (ZYLOPRIM) tablet 200 mg  200 mg Oral DAILY  amLODIPine (NORVASC) tablet 2.5 mg  2.5 mg Oral DAILY  apixaban (ELIQUIS) tablet 2.5 mg  2.5 mg Oral BID  fluticasone-vilanterol (BREO ELLIPTA) 100mcg-25mcg/puff  1 Puff Inhalation DAILY  potassium chloride (KLOR-CON) packet for solution 20 mEq  20 mEq Oral DAILY Subjective:  
 
  
Visit Vitals /68 (BP 1 Location: Left arm, BP Patient Position: Sitting) Pulse 86 Temp 97.7 °F (36.5 °C) Resp 17 Ht 5' 2\" (1.575 m) Wt 74.3 kg (163 lb 12.8 oz) SpO2 96% BMI 29.96 kg/m² Physical: 
Gen:  Elderly AAF in no acute distress Heart: regular rhythm, no murmur, gallop or rub Lungs: ecreased BS, clear Abdomen: soft, non-tender. Bowel sounds normal. No masses,  no organomegaly Extremities: No edema N Data Review:  
Recent Labs 03/03/19 
0255 03/02/19 
1630 WBC 3.9 4.3 HGB 13.1 13.2 HCT 40.8 41.2  252 Recent Labs 03/04/19 
0335 03/03/19 
0255 03/02/19 
1630  145 141  
K 4.9 3.7 4.1  106 103 CO2 32 32 29 GLU 91 89 131* BUN 21* 18 18 CREA 1.36* 1.18* 1.25* CA 9.7 9.7 9.4 ALB  --  3.0* 3.2* TBILI  --  0.7 0.6 SGOT  --  22 23 ALT  --  17 17 Recent Labs 03/02/19 
1630 TROIQ <0.05 CPK 45 CKMB 1.4 No results found for: CHOL, CHOLPOCT, CHOLX, CHLST, CHOLV, HDL, HDLPOC, LDL, LDLCPOC, LDLC, DLDLP, VLDLC, VLDL, TGLX, TRIGL, TRIGP, TGLPOCT, CHHD, CHHDX Intake/Output Summary (Last 24 hours) at 3/4/2019 1455 Last data filed at 3/4/2019 1136 Gross per 24 hour Intake 720 ml Output 1000 ml Net -280 ml  
  
 
Cardiographics Telemetry: AFIB 
 
ECG:  
Atrial fibrillation Assessment:  
  
 Principal Problem: 
  Diastolic CHF, acute on chronic ([facility] (5/29/2018) Active Problems: 
  Colon cancer ([facility] (3/30/2013) Malignant essential hypertension (4/1/2013) S/P right colectomy (4/5/2013) CKD (chronic kidney disease) stage 3, GFR 30-59 ml/min ([facility]) (5/29/2018) Chronic a-fib ([facility] (5/29/2018) Plan:  
 
80!!! y/o admitted with Acute on Chronic Diastolic Heart failure: 
Continue on IV diuretics as tolerated Strict I's/O's, weights, sign on door- 2.5 L negative so far and feeling much better HTN control ACEI/ beta blocker not critical as HF is diastolic Echo pending Cardiac rehab consult Chronic AF: 
Rate controlled on Eliquis, no AV blocking agents. [facility] 3/4/2019 Patient seen, examined by me personally. Plan discussed as detailed. Agree with note as outlined by  NP. I confirm findings in history and physical exam. No additional findings noted. Agree with plan as outlined above. Discussed with daughter.  
 
Sohan Vega MD

## 2019-03-04 NOTE — PROGRESS NOTES
physical Therapy EVALUATION/DISCHARGE Patient: Ronell Collet (89 y.o. female) Date: 3/4/2019 Primary Diagnosis: CHF (congestive heart failure) ([facility] [I50.9] Precautions:    
ASSESSMENT : 
Based on the objective data described below, the patient presents with decreased strength, intact balance, good functional mobility and mildly unsteady gait following admission for CHF. PTA, patient and daughter report that patient receives assistance from aides or her daughter for all ADLs. She uses a rollator all the time and mostly stays in the house \"watching TV and exercising. \" Patient receives sponge baths. Patient lives with family. Currently, patient resting in chair on RA, cleared and agreeable to working with therapy. Patient performed functional mobility and ambulation with supervision. Ambulated 75 feet with rolling walker, demonstrating slowed pace, decreased step clearance and forward trunk lean. Patient reports that she is ambulating at her baseline. Patient left resting in chair with all needs met and VSS (HR: 83 bpm, O2 sats: 92%) on RA. Patient will be discharged from physical therapy and does not require further PT needs at discharge as she is performing at her baseline and has no mobility concerns at this time. Further skilled acute physical therapy is not indicated at this time. PLAN : 
Discharge Recommendations: None Further Equipment Recommendations for Discharge: unlikely SUBJECTIVE:  
Patient stated I walk all five rooms in my house every day.  OBJECTIVE DATA SUMMARY:  
HISTORY:   
Past Medical History:  
Diagnosis Date  A-fib ([facility]  [facility]  Cancer ([facility] right breast CA, lung, colon  Chronic kidney disease  Gastrointestinal disorder GERD  Hypertension  Ileitis, terminal ([facility]  Other ill-defined conditions(799.89)   
 gout  Squamous cell lung cancer ([facility] Past Surgical History:  
Procedure Laterality Date [address] Right mastectomy  HX APPENDECTOMY  HX HEENT    
 right glass eye  MD COLONOSCOPY W/BIOPSY SINGLE/MULTIPLE  3/29/2013  MD COLSC FLX W/RMVL OF TUMOR POLYP LESION SNARE TQ  3/29/2013 Prior Level of Function/Home Situation: patient and daughter report that patient receives assistance from aides or her daughter for all ADLs. She uses a rollator all the time and mostly stays in the house \"watching TV and exercising. \" Patient receives sponge baths. Patient lives with family. Personal factors and/or comorbidities impacting plan of care: age, PLOF Home Situation Home Environment: Private residence # Steps to Enter: 2 Rails to Enter: No 
One/Two Story Residence: One story Living Alone: No 
Support Systems: Child(indra), Family member(s) Patient Expects to be Discharged to[de-identified] Private residence Current DME Used/Available at Home: [address] [facility] [place], straight, [address], rollator Tub or Shower Type: Other (comment)(sponge baths) EXAMINATION/PRESENTATION/DECISION MAKING:  
Critical Behavior: 
Neurologic State: Alert Orientation Level: Oriented X4 Cognition: Appropriate decision making, Appropriate for age attention/concentration, Appropriate safety awareness, Follows commands Hearing: Auditory Auditory Impairment: Hard of hearing, bilateral 
Skin:  intact Edema: not observed Range Of Motion: 
AROM: Within functional limits PROM: Within functional limits Strength:   
Strength: Generally decreased, functional 
  
  
  
  
  
  
Tone & Sensation:  
Tone: Normal 
  
  
  
  
Sensation: Intact Coordination: 
Coordination: Within functional limits Vision:  
  
Functional Mobility: 
Bed Mobility: 
  
  
  
  
Transfers: 
Sit to Stand: Supervision Stand to Sit: Supervision Balance:  
Sitting: Intact Standing: Impaired; With support Standing - Static: Good;Constant support Standing - Dynamic : Fair Ambulation/Gait Training: 
Distance (ft): 75 Feet (ft) Assistive Device: Gait belt;Walker, rolling Ambulation - Level of Assistance: Supervision Gait Description (WDL): Exceptions to [facility] Base of Support: Widened Speed/[place]: Pace decreased (<100 feet/min) Step Length: Left shortened;Right shortened Functional Measure: 
Tinetti test: 
 
Sitting Balance: 1 Arises: 1 Attempts to Rise: 2 Immediate Standing Balance: 1 Standing Balance: 1 Nudged: 1 Eyes Closed: 1 Turn 360 Degrees - Continuous/Discontinuous: 1 Turn 360 Degrees - Steady/Unsteady: 1 Sitting Down: 1 Balance Score: 11 Indication of Gait: 1 
R Step Length/Height: 1 L Step Length/Height: 1 
R Foot Clearance: 1 L Foot Clearance: 1 Step Symmetry: 1 Step Continuity: 1 Path: 1 Trunk: 0 Walking Time: 0 Gait Score: 8 Total Score: 19 Tinetti Tool Score Risk of Falls 
<19 = High Fall Risk 19-24 = Moderate Fall Risk 25-28 = Low Fall Risk Tinetti ME. Performance-Oriented Assessment of Mobility Problems in Elderly Patients. [facility]; [id number]. (Scoring Description: PT Bulletin Feb. 10, 1993) Older adults: Angelica Vincent et al, 2009; n = [address] elderly evaluated with ABC, TASHA, ADL, and IADL) · Mean TASHA score for males aged 69-68 years = 26.21(3.40) · Mean TASHA score for females age 69-68 years = 25.16(4.30) · Mean TASHA score for males over 80 years = 23.29(6.02) · Mean TASHA score for females over 80 years = 17.20(8.32) Physical Therapy Evaluation Charge Determination History Examination Presentation Decision-Making MEDIUM  Complexity : 1-2 comorbidities / personal factors will impact the outcome/ POC  MEDIUM Complexity : 3 Standardized tests and measures addressing body structure, function, activity limitation and / or participation in recreation  MEDIUM Complexity : Evolving with changing characteristics  Other outcome measures Tinetti  MEDIUM  
  
 Based on the above components, the patient evaluation is determined to be of the following complexity level: MEDIUM Pain: 
Pain Scale 1: Numeric (0 - 10) Pain Intensity 1: 0 Activity Tolerance:  
Good with VSS on RA Please refer to the flowsheet for vital signs taken during this treatment. After treatment:  
[x]   Patient left in no apparent distress sitting up in chair 
[]   Patient left in no apparent distress in bed 
[x]   Call bell left within reach [x]   Nursing notified 
[x]   Caregiver present 
[]   Bed alarm activated COMMUNICATION/EDUCATION:  
Communication/Collaboration: 
[x]   Fall prevention education was provided and the patient/caregiver indicated understanding. [x]   Patient/family have participated as able and agree with findings and recommendations. []   Patient is unable to participate in plan of care at this time. Findings and recommendations were discussed with: Registered Nurse and  Thank you for this referral. 
Vince Gilman 
 Time Calculation: 18 mins Regarding student involvement in patient care: A student participated in this treatment session. Per CMS Medicare statements and APTA guidelines I certify that the following was true: 1. I was present and directly observed the entire session. 2. I made all skilled judgments and clinical decisions regarding care. 3. I am the practitioner responsible for assessment, treatment, and documentation.

## 2019-03-04 NOTE — PROGRESS NOTES
Pharmacist Discharge Medication Reconciliation Significant PMH:  
Past Medical History:  
Diagnosis Date  A-fib ([facility]  Adenocarcinoma ([facility]  Cancer ([facility] right breast CA, lung, colon  Chronic kidney disease  Gastrointestinal disorder GERD  Hypertension  Ileitis, terminal ([facility]  Other ill-defined conditions(799.89)   
 gout  Squamous cell lung cancer ([facility] Chief Complaint for this Admission: Chief Complaint Patient presents with  Shortness of Breath  Ankle swelling Increase in right sided leg swelling over the past 3 days. Pt recently decreased the mg dosase of her fluid mediation. Allergies: Penicillins Discharge Medications:  
Discharge Medication List as of 3/4/2019  2:37 PM  
  
 
CONTINUE these medications which have NOT CHANGED Details OXYGEN-AIR DELIVERY SYSTEMS Take 2 L by inhalation as needed., Historical Med  
  
allopurinol (ZYLOPRIM) 100 mg tablet Take 200 mg by mouth daily. (200 mg in the morning; 100 mg at night), Historical Med  
  
albuterol (PROVENTIL HFA) 90 mcg/actuation inhaler Take 2 Puffs by inhalation every four (4) hours as needed for Wheezing or Shortness of Breath., Historical Med  
  
furosemide (LASIX) 40 mg tablet Take 2 tab 80mg in AM and 1 tab 40mg in PM (Corrected Rx), Normal, Disp-270 Tab, R-1  
  
albuterol-ipratropium (DUO-NEB) 2.5 mg-0.5 mg/3 ml nebu Take 3 mL by inhalation every four (4) hours as needed (shortness of breath). , Historical Med  
  
apixaban (ELIQUIS) 2.5 mg tablet Take 1 Tab by mouth two (2) times a day., Normal, Disp-60 Tab, R-11  
  
budesonide-formoterol (SYMBICORT) 160-4.5 mcg/actuation HFAA Take 2 Puffs by inhalation two (2) times a day., Historical Med  
  
mv-mn/folic acid/calcium/vit K (ONE-A-DAY WOMEN'S 50 PLUS PO) Take 1 Tab by mouth daily. , Historical Med  
  
dextran 70/hypromellose/PF (NATURAL TEARS, PF, OP) Administer 1 Drop to both eyes daily as needed (dry eyes). , Historical Med  
  
mometasone (NASONEX) 50 mcg/actuation nasal spray 2 Sprays by Both Nostrils route daily as needed (congestion). , Historical Med  
  
potassium chloride (KLOR-CON) 20 mEq packet Take 20 mEq by mouth daily. , Historical Med  
  
colchicine (COLCRYS) 0.6 mg tablet Take 0.6 mg by mouth daily. , Historical Med  
  
acetaminophen (TYLENOL EXTRA STRENGTH) 500 mg tablet Take 1,000 mg by mouth every six (6) hours as needed for Pain., Historical Med  
  
amLODIPine (NORVASC) 2.5 mg tablet Take 2.5 mg by mouth daily. , Historical Med The patient's chart, MAR and AVS were reviewed by Wally Florez, PHARMD. Discharging Provider: Nehemias Levine

## 2019-03-05 ENCOUNTER — PATIENT OUTREACH (OUTPATIENT)
Dept: FAMILY MEDICINE CLINIC | Age: 84
End: 2019-03-05

## 2019-03-05 NOTE — PROGRESS NOTES
Hospital Discharge Follow-Up      Date/Time:  3/5/2019 8:57 AM    Patient was admitted to [facility] on 3/2/19 and discharged on 3/4/19 for CHF. The physician discharge summary was available at the time of outreach. Patient was contacted within 1 business days of discharge. Top Challenges reviewed with the provider   Need BMP drawn in 1 week         Method of communication with provider :phone    Inpatient RRAT score: 12  Was this a readmission? no   Patient stated reason for the readmission: n/a    Nurse Navigator (NN) contacted the patient by telephone to perform post hospital discharge assessment. Verified name and  with patient as identifiers. Provided introduction to self, and explanation of the Nurse Navigator role. Reviewed discharge instructions and red flags with patient who verbalized understanding. Patient given an opportunity to ask questions and does not have any further questions or concerns at this time. The patient agrees to contact the PCP office for questions related to their healthcare. NN provided contact information for future reference. Disease Specific:   CHF    Summary of patient's top problems:  1. CHF  2.  No advanced care plan on file      Home Health orders at discharge: [address]: n/a  Date of initial visit: [facility] ordered/company: Oxygen at home prior to admission  Durable Medical Equipment received: n/a    Barriers to care? lack of knowledge about disease, transportation relies on children to take to doctors appointments    Advance Care Planning:   Does patient have an Advance Directive:  not on file; education provided prior to discharge with NP Tabby Puente and hospitalist    Medication(s):   New Medications at Discharge: none  Changed Medications at Discharge: none  Discontinued Medications at Discharge: none    Medication reconciliation was performed with patient, who verbalizes understanding of administration of home medications. There were no barriers to obtaining medications identified at this time. Referral to Pharm D needed: no     Current Outpatient Medications   Medication Sig    OXYGEN-AIR DELIVERY SYSTEMS Take 2 L by inhalation as needed.  allopurinol (ZYLOPRIM) 100 mg tablet Take 200 mg by mouth daily. (200 mg in the morning; 100 mg at night)    albuterol (PROVENTIL HFA) 90 mcg/actuation inhaler Take 2 Puffs by inhalation every four (4) hours as needed for Wheezing or Shortness of Breath.  furosemide (LASIX) 40 mg tablet Take 2 tab 80mg in AM and 1 tab 40mg in PM (Corrected Rx)    albuterol-ipratropium (DUO-NEB) 2.5 mg-0.5 mg/3 ml nebu Take 3 mL by inhalation every four (4) hours as needed (shortness of breath).  apixaban (ELIQUIS) 2.5 mg tablet Take 1 Tab by mouth two (2) times a day.  budesonide-formoterol (SYMBICORT) 160-4.5 mcg/actuation HFAA Take 2 Puffs by inhalation two (2) times a day.  mv-mn/folic acid/calcium/vit K (ONE-A-DAY WOMEN'S 50 PLUS PO) Take 1 Tab by mouth daily.  dextran 70/hypromellose/PF (NATURAL TEARS, PF, OP) Administer 1 Drop to both eyes daily as needed (dry eyes).  mometasone (NASONEX) 50 mcg/actuation nasal spray 2 Sprays by Both Nostrils route daily as needed (congestion).  potassium chloride (KLOR-CON) 20 mEq packet Take 20 mEq by mouth daily.  colchicine (COLCRYS) 0.6 mg tablet Take 0.6 mg by mouth daily.  acetaminophen (TYLENOL EXTRA STRENGTH) 500 mg tablet Take 1,000 mg by mouth every six (6) hours as needed for Pain.  amLODIPine (NORVASC) 2.5 mg tablet Take 2.5 mg by mouth daily. No current facility-administered medications for this visit. There are no discontinued medications.     [facility] follow up appointment(s):   Future Appointments   Date Time Provider Thee Cabrera   3/15/2019 10:15 AM Steph Flores MD [facility] SCHED   4/23/2019 11:15 AM Steph Flores MD [address] [facility]      Non-[facility] follow up appointment(s): PCP, Dr. Jammie Helm 3/5/19  [facility]:  out of service area       Goals      Prevent complications post hospitalization. 3/5/19 Patient reports not weighing for the morning at this time. She needs the help of her daughter. Patient educated on importance of weighing daily. Patient will weigh and report at next week outreach.  HO[facility]

## 2019-03-07 LAB
BACTERIA SPEC CULT: NORMAL
SERVICE CMNT-IMP: NORMAL

## 2019-03-15 ENCOUNTER — OFFICE VISIT (OUTPATIENT)
Dept: CARDIOLOGY CLINIC | Age: 84
End: 2019-03-15

## 2019-03-15 VITALS
RESPIRATION RATE: 18 BRPM | HEIGHT: 62 IN | OXYGEN SATURATION: 97 % | BODY MASS INDEX: 31.82 KG/M2 | SYSTOLIC BLOOD PRESSURE: 116 MMHG | WEIGHT: 172.9 LBS | HEART RATE: 82 BPM | DIASTOLIC BLOOD PRESSURE: 80 MMHG

## 2019-03-15 DIAGNOSIS — I50.33 DIASTOLIC CHF, ACUTE ON CHRONIC (HCC): Primary | ICD-10-CM

## 2019-03-15 DIAGNOSIS — I10 ESSENTIAL HYPERTENSION: ICD-10-CM

## 2019-03-15 DIAGNOSIS — I48.20 CHRONIC A-FIB (HCC): ICD-10-CM

## 2019-03-15 RX ORDER — METOLAZONE 2.5 MG/1
2.5 TABLET ORAL DAILY
COMMUNITY
End: 2019-04-15 | Stop reason: SDUPTHER

## 2019-03-15 NOTE — PROGRESS NOTES
Subjective/HPI:     Ms. Marlo Whelan is a 80 y.o. female is here for hospital f/u. She has a PMHx of chronic A fib, HFpEF, CKD, HTN and GERD. She was hospitalized for acute on chronic HFpEF. She was diuresed effectively and discharged home in stable condition. She reports improvement in breathing since coming home. She does not have shortness of breath. She has a caretaker at home that visits every day. She has been checking her weights daily. Since discharge from the hospital, she has gained 6 lbs in the past week.          PCP Provider  Juan A Winters MD  Past Medical History:   Diagnosis Date    A-fib [facility])     Adenocarcinoma ([facility]     Cancer [facility])     right breast CA, lung, colon    Chronic kidney disease     Gastrointestinal disorder     GERD    Hypertension     Ileitis, terminal ([facility]     Other ill-defined conditions(799.89)     gout    Squamous cell lung cancer ([facility]       Past Surgical History:   Procedure Laterality Date    BREAST SURGERY PROCEDURE UNLISTED      Right mastectomy    HX APPENDECTOMY      HX HEENT      right glass eye    ID COLONOSCOPY W/BIOPSY SINGLE/MULTIPLE  3/29/2013         ID COLSC FLX W/RMVL OF TUMOR POLYP LESION SNARE TQ  3/29/2013          Family History   Problem Relation Age of Onset    Colon Cancer Other     Hypertension Other      Social History     Socioeconomic History    Marital status:      Spouse name: Not on file    Number of children: Not on file    Years of education: Not on file    Highest education level: Not on file   Social Needs    Financial resource strain: Not on file    Food insecurity - worry: Not on file    Food insecurity - inability: Not on file   Tajik [id number] needs - medical: Not on file   Tajik [id number] needs - non-medical: Not on file   Occupational History    Not on file   Tobacco Use    Smoking status: Former Smoker     Types: Cigarettes     Last attempt to quit: 1967     Years since quittin.2    Smokeless tobacco: Never Used   Substance and Sexual Activity    Alcohol use: No    Drug use: No    Sexual activity: Not Currently   Other Topics Concern    Not on file   Social History Narrative    Not on file       Allergies   Allergen Reactions    Penicillins Hives        Current Outpatient Medications   Medication Sig    metOLazone (ZAROXOLYN) 2.5 mg tablet Take 2.5 mg by mouth daily.  OXYGEN-AIR DELIVERY SYSTEMS Take 2 L by inhalation as needed.  albuterol (PROVENTIL HFA) 90 mcg/actuation inhaler Take 2 Puffs by inhalation every four (4) hours as needed for Wheezing or Shortness of Breath.  furosemide (LASIX) 40 mg tablet Take 2 tab 80mg in AM and 1 tab 40mg in PM (Corrected Rx)    albuterol-ipratropium (DUO-NEB) 2.5 mg-0.5 mg/3 ml nebu Take 3 mL by inhalation every four (4) hours as needed (shortness of breath).  apixaban (ELIQUIS) 2.5 mg tablet Take 1 Tab by mouth two (2) times a day.  mv-mn/folic acid/calcium/vit K (ONE-A-DAY WOMEN'S 50 PLUS PO) Take 1 Tab by mouth daily.  dextran 70/hypromellose/PF (NATURAL TEARS, PF, OP) Administer 1 Drop to both eyes daily as needed (dry eyes).  mometasone (NASONEX) 50 mcg/actuation nasal spray 2 Sprays by Both Nostrils route daily as needed (congestion).  potassium chloride (KLOR-CON) 20 mEq packet Take 20 mEq by mouth daily.  colchicine (COLCRYS) 0.6 mg tablet Take 0.6 mg by mouth daily.  acetaminophen (TYLENOL EXTRA STRENGTH) 500 mg tablet Take 1,000 mg by mouth every six (6) hours as needed for Pain.  amLODIPine (NORVASC) 2.5 mg tablet Take 2.5 mg by mouth daily. No current facility-administered medications for this visit. I have reviewed the problem list, allergy list, medical history, family, social history and medications. Review of Symptoms:    Review of Systems   Constitutional: Negative for chills, fever and weight loss. HENT: Negative for nosebleeds.     Eyes: Negative for blurred vision and double vision. Respiratory: Negative for cough, shortness of breath and wheezing. Cardiovascular: Negative for chest pain, palpitations, orthopnea, leg swelling and PND. Gastrointestinal: Negative for abdominal pain, blood in stool, diarrhea, nausea and vomiting. Musculoskeletal: Negative for joint pain. Skin: Negative for rash. Neurological: Negative for dizziness, tingling and loss of consciousness. Endo/Heme/Allergies: Does not bruise/bleed easily. Physical Exam:      General: Well developed, in no acute distress, cooperative and alert  HEENT: No carotid bruits, no JVD, trach is midline. Neck Supple, PEERL, EOM intact. Heart:  reg rate and rhythm; normal S1/S2; no murmurs, gallops or rubs.   Respiratory: Clear bilaterally x 4, no wheezing or rales  Abdomen:   Soft, non-tender, no distention, no masses. + BS.   Extremities:  Normal cap refill, no cyanosis, atraumatic. 1+ pitting edema BLE  Neuro: A&Ox3, speech clear, ambulates in wheelchair   Skin: Skin color is normal. No rashes or lesions.  Non diaphoretic  Vascular: 2+ pulses symmetric in all extremities    Vitals:    03/15/19 1014   BP: 116/80   Pulse: 82   Resp: 18   SpO2: 97%   Weight: 172 lb 14.4 oz (78.4 kg)   Height: 5' 2\" (1.575 m)       Cardiographics    ECG: atrial fibrillation  Results for orders placed or performed during the hospital encounter of 03/02/19   EKG, 12 LEAD, INITIAL   Result Value Ref Range    Ventricular Rate 84 BPM    Atrial Rate 187 BPM    QRS Duration 78 ms    Q-T Interval 374 ms    QTC Calculation (Bezet) 441 ms    Calculated R Axis -10 degrees    Calculated T Axis 46 degrees    Diagnosis       Atrial fibrillation  Low voltage QRS  Loss of anteroseptal forces  Nonspecific T wave abnormality    When compared with ECG of 16-JUL-2018 17:52,  No significant change was found    Confirmed by Elba Lucas ([id number]) on 3/3/2019 4:43:48 PM         Cardiology Labs:  No results found for: CHOL, [address], [address], [address], [address], HDL, LDL, LDLC, DLDLP, TGLX, TRIGL, TRIGP, CHHD, CHHDX    Lab Results   Component Value Date/Time    Sodium 141 03/04/2019 03:35 AM    Potassium 4.9 03/04/2019 03:35 AM    Chloride 103 03/04/2019 03:35 AM    CO2 32 03/04/2019 03:35 AM    Anion gap 6 03/04/2019 03:35 AM    Glucose 91 03/04/2019 03:35 AM    BUN 21 (H) 03/04/2019 03:35 AM    Creatinine 1.36 (H) 03/04/2019 03:35 AM    BUN/Creatinine ratio 15 03/04/2019 03:35 AM    GFR est AA 43 (L) 03/04/2019 03:35 AM    GFR est non-AA 36 (L) 03/04/2019 03:35 AM    Calcium 9.7 03/04/2019 03:35 AM    Bilirubin, total 0.7 03/03/2019 02:55 AM    AST (SGOT) 22 03/03/2019 02:55 AM    Alk. phosphatase 116 03/03/2019 02:55 AM    Protein, total 7.6 03/03/2019 02:55 AM    Albumin 3.0 (L) 03/03/2019 02:55 AM    Globulin 4.6 (H) 03/03/2019 02:55 AM    A-G Ratio 0.7 (L) 03/03/2019 02:55 AM    ALT (SGPT) 17 03/03/2019 02:55 AM           Assessment:     Assessment:       ICD-10-CM [id number]    1. Diastolic CHF, acute on chronic (HCC) I50.33 428.33      428.0    2. Chronic a-fib (HCC) I48.2 427.31 AMB POC EKG ROUTINE W/ 12 LEADS, INTER & REP   3. Essential hypertension I10 401.9         Plan:     1. Diastolic CHF, acute on chronic (HCC)  Stable; with 6 lb weight gain in the past week since hospital discharge. Currently on Lasix 80 mg in AM and 40 mg in PM and Metolazone 2.5 mg PRN. She has not taken any diuretics this morning. Will have her take 80 mg Lasix and 2.5 mg Metolazone when she gets home, and then 40 mg again in the evening for today. She will then take 80 mg Lasix BID over the weekend, until she has returned to dry weight 163-165 lbs. Her caretaker will call Wednesday 3/20 if her weight has not returned to baseline. 2. Chronic a-fib (HCC)  Asymptomatic; rates well controlled with medications. Continue Eliquis therapy. - AMB POC EKG ROUTINE W/ 12 LEADS, INTER & REP    3. Essential hypertension  BP well controlled; continue low dose amlodipine.       Simon Jerome, FNP-LINH Anguiano NP       [facility]    3/15/2019         Patient seen, examined by me personally. Plan discussed as detailed. Agree with note as outlined by  NP. I confirm findings in history and physical exam. No additional findings noted. Agree with plan as outlined above.      Kaylin Noe MD

## 2019-03-15 NOTE — PROGRESS NOTES
1. Have you been to the ER, urgent care clinic since your last visit? Hospitalized since your last visit? Seen on 3/2/19. 2. Have you seen or consulted any other health care providers outside of the [address] [place] [address] since your last visit? Include any pap smears or colon screening. Seen by PCP.       Chief Complaint   Patient presents with   [facility] Follow Up     swelling and sob is better, denies any cardiac symptoms

## 2019-03-25 ENCOUNTER — TELEPHONE (OUTPATIENT)
Dept: CARDIOLOGY CLINIC | Age: 84
End: 2019-03-25

## 2019-03-25 NOTE — TELEPHONE ENCOUNTER
Dr. Eli Swann decreased furosemide (LASIX) 40 mg tablet     1 week ago,  Yesterday patient weighed 164.2 and she is now weighing in at 169 today. Experiencing some sob as well     Caregiver;  Armani Egan is calling, on [place] [phone]    Please advise

## 2019-03-26 NOTE — TELEPHONE ENCOUNTER
Verified patient with two identifiers. Spoke with Isabel on HIPAA,advising her to have to take 40 mg of lasix bid, if weight does not go down, pt can take 2.5 metolazone m, w, f. Advised her to call office on Thursday with weight. Kevin Graves, BRITTANY Damico LPN   Caller: Unspecified (Yesterday, 12:56 PM)             She can increase lasix 40 mg BID, if that doesn't help the weight come back down, she can add Metolazone 2.5 mg MWF until she is back to dry weight.      Thanks,   [employer]

## 2019-03-27 ENCOUNTER — PATIENT OUTREACH (OUTPATIENT)
Dept: FAMILY MEDICINE CLINIC | Age: 84
End: 2019-03-27

## 2019-03-27 NOTE — PROGRESS NOTES
Goals  Prevent complications post hospitalization. 3/5/19 Patient reports not weighing for the morning at this time. She needs the help of her daughter. Patient educated on importance of weighing daily. Patient will weigh and report at next week outreach. [facility] 
 
3/27/19 Diamond Forrest with PCP office reports patient attended appointment 3/12/19 and had BMP drawn as requested. Per review of chart patient weight on 3/25/19 was 169 which was up from 164 . Cardio advised Lasix 40 mg BID and add Metolazone 2.5 mg MWF if weight does not decrease. Daughter reports weight was 170 on 3/26/19. She called PCP office and was told to follow directions of cardiology. She is unsure if caregiver gave Metolazone on Monday. She will check with her today. Daughter report patient is having SOB. Advised if patient is taking Lasix and metolazone as directed she will need to be seen by cardiologist to evaluate patient and medications. Daughter will check with caregiver and weigh patient for today and report to writer tomorrow.  [facility]

## 2019-03-29 ENCOUNTER — PATIENT OUTREACH (OUTPATIENT)
Dept: FAMILY MEDICINE CLINIC | Age: 84
End: 2019-03-29

## 2019-03-29 NOTE — TELEPHONE ENCOUNTER
Ariana Zamudio NP  You 16 minutes ago (12:19 PM)      Okay, please continue to keep an eye on the weight.  Continue Lasix BID and use Metolazone every other day until she achieves dry weight 162-164 lbs. Call back Monday with weights. Thanks! Routing comment       You  Mitali Brown NP 1 hour ago (11:30 AM)      F. Y. I.  Per caregiver [place], weight today is 167.4 and just gave her a dose of Metolazone with her normal lasix. Per note 3/26/2019 pt was to callback with weight info today       Spoke with [place] and advised  Verified patient with 2 patient identifiers  Isabel verbalized understanding.

## 2019-03-29 NOTE — PROGRESS NOTES
Goals  Prevent complications post hospitalization. 3/5/19 Patient reports not weighing for the morning at this time. She needs the help of her daughter. Patient educated on importance of weighing daily. Patient will weigh and report at next week outreach. [facility] 
 
3/27/19 Adriana Huang with PCP office reports patient attended appointment 3/12/19 and had BMP drawn as requested. Per review of chart patient weight on 3/25/19 was 169 which was up from 164 . Cardio advised Lasix 40 mg BID and add Metolazone 2.5 mg MWF if weight does not decrease. Daughter reports weight was 170 on 3/26/19. She called PCP office and was told to follow directions of cardiology. She is unsure if caregiver gave Metolazone on Monday. She will check with her today. Daughter report patient is having SOB. Advised if patient is taking Lasix and metolazone as directed she will need to be seen by cardiologist to evaluate patient and medications. Daughter will check with caregiver and weigh patient for today and report to writer tomorrow. [facility] 
 
3/29/19 Isabel, caregiver, reports patient did receive Metolazone 2.5 mg on Wed. Patient has not had for this morning. Denies SOB, chest pain. Weight is down to 167.4 today. Writer advised Isabel that patient can have Metolazone 2.5 mg on MWF in addition to Lasix 40 mg BID. Evaristo Boykin is to call cardiology office today and report weight and  Directions of medication adjustment. Will follow up with patient next week.  [facility]

## 2019-03-29 NOTE — TELEPHONE ENCOUNTER
Per caregiver Isabel, weight today is 167.4 and just gave her a dose of Metolazone with her normal lasix. Please call [phone] if any questions. She said Dr. Eddie Kim ask her to call today with this information. Thanks!

## 2019-04-01 ENCOUNTER — TELEPHONE (OUTPATIENT)
Dept: CARDIOLOGY CLINIC | Age: 84
End: 2019-04-01

## 2019-04-01 NOTE — TELEPHONE ENCOUNTER
Pts caregiver called wanting to give an update on the pts weight, She is currently at 162 lbs    thanks

## 2019-04-01 NOTE — TELEPHONE ENCOUNTER
Verified patient with two identifiers. Spoke with Paula Reyes on HIPAA advising her to have pt continue on lasix regime and to take metolazone when needed ,3 lbs in a day or 6 lbs in a week,  She verbalized understanding    BRITTANY Purcell LPN   Caller: Unspecified (Today, 10:43 AM)             Awesome. Please have her continue with her lasix regimen and remind her to use the Metolazone as needed for weight gain > 3lbs/day or > 6lbs/week.

## 2019-04-02 ENCOUNTER — PATIENT OUTREACH (OUTPATIENT)
Dept: FAMILY MEDICINE CLINIC | Age: 84
End: 2019-04-02

## 2019-04-02 VITALS — WEIGHT: 162 LBS | BODY MASS INDEX: 29.63 KG/M2

## 2019-04-02 NOTE — PROGRESS NOTES
Goals  Prevent complications post hospitalization. 3/5/19 Patient reports not weighing for the morning at this time. She needs the help of her daughter. Patient educated on importance of weighing daily. Patient will weigh and report at next week outreach. [facility] 
 
3/27/19 Joel Khan with PCP office reports patient attended appointment 3/12/19 and had BMP drawn as requested. Per review of chart patient weight on 3/25/19 was 169 which was up from 164 . Cardio advised Lasix 40 mg BID and add Metolazone 2.5 mg MWF if weight does not decrease. Daughter reports weight was 170 on 3/26/19. She called PCP office and was told to follow directions of cardiology. She is unsure if caregiver gave Metolazone on Monday. She will check with her today. Daughter report patient is having SOB. Advised if patient is taking Lasix and metolazone as directed she will need to be seen by cardiologist to evaluate patient and medications. Daughter will check with caregiver and weigh patient for today and report to writer tomorrow. [facility] 
 
3/29/19 Isabel, caregiver, reports patient did receive Metolazone 2.5 mg on Wed. Patient has not had for this morning. Denies SOB, chest pain. Weight is down to 167.4 today. Writer advised Isabel that patient can have Metolazone 2.5 mg on MWF in addition to Lasix 40 mg BID. Waldo Maggie is to call cardiology office today and report weight and  Directions of medication adjustment. Will follow up with patient next week. [facility] 
 
4/2/19 Isabel, caregiver, reports patient weight is now 162. Denies SOB, edema, and chest pain. Cardiology has patient taking Lasix 80 mg in AM and 40 mg in PM. Caregiver will continue to monitor CHF S&S and report at next week outreach.  [facility]

## 2019-04-10 ENCOUNTER — PATIENT OUTREACH (OUTPATIENT)
Dept: FAMILY MEDICINE CLINIC | Age: 84
End: 2019-04-10

## 2019-04-10 NOTE — PROGRESS NOTES
Goals  Prevent complications post hospitalization. 3/5/19 Patient reports not weighing for the morning at this time. She needs the help of her daughter. Patient educated on importance of weighing daily. Patient will weigh and report at next week outreach. [facility] 
 
3/27/19 Norm Klinefelter with PCP office reports patient attended appointment 3/12/19 and had BMP drawn as requested. Per review of chart patient weight on 3/25/19 was 169 which was up from 164 . Cardio advised Lasix 40 mg BID and add Metolazone 2.5 mg MWF if weight does not decrease. Daughter reports weight was 170 on 3/26/19. She called PCP office and was told to follow directions of cardiology. She is unsure if caregiver gave Metolazone on Monday. She will check with her today. Daughter report patient is having SOB. Advised if patient is taking Lasix and metolazone as directed she will need to be seen by cardiologist to evaluate patient and medications. Daughter will check with caregiver and weigh patient for today and report to writer tomorrow. [facility] 
 
3/29/19 Isabel, caregiver, reports patient did receive Metolazone 2.5 mg on Wed. Patient has not had for this morning. Denies SOB, chest pain. Weight is down to 167.4 today. Writer advised Isabel that patient can have Metolazone 2.5 mg on MWF in addition to Lasix 40 mg BID. Talha Hill is to call cardiology office today and report weight and  Directions of medication adjustment. Will follow up with patient next week. [facility] 
 
4/2/19 Isabel, caregiver, reports patient weight is now 162. Denies SOB, edema, and chest pain. Cardiology has patient taking Lasix 80 mg in AM and 40 mg in PM. Caregiver will continue to monitor CHF S&S and report at next week outreach. [facility] 
 
4/10/19 Daughter reports patient weight is 162. Denies chest pain, SOB, edema. Endorses shaking hands. Patient has eatting and drinking as usual.  Daughter declines to schedule appointment with PCP at this time.  [facility] is out of service area. Will monitor and report at next outreach.  [facility]

## 2019-04-15 RX ORDER — METOLAZONE 2.5 MG/1
2.5 TABLET ORAL DAILY
Qty: 90 TAB | Refills: 1 | Status: ON HOLD | OUTPATIENT
Start: 2019-04-15 | End: 2019-01-01 | Stop reason: SDUPTHER

## 2019-04-16 ENCOUNTER — PATIENT OUTREACH (OUTPATIENT)
Dept: FAMILY MEDICINE CLINIC | Age: 84
End: 2019-04-16

## 2019-04-16 NOTE — PROGRESS NOTES
Goals  Prevent complications post hospitalization. 3/5/19 Patient reports not weighing for the morning at this time. She needs the help of her daughter. Patient educated on importance of weighing daily. Patient will weigh and report at next week outreach. [facility] 
 
3/27/19 [facility] with PCP office reports patient attended appointment 3/12/19 and had BMP drawn as requested. Per review of chart patient weight on 3/25/19 was 169 which was up from 164 . Cardio advised Lasix 40 mg BID and add Metolazone 2.5 mg MWF if weight does not decrease. Daughter reports weight was 170 on 3/26/19. She called PCP office and was told to follow directions of cardiology. She is unsure if caregiver gave Metolazone on Monday. She will check with her today. Daughter report patient is having SOB. Advised if patient is taking Lasix and metolazone as directed she will need to be seen by cardiologist to evaluate patient and medications. Daughter will check with caregiver and weigh patient for today and report to writer tomorrow. [facility] 
 
3/29/19 Isabel, caregiver, reports patient did receive Metolazone 2.5 mg on Wed. Patient has not had for this morning. Denies SOB, chest pain. Weight is down to 167.4 today. Writer advised Isabel that patient can have Metolazone 2.5 mg on MWF in addition to Lasix 40 mg BID. Kaylin Low is to call cardiology office today and report weight and  Directions of medication adjustment. Will follow up with patient next week. [facility] 
 
4/2/19 Isabel, caregiver, reports patient weight is now 162. Denies SOB, edema, and chest pain. Cardiology has patient taking Lasix 80 mg in AM and 40 mg in PM. Caregiver will continue to monitor CHF S&S and report at next week outreach. [facility] 
 
4/10/19 Daughter reports patient weight is 162. Denies chest pain, SOB, edema. Endorses shaking hands. Patient has eatting and drinking as usual.  Daughter declines to schedule appointment with PCP at this time.  [facility] is out of service area. Will monitor and report at next outreach. [facility] 
 
4/16/19 Isabel,caregiver, reports patient weight today is 160.2. Denies SOB, edema, and chest pain. Shaking in the hands is better but not completely gone. Patient has not seen PCP regarding family is just watching for now. Family/cargiver will continue to monitor CHF S&S and report at next week outreach.  [facility]

## 2019-04-23 NOTE — PROGRESS NOTES
Goals  Prevent complications post hospitalization. 3/5/19 Patient reports not weighing for the morning at this time. She needs the help of her daughter. Patient educated on importance of weighing daily. Patient will weigh and report at next week outreach. [facility] 
 
3/27/19 Matias Ayoub with PCP office reports patient attended appointment 3/12/19 and had BMP drawn as requested. Per review of chart patient weight on 3/25/19 was 169 which was up from 164 . Cardio advised Lasix 40 mg BID and add Metolazone 2.5 mg MWF if weight does not decrease. Daughter reports weight was 170 on 3/26/19. She called PCP office and was told to follow directions of cardiology. She is unsure if caregiver gave Metolazone on Monday. She will check with her today. Daughter report patient is having SOB. Advised if patient is taking Lasix and metolazone as directed she will need to be seen by cardiologist to evaluate patient and medications. Daughter will check with caregiver and weigh patient for today and report to writer tomorrow. [facility] 
 
3/29/19 Isabel, caregiver, reports patient did receive Metolazone 2.5 mg on Wed. Patient has not had for this morning. Denies SOB, chest pain. Weight is down to 167.4 today. Writer advised Isabel that patient can have Metolazone 2.5 mg on MWF in addition to Lasix 40 mg BID. Marytea Jose is to call cardiology office today and report weight and  Directions of medication adjustment. Will follow up with patient next week. [facility] 
 
4/2/19 Isabel, caregiver, reports patient weight is now 162. Denies SOB, edema, and chest pain. Cardiology has patient taking Lasix 80 mg in AM and 40 mg in PM. Caregiver will continue to monitor CHF S&S and report at next week outreach. [facility] 
 
4/10/19 Daughter reports patient weight is 162. Denies chest pain, SOB, edema. Endorses shaking hands. Patient has eatting and drinking as usual.  Daughter declines to schedule appointment with PCP at this time.  [facility] is out of service area. Will monitor and report at next outreach. [facility] 
 
4/16/19 Isabel,caregiver, reports patient weight today is 160.2. Denies SOB, edema, and chest pain. Shaking in the hands is better but not completely gone. Patient has not seen PCP regarding family is just watching for now. Family/cargiver will continue to monitor CHF S&S and report at next week outreach. [facility] 
 
4/23/19 Isabel, caregiver reports patient weight is up to 164.4 today with edema in LE. She has given additional fluid pill as directed. Caregiver/family will monitor weight and CHF S&S and report to writer in 2 day outreach.  [facility]

## 2019-04-25 NOTE — PROGRESS NOTES
Goals  Prevent complications post hospitalization. 3/5/19 Patient reports not weighing for the morning at this time. She needs the help of her daughter. Patient educated on importance of weighing daily. Patient will weigh and report at next week outreach. [facility] 
 
3/27/19 Conner Mathew with PCP office reports patient attended appointment 3/12/19 and had BMP drawn as requested. Per review of chart patient weight on 3/25/19 was 169 which was up from 164 . Cardio advised Lasix 40 mg BID and add Metolazone 2.5 mg MWF if weight does not decrease. Daughter reports weight was 170 on 3/26/19. She called PCP office and was told to follow directions of cardiology. She is unsure if caregiver gave Metolazone on Monday. She will check with her today. Daughter report patient is having SOB. Advised if patient is taking Lasix and metolazone as directed she will need to be seen by cardiologist to evaluate patient and medications. Daughter will check with caregiver and weigh patient for today and report to writer tomorrow. [facility] 
 
3/29/19 Isabel, caregiver, reports patient did receive Metolazone 2.5 mg on Wed. Patient has not had for this morning. Denies SOB, chest pain. Weight is down to 167.4 today. Writer advised Isabel that patient can have Metolazone 2.5 mg on MWF in addition to Lasix 40 mg BID. Jamey Dodson is to call cardiology office today and report weight and  Directions of medication adjustment. Will follow up with patient next week. [facility] 
 
4/2/19 Isabel, caregiver, reports patient weight is now 162. Denies SOB, edema, and chest pain. Cardiology has patient taking Lasix 80 mg in AM and 40 mg in PM. Caregiver will continue to monitor CHF S&S and report at next week outreach. [facility] 
 
4/10/19 Daughter reports patient weight is 162. Denies chest pain, SOB, edema. Endorses shaking hands. Patient has eatting and drinking as usual.  Daughter declines to schedule appointment with PCP at this time.  [facility] is out of service area. Will monitor and report at next outreach. [facility] 
 
4/16/19 Isabel,caregiver, reports patient weight today is 160.2. Denies SOB, edema, and chest pain. Shaking in the hands is better but not completely gone. Patient has not seen PCP regarding family is just watching for now. Family/cargiver will continue to monitor CHF S&S and report at next week outreach. [facility] 
 
4/23/19 Isabel, caregiver reports patient weight is up to 164.4 today with edema in LE. She has given additional fluid pill as directed. Caregiver/family will monitor weight and CHF S&S and report to writer in 2 day outreach. [facility] 
 
4/25/19 Isabel, caregiver, triston patient weight decreased to 161 pounds with minimal edema in legs (does elevate when sitting) CG/family will continue to monitor CHF S&S and reports to next week outreach.  [facility]

## 2019-05-03 NOTE — PROGRESS NOTES
Goals  Prevent complications post hospitalization. 3/5/19 Patient reports not weighing for the morning at this time. She needs the help of her daughter. Patient educated on importance of weighing daily. Patient will weigh and report at next week outreach. [facility] 
 
3/27/19 Bety Harper with PCP office reports patient attended appointment 3/12/19 and had BMP drawn as requested. Per review of chart patient weight on 3/25/19 was 169 which was up from 164 . Cardio advised Lasix 40 mg BID and add Metolazone 2.5 mg MWF if weight does not decrease. Daughter reports weight was 170 on 3/26/19. She called PCP office and was told to follow directions of cardiology. She is unsure if caregiver gave Metolazone on Monday. She will check with her today. Daughter report patient is having SOB. Advised if patient is taking Lasix and metolazone as directed she will need to be seen by cardiologist to evaluate patient and medications. Daughter will check with caregiver and weigh patient for today and report to writer tomorrow. [facility] 
 
3/29/19 Isabel, caregiver, reports patient did receive Metolazone 2.5 mg on Wed. Patient has not had for this morning. Denies SOB, chest pain. Weight is down to 167.4 today. Writer advised Isabel that patient can have Metolazone 2.5 mg on MWF in addition to Lasix 40 mg BID. Alfredo Lopez is to call cardiology office today and report weight and  Directions of medication adjustment. Will follow up with patient next week. [facility] 
 
4/2/19 Isabel, caregiver, reports patient weight is now 162. Denies SOB, edema, and chest pain. Cardiology has patient taking Lasix 80 mg in AM and 40 mg in PM. Caregiver will continue to monitor CHF S&S and report at next week outreach. [facility] 
 
4/10/19 Daughter reports patient weight is 162. Denies chest pain, SOB, edema. Endorses shaking hands. Patient has eatting and drinking as usual.  Daughter declines to schedule appointment with PCP at this time.  [facility] is out of service area. Will monitor and report at next outreach. [facility] 
 
4/16/19 Isabel,caregiver, reports patient weight today is 160.2. Denies SOB, edema, and chest pain. Shaking in the hands is better but not completely gone. Patient has not seen PCP regarding family is just watching for now. Family/cargiver will continue to monitor CHF S&S and report at next week outreach. [facility] 
 
4/23/19 Isabel, caregiver reports patient weight is up to 164.4 today with edema in LE. She has given additional fluid pill as directed. Caregiver/family will monitor weight and CHF S&S and report to writer in 2 day outreach. [facility] 
 
4/25/19 Isabel, caregiver, repotrs patient weight decreased to 161 pounds with minimal edema in legs (does elevate when sitting) CG/family will continue to monitor CHF S&S and reports to next week outreach. [facility] 
 
5/3/19 Isabel, caregiver, reports patient weight did increase to 165 on 4/30/19. She followed previously given instructions and gave metalazone MWF and weight today is 162.5. Denies SOB and edema. CG/Family continuing to monitor CHF S&S and will report ant next week outreach.  [facility]

## 2019-05-13 NOTE — PROGRESS NOTES
Goals  Prevent complications post hospitalization. 3/5/19 Patient reports not weighing for the morning at this time. She needs the help of her daughter. Patient educated on importance of weighing daily. Patient will weigh and report at next week outreach. [facility] 
 
3/27/19 Joaquina Ruvalcaba with PCP office reports patient attended appointment 3/12/19 and had BMP drawn as requested. Per review of chart patient weight on 3/25/19 was 169 which was up from 164 . Cardio advised Lasix 40 mg BID and add Metolazone 2.5 mg MWF if weight does not decrease. Daughter reports weight was 170 on 3/26/19. She called PCP office and was told to follow directions of cardiology. She is unsure if caregiver gave Metolazone on Monday. She will check with her today. Daughter report patient is having SOB. Advised if patient is taking Lasix and metolazone as directed she will need to be seen by cardiologist to evaluate patient and medications. Daughter will check with caregiver and weigh patient for today and report to writer tomorrow. [facility] 
 
3/29/19 Isabel, caregiver, reports patient did receive Metolazone 2.5 mg on Wed. Patient has not had for this morning. Denies SOB, chest pain. Weight is down to 167.4 today. Writer advised Isabel that patient can have Metolazone 2.5 mg on MWF in addition to Lasix 40 mg BID. Esequiel Aaliyah is to call cardiology office today and report weight and  Directions of medication adjustment. Will follow up with patient next week. [facility] 
 
4/2/19 Isabel, caregiver, reports patient weight is now 162. Denies SOB, edema, and chest pain. Cardiology has patient taking Lasix 80 mg in AM and 40 mg in PM. Caregiver will continue to monitor CHF S&S and report at next week outreach. [facility] 
 
4/10/19 Daughter reports patient weight is 162. Denies chest pain, SOB, edema. Endorses shaking hands. Patient has eatting and drinking as usual.  Daughter declines to schedule appointment with PCP at this time.  [facility] is out of service area. Will monitor and report at next outreach. \[facility]\"" 
 
4/16/19 Isabel,caregiver, reports patient weight today is 160.2. Denies SOB, edema, and chest pain. Shaking in the hands is better but not completely gone. Patient has not seen PCP regarding family is just watching for now. Family/cargiver will continue to monitor CHF S&S and report at next week outreach. \[facility]\"" 
 
4/23/19 Isabel, caregiver reports patient weight is up to 164.4 today with edema in LE. She has given additional fluid pill as directed. Caregiver/family will monitor weight and CHF S&S and report to writer in 2 day outreach. \[facility]\"" 
 
4/25/19 Isabel, caregiver, repotrs patient weight decreased to 161 pounds with minimal edema in legs (does elevate when sitting) CG/family will continue to monitor CHF S&S and reports to next week outreach. \[facility]\"" 
 
5/3/19 Isabel, caregiver, reports patient weight did increase to 165 on 4/30/19. She followed previously given instructions and gave metalazone MWF and weight today is 162.5. Denies SOB and edema. CG/Family continuing to monitor CHF S&S and will report ant next week outreach. \[facility]\"" 
 
5/13/19 Isabel, CG, reports patient weight is 162. 6. SPO2 was 100%. Denies tremors, SOB, chest pain and edema. Complaint with diet. Family/CG will report CHF S&S at next week outreach.  \[facility]\""

## 2019-05-28 NOTE — PROGRESS NOTES
Goals  Prevent complications post hospitalization. 3/5/19 Patient reports not weighing for the morning at this time. She needs the help of her daughter. Patient educated on importance of weighing daily. Patient will weigh and report at next week outreach. [facility] 
 
3/27/19 Patrice Overton with PCP office reports patient attended appointment 3/12/19 and had BMP drawn as requested. Per review of chart patient weight on 3/25/19 was 169 which was up from 164 . Cardio advised Lasix 40 mg BID and add Metolazone 2.5 mg MWF if weight does not decrease. Daughter reports weight was 170 on 3/26/19. She called PCP office and was told to follow directions of cardiology. She is unsure if caregiver gave Metolazone on Monday. She will check with her today. Daughter report patient is having SOB. Advised if patient is taking Lasix and metolazone as directed she will need to be seen by cardiologist to evaluate patient and medications. Daughter will check with caregiver and weigh patient for today and report to writer tomorrow. [facility] 
 
3/29/19 Isabel, caregiver, reports patient did receive Metolazone 2.5 mg on Wed. Patient has not had for this morning. Denies SOB, chest pain. Weight is down to 167.4 today. Writer advised Isabel that patient can have Metolazone 2.5 mg on MWF in addition to Lasix 40 mg BID. Mary Danyellnancy is to call cardiology office today and report weight and  Directions of medication adjustment. Will follow up with patient next week. [facility] 
 
4/2/19 Isabel, caregiver, reports patient weight is now 162. Denies SOB, edema, and chest pain. Cardiology has patient taking Lasix 80 mg in AM and 40 mg in PM. Caregiver will continue to monitor CHF S&S and report at next week outreach. [facility] 
 
4/10/19 Daughter reports patient weight is 162. Denies chest pain, SOB, edema. Endorses shaking hands. Patient has eatting and drinking as usual.  Daughter declines to schedule appointment with PCP at this time.  [facility] is out of service area. Will monitor and report at next outreach. [facility] 
 
4/16/19 Isabel,caregiver, reports patient weight today is 160.2. Denies SOB, edema, and chest pain. Shaking in the hands is better but not completely gone. Patient has not seen PCP regarding family is just watching for now. Family/cargiver will continue to monitor CHF S&S and report at next week outreach. [facility] 
 
4/23/19 Isabel, caregiver reports patient weight is up to 164.4 today with edema in LE. She has given additional fluid pill as directed. Caregiver/family will monitor weight and CHF S&S and report to writer in 2 day outreach. [facility] 
 
4/25/19 Isabel, caregiver, repotrs patient weight decreased to 161 pounds with minimal edema in legs (does elevate when sitting) CG/family will continue to monitor CHF S&S and reports to next week outreach. [facility] 
 
5/3/19 Isabel, caregiver, reports patient weight did increase to 165 on 4/30/19. She followed previously given instructions and gave metalazone MWF and weight today is 162.5. Denies SOB and edema. CG/Family continuing to monitor CHF S&S and will report ant next week outreach. [facility] 
 
5/13/19 Isabel, CG, reports patient weight is 162. 6. SPO2 was 100%. Denies tremors, SOB, chest pain and edema. Complaint with diet. Family/CG will report CHF S&S at next week outreach. [facility] 
 
5/21/19 Daughter reports patient weight from 5/17-21/19 162.4-164. Denies SOB, chest pain, increase in pillow use. Endorses a \"rash on buttocks area. She will call PCP for which ointment to use. Daughter will report to writer which ointment was added to medication list and monitor CHF S&S at next week outreach. [facility] 
 
5/28/19 Isabel, CG, Denies SOB, chest pain, increase in pillow use. Endorses a \"rash on buttocks area. CG reports rash is much better since Dr. Charito Fierro prescribed Nystatin. CG/Family will monitor CHF S&S and report at next week outreach.  [facility]

## 2019-06-05 NOTE — PROGRESS NOTES
Patient has graduated from the Disease Specific Care Management  program on 6/5/19. Patient's symptoms are stable at this time. Patient/family has the ability to self-manage. Care management goals have been completed at this time. No further nurse navigator follow up scheduled. Goals Addressed                 This Visit's Progress     COMPLETED: Prevent complications post hospitalization. 3/5/19 Patient reports not weighing for the morning at this time. She needs the help of her daughter. Patient educated on importance of weighing daily. Patient will weigh and report at next week outreach. [facility]    3/27/19 Negro Hameed with PCP office reports patient attended appointment 3/12/19 and had BMP drawn as requested. Per review of chart patient weight on 3/25/19 was 169 which was up from 164 . Cardio advised Lasix 40 mg BID and add Metolazone 2.5 mg MWF if weight does not decrease. Daughter reports weight was 170 on 3/26/19. She called PCP office and was told to follow directions of cardiology. She is unsure if caregiver gave Metolazone on Monday. She will check with her today. Daughter report patient is having SOB. Advised if patient is taking Lasix and metolazone as directed she will need to be seen by cardiologist to evaluate patient and medications. Daughter will check with caregiver and weigh patient for today and report to writer tomorrow. [facility]    3/29/19 Isabel, caregiver, reports patient did receive Metolazone 2.5 mg on Wed. Patient has not had for this morning. Denies SOB, chest pain. Weight is down to 167.4 today. Writer advised Isabel that patient can have Metolazone 2.5 mg on MWF in addition to Lasix 40 mg BID. Emelia Miranda is to call cardiology office today and report weight and  Directions of medication adjustment. Will follow up with patient next week. [facility]    4/2/19 Isabel, caregiver, reports patient weight is now 162. Denies SOB, edema, and chest pain.  Cardiology has patient taking Lasix 80 mg in AM and 40 mg in PM. Caregiver will continue to monitor CHF S&S and report at next week outreach. [facility]    4/10/19 Daughter reports patient weight is 162. Denies chest pain, SOB, edema. Endorses shaking hands. Patient has eatting and drinking as usual.  Daughter declines to schedule appointment with PCP at this time. [facility] is out of service area. Will monitor and report at next outreach. [facility]    4/16/19 Isabel,caregiver, reports patient weight today is 160.2. Denies SOB, edema, and chest pain. Shaking in the hands is better but not completely gone. Patient has not seen PCP regarding family is just watching for now. Family/cargiver will continue to monitor CHF S&S and report at next week outreach. [facility]    4/23/19 Isabel, caregiver reports patient weight is up to 164.4 today with edema in LE. She has given additional fluid pill as directed. Caregiver/family will monitor weight and CHF S&S and report to writer in 2 day outreach. [facility]    4/25/19 Isabel, caregiver, repotrs patient weight decreased to 161 pounds with minimal edema in legs (does elevate when sitting) CG/family will continue to monitor CHF S&S and reports to next week outreach. [facility]    5/3/19 Isabel, caregiver, reports patient weight did increase to 165 on 4/30/19. She followed previously given instructions and gave metalazone MWF and weight today is 162.5. Denies SOB and edema. CG/Family continuing to monitor CHF S&S and will report ant next week outreach. [facility]    5/13/19 Isabel, CG, reports patient weight is 162. 6. SPO2 was 100%. Denies tremors, SOB, chest pain and edema. Complaint with diet. Family/CG will report CHF S&S at next week outreach. [facility]    5/21/19 Daughter reports patient weight from 5/17-21/19 162.4-164. Denies SOB, chest pain, increase in pillow use. Endorses a \"rash on buttocks area. She will call PCP for which ointment to use.  Daughter will report to writer which ointment was added to medication list and monitor CHF S&S at next week outreach. [facility]    5/28/19 CHARLES Hall, Denies SOB, chest pain, increase in pillow use. Endorses a \"rash on buttocks area. CG reports rash is much better since Dr. Jon Johnston prescribed Nystatin. CG/Family will monitor CHF S&S and report at next week outreach. [facility]    6/5/19 CHARLES Hall, reports patient weight is 163. Denies SOB, increase in pillow use, and SOB. Patient has completed goal of 90 day post hospitalization period. Pt has nurse navigator's contact information for any further questions, concerns, or needs. Patients upcoming visits:  No future appointments.

## 2019-06-11 NOTE — TELEPHONE ENCOUNTER
Dearcharles Whiting, caregiver called stated patient's weight still increasing, please call, with plan.  Thanks

## 2019-06-12 NOTE — TELEPHONE ENCOUNTER
Verified patient with two identifiers. Spoke with [employer] on HIPAA, pt has gained 6 lbs from Saturday to Tuesday. She gave her metolazone on Monday as she had gained 3 lbs, then on Tuesday she had gained another 3 lbs she gave another metolazone. She is not at pt's home this am will be there this afternoon. Does not know BP. Pt's legs are swollen, some SOB.   Please advise

## 2019-06-12 NOTE — TELEPHONE ENCOUNTER
Verified patient with two identifiers. Spoke with [employer] on HIPAA. Pt is taking 80 mg of lasix in am and 40 mg in PM.  Advised her to take 80 mg BID, and to take metolazone daily until weight gets down to 163. Today weight was 164 lbs. She will call office on Friday 6/14 to give weight. BRITTANY Barron LPN   Caller: Unspecified (Yesterday,  9:54 AM)            1.  Is she also taking her Lasix?  If so, please make sure she is taking Lasix 80 mg BID for now   2.  If she is on Metolazone 2.5 mg MWF, then start taking it every day until weight is down   3.  If not also taking Lasix, then start Lasix daily 80 mg BID and take Metolazone 2.5 MWF in addition   4.  Low sodium diet; keep legs elevated, use compression stockings, fluid restriction 2L/day     Thanks

## 2019-06-14 NOTE — TELEPHONE ENCOUNTER
Verified patient with two identifiers. Spoke with [employer] on HIPAA, pt's weight is down, advised her to continue and call back on Monday with weights.

## 2019-09-30 PROBLEM — N17.9 ACUTE KIDNEY INJURY (HCC): Status: ACTIVE | Noted: 2019-01-01

## 2019-09-30 NOTE — ED PROVIDER NOTES
EMERGENCY DEPARTMENT HISTORY AND PHYSICAL EXAM 
 
 
Date: 9/30/2019 Patient Name: Umm Schmitz History of Presenting Illness Chief Complaint Patient presents with  Difficulty Walking  
  pt given cortisone injection for her hip on thursday after being \"unable to walk with her walker. \" denies falls History Provided By: Patient, Patient's Wife and Patient's Son HPI: Umm Schmitz, 80 y.o. female with history of A. fib, adenocarcinoma of the right breast, lung, colon, CKD, hypertension presents to the ED with cc of ambulatory dysfunction. Patient lives at home with her daughter where she typically ambulates with use of a walker. She is very functional and active for her age. Patient has not had any recent illness. Denies any recent fevers or injury. She does complain of left hip pain. Patient has not been able to ambulate for the past 5 days which is what had family concerned enough to bring her to the ED for evaluation. Patient's daughter notices that she has had increased weight gain and volume overload despite taking her diuretic medication. Patient has no complaints at this time. There are no other complaints, changes, or physical findings at this time. PCP: Jude Cheema MD 
 
No current facility-administered medications on file prior to encounter. Current Outpatient Medications on File Prior to Encounter Medication Sig Dispense Refill  furosemide (LASIX) 40 mg tablet Take 2 tab 80mg in AM and 1 tab 40mg in PM (Corrected Rx) 270 Tab 1  
 albuterol-ipratropium (DUO-NEB) 2.5 mg-0.5 mg/3 ml nebu Take 3 mL by inhalation every four (4) hours as needed (shortness of breath).  apixaban (ELIQUIS) 2.5 mg tablet Take 1 Tab by mouth two (2) times a day. 60 Tab 11  
 mv-mn/folic acid/calcium/vit K (ONE-A-DAY WOMEN'S 50 PLUS PO) Take 1 Tab by mouth daily.  potassium chloride (KLOR-CON) 20 mEq packet Take 20 mEq by mouth daily.  amLODIPine (NORVASC) 2.5 mg tablet Take 2.5 mg by mouth daily.  metOLazone (ZAROXOLYN) 2.5 mg tablet Take 1 Tab by mouth daily. Indications: take per cardiology 90 Tab 1  
 OXYGEN-AIR DELIVERY SYSTEMS Take 2 L by inhalation as needed.  albuterol (PROVENTIL HFA) 90 mcg/actuation inhaler Take 2 Puffs by inhalation every four (4) hours as needed for Wheezing or Shortness of Breath.  dextran 70/hypromellose/PF (NATURAL TEARS, PF, OP) Administer 1 Drop to both eyes daily as needed (dry eyes).  mometasone (NASONEX) 50 mcg/actuation nasal spray 2 Sprays by Both Nostrils route daily as needed (congestion).  colchicine (COLCRYS) 0.6 mg tablet Take 0.6 mg by mouth daily.  acetaminophen (TYLENOL EXTRA STRENGTH) 500 mg tablet Take 1,000 mg by mouth every six (6) hours as needed for Pain. Past History Past Medical History: 
Past Medical History:  
Diagnosis Date  A-fib ([facility]  Adenocarcinoma ([facility]  Cancer ([facility] right breast CA, lung, colon  Chronic kidney disease  Gastrointestinal disorder GERD  Hypertension  Ileitis, terminal ([facility]  Other ill-defined conditions(799.89)   
 gout  Squamous cell lung cancer ([facility] Past Surgical History: 
Past Surgical History:  
Procedure Laterality Date [facility] Right mastectomy  HX APPENDECTOMY  HX HEENT    
 right glass eye  [place] COLONOSCOPY W/BIOPSY SINGLE/MULTIPLE  3/29/2013  [place] COLSC FLX W/RMVL OF TUMOR POLYP LESION SNARE TQ  3/29/2013 Family History: 
Family History Problem Relation Age of Onset  Colon Cancer Other  Hypertension Other Social History: 
Social History Tobacco Use  Smoking status: Former Smoker Types: Cigarettes Last attempt to quit: 1967 Years since quittin.7  Smokeless tobacco: Never Used Substance Use Topics  Alcohol use: No  
 Drug use: No  
 
 
Allergies: Allergies Allergen Reactions  Penicillins Hives Review of Systems Review of Systems Constitutional: Positive for activity change. Negative for chills and fever. HENT: Negative. Eyes: Negative for visual disturbance. Respiratory: Negative for cough and shortness of breath. Cardiovascular: Negative for chest pain and leg swelling. Gastrointestinal: Negative for abdominal pain, nausea and vomiting. Genitourinary: Negative. Musculoskeletal: Positive for arthralgias and gait problem. Negative for back pain. Skin: Negative for color change and rash. Neurological: Negative for dizziness, weakness, light-headedness and headaches. Hematological: Does not bruise/bleed easily. All other systems reviewed and are negative. Physical Exam  
Physical Exam  
Constitutional: She is oriented to person, place, and time. She appears well-developed and well-nourished. No distress. HENT:  
Head: Normocephalic and atraumatic. Eyes: Pupils are equal, round, and reactive to light. EOM are normal.  
Neck: Normal range of motion. Neck supple. Cardiovascular: Normal rate, regular rhythm and normal heart sounds. No murmur heard. Pulmonary/Chest: Effort normal. No respiratory distress. She has no wheezes. Abdominal: Soft. She exhibits no distension. There is no tenderness. There is no rebound. Musculoskeletal: Normal range of motion. She exhibits edema (3+ pitting edema bilaterally). Neurological: She is alert and oriented to person, place, and time. Skin: Skin is warm and dry. She is not diaphoretic. No erythema. Diagnostic Study Results Labs - Recent Results (from the past 12 hour(s)) CBC WITH AUTOMATED DIFF Collection Time: 09/30/19  4:52 PM  
Result Value Ref Range WBC 5.2 3.6 - 11.0 K/uL  
 RBC 4.82 3.80 - 5.20 M/uL  
 HGB 13.3 11.5 - 16.0 g/dL HCT 40.9 35.0 - 47.0 % MCV 84.9 80.0 - 99.0 FL  
 MCH 27.6 26.0 - 34.0 PG  
 MCHC 32.5 30.0 - 36.5 g/dL RDW 17.4 (H) 11.5 - 14.5 % PLATELET 720 802 - 946 K/uL MPV 10.6 8.9 - 12.9 FL  
 NRBC 0.0 0  WBC ABSOLUTE NRBC 0.00 0.00 - 0.01 K/uL NEUTROPHILS 73 32 - 75 % LYMPHOCYTES 12 12 - 49 % MONOCYTES 14 (H) 5 - 13 % EOSINOPHILS 0 0 - 7 % BASOPHILS 1 0 - 1 % IMMATURE GRANULOCYTES 0 0.0 - 0.5 % ABS. NEUTROPHILS 3.8 1.8 - 8.0 K/UL  
 ABS. LYMPHOCYTES 0.6 (L) 0.8 - 3.5 K/UL  
 ABS. MONOCYTES 0.7 0.0 - 1.0 K/UL  
 ABS. EOSINOPHILS 0.0 0.0 - 0.4 K/UL  
 ABS. BASOPHILS 0.1 0.0 - 0.1 K/UL  
 ABS. IMM. GRANS. 0.0 0.00 - 0.04 K/UL  
 DF AUTOMATED    
 RBC COMMENTS OVALOCYTES 1+ METABOLIC PANEL, COMPREHENSIVE Collection Time: 09/30/19  4:52 PM  
Result Value Ref Range Sodium 142 136 - 145 mmol/L Potassium 3.7 3.5 - 5.1 mmol/L Chloride 100 97 - 108 mmol/L  
 CO2 38 (H) 21 - 32 mmol/L Anion gap 4 (L) 5 - 15 mmol/L Glucose 103 (H) 65 - 100 mg/dL BUN 39 (H) 6 - 20 MG/DL Creatinine 2.25 (H) 0.55 - 1.02 MG/DL  
 BUN/Creatinine ratio 17 12 - 20 GFR est AA 24 (L) >60 ml/min/1.73m2 GFR est non-AA 20 (L) >60 ml/min/1.73m2 Calcium 10.3 (H) 8.5 - 10.1 MG/DL Bilirubin, total 0.9 0.2 - 1.0 MG/DL  
 ALT (SGPT) 18 12 - 78 U/L  
 AST (SGOT) 28 15 - 37 U/L Alk. phosphatase 129 (H) 45 - 117 U/L Protein, total 8.2 6.4 - 8.2 g/dL Albumin 3.1 (L) 3.5 - 5.0 g/dL Globulin 5.1 (H) 2.0 - 4.0 g/dL A-G Ratio 0.6 (L) 1.1 - 2.2 NT-PRO BNP Collection Time: 09/30/19  4:52 PM  
Result Value Ref Range NT pro-BNP 3,016 (H) <450 PG/ML  
EKG, 12 LEAD, INITIAL Collection Time: 09/30/19  8:07 PM  
Result Value Ref Range Ventricular Rate 84 BPM  
 Atrial Rate 61 BPM  
 QRS Duration 78 ms Q-T Interval 390 ms QTC Calculation (Bezet) 460 ms Calculated R Axis -5 degrees Calculated T Axis 23 degrees Diagnosis Atrial fibrillation with premature ventricular or aberrantly conducted  
complexes Low voltage QRS 
 Possible Anterolateral infarct (cited on or before 30-SEP-2019) When compared with ECG of 02-MAR-2019 16:20, 
Nonspecific T wave abnormality now evident in Inferior leads Nonspecific T wave abnormality now evident in Anterior leads URINALYSIS W/ REFLEX CULTURE Collection Time: 09/30/19  8:16 PM  
Result Value Ref Range Color YELLOW/STRAW Appearance CLEAR CLEAR Specific gravity 1.013 1.003 - 1.030    
 pH (UA) 7.0 5.0 - 8.0 Protein NEGATIVE  NEG mg/dL Glucose NEGATIVE  NEG mg/dL Ketone NEGATIVE  NEG mg/dL Bilirubin NEGATIVE  NEG Blood MODERATE (A) NEG Urobilinogen 1.0 0.2 - 1.0 EU/dL Nitrites NEGATIVE  NEG Leukocyte Esterase NEGATIVE  NEG    
 WBC 0-4 0 - 4 /hpf  
 RBC 5-10 0 - 5 /hpf Epithelial cells FEW FEW /lpf Bacteria NEGATIVE  NEG /hpf  
 UA:UC IF INDICATED CULTURE NOT INDICATED BY UA RESULT CNI Hyaline cast 0-2 0 - 5 /lpf  
TROPONIN I Collection Time: 09/30/19  9:34 PM  
Result Value Ref Range Troponin-I, Qt. <0.05 <0.05 ng/mL Radiologic Studies -  
XR CHEST PORT Final Result IMPRESSION:  
Stable left mid and lower lung field opacification with volume loss. Stable tiny  
right pleural effusion possible. Madhu Ace XR HIP LT W OR WO PELV 2-3 VWS Final Result IMPRESSION: No acute abnormality. CT CHEST WO CONT    (Results Pending) CT Results  (Last 48 hours) None CXR Results  (Last 48 hours) 09/30/19 2131  XR CHEST PORT Final result Impression:  IMPRESSION:  
Stable left mid and lower lung field opacification with volume loss. Stable tiny  
right pleural effusion possible. Madhu Manuel Narrative:  INDICATION:  CHF with volume overload EXAM: Chest single view. COMPARISON: 3/3/2019. FINDINGS: A single frontal view of the chest at 2118 hours shows stable volume  
loss in the left hemithorax with opacification of the mid and lower lung fields suggesting effusion and possible consolidation/collapse. The right lung shows  
mild stable interstitial prominence with no new focal abnormality. Possible tiny  
right pleural effusion is stable. The heart, mediastinum and pulmonary  
vasculature are stable . The bony thorax is unremarkable for age. .  
   
  
  
 
 
Medical Decision Making I am the first provider for this patient. I reviewed the vital signs, available nursing notes, past medical history, past surgical history, family history and social history. Vital Signs-Reviewed the patient's vital signs. Patient Vitals for the past 12 hrs: 
 Temp Pulse Resp BP SpO2  
09/30/19 2208  70  (!) 177/97   
09/30/19 2133  76 20  92 % 09/30/19 2131    140/62   
09/30/19 2045  (!) 26 25 138/79 92 % 09/30/19 2030  63 18 145/82 95 % 09/30/19 2017  (!) 0 25 147/79 91 % 09/30/19 1945    102/70   
09/30/19 1930    136/76 95 % 09/30/19 1915    134/82 95 % 09/30/19 1900    114/75 94 % 09/30/19 1856     96 % 09/30/19 1855    128/58   
09/30/19 1617 97.5 °F (36.4 °C) 82 17 147/87 100 % Records Reviewed: Nursing Notes, Old Medical Records, Previous electrocardiograms, Previous Radiology Studies and Previous Laboratory Studies Provider Notes (Medical Decision Making): This is a 8-year-old female here with ambulatory dysfunction likely secondary to acute kidney injury. On examination patient appears clinically well and nontoxic. She is afebrile and vital signs stable throughout entire ED stay. There is no increased work of breathing or hypoxia on my examination. She does appear volume overloaded however with 3+ peripheral pitting edema. Chest x-ray demonstrates opacification of the left lung which I will obtain CT for further evaluation. Given that she does appear volume overloaded I will administer 40 mg IV Lasix. She does have an RUMA with creatinine 2.25, baseline is 1-1. 3.  proBNP is elevated at 3000. Troponin negative. Urinalysis does not appear consistent with infection. I discussed with Dr. Fior Ramos, hospitalist, who will accept for admission. ED Course:  
Initial assessment performed. The patients presenting problems have been discussed, and they are in agreement with the care plan formulated and outlined with them. I have encouraged them to ask questions as they arise throughout their visit. Critical Care Time:  
0 Disposition: 
Admit to medicine Diagnosis Clinical Impression: 1. RUMA (acute kidney injury) ([facility] 2. Ambulatory dysfunction Attestations: 
 
Karli Anglin MD 
 
Please note that this dictation was completed with [employer], the computer voice recognition software. Quite often unanticipated grammatical, syntax, homophones, and other interpretive errors are inadvertently transcribed by the computer software. Please disregard these errors. Please excuse any errors that have escaped final proofreading. Thank you.

## 2019-10-01 NOTE — ED NOTES
Patient repositioned onto her right side to alleviate pressure on sacrum at this time. Hoop earrings removed per family request and given to daughter.

## 2019-10-01 NOTE — CONSULTS
[address] [place] [address], [address] [place] [address] Ne, [address] [facility]  [phone] [address] [facility] Date of  Admission: 9/30/2019  6:30 PM  
 
Admission type:Emergency Consult for: unclear volume status Consult by: hospitalist  
 
 Subjective:  
 
Aishwarya Marques is a 80 y.o. female with PMH a fib, HFpEF, CKD, HTN,  
GERD, lung CA who was admitted for Acute kidney injury ([address] [N17.9]. Per ED provider note Aishwarya Marques presented to the ED with c/o difficulty walking due to left hip pain. Cardiology consulted for unclear volume status in a HF patient. On assessment, Aishwarya Marques is sitting in chair in NAD. No family present at bedside. Ms. Kevin Singh endorses the left hip pain that brought her in. She also has chronic leg swelling, which she believes is slightly better than usual.  No SOB or chest pain. She has had some intermittent dizziness PTA. ED note mentions that the patient's daughter believed she was volume overloaded with increased weight gain (weight down 4# from last office visit). Aishwarya Marques  follows with Dr. Alistair Boyd for cardiology. Last ECHO 05/18 with EF 55-60%; JC; mod MR; severe TR; mod pHTN. Patient Active Problem List  
 Diagnosis Date Noted  Acute kidney injury ([address] 09/30/2019  CHF (congestive heart failure) ([address] 03/02/2019  Pleural effusion, left 07/17/2018  CHF, acute ([address] 07/17/2018  CKD (chronic kidney disease) stage 3, GFR 30-59 ml/min ([facility] 05/29/2018  Chronic a-fib 05/29/2018  Diastolic CHF, acute on chronic ([address] 05/29/2018  Pleural effusion 05/29/2018  Pneumonia 04/18/2017  Sepsis(995.91) 04/18/2017  Dehydration 04/18/2017  Acute CHF ([address] 01/23/2015  SOB (shortness of breath) 01/21/2015  Pedal edema 01/21/2015  Squamous cell lung cancer ([address] 01/21/2015  Adenocarcinoma ([address] 04/05/2013  S/P right colectomy 04/05/2013  Malignant essential hypertension 04/01/2013  Colon cancer ([facility] 2013  Ileitis, terminal ([facility] 2013  A-fib ([facility] 2013  
 HTN (hypertension) 2013 Britta Lee MD 
Past Medical History:  
Diagnosis Date  A-fib ([facility]  Adenocarcinoma ([facility]  Cancer ([place] [address] right breast CA, lung, colon  Chronic kidney disease  Gastrointestinal disorder GERD  Hypertension  Ileitis, terminal ([facility]  Other ill-defined conditions(799.89)   
 gout  Squamous cell lung cancer ([place] [address] Social History Socioeconomic History  Marital status:  Spouse name: Not on file  Number of children: Not on file  Years of education: Not on file  Highest education level: Not on file Tobacco Use  Smoking status: Former Smoker Types: Cigarettes Last attempt to quit: 1967 Years since quittin.7  Smokeless tobacco: Never Used Substance and Sexual Activity  Alcohol use: No  
 Drug use: No  
 Sexual activity: Not Currently Allergies Allergen Reactions  Penicillins Hives Family History Problem Relation Age of Onset  Colon Cancer Other  Hypertension Other Current Facility-Administered Medications Medication Dose Route Frequency  sodium chloride (NS) flush 5-40 mL  5-40 mL IntraVENous Q8H  
 sodium chloride (NS) flush 5-40 mL  5-40 mL IntraVENous PRN  
 acetaminophen (TYLENOL) tablet 650 mg  650 mg Oral Q6H PRN  
 naloxone (NARCAN) injection 0.4 mg  0.4 mg IntraVENous PRN  
 ondansetron (ZOFRAN) injection 4 mg  4 mg IntraVENous Q4H PRN  
 bisacodyl (DULCOLAX) suppository 10 mg  10 mg Rectal DAILY PRN  
 apixaban (ELIQUIS) tablet 2.5 mg  2.5 mg Oral BID  hydrALAZINE (APRESOLINE) tablet 10 mg  10 mg Oral TID  hydrALAZINE (APRESOLINE) 20 mg/mL injection 10 mg  10 mg IntraVENous Q6H PRN  
 influenza vaccine 2019- (6 mos+)(PF) (FLUARIX/FLULAVAL/FLUZONE QUAD) injection 0.5 mL  0.5 mL IntraMUSCular PRIOR TO DISCHARGE  
  senna-docusate (PERICOLACE) 8.6-50 mg per tablet 1 Tab  1 Tab Oral BID PRN  
 traMADol (ULTRAM) tablet 50 mg  50 mg Oral Q12H PRN Review of Symptoms:  
11 systems reviewed, negative other than as stated in the HPI Objective:  
  
Visit Vitals /76 (BP 1 Location: Left arm, BP Patient Position: Sitting) Pulse 84 Temp 97.3 °F (36.3 °C) Resp 20 Ht 5' 2\" (1.575 m) Wt 76.4 kg (168 lb 6.4 oz) SpO2 96% BMI 30.80 kg/m² Physical:  
General: pleasant, elderly AAF sitting in chair in NAD. Appears younger than stated age. Heart: irregularRR, no m/S3/JVD, split s2 Lungs: clear R. Dim L Abdomen: Soft, +BS, NTND Extremities: LE espinoza +DP/PT, 2+ edema BLE Neurologic: Grossly normal 
 
Data Review:  
Recent Labs 10/01/19 
0151 09/30/19 
1652 WBC 5.1 5.2 HGB 13.8 13.3 HCT 43.8 40.9  244 Recent Labs 10/01/19 
0151 09/30/19 
1652  142  
K 3.5 3.7  100 CO2 33* 38* * 103* BUN 38* 39* CREA 1.75* 2.25* CA 10.0 10.3* ALB 3.3* 3.1* TBILI 0.6 0.9 SGOT 27 28 ALT 18 18 Recent Labs  
  09/30/19 
2134 TROIQ <0.05 Intake/Output Summary (Last 24 hours) at 10/1/2019 1127 Last data filed at 10/1/2019 4268 Gross per 24 hour Intake 500 ml Output 1300 ml Net -800 ml Cardiographics Telemetry: a fib 70s ECG: a fib Echocardiogram: last as above CXRAY: \"Stable left mid and lower lung field opacification with volume loss. Stable tiny right pleural effusion possible. .\" 
CT chest: There is stable left lung volume loss. Left pleural effusion is 
unchanged. There is stable consolidation within the left midlung. Centrilobular emphysematous changes are noted within the right upper lung. There is a small focus of patchy airspace disease within the right upper lobe, new compared to prior CT dated July 2018. There is a very small right pleural effusion, new compared to prior CT dated July 2018. \". \" 
  
 
 
 
 Assessment:  
  
 Active Problems: 
  Acute kidney injury ([facility] (9/30/2019) Plan:  
 
Chloé Haley is a 80 y.o. female who presented to the ED with left hip pain and difficulty ambulating. Cardiology consulted for unclear volume status in this patient with chronic HFpEF. RUMA with creatinine elevated above baseline at 2.25 (down to 1.75 today); Troponin negative. proBNP 3016. Patient states leg swelling is better than usual, while ED note mentioned her daughter felt differently. Patients weight is 4# less from her last office visit with us 03/19. · Agree that it is difficult to determine whether patient is slightly dry or slightly wet. Creatinine improved overnight, but patient received a 1 liter fluid bolus prior to receiving 40mg IV lasix. CXR without significant changes, but proBNp slightly elevated over most recent levels. Orthostatics negative this morning. Does not appear they were completed last night. · Will restart home lasix. Takes metolazone for PRN weight gain at home and appears was transitioned to every MWF back in June. Hold off on adding metolazone back at this time. · Agree with stopping amlodipine to see if it helps with chronic leg swelling. Should try compression stockings as well. · eliquis for chronic a fib. Not on rate control agents. Thank you for consulting [facility] Oneal Kendrick, BRITTANY 
DNP, RN, [facility] Patient seen and examined by me with the above nurse practitioner. I personally performed all components of the history, physical, and medical decision making and agree with the assessment and plan with minor modifications as noted. Ms. Donovan Narvaez is a 8-year-old with primary complaint of hip pain and difficulty ambulating, with 1-2+ pitting edema, clear lungs and elevated creatinine. Continue home diuretic dose, place compression stockings, reassess volume status in the morning.    Roman Agustin will follow with you starting tomorrow.

## 2019-10-01 NOTE — H&P
Hospitalist Admission Note NAME:  Kwesi Mederos :   1918 MRN:   [id number] Date of admit: 2019 PCP: Jose Armstrong MD 
 
Assessment/Plan:  
 
Acute on chronic diastolic CHF POA Last LVEF 55 to 60% Acute kidney injury POA admit BUN/creatinine 39 and 2.25 Stage III chronic kidney disease baseline creatinine 1.2-1.3 Lower extremity edema bilaterally POA Came in inability to ambulate, left hip pain Significantly edematous but now with acute kidney injury Takes Lasix daily and occasional metolazone Key question is volume status 
       overloaded versus hypovolemic with chronic lower extremity edema ? Elevated creatinine is cardiorenal vs hypovolemic 
proBNP mildly increased at 3016 Chest x-ray with no edema, minimal respiratory symptoms Received IV Lasix in the emergency room, will hold further Recheck creatinine in the morning Check orthostatics PT and OT consults Ask cardiology to see to assist in management of her volume status Stop the Norvasc with the lower extremity edema Check renal US My sense is that the LE edema is due to venous causes, norvasc and she  
 IS VOLUME DEPLETED WITH THE LASIX Left hip pain POA x4 days Inability to ambulate x4 days Left hip currently nontender, able to move hip in bed without pain when I saw her Left hip x-rays were negative for fracture Saw PCP several days ago, steroid injection in the left hip We will ambulate with PT, recurrent pain, will obtain CT scan pelvis and consult Ortho No trauma to the area Chronic atrial fibrillation on Eliquis Continue Eliquis for now History of left lung cancer status post XRT History of right breast cancer History of colon cancer Chest x-ray with significant volume loss on left appears stable from prior exams CT scan ordered by emergency room is essentially unchanged left effusion and volume loss There is stable airspace disease in the lung on the left New area of airspace disease on the right, I think this is nonspecific We will hold antibiotics Check procalcitonin, if normal we will not treat with antibiotics further COPD Not currently on inhalers, not wheezing Essential hypertension POA Stop Norvasc but the lower extremity edema Low-dose hydralazine Overweight  POA Body mass index is 29.45 kg/m². Given the patient's current clinical presentation, I have a high level of concern for decompensation if discharged from the emergency department. My assessment of this patient's clinical condition and my plan of care is as noted above. DVT prophylaxis with eliquis Code status: DNR/DNI 
NOK: Shruthi Hicks History CHIEF COMPLAINT: Brought in by children with not being able to walk for 4 days due to left hip pain and lower extremity swelling HISTORY OF PRESENT ILLNESS: 
 
8-year-old -American female Lives with her daughter at baseline They have caregivers to come in to help daughter DNR/DNI Baseline ambulates independently Prior used to wear home oxygen, no longer uses her home nebulizer Normally stays in a recliner at home Known diastolic congestive heart failure Takes Lasix 80 mg in the morning 40 mg in the evening Uses metolazone as needed for weight gain 4 days ago began to have trouble ambulating Would not get up and walk on her own, a change from baseline Was complaining of left hip pain moderate to severe, worse with movement No trauma or falls to the area Saw PCP on Friday Left Hip was injected with a steroid, no improvement initially Some shortness of breath but overall breathing is been stable Significant lower extremity edema per the family Because of the inability to walk they brought her to the emergency room as the main complaint Emergency room  
chest x-ray with volume loss on the left lung, left effusion, this is stable ED did CT scan which was essentially unchanged except for some very mild patchy airspace disease in the right lung 
proBNP was elevated 3016 3+ lower extremity edema BUN/creatinine were elevated at 39 and 2.25, prior baseline creatinine 1.2-1.3 Received a dose of IV Lasix in the emergency room for concern for CHF Left hip x-ray showed no evidence of fracture We will called to admit the patient Past Medical History:  
Diagnosis Date  A-fib ([facility]  Adenocarcinoma ([facility]  Cancer ([facility] right breast CA, lung, colon  Chronic kidney disease  Gastrointestinal disorder GERD  Hypertension  Ileitis, terminal ([facility]  Other ill-defined conditions(799.89)   
 gout  Squamous cell lung cancer ([facility] Past Surgical History:  
Procedure Laterality Date [facility] Right mastectomy  HX APPENDECTOMY  HX HEENT    
 right glass eye  GA COLONOSCOPY W/BIOPSY SINGLE/MULTIPLE  3/29/2013  GA COLSC FLX W/RMVL OF TUMOR POLYP LESION SNARE TQ  3/29/2013 Social History Tobacco Use  Smoking status: Former Smoker Types: Cigarettes Last attempt to quit: 1967 Years since quittin.7  Smokeless tobacco: Never Used Substance Use Topics  Alcohol use: No  
  
 
Family History Problem Relation Age of Onset  Colon Cancer Other  Hypertension Other Allergies Allergen Reactions  Penicillins Hives Prior to Admission medications Medication Sig Start Date End Date Taking? Authorizing Provider  
furosemide (LASIX) 40 mg tablet Take 2 tab 80mg in AM and 1 tab 40mg in PM (Corrected Rx) 18  Yes Jabier Munguia NP  
albuterol-ipratropium (DUO-NEB) 2.5 mg-0.5 mg/3 ml nebu Take 3 mL by inhalation every four (4) hours as needed (shortness of breath). Yes Provider, Historical  
apixaban (ELIQUIS) 2.5 mg tablet Take 1 Tab by mouth two (2) times a day.  18  Yes Florentino Agustin NP  
 mv-mn/folic acid/calcium/vit K (ONE-A-DAY WOMEN'S 50 PLUS PO) Take 1 Tab by mouth daily. Yes Marky Dejesus MD  
potassium chloride (KLOR-CON) 20 mEq packet Take 20 mEq by mouth daily. Yes Marky Dejesus MD  
amLODIPine (NORVASC) 2.5 mg tablet Take 2.5 mg by mouth daily. Yes Marky Dejesus MD  
metOLazone (ZAROXOLYN) 2.5 mg tablet Take 1 Tab by mouth daily. Indications: take per cardiology 4/15/19   Anika Brown NP  
OXYGEN-AIR DELIVERY SYSTEMS Take 2 L by inhalation as needed. 7/30/18   Provider, Paulo  
albuterol (PROVENTIL HFA) 90 mcg/actuation inhaler Take 2 Puffs by inhalation every four (4) hours as needed for Wheezing or Shortness of Breath. 7/1/18   Primo Gamboa MD  
dextran 70/hypromellose/PF (NATURAL TEARS, PF, OP) Administer 1 Drop to both eyes daily as needed (dry eyes). Marky Dejesus MD  
mometasone (NASONEX) 50 mcg/actuation nasal spray 2 Sprays by Both Nostrils route daily as needed (congestion). Marky Dejesus MD  
colchicine (COLCRYS) 0.6 mg tablet Take 0.6 mg by mouth daily. Marky Dejesus MD  
acetaminophen (TYLENOL EXTRA STRENGTH) 500 mg tablet Take 1,000 mg by mouth every six (6) hours as needed for Pain. Marky Dejesus MD  
 
 
Review of symptoms: 
   POSITIVE= Bold  Negative = not bold General:  fever, chills, sweats, generalized weakness Eyes:    blurred vision, eye pain, double vision ENT:    Coryza, sore throat, trouble swallowing Respiratory:   cough, sputum, SOB Cardiology:   chest pain, orthopnea, PND, edema Gastrointestinal:  abdominal pain , N/V, diarrhea, constipation, melena or BRBPR Genitourinary:  Urgency, dysuria, hematuria Muskuloskeletal :  Joint redness, left hip pain, myalgias Hematology:  easy bruising, nose or gum bleeding Dermatological: rash, ulceration Endocrine:   Polyuria or polydipsia, heat or hold intolerance Neurological:  Headache, focal motor or sensory changes Speech difficulties, memory loss Psychological: depression, agitation Objective: VITALS:   
Patient Vitals for the past 24 hrs: 
 Temp Pulse Resp BP SpO2  
19 2208  70  (!) 177/97   
19 2133  76 20  92 % 19 2131    140/62   
19  (!) 26 25 138/79 92 % 19 2030  63 18 145/82 95 % 19  (!) 0 25 147/79 91 % 19 1945    102/70   
19 1930    136/76 95 % 19 1915    134/82 95 % 19 1900    114/75 94 % 19 1856     96 % 19 1855    128/58   
19 1617 97.5 °F (36.4 °C) 82 17 147/87 100 % Temp (24hrs), Av.5 °F (36.4 °C), Min:97.5 °F (36.4 °C), Max:97.5 °F (36.4 °C) O2 Device: Room air Wt Readings from Last 12 Encounters:  
19 73 kg (161 lb) 19 73 kg (161 lb) 19 74.6 kg (164 lb 6.4 oz) 19 73.5 kg (162 lb)  
03/15/19 78.4 kg (172 lb 14.4 oz) 19 74.3 kg (163 lb 12.8 oz) 10/19/18 79.9 kg (176 lb 1.6 oz) 18 80.3 kg (177 lb) 18 79.8 kg (176 lb)  
18 80.3 kg (177 lb)  
18 79.4 kg (175 lb)  
18 80.3 kg (177 lb) PHYSICAL EXAM:  
General:    Alert, cooperative in no distress HEENT: Normocephalic, atraumatic    PERRL, Sclera no icterus Nasal mucosa without masses or discharge  Hearing intact to voice Oropharynx without erythema or exudate   Pink MM Neck:  No meningismus, trachea midline, no carotid bruits Thyroid not enlarged, no nodules or tenderness Lungs:   Clear to auscultation bilaterally. No wheezing or rales No accessory muscle use or retractions. Heart:   Regular rate and rhythm,  no murmur or gallop. 3+ LE edema Abdomen:   Soft, non-tender. Not distended. Bowel sounds normal.  
  No masses, No Hepatosplenomegaly, No Rebound or guarding Lymph nodes: No cervical or inguinal MARTA Musculoskeletal:  No Joint swelling, erythema, warmth. No Cyanosis or clubbing No pain on motion left hip Skin:      No rashes Not Jaundiced   No nodules or thickening Neurologic: Alert, follows commands Cranial nerves 2 to 12 intact Symmetric motor strength bilaterally LAB DATA REVIEWED:   
Recent Results (from the past 12 hour(s)) CBC WITH AUTOMATED DIFF Collection Time: 09/30/19  4:52 PM  
Result Value Ref Range WBC 5.2 3.6 - 11.0 K/uL  
 RBC 4.82 3.80 - 5.20 M/uL  
 HGB 13.3 11.5 - 16.0 g/dL HCT 40.9 35.0 - 47.0 % MCV 84.9 80.0 - 99.0 FL  
 MCH 27.6 26.0 - 34.0 PG  
 MCHC 32.5 30.0 - 36.5 g/dL  
 RDW 17.4 (H) 11.5 - 14.5 % PLATELET 984 111 - 100 K/uL MPV 10.6 8.9 - 12.9 FL  
 NRBC 0.0 0  WBC ABSOLUTE NRBC 0.00 0.00 - 0.01 K/uL NEUTROPHILS 73 32 - 75 % LYMPHOCYTES 12 12 - 49 % MONOCYTES 14 (H) 5 - 13 % EOSINOPHILS 0 0 - 7 % BASOPHILS 1 0 - 1 % IMMATURE GRANULOCYTES 0 0.0 - 0.5 % ABS. NEUTROPHILS 3.8 1.8 - 8.0 K/UL  
 ABS. LYMPHOCYTES 0.6 (L) 0.8 - 3.5 K/UL  
 ABS. MONOCYTES 0.7 0.0 - 1.0 K/UL  
 ABS. EOSINOPHILS 0.0 0.0 - 0.4 K/UL  
 ABS. BASOPHILS 0.1 0.0 - 0.1 K/UL  
 ABS. IMM. GRANS. 0.0 0.00 - 0.04 K/UL  
 DF AUTOMATED    
 RBC COMMENTS OVALOCYTES 1+ METABOLIC PANEL, COMPREHENSIVE Collection Time: 09/30/19  4:52 PM  
Result Value Ref Range Sodium 142 136 - 145 mmol/L Potassium 3.7 3.5 - 5.1 mmol/L Chloride 100 97 - 108 mmol/L  
 CO2 38 (H) 21 - 32 mmol/L Anion gap 4 (L) 5 - 15 mmol/L Glucose 103 (H) 65 - 100 mg/dL BUN 39 (H) 6 - 20 MG/DL Creatinine 2.25 (H) 0.55 - 1.02 MG/DL  
 BUN/Creatinine ratio 17 12 - 20 GFR est AA 24 (L) >60 ml/min/1.73m2 GFR est non-AA 20 (L) >60 ml/min/1.73m2 Calcium 10.3 (H) 8.5 - 10.1 MG/DL Bilirubin, total 0.9 0.2 - 1.0 MG/DL  
 ALT (SGPT) 18 12 - 78 U/L  
 AST (SGOT) 28 15 - 37 U/L Alk. phosphatase 129 (H) 45 - 117 U/L Protein, total 8.2 6.4 - 8.2 g/dL Albumin 3.1 (L) 3.5 - 5.0 g/dL Globulin 5.1 (H) 2.0 - 4.0 g/dL A-G Ratio 0.6 (L) 1.1 - 2.2 NT-PRO BNP  
 Collection Time: 09/30/19  4:52 PM  
Result Value Ref Range NT pro-BNP 3,016 (H) <450 PG/ML  
EKG, 12 LEAD, INITIAL Collection Time: 09/30/19  8:07 PM  
Result Value Ref Range Ventricular Rate 84 BPM  
 Atrial Rate 61 BPM  
 QRS Duration 78 ms Q-T Interval 390 ms QTC Calculation (Bezet) 460 ms Calculated R Axis -5 degrees Calculated T Axis 23 degrees Diagnosis Atrial fibrillation with premature ventricular or aberrantly conducted  
complexes Low voltage QRS Possible Anterolateral infarct (cited on or before 30-SEP-2019) When compared with ECG of 02-MAR-2019 16:20, 
Nonspecific T wave abnormality now evident in Inferior leads Nonspecific T wave abnormality now evident in Anterior leads URINALYSIS W/ REFLEX CULTURE Collection Time: 09/30/19  8:16 PM  
Result Value Ref Range Color YELLOW/STRAW Appearance CLEAR CLEAR Specific gravity 1.013 1.003 - 1.030    
 pH (UA) 7.0 5.0 - 8.0 Protein NEGATIVE  NEG mg/dL Glucose NEGATIVE  NEG mg/dL Ketone NEGATIVE  NEG mg/dL Bilirubin NEGATIVE  NEG Blood MODERATE (A) NEG Urobilinogen 1.0 0.2 - 1.0 EU/dL Nitrites NEGATIVE  NEG Leukocyte Esterase NEGATIVE  NEG    
 WBC 0-4 0 - 4 /hpf  
 RBC 5-10 0 - 5 /hpf Epithelial cells FEW FEW /lpf Bacteria NEGATIVE  NEG /hpf  
 UA:UC IF INDICATED CULTURE NOT INDICATED BY UA RESULT CNI Hyaline cast 0-2 0 - 5 /lpf  
TROPONIN I Collection Time: 09/30/19  9:34 PM  
Result Value Ref Range Troponin-I, Qt. <0.05 <0.05 ng/mL EKG as read by me shows atrial fib rate 84, LAD, no LVH Non specific ST-T changes CXR read by radiology and reviewed by myself results: A single frontal view of the chest at 2118 hours shows stable volume 
loss in the left hemithorax with opacification of the mid and lower lung fields 
suggesting effusion and possible consolidation/collapse. The right lung shows mild stable interstitial prominence with no new focal abnormality. Possible tiny 
right pleural effusion is stable. The heart, mediastinum and pulmonary 
vasculature are stable . The bony thorax is unremarkable for age. Blas Eleno IMPRESSION: 
Stable left mid and lower lung field opacification with volume loss. Stable tiny 
right pleural effusion possible. Blas Eleno Left hip X-rays read by radiology FINDINGS: 
An AP view of the pelvis and a frogleg lateral view of the left hip 
demonstrate no fracture, dislocation or other acute abnormality. IMPRESSION: No acute abnormality. CT chest scan read by radiology results: 
Chest:  
Lymph nodes: There is no axillary, mediastinal or hilar lymphadenopathy. Heart: The heart is of normal size and there is no pericardial effusion. Lungs/Pleura: There is stable left lung volume loss. Left pleural effusion is 
unchanged. There is stable consolidation within the left midlung. Centrilobular 
emphysematous changes are noted within the right upper lung. There is a small 
focus of patchy airspace disease within the right upper lobe, new compared to 
prior CT dated July 2018. There is a very small right pleural effusion, new 
compared to prior CT dated July 2018. Bones: Visualized osseous structures are intact. Upper abdomen: The visualized abdominal structures are normal 
IMPRESSION: Very small right pleural effusion, new. Small focus of patchy 
airspace disease within the right upper lobe, new. I saw the patient personally, took a history and did a complete physical exam at the bedside. I performed complex decision making in coming up with a diagnostic and treatment plan for the patient. I reviewed the patient's past medical records, current laboratory and radiology results, and actual Xray films/EKG. I have also discussed this case with the involved ED physician. Care Plan discussed with: 
  Patient, Family, ED Doc Risk of deterioration:  High Yoandy Maxwell MD

## 2019-10-01 NOTE — ED NOTES
Placed pt on purewick. Was able to straight cath pt for UA. Placed mepilex on pt's sacrum area for stage 2 ulcer noted.

## 2019-10-01 NOTE — PROGRESS NOTES
Transitional Care Team: Initial HUG Note Date of Assessment: 10/01/19 Time of Assessment:  12:37 PM 
 
Tashia Phoenix is a 80 y.o. female inpatient at  [facility]. Assessment & Plan Pt is alert and oriented x 3. (Difficulties remembering house number, phone number and current pharmacy)  She resides in her own private residence with her daughter and has personal caregiver that come into the home to help with food preparation, bathing and transportation to medical appointments. Pt states that she understands her current medical conditions, however, when prompted to speak more about CHF diagnosis and trajectory of her illness, she was unable to provide that information. Pt to likely discharge back to her private residence with daughter and private caregiver with [place] rehab services. Primary Diagnosis: Acute Kidney Injury Advance Directive:  Reviewed and Current. See ACP note from 3/5/2019 Current Code Status:  DDNR Referral to Hospice/ Palliative Care Appropriate: Not at this time. Awareness of Medical Conditions: (Trajectory of illness and pts expectations). Pt states that she is aware of her medical condition, but after probing to see if she understands the trajectory of her illness, she was not able to do so. Discharge Needs: (to include safety issues) Possible need for [place] rehab service at discharge. Barriers Identified: None at this time Patient is willing to go to SNF/Inpatient Rehab if recommended: Not currently recommended Medication Review:  Awaiting medication list on AVS. 
 
Can patient afford medications:  Yes, has medicare and medicaid to assist with medication costs. Patient is Compliant with Medication regimen: Yes Who manages medications at home: Patient's personal care giver and daughter assists with managing medications Best Patient Contact Number:    [phone] HUG (Healthy Understanding of Goals) program introduced to patient/family. The Transitional Care Team bridges the gaps in care and education surrounding discharge from the acute care facility. The objective is to empower the patient and family in taking a proactive role in preventing readmission within the first thirty days after discharge. The team is also involved in the efforts to reduce readmission to the acute care setting after stabilization and discharge from the acute care environment either to skilled nursing facilities or community. G RN/NP will return with [facility] Calendar/ follow up appointments/ Ambulatory Nurse Navigator name and contact information when the patient is ready for discharge. PCP- Irene Mendez Cardiology- Bridger Ho [facility] follow up appointment(s): N/A [facility]: call information not given as patient is outside of the service area Patient education focused on readmission zones as described as: The Red Zone: High risk for readmission, days 1-21 The Yellow Zone: Moderate  risk for readmission, days 22-29 The Green Zone: Lower risk for readmission, days 30 and after The [facility] Team will attempt to follow the patient from a distance while inpatient as well as be available for further transition/disposition needs. The [facility] team will continue to offer support during the 30- 90 day discharge from acute care setting. Will notify AmbulatoryTOC   RN, Rhoda Alcala. Past Medical History:  
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   
 gout  Squamous cell lung cancer ([address] Ken Burr Care Manager - [facility] [employer] Facilitator [phone]

## 2019-10-01 NOTE — PROGRESS NOTES
TRANSFER - IN REPORT: 
 
Verbal report received from Zuly Castillo, [facility] (name) on Kwesi Mederos  being received from ED(unit) for routine progression of care Report consisted of patients Situation, Background, Assessment and  
Recommendations(SBAR). Information from the following report(s) SBAR, Kardex, ED Summary, [facility] and Cardiac Rhythm A fib was reviewed with the receiving nurse. Opportunity for questions and clarification was provided. Assessment completed upon patients arrival to unit and care assumed.

## 2019-10-01 NOTE — PROGRESS NOTES
Report received from [facility]. Introduced myself as primary RN. Assumed care of patient. Instructed patient to call for assistance prior to getting up. Pt verbalized understanding. Call bell within reach. 9:35 AM Patient off fl testing. (SHOSHANA Perez) Attending paged re: patient complaints of constipation and need for stronger pain medication for left hip other than Tylenol. MD to place orders. 10:20 AM Orders received for cardiac monitoring given patient with history of chronic AFIB. 10:26 AM Patient back on fl.  
 
5:50 PM Flu Vaccine education provided. Vaccine administered. Vaccine information sheet given to patient. Bedside and Verbal shift change report given to Domenica Ayoub (oncoming nurse) by Alisson Marvin (offgoing nurse). Report included the following information SBAR, Kardex, MAR, Recent Results and Cardiac Rhythm AFIB.

## 2019-10-01 NOTE — PROGRESS NOTES
Hospitalist Progress Note NAME: Fior Logan :  1918 MRN:  [id number] Assessment / Plan: 
Acute on chronic diastolic CHF POA 
-last EF is 65 %  
-restarted lasix as cr is better. Holding metolazone prn for now 
-stop Norvasc due to edema 
-not on BB at home 
-daily weights and I/O 
-Cards following 
-check orthostatics. Consult pt/ot. Acute kidney injury POA admit BUN/creatinine 39 and 2.25 Stage III chronic kidney disease baseline creatinine 1.2-1.3 
-base line is around 1.3 and cr peaked at 2.25 
-cr is trending down to 1.75 
-check bmp in am  
  
Left hip pain POA x4 days Inability to ambulate x4 days Left hip x-rays were negative for fracture Saw PCP several days ago, steroid injection in the left hip Consulted pt If persistent pain, consider ortho eval 
  
Chronic atrial fibrillation on Eliquis Continue Eliquis for now 
  History of left lung cancer status post XRT History of right breast cancer History of colon cancer Chest x-ray with significant volume loss on left appears stable from prior exams CT scan ordered by emergency room is essentially unchanged left effusion and volume loss There is stable airspace disease in the lung on the left New area of airspace disease on the right, I think this is nonspecific We will hold antibiotics Pro calcitonin is normal 
  
COPD Not currently on inhalers, not wheezing 
  
Essential hypertension POA Stop Norvasc due to lower extremity edema Cont added hydralazine Constipation 
-add yokasta colace prn 
 
  
Overweight  POA Body mass index is 29.45 kg/m². 
  
 
 
Body mass index is 30.8 kg/m². Code status: DNR Prophylaxis: Eliquis Recommended Disposition: Home w/Family Subjective: Chief Complaint / Reason for Physician Visit Sob is better. No chest pain. Mild cough. No fever. Review of Systems: 
Symptom Y/N Comments  Symptom Y/N Comments Fever/Chills    Chest Pain Poor Appetite    Edema Cough    Abdominal Pain Sputum    Joint Pain SOB/WOOTEN    Pruritis/Rash Nausea/vomit    Tolerating PT/OT Diarrhea    Tolerating Diet Constipation    Other Could NOT obtain due to:   
 
Objective: VITALS:  
Last 24hrs VS reviewed since prior progress note. Most recent are: 
Patient Vitals for the past 24 hrs: 
 Temp Pulse Resp BP SpO2  
10/01/19 1043 97.3 °F (36.3 °C) 84 20 126/76 96 % 10/01/19 0744 97.5 °F (36.4 °C) 71 18 133/76 94 % 10/01/19 0143 96.8 °F (36 °C) 96 20 128/77 94 % 10/01/19 0100  73 20 148/65 94 % 10/01/19 0030  75 17 149/76 93 % 09/30/19 2230  73 26 133/90 94 % 09/30/19 2215  74 22 138/78 91 % 09/30/19 2208  70  (!) 177/97   
09/30/19 2145  74 23 147/79 91 % 09/30/19 2133  76 20  92 % 09/30/19 2131    140/62   
09/30/19 2045  (!) 26 25 138/79 92 % 09/30/19 2030  63 18 145/82 95 % 09/30/19 2017  (!) 0 25 147/79 91 % 09/30/19 1945    102/70   
09/30/19 1930    136/76 95 % 09/30/19 1915    134/82 95 % 09/30/19 1900    114/75 94 % 09/30/19 1856     96 % 09/30/19 1855    128/58  Intake/Output Summary (Last 24 hours) at 10/1/2019 1630 Last data filed at 10/1/2019 3785 Gross per 24 hour Intake 500 ml Output 1300 ml Net -800 ml PHYSICAL EXAM: 
General: Alert, cooperative, no acute distress   
EENT:  EOMI. Anicteric sclerae. MMM Resp:  Good air entry, crackles +, no wheeze CV:  Regular  rhythm,  No edema GI:  Soft, Non distended, Non tender.  +Bowel sounds Neurologic:  Alert and awake, normal speech, Psych:   Not anxious nor agitated Skin:  No rashes. No jaundice Reviewed most current lab test results and cultures  YES Reviewed most current radiology test results   YES Review and summation of old records today    NO Reviewed patient's current orders and MAR    YES 
PMH/SH reviewed - no change compared to H&P Current Facility-Administered Medications:   sodium chloride (NS) flush 5-40 mL, 5-40 mL, IntraVENous, Q8H, Feroz Sandoval MD, 10 mL at 10/01/19 1352   sodium chloride (NS) flush 5-40 mL, 5-40 mL, IntraVENous, PRN, Feroz Sandoval MD 
  acetaminophen (TYLENOL) tablet 650 mg, 650 mg, Oral, Q6H PRN, Feroz Sandoval MD 
  [facility]) injection 0.4 mg, 0.4 mg, IntraVENous, PRN, Feroz Sandoval MD 
  ondansetron [facility]) injection 4 mg, 4 mg, IntraVENous, Q4H PRN, Feroz Sandoval MD 
  bisacodyl (DULCOLAX) suppository 10 mg, 10 mg, Rectal, DAILY PRN, Feroz Sandoval MD 
  apixaban Debbra ) tablet 2.5 mg, 2.5 mg, Oral, BID, Feroz Sandoval MD, 2.5 mg at 10/01/19 5536   hydrALAZINE (APRESOLINE) tablet 10 mg, 10 mg, Oral, TID, Feroz Sandoval MD, 10 mg at 10/01/19 5887   hydrALAZINE (APRESOLINE) 20 mg/mL injection 10 mg, 10 mg, IntraVENous, Q6H PRN, Feroz Sandoval MD 
  influenza vaccine 2019-20 (6 mos+)(PF) (FLUARIX/FLULAVAL/FLUZONE QUAD) injection 0.5 mL, 0.5 mL, IntraMUSCular, PRIOR TO DISCHARGE, Malissa Moser MD 
  senna-docusate (PERICOLACE) 8.6-50 mg per tablet 1 Tab, 1 Tab, Oral, BID PRN, Ac Youngblood MD, 1 Tab at 10/01/19 1200 
  traMADol (ULTRAM) tablet 50 mg, 50 mg, Oral, Q12H PRN, cA Youngblood MD, 50 mg at 10/01/19 1159   furosemide (LASIX) tablet 80 mg, 80 mg, Oral, DAILY, Ansley [place] NP, 80 mg at 10/01/19 1350   furosemide (LASIX) tablet 40 mg, 40 mg, Oral, QPM, Ansley South NP 
________________________________________________________________________ Care Plan discussed with: 
  Comments Patient y Family RN y   
Care Manager Consultant Multidiciplinary team rounds were held today with , nursing, pharmacist and clinical coordinator. Patient's plan of care was discussed; medications were reviewed and discharge planning was addressed. ________________________________________________________________________ Total NON critical care TIME:  35    Minutes Total CRITICAL CARE TIME Spent:   Minutes non procedure based Comments >50% of visit spent in counseling and coordination of care    
________________________________________________________________________ José Manuel Saenz MD  
 
Procedures: see electronic medical records for all procedures/Xrays and details which were not copied into this note but were reviewed prior to creation of Plan. LABS: 
I reviewed today's most current labs and imaging studies. Pertinent labs include: 
Recent Labs 10/01/19 
0151 09/30/19 
1652 WBC 5.1 5.2 HGB 13.8 13.3 HCT 43.8 40.9  244 Recent Labs 10/01/19 
0151 09/30/19 
1652  142  
K 3.5 3.7  100 CO2 33* 38* * 103* BUN 38* 39* CREA 1.75* 2.25* CA 10.0 10.3* ALB 3.3* 3.1* TBILI 0.6 0.9 SGOT 27 28 ALT 18 18 Signed: José Manuel Saenz MD

## 2019-10-01 NOTE — WOUND CARE
Wound care nurse unable to assess patients gluteal cleft per nurse consult today. Patient up in chair with x5 family members in room at this time. Will assess tomorrow.  
 
Rufina Montalvo RN, [employer]

## 2019-10-01 NOTE — PROGRESS NOTES
Orders received, chart reviewed. Noted start time for PT order to be 10/2/19. Will hold PT evaluation until that time. Thank you Gilbert Pineda, PT, DPT

## 2019-10-01 NOTE — CARDIO/PULMONARY
Cardiac Rehab Note: chart review CHF BUNDLE Patient not seen at this time, cardiac consult ordered. CP Rehab will see as indicated.

## 2019-10-01 NOTE — PROGRESS NOTES
Bedside shift change report GIVEN TO Josh Hinds RN. Report included the following information SBAR. SIGNIFICANT CHANGES DURING SHIFT:   
 
 
 
CONCERNS TO ADDRESS WITH MD:   
Pt and family requesting stronger pain medication. Would also like something for pt to have a bowel movement. Would prefer not to have a suppository. Pt uses milk of magnesium at home. [address] NURSING NOTE Admission Date 9/30/2019 Admission Diagnosis Acute kidney injury ([facility] [address] [N17.9] Consults IP CONSULT TO CARDIOLOGY Cardiac Monitoring [] Yes [x] No  
  
Purposeful Hourly Rounding [x] Yes   
Shane Score Total Score: 4 Shane score 3 or > [x] Bed Alarm [] Avasys [] 1:1 sitter [] Patient refused (Signed refusal form in chart) Michael Score Michael Score: 16 Michael score 14 or < [x] PMT consult [x] Wound Care consult  
 []  Specialty bed  [] Nutrition consult Influenza Vaccine Received Flu Vaccine for Current Season (usually Sept-March): No  
 Patient/Guardian Refused (Notify MD): No  
  
Oxygen needs? [x] Room air Oxygen @  []1L    []2L    []3L   []4L    []5L   []6L via NC Chronic home O2 use? [] Yes [] No 
Perform O2 challenge test and document in progress note using smartphrase (.Homeoxygen) Last bowel movement Last Bowel Movement Date: 09/27/19 Urinary Catheter LDAs Peripheral IV 09/30/19 Left Antecubital (Active) Site Assessment Clean, dry, & intact 10/1/2019  1:43 AM  
Phlebitis Assessment 0 10/1/2019  1:43 AM  
Infiltration Assessment 0 10/1/2019  1:43 AM  
Dressing Status Clean, dry, & intact 10/1/2019  1:43 AM  
Dressing Type Transparent 10/1/2019  1:43 AM  
Hub Color/Line Status Pink 10/1/2019  1:43 AM  
                  
  
Readmission Risk Assessment Tool Score Medium Risk 12 Total Score 3 Has Seen PCP in Last 6 Months (Yes=3, No=0)  
 4 IP Visits Last 12 Months (1-3=4, 4=9, >4=11) 9 Pt. Coverage (Medicare=5 , Medicaid, or Self-Pay=4) Criteria that do not apply:  
 . Living with Significant Other. Assisted Living. LTAC. SNF. or  
Rehab Patient Length of Stay (>5 days = 3) Charlson Comorbidity Score (Age + Comorbid Conditions) Expected Length of Stay - - - Actual Length of Stay 1

## 2019-10-01 NOTE — ED NOTES
TRANSFER - OUT REPORT: 
 
Verbal report given to Kristy BENTLEY(name) on Polina Wells  being transferred to [facility] (unit) for routine progression of care Report consisted of patients Situation, Background, Assessment and  
Recommendations(SBAR). Information from the following report(s) SBAR, Kardex, ED Summary, MAR, Recent Results and Cardiac Rhythm atrial fibrillation was reviewed with the receiving nurse. Lines:  
Peripheral IV 09/30/19 Left Antecubital (Active) Site Assessment Clean, dry, & intact 9/30/2019  6:57 PM  
Phlebitis Assessment 0 9/30/2019  6:57 PM  
Infiltration Assessment 0 9/30/2019  6:57 PM  
Dressing Status Clean, dry, & intact 9/30/2019  6:57 PM  
Dressing Type Transparent 9/30/2019  6:57 PM  
Hub Color/Line Status Pink;Flushed;Patent 9/30/2019  6:57 PM  
  
 
Opportunity for questions and clarification was provided. Patient transported with: 
 [employer]

## 2019-10-01 NOTE — PROGRESS NOTES
Care Manager Initial Assessment HA: Home with HH vs None Reason for Admission:   Acute kidney injury RRAT Score:     16 Moderate Do you (patient/family) have any concerns for transition/discharge? Not at this time Plan for utilizing home health:   Pt came in with concerns regarding not being able to ambulate for 4 days due to hip pain. Pt may benefit from [facility] services for PT/OT to assist with this issue Current Advanced Directive/Advance Care Plan:   Pt has an ACP on file listing her daughter, Yina Pena as primary healthcare agent and son Suresh Wong as secondary agent. Transition of Care Plan:   Discharge home with personal caregiver and daughter. [facility] rehab services versus none. Pt is a 8 year old,  [place] female admitted as inpatient status to [facility] for hip pain, inability to walk for 4 days and Acute kidney injury. She also has a history of CHF. Pt alert and oriented x 3 (although she did have some memory issues with her full home address and telephone number and the name of her current pharmacy)  She reports that she lives in a one story home with no steps to enter and no ramps. She resides with her daughter, Yina Pena ([phone]) and has a personal care giver that come in 5 days a week from 9am until 4pm (This will need to be verified by daughter as patient stated she was having a difficult time remembering when the caregiver leaves the home). Pt is a DDNR and has an ACP on file. Pt's daughter, Janel Fernandez and son Suresh Wong ([phone]) are listed as health care agents. Pt reports utilizing a walker for ambulation prior to a few days before admission (when she reported that she couldn't walk for 4 days). She states that she was cooking for herself up until about 6 months ago, now her nurse does meal preparation for her.   The nurse also performs other ADLs to include bathing and driving her to medical appointments. Patient reports that her daughter will pick her up at discharge. Pt reports that she takes her medications regularly and medication management is done by her nurse/personal caregiver. She has no issues affording medications due to having Medicare and Medicaid insurance. Pt unable to remember which pharmacy she uses in [place]. She is willing to have any new medications delivered to her room prior to discharge. Pt denies any past PT/OT services at [facility] or [facility]. She was able to state that Dr. John Cui is her PCP and last visited with him on Friday, September 27, 2019. She has been seeing Dr. Reynold Opitz for Cardiology. Care Management Interventions PCP Verified by CM: Yes Last Visit to PCP: 09/27/19 Mode of Transport at Discharge: Other (see comment)(Pt's daughter will transport home) Discharge Durable Medical Equipment: No(Has walker at home that she uses) Physical Therapy Consult: Yes Occupational Therapy Consult: Yes Current Support Network: Has Personal Caregivers Confirm Follow Up Transport: Family Plan discussed with Pt/Family/Caregiver: Yes Kyrie Moseley Care Manager - [id number] Facilitator [phone]

## 2019-10-01 NOTE — PROGRESS NOTES
Pharmacy Medication Reconciliation The patient was interviewed regarding current PTA medication list, use and drug allergies;  patient's daughter and  present in room and obtained permission from patient to discuss drug regimen with visitor(s) present. The patient was questioned regarding use of any other inhalers, topical products, over the counter medications, herbal medications, vitamin products or ophthalmic/nasal/otic medication use. Allergy Update: Penicillins Recommendations/Findings: The following amendments were made to the patient's active medication list on file at [facility]:  
1) Additions: Nystatin 100,000 unit/g cream, apply to affected area twice daily for 10 days. This medication was started 9/27, stop date 10/6 
 
2) Deletions: None 3) Changes: Changed natural tears to right eye only (her artificial eye), clarified that colchicine was taken as needed for gout flare ups. -Clarified PTA med list with patient's daughter and daughter's , Rx Query. PTA medication list was corrected to the following:  
 
Prior to Admission Medications Prescriptions Last Dose Informant Patient Reported? Taking?  
acetaminophen (TYLENOL EXTRA STRENGTH) 500 mg tablet   Yes Yes Sig: Take 1,000 mg by mouth every six (6) hours as needed for Pain. albuterol (PROVENTIL HFA) 90 mcg/actuation inhaler   Yes Yes Sig: Take 2 Puffs by inhalation every four (4) hours as needed for Wheezing or Shortness of Breath. albuterol-ipratropium (DUO-NEB) 2.5 mg-0.5 mg/3 ml nebu 9/30/2019 at Unknown time  Yes Yes Sig: Take 3 mL by inhalation every four (4) hours as needed (shortness of breath). amLODIPine (NORVASC) 2.5 mg tablet 9/30/2019 at Unknown time  Yes Yes Sig: Take 2.5 mg by mouth daily. apixaban (ELIQUIS) 2.5 mg tablet 9/30/2019 at Unknown time  No Yes Sig: Take 1 Tab by mouth two (2) times a day. colchicine (COLCRYS) 0.6 mg tablet   Yes Yes Sig: Take 0.6 mg by mouth daily as needed (gout flare up). dextran 70/hypromellose/PF (NATURAL TEARS, PF, OP)   Yes Yes Sig: Administer 1 Drop to right eye daily as needed (dry eye). furosemide (LASIX) 40 mg tablet 2019 at Unknown time Self No Yes Sig: Take 2 tab 80mg in AM and 1 tab 40mg in PM (Corrected Rx) metOLazone (ZAROXOLYN) 2.5 mg tablet   No Yes Sig: Take 1 Tab by mouth daily. Indications: take per cardiology  
mometasone (NASONEX) 50 mcg/actuation nasal spray   Yes Yes Si Sprays by Both Nostrils route daily as needed (congestion). mv-mn/folic acid/calcium/vit K (ONE-A-DAY WOMEN'S 50 PLUS PO) 2019 at Unknown time  Yes Yes Sig: Take 1 Tab by mouth daily. nystatin (MYCOSTATIN) topical cream   Yes Yes Sig: Apply  to affected area two (2) times a day. Apply to affected area twice daily for 10 days  
potassium chloride (KLOR-CON) 20 mEq packet 2019 at Unknown time  Yes Yes Sig: Take 20 mEq by mouth daily. Facility-Administered Medications: None Thank you, Gail Kaur, [facility] Student

## 2019-10-02 NOTE — WOUND CARE
Wound care nurse consult from staff nurse stating \" Determine between DTI, pressure ulcer, or old wound in gluteal cleft. \". Patient is a very sweet 81 y/o AAF who is still continent of urine and stool and sits a lot. Past Medical History:  
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   
 gout  Squamous cell lung cancer ([facility] Patient has NO PI in gluteal cleft, she has a healed gluteal cleft fissure that is 100% pink epithelial. 
 
Recommend: 
 
Gluteal cleft and skin folds: daily cleanse with soap and water pat dry and apply antifungal powder to fold and gentle smooth in to skin fold.  
 
Ryland Simons RN, [employer]

## 2019-10-02 NOTE — PROGRESS NOTES
Problem: Mobility Impaired (Adult and Pediatric) Goal: *Acute Goals and Plan of Care (Insert Text) Description FUNCTIONAL STATUS PRIOR TO ADMISSION: Patient is mod I with use of rollator at baseline. Has needed increased assistance over the past 4 days due to L hip pain. Gets assistance from caregiver and daughter for ADLs. Denies recent falls. Reports that she completes supine LE exercises daily. HOME SUPPORT PRIOR TO ADMISSION: The patient lived with daughter. Patient has caregivers when daughter is not present. Physical Therapy Goals Initiated 10/2/2019 1. Patient will move from supine to sit and sit to supine , scoot up and down and roll side to side in bed with modified independence within 7 day(s). 2.  Patient will transfer from bed to chair and chair to bed with modified independence using the least restrictive device within 7 day(s). 3.  Patient will perform sit to stand with modified independence within 7 day(s). 4.  Patient will ambulate with modified independence for 100 feet with the least restrictive device within 7 day(s). Outcome: Progressing Towards Goal 
 PHYSICAL THERAPY EVALUATION Patient: Mary Osborn (89 y.o. female) Date: 10/2/2019 Primary Diagnosis: Acute kidney injury ([facility] [address] [N17.9] Precautions:  Fall, DNR, Bed Alarm ASSESSMENT Based on the objective data described below, the patient presents with impaired functional mobility secondary to impaired standing balance, moderate gait impairments, increased L hip pain at rest and with activity, decreased activity tolerance, limited endurance, and generalized weakness. Patient exhibiting and reporting mild SOB with activity, however SpO2 >92%, HR 88-95 bpm on RA with activity. She needed verbal cueing for proper gait sequencing with use of RW due to L hip pain. Gait distance limited by patient due to fatigue and increased L hip pain.  Patient has excellent support at home and will be safe to return home with 24/7 supervision/assistance and HHPT. Current Level of Function Impacting Discharge (mobility/balance): CGA for sit<>stand transfers with VCs for hand placement with use of RW; CGA for gait training x 25 ft with RW support; intact sitting balance; good to fair standing balance with use of RW Functional Outcome Measure: The patient scored 50/100 on the Barthel index outcome measure which is indicative of 50% impairment. Other factors to consider for discharge: Has 24/7 supervision Patient will benefit from skilled therapy intervention to address the above noted impairments. PLAN : 
Recommendations and Planned Interventions: bed mobility training, transfer training, gait training, therapeutic exercises, patient and family training/education and therapeutic activities Frequency/Duration: Patient will be followed by physical therapy:  4 times a week to address goals. Recommendation for discharge: (in order for the patient to meet his/her long term goals) Physical therapy at least 2 days/week in the home AND ensure assist and/or supervision for safety with all functional mobility and ADLs This discharge recommendation: 
Has been made in collaboration with the attending provider and/or case management Equipment recommendations for successful discharge (if) home: none SUBJECTIVE:  
Patient stated It's just pressure.  OBJECTIVE DATA SUMMARY:  
HISTORY:   
Past Medical History:  
Diagnosis Date A-fib ([facility] [address] Adenocarcinoma ([facility] [address] Cancer ([facility] [address] right breast CA, lung, colon Chronic kidney disease Gastrointestinal disorder GERD Hypertension Ileitis, terminal ([facility] [address] Other ill-defined conditions(799.89)   
 gout Squamous cell lung cancer ([facility] [address] Past Surgical History:  
Procedure Laterality Date [address] Right mastectomy HX APPENDECTOMY HX HEENT    
 right glass eye [place] COLONOSCOPY W/BIOPSY SINGLE/MULTIPLE  3/29/2013 [place] COLSC FLX W/RMVL OF TUMOR POLYP LESION SNARE TQ  3/29/2013 Personal factors and/or comorbidities impacting plan of care: a-fib; CKD; HTN; CA Home Situation Home Environment: Private residence # Steps to Enter: 0 One/Two Story Residence: One story Living Alone: No 
Support Systems: Child(indra), Home care staff Patient Expects to be Discharged to[de-identified] Private residence Current DME Used/Available at Home: Shower chair, Walker, rollator, Wheelchair, [place], straight Tub or Shower Type: Tub/Shower combination EXAMINATION/PRESENTATION/DECISION MAKING:  
Critical Behavior: 
Neurologic State: Alert, Appropriate for age Orientation Level: Appropriate for age, Oriented to person, Oriented to place, Oriented to situation, Disoriented to time Cognition: Appropriate decision making, Appropriate for age attention/concentration, Follows commands Safety/Judgement: Awareness of environment Hearing: Auditory Auditory Impairment: None Skin:  Intact Edema: None Range Of Motion: 
AROM: Generally decreased, functional 
  
  
  
PROM: Generally decreased, functional 
  
  
  
Strength:   
Strength: Generally decreased, functional 
  
  
  
  
  
  
Tone & Sensation:  
Tone: Normal 
  
  
  
  
Sensation: Intact Coordination: 
Coordination: Generally decreased, functional 
Vision:  
Corrective Lenses: Glasses Functional Mobility: 
Bed Mobility: 
Rolling: (Pt received in chair) Scooting: Stand-by assistance Transfers: 
Sit to Stand: Contact guard assistance Stand to Sit: Contact guard assistance Balance:  
Sitting: Intact Standing: Impaired Standing - Static: Good;Constant support Standing - Dynamic : Fair;Constant support Ambulation/Gait Training: 
Distance (ft): 25 Feet (ft) Assistive Device: Gait belt;Walker, rolling Ambulation - Level of Assistance: Contact guard assistance; Adaptive equipment; Additional time;Assist x1 Gait Description (WDL): Exceptions to [facility] Gait Abnormalities: Decreased step clearance; Antalgic; Step to gait(increased trunk flexion) Base of Support: Narrowed; Shift to right Speed/[place]: Pace decreased (<100 feet/min); Shuffled Step Length: Left shortened;Right shortened Functional Measure: 
Barthel Index: 
 
Bathin Bladder: 10 Bowels: 10 
Groomin Dressin Feedin Mobility: 0 Stairs: 0 Toilet Use: 5 Transfer (Bed to Chair and Back): 10 Total: 50/100 The Barthel ADL Index: Guidelines 1. The index should be used as a record of what a patient does, not as a record of what a patient could do. 2. The main aim is to establish degree of independence from any help, physical or verbal, however minor and for whatever reason. 3. The need for supervision renders the patient not independent. 4. A patient's performance should be established using the best available evidence. Asking the patient, friends/relatives and nurses are the usual sources, but direct observation and common sense are also important. However direct testing is not needed. 5. Usually the patient's performance over the preceding 24-48 hours is important, but occasionally longer periods will be relevant. 6. Middle categories imply that the patient supplies over 50 per cent of the effort. 7. Use of aids to be independent is allowed. Fabiola Crimes., Barthel, D.W. ([id number]). Functional evaluation: the Barthel Index. [address] [facility] (142. Ilda Salvador dana ADRIANA Dimas, Luis A Ng., Damari Kay., [place], [address] [place], [place] [address]e (). Measuring the change indisability after inpatient rehabilitation; comparison of the responsiveness of the Barthel Index and Functional [place] Measure. Journal of Neurology, Neurosurgery, and Psychiatry, 66(4), 010-237. PARMJIT Ma, ALFREDO Posada, & Janina Louis M.A. (2004.) Assessment of post-stroke quality of life in cost-effectiveness studies: The usefulness of the Barthel Index and the EuroQoL-5D. [facility], 13, 259-82 Physical Therapy Evaluation Charge Determination History Examination Presentation Decision-Making HIGH Complexity :3+ comorbidities / personal factors will impact the outcome/ POC  HIGH Complexity : 4+ Standardized tests and measures addressing body structure, function, activity limitation and / or participation in recreation  MEDIUM Complexity : Evolving with changing characteristics  Other outcome measures Barthel index  MEDIUM Based on the above components, the patient evaluation is determined to be of the following complexity level: MEDIUM Pain Ratin/10 L hip pain with gait training Activity Tolerance:  
Fair, SpO2 stable on RA, requires frequent rest breaks and observed SOB with activity Please refer to the flowsheet for vital signs taken during this treatment. After treatment patient left in no apparent distress:  
Sitting in chair, Call bell within reach and Bed / chair alarm activated COMMUNICATION/EDUCATION:  
The patients plan of care was discussed with: Physical Therapist, Occupational Therapist and Registered Nurse. Fall prevention education was provided and the patient/caregiver indicated understanding., Patient/family have participated as able in goal setting and plan of care. and Patient/family agree to work toward stated goals and plan of care. Thank you for this referral. 
Carleen Hilario, PT, DPT Time Calculation: 20 mins

## 2019-10-02 NOTE — PROGRESS NOTES
OCCUPATIONAL THERAPY EVALUATION/DISCHARGE Patient: Rafi Ramos (89 y.o. female) Date: 10/2/2019 Primary Diagnosis: Acute kidney injury ([facility] [address] [N17.9] Precautions:   Fall, DNR, Bed Alarm ASSESSMENT Based on the objective data described below, the patient presents with general weakness and SOB that limit her functional mobility. Pt has a pleasant affect and is cooperative with therapy. Pt is near baseline for completion of ADLs and has paid caregiver assist as well as family that assist with ADLs. Pt would have no benefit from OT services at this time as her deficits are related to mobility which PT is addressing. . Otherwise she is at her ADL baseline. Orthostatics were completed. Vitals:  
  10/02/19 1048 10/02/19 1216 10/02/19 1219 10/02/19 1222 BP: 121/59 118/77 129/84 116/76 BP 1 Location: Left arm Left arm Left arm Left arm BP Patient Position: At rest;Sitting Comment: LE elevated in recliner Sitting Standing Pulse: 88 91 89 88 Resp: 18        
Temp: 97.5 °F (36.4 °C)        
SpO2: 97% 95% 95% 96% Weight:          
Height:       
 
 
 
Current Level of Function (ADLs/self-care): Pt is Total A for bathing, Max A for LB dressing, Min A for toilet transfer and toileting, CGA for bathroom mobility and transfers, and Set-up/SBA for scooting in bed, UB dressing, grooming, and feeding. Functional Outcome Measure: The patient scored 45/100 on the Barthel outcome measure which is indicative of profound impairment. Other factors to consider for discharge: none PLAN : 
Recommend with staff: OOB for most of day Recommendation for discharge: (in order for the patient to meet his/her long term goals) No skilled occupational therapy/ follow up rehabilitation needs identified at this time. This discharge recommendation: 
Has been made in collaboration with the attending provider and/or case management Equipment recommendations for successful discharge: bedside commode or elevated toilet seat with handles SUBJECTIVE:  
Patient stated \"I'm a little out of breath.  OBJECTIVE DATA SUMMARY:  
HISTORY:  
Past Medical History:  
Diagnosis Date  A-fib ([facility] [address]  Adenocarcinoma ([facility] [address]  Cancer ([facility] [address] right breast CA, lung, colon  Chronic kidney disease  Gastrointestinal disorder GERD  Hypertension  Ileitis, terminal ([facility] [address]  Other ill-defined conditions(799.89)   
 gout  Squamous cell lung cancer ([facility] [address] Past Surgical History:  
Procedure Laterality Date [facility] Right mastectomy  HX APPENDECTOMY  HX HEENT    
 right glass eye  [place] COLONOSCOPY W/BIOPSY SINGLE/MULTIPLE  3/29/2013  [place] COLSC FLX W/RMVL OF TUMOR POLYP LESION SNARE TQ  3/29/2013 Prior Level of Function/Environment/Context: Pt lives with her daughter and has a health care aid from 9-4 5 days/wk. Per pt report she is independent with toileting, but requires Max A for LB dressing stating \"I can't pull my clothes up. \" This discrepancy with her ability with clothing management make this therapist question the accuracy of the pt's report. Pt also reports that she is Total A for bathing, and Set-up for UB dressing, feeding, and grooming. Expanded or extensive additional review of patient history:  
Home Situation Home Environment: Private residence # Steps to Enter: 0 One/Two Story Residence: One story Living Alone: No 
Support Systems: Child(indra), Home care staff Patient Expects to be Discharged to[de-identified] Private residence Current DME Used/Available at Home: Shower chair, Walker, rollator, Wheelchair, [place], straight Tub or Shower Type: Tub/Shower combination EXAMINATION OF PERFORMANCE DEFICITS: 
Cognitive/Behavioral Status: 
Neurologic State: Alert; Appropriate for age Orientation Level: Appropriate for age;Oriented to person;Oriented to place;Oriented to situation;Disoriented to time Cognition: Appropriate decision making; Appropriate for age attention/concentration; Follows commands Perception: Appears intact Perseveration: No perseveration noted Safety/Judgement: Awareness of environment Hearing: Auditory Auditory Impairment: None Vision/Perceptual:   
    
    
    
  
    
    
Corrective Lenses: Glasses Range of Motion: 
 
AROM: Generally decreased, functional 
PROM: Generally decreased, functional 
  
  
  
  
  
  
 
Strength: 
 
Strength: Generally decreased, functional 
  
  
  
  
 
Coordination: 
Coordination: Generally decreased, functional 
Fine Motor Skills-Upper: Left Impaired;Right Impaired(functional) Gross Motor Skills-Upper: Right Intact; Left Impaired Tone & Sensation: 
 
Tone: Normal 
Sensation: Intact Balance: 
Sitting: Intact Standing: Impaired Standing - Static: Good;Constant support Standing - Dynamic : Fair;Constant support Functional Mobility and Transfers for ADLs: 
Bed Mobility: 
Rolling: (Pt received in chair) Scooting: Stand-by assistance Transfers: 
Sit to Stand: Contact guard assistance Stand to Sit: Contact guard assistance Bathroom Mobility: Contact guard assistance Toilet Transfer : Minimum assistance ADL Assessment: 
Feeding: Setup Oral Facial Hygiene/Grooming: Setup Bathing: Total assistance Upper Body Dressing: Setup Lower Body Dressing: Maximum assistance Toileting: Minimum assistance ADL Intervention and task modifications: 
  
 
  
 
  
 
  
 
  
 
  
 
  
 
Cognitive Retraining Safety/Judgement: Awareness of environment Functional Measure: 
Barthel Index: 
 
Bathin Bladder: 10 Bowels: 10 
Groomin Dressin Feedin Mobility: 0 Stairs: 0 Toilet Use: 5 Transfer (Bed to Chair and Back): 10 Total: 50/100 The Barthel ADL Index: Guidelines 1.  The index should be used as a record of what a patient does, not as a record of what a patient could do. 2. The main aim is to establish degree of independence from any help, physical or verbal, however minor and for whatever reason. 3. The need for supervision renders the patient not independent. 4. A patient's performance should be established using the best available evidence. Asking the patient, friends/relatives and nurses are the usual sources, but direct observation and common sense are also important. However direct testing is not needed. 5. Usually the patient's performance over the preceding 24-48 hours is important, but occasionally longer periods will be relevant. 6. Middle categories imply that the patient supplies over 50 per cent of the effort. 7. Use of aids to be independent is allowed. Wilmer Simon., Barthel, DIsauraW. ([id number]). Functional evaluation: the Barthel Index. [address] [facility] (14)2. Cameron Snyder dana ADRIANA Dimas, Daniel Singletary., [facility] [place], [address] [place] [address] (1999). Measuring the change indisability after inpatient rehabilitation; comparison of the responsiveness of the Barthel Index and Functional [place] Measure. Journal of Neurology, Neurosurgery, and Psychiatry, 66(4), 811-206. Sharon Guzman, [place].A, ADELA Posada.ALDO, & Leif Kaur MMARIANN. (2004.) Assessment of post-stroke quality of life in cost-effectiveness studies: The usefulness of the Barthel Index and the EuroQoL-5D. [facility], 13, 494-94 Occupational Therapy Evaluation Charge Determination History Examination Decision-Making MEDIUM Complexity : Expanded review of history including physical, cognitive and psychosocial  history  LOW Complexity : 1-3 performance deficits relating to physical, cognitive , or psychosocial skils that result in activity limitations and / or participation restrictions  LOW Complexity : No comorbidities that affect functional and no verbal or physical assistance needed to complete eval tasks Based on the above components, the patient evaluation is determined to be of the following complexity level: LOW Pain Ratin/10 Activity Tolerance:  
Fair and requires rest breaks Please refer to the flowsheet for vital signs taken during this treatment. After treatment patient left in no apparent distress:   
Sitting in chair, Call bell within reach, Bed / chair alarm activated and Caregiver / family present COMMUNICATION/EDUCATION:  
The patients plan of care was discussed with: Registered Nurse. Thank you for this referral. 
Amelia Mcgee Time Calculation: 28 mins

## 2019-10-02 NOTE — PROGRESS NOTES
Orders received, chart reviewed and patient evaluated by physical therapy. Pending progression with skilled acute physical therapy, recommend: 
Physical therapy at least 2 days/week in the home AND ensure assist and/or supervision for safety with all functional mobility and ADLs Recommend with nursing patient to complete as able in order to maintain strength, endurance and independence: OOB to chair 3x/day with CGA and ambulating with CGA and RW support. Thank you for your assistance. Full evaluation to follow.

## 2019-10-02 NOTE — PROGRESS NOTES
Problem: Heart Failure: Day 1 Goal: Activity/Safety Outcome: Progressing Towards Goal 
Goal: Medications Outcome: Progressing Towards Goal 
Goal: *Oxygen saturation within defined limits Outcome: Progressing Towards Goal 
Goal: *Anxiety reduced or absent Outcome: Progressing Towards Goal 
  
Problem: Pressure Injury - Risk of 
Goal: *Prevention of pressure injury Description Document Michael Scale and appropriate interventions in the flowsheet. Outcome: Progressing Towards Goal 
Note:  
Pressure Injury Interventions: 
Sensory Interventions: Assess changes in LOC, Keep linens dry and wrinkle-free, Minimize linen layers Moisture Interventions: Absorbent underpads, Minimize layers Activity Interventions: Increase time out of bed, PT/OT evaluation Mobility Interventions: HOB 30 degrees or less, PT/OT evaluation Nutrition Interventions: Document food/fluid/supplement intake Friction and Shear Interventions: HOB 30 degrees or less, Minimize layers Problem: Falls - Risk of 
Goal: *Absence of Falls Description Document Linden Dixon Fall Risk and appropriate interventions in the flowsheet. Outcome: Progressing Towards Goal 
Note:  
Fall Risk Interventions: 
Mobility Interventions: Bed/chair exit alarm, Patient to call before getting OOB Mentation Interventions: Adequate sleep, hydration, pain control, Bed/chair exit alarm, Door open when patient unattended, Increase mobility, More frequent rounding Medication Interventions: Bed/chair exit alarm, Patient to call before getting OOB, Teach patient to arise slowly Elimination Interventions: Bed/chair exit alarm, Call light in reach

## 2019-10-02 NOTE — PROGRESS NOTES
1900: Bedside shift change report given to Radha Marques (oncoming nurse) by Dmitry (offgoing nurse). Report included the following information SBAR.  
 
0330: Potassium 3.0 in morning labs. Tele hospitalist paged 
 
0700: 
Bedside shift change report GIVEN TO Geoff William RN. Report included the following information SBAR. SIGNIFICANT CHANGES DURING SHIFT:   
 
 
CONCERNS TO ADDRESS WITH MD:   
 
 
 
 
[facility] NURSING NOTE Admission Date 9/30/2019 Admission Diagnosis Acute kidney injury ([facility] [address] [N17.9] Consults IP CONSULT TO CARDIOLOGY Cardiac Monitoring [x] Yes [] No  
  
Purposeful Hourly Rounding [x] Yes   
Shane Score Total Score: 3 Shane score 3 or > [x] Bed Alarm [] Avasys [] 1:1 sitter [] Patient refused (Signed refusal form in chart) Michael Score Michael Score: 17 Michael score 14 or < [x] PMT consult [] Wound Care consult  
 []  Specialty bed  [] Nutrition consult Influenza Vaccine Received Flu Vaccine for Current Season (usually Sept-March): No  
 Patient/Guardian Refused (Notify MD): No  
  
Oxygen needs? [x] Room air Oxygen @  []1L    []2L    []3L   []4L    []5L   []6L via NC Chronic home O2 use? [] Yes [] No 
Perform O2 challenge test and document in progress note using [employer]) Last bowel movement Last Bowel Movement Date: 09/27/19 Urinary Catheter LDAs Peripheral IV 09/30/19 Left Antecubital (Active) Site Assessment Clean, dry, & intact 10/2/2019  3:00 AM  
Phlebitis Assessment 0 10/2/2019  3:00 AM  
Infiltration Assessment 0 10/2/2019  3:00 AM  
Dressing Status Clean, dry, & intact 10/2/2019  3:00 AM  
Dressing Type Tape;Transparent 10/2/2019  3:00 AM  
Hub Color/Line Status Pink;Flushed;Patent 10/2/2019  3:00 AM  
                  
  
Readmission Risk Assessment Tool Score Medium Risk 12 Total Score 3 Has Seen PCP in Last 6 Months (Yes=3, No=0)  
 4 IP Visits Last 12 Months (1-3=4, 4=9, >4=11) 9 Pt. Coverage (Medicare=5 , Medicaid, or Self-Pay=4) Criteria that do not apply:  
 . Living with Significant Other. Assisted Living. LTAC. SNF. or  
Rehab Patient Length of Stay (>5 days = 3) Charlson Comorbidity Score (Age + Comorbid Conditions) Expected Length of Stay 4d 2h Actual Length of Stay 2

## 2019-10-02 NOTE — PROGRESS NOTES
Attempted to see pt for OT services. Pt just returned from the bathroom with the nursing staff and was SOB and wanted to rest.  Will defer and continue to follow.

## 2019-10-02 NOTE — PROGRESS NOTES
[address]  [phone] Cardiology Progress Note 
 
 
10/2/2019 1110AM 
 
Admit Date: 9/30/2019 Admit Diagnosis:  
Acute kidney injury ([facility] [address] [N17.9] Interval History/Subjective:  
 
Mina Meraz is a 80 y.o. female with PMH a fib, HFpEF, CKD, HTN,  
GERD, lung CA who was admitted for Acute kidney injury ([facility] [address] [N17.9]. -VSS 
-K 3.0; creat down to 1.40 
-Weight charted down 6#; I/O incomplete 
-Ms. Maryjo Mcgee is feeling okay today. She is having some WOOTEN, which is not usual for her. No c/o pain. Hip is feeling better. Visit Vitals /73 (BP 1 Location: Left arm, BP Patient Position: Standing) Comment (BP Patient Position): after mobility around the room Pulse 80 Temp 97.5 °F (36.4 °C) Resp 18 Ht 5' 2\" (1.575 m) Wt 73.7 kg (162 lb 6.4 oz) SpO2 93% BMI 29.70 kg/m² Current Facility-Administered Medications Medication Dose Route Frequency  potassium chloride SR (KLOR-CON 10) tablet 20 mEq  20 mEq Oral NOW  nystatin (MYCOSTATIN) 100,000 unit/gram powder   Topical BID  sodium chloride (NS) flush 5-40 mL  5-40 mL IntraVENous Q8H  
 sodium chloride (NS) flush 5-40 mL  5-40 mL IntraVENous PRN  
 acetaminophen (TYLENOL) tablet 650 mg  650 mg Oral Q6H PRN  
 naloxone (NARCAN) injection 0.4 mg  0.4 mg IntraVENous PRN  
 ondansetron (ZOFRAN) injection 4 mg  4 mg IntraVENous Q4H PRN  
 bisacodyl (DULCOLAX) suppository 10 mg  10 mg Rectal DAILY PRN  
 apixaban (ELIQUIS) tablet 2.5 mg  2.5 mg Oral BID  hydrALAZINE (APRESOLINE) tablet 10 mg  10 mg Oral TID  hydrALAZINE (APRESOLINE) 20 mg/mL injection 10 mg  10 mg IntraVENous Q6H PRN  
 senna-docusate (PERICOLACE) 8.6-50 mg per tablet 1 Tab  1 Tab Oral BID PRN  
 traMADol (ULTRAM) tablet 50 mg  50 mg Oral Q12H PRN  
 furosemide (LASIX) tablet 80 mg  80 mg Oral DAILY  furosemide (LASIX) tablet 40 mg  40 mg Oral QPM  
   
Objective:  
  
Physical Exam: General: pleasant, elderly AAF sitting in chair in NAD. Appears younger than stated age. Heart: irregularRR, no m/S3/JVD, split s2 Lungs: clear/dim Abdomen: Soft, +BS, NTND Extremities: LE espinoza +DP/PT, 2+ edema BLE Neurologic: Grossly normal 
Skin:  Warm and dry. Data Review:  
Recent Labs 10/02/19 
0253 10/01/19 
0151 09/30/19 
1652 WBC 5.3 5.1 5.2 HGB 12.6 13.8 13.3 HCT 38.9 43.8 40.9  219 244 Recent Labs 10/02/19 
0253 10/01/19 
0151 09/30/19 
1652  140 142  
K 3.0* 3.5 3.7  100 100 CO2 35* 33* 38* GLU 96 178* 103* BUN 33* 38* 39* CREA 1.40* 1.75* 2.25* CA 9.4 10.0 10.3* ALB  --  3.3* 3.1* TBILI  --  0.6 0.9 SGOT  --  27 28 ALT  --  18 18 Recent Labs  
  09/30/19 
2134 TROIQ <0.05 Intake/Output Summary (Last 24 hours) at 10/2/2019 1242 Last data filed at 10/2/2019 4505 Gross per 24 hour Intake 120 ml Output 1100 ml Net -980 ml Telemetry: a fib 70s ECG: a fib CXRAY: \"Stable left mid and lower lung field opacification with volume loss. Stable tiny right pleural effusion possible. .\" 
CT chest: There is stable left lung volume loss. Left pleural effusion is 
unchanged. There is stable consolidation within the left midlung. Centrilobular emphysematous changes are noted within the right upper lung. There is a small focus of patchy airspace disease within the right upper lobe, new compared to prior CT dated July 2018. There is a very small right pleural effusion, new compared to prior CT dated July 2018. \". \" 
 
Assessment:  
 
Active Problems: 
  Acute kidney injury ([facility] [address] (9/30/2019) Plan:  
 
Chronic HFpEF:  Now becoming SOB/WOOTEN with prior hold on diuretics · Since further improvement in creatinine and patient now having WOOTEN, will restart metolazone today (patient has been taking Mon, Wed, Friday at home). · Continue home lasix as well · Continue to monitor and replete potassium for hypkalemia Chronic a fib:  Rate controlled · eliquis · Not on rate reduction meds HTN:  Stable · Amlodipine stopped for potential contribution to leg swelling · Appears to be tolerating hydralazine RUMA:  Improving · Meds, as above Jennifer Braun NP 
DNP, RN, [facility] Cardiology 10/2/2019 Patient seen, examined by me personally. Plan discussed as detailed. Agree with note as outlined by  NP. I confirm findings in history and physical exam. No additional findings noted. Agree with plan as outlined above.   
 
Michael Pugh MD

## 2019-10-02 NOTE — PROGRESS NOTES
[address] INTERDISCIPLINARY ROUNDS Cardiopulmonary Care Interdisciplinary Rounds were held today to discuss patient's plan of care and outcomes. The following members were present: NP/Physician, Pharmacy, Nursing and Case Management. Expected Length of Stay:  4d 2h 
 
PLAN OF CARE:  
Continue current treatment plan

## 2019-10-02 NOTE — PROGRESS NOTES
Hospitalist Progress Note NAME: Hernando Blum :  1918 MRN:  [id number] Assessment / Plan: 
Acute on chronic diastolic CHF POA 
-last EF is 65 %  
-cont lasix and metolazone restarted per home dosing 
-stop Norvasc due to edema 
-not on BB at home 
-daily weights and I/O 
-Cards following 
-check orthostatics. Cont pt/ot Hypokalemia 
-replaced and recheck in am  
 
Acute kidney injury POA admit BUN/creatinine 39 and 2.25 Stage III chronic kidney disease baseline creatinine 1.2-1.3 
-base line is around 1.3 and cr peaked at 2.25 
-cr is trending down to 1.4 
-check bmp in am  
  
Left hip pain POA x4 days Inability to ambulate x4 days Left hip x-rays were negative for fracture Saw PCP several days ago, steroid injection in the left hip Consulted pt If persistent pain, consider ortho eval 
  
Chronic atrial fibrillation on Eliquis Continue Eliquis for now 
  History of left lung cancer status post XRT History of right breast cancer History of colon cancer Chest x-ray with significant volume loss on left appears stable from prior exams CT scan ordered by emergency room is essentially unchanged left effusion and volume loss There is stable airspace disease in the lung on the left New area of airspace disease on the right, I think this is nonspecific We will hold antibiotics Pro calcitonin is normal 
  
COPD Not currently on inhalers, not wheezing 
  
Essential hypertension POA Stop Norvasc due to lower extremity edema Cont added hydralazine Constipation 
-cont yokasta colace prn 
 
  
Overweight  POA Body mass index is 29.45 kg/m². 
  
 
 
Body mass index is 29.7 kg/m². Code status: DNR Prophylaxis: Eliquis Recommended Disposition: Home w/Family Subjective: Chief Complaint / Reason for Physician Visit Sob is better. No chest pain. Mild cough. No fever. Review of Systems: 
Symptom Y/N Comments  Symptom Y/N Comments Fever/Chills    Chest Pain Poor Appetite    Edema Cough    Abdominal Pain Sputum    Joint Pain SOB/WOOTEN    Pruritis/Rash Nausea/vomit    Tolerating PT/OT Diarrhea    Tolerating Diet Constipation    Other Could NOT obtain due to:   
 
Objective: VITALS:  
Last 24hrs VS reviewed since prior progress note. Most recent are: 
Patient Vitals for the past 24 hrs: 
 Temp Pulse Resp BP SpO2  
10/02/19 1542 97.8 °F (36.6 °C) 86 18 118/75 98 % 10/02/19 1226  80  113/73 93 % 10/02/19 1222  88  116/76 96 % 10/02/19 1219  89  129/84 95 % 10/02/19 1216  91  118/77 95 % 10/02/19 1048 97.5 °F (36.4 °C) 88 18 121/59 97 % 10/02/19 0926    126/77   
10/02/19 0727 97.6 °F (36.4 °C) 78 18 111/58 93 % 10/02/19 0301 97.5 °F (36.4 °C) 74 18 110/70 95 % 10/01/19 2235 97.5 °F (36.4 °C) 65 18 120/69 96 % 10/01/19 2018 97.3 °F (36.3 °C) 79 20 140/78 98 % 10/01/19 1719  70  115/74  Intake/Output Summary (Last 24 hours) at 10/2/2019 1613 Last data filed at 10/2/2019 1329 Gross per 24 hour Intake 480 ml Output 1400 ml Net -920 ml PHYSICAL EXAM: 
General: Alert, cooperative, no acute distress   
EENT:  EOMI. Anicteric sclerae. MMM Resp:  Good air entry, crackles +, no wheeze CV:  Regular  rhythm,  No edema GI:  Soft, Non distended, Non tender.  +Bowel sounds Neurologic:  Alert and awake, normal speech, Psych:   Not anxious nor agitated Skin:  No rashes. No jaundice Reviewed most current lab test results and cultures  YES Reviewed most current radiology test results   YES Review and summation of old records today    NO Reviewed patient's current orders and MAR    YES 
PMH/SH reviewed - no change compared to H&P Current Facility-Administered Medications:  
  nystatin (MYCOSTATIN) 100,000 unit/gram powder, , Topical, BID, Sharon Jernigan MD 
  metOLazone (ZAROXOLYN) tablet 2.5 mg, 2.5 mg, Oral, Q MON, WED & FRI, Oliver South, NP 
   sodium chloride (NS) flush 5-40 mL, 5-40 mL, IntraVENous, Q8H, Dann Sandoval MD, 10 mL at 10/02/19 1326   sodium chloride (NS) flush 5-40 mL, 5-40 mL, IntraVENous, PRN, Dann Sandoval MD 
  acetaminophen (TYLENOL) tablet 650 mg, 650 mg, Oral, Q6H PRN, Luis Enrique Barahona MD, 650 mg at 10/01/19 1750 
  [facility]) injection 0.4 mg, 0.4 mg, IntraVENous, PRN, Dann Sandoval MD 
  ondansetron [facility]) injection 4 mg, 4 mg, IntraVENous, Q4H PRN, Dann Sandoval MD 
  bisacodyl (DULCOLAX) suppository 10 mg, 10 mg, Rectal, DAILY PRN, Dann Sandoval MD 
  apixaban [facility] [place]) tablet 2.5 mg, 2.5 mg, Oral, BID, Dann Sandoval MD, 2.5 mg at 10/02/19 8966   hydrALAZINE (APRESOLINE) tablet 10 mg, 10 mg, Oral, TID, Dann Sandoval MD, 10 mg at 10/02/19 1605   hydrALAZINE (APRESOLINE) 20 mg/mL injection 10 mg, 10 mg, IntraVENous, Q6H PRN, Dann Sandoval MD 
  senna-docusate (PERICOLACE) 8.6-50 mg per tablet 1 Tab, 1 Tab, Oral, BID PRN, Barbra Ovalle MD, 1 Tab at 10/02/19 1604   traMADol (ULTRAM) tablet 50 mg, 50 mg, Oral, Q12H PRN, Barbra Ovalle MD, 50 mg at 10/01/19 1159   furosemide (LASIX) tablet 80 mg, 80 mg, Oral, DAILY, Ansley South NP, 80 mg at 10/02/19 9128   furosemide (LASIX) tablet 40 mg, 40 mg, Oral, QPM, Ansley South NP, 40 mg at 10/01/19 1750 
________________________________________________________________________ Care Plan discussed with: 
  Comments Patient y Family RN y   
Care Manager Consultant Multidiciplinary team rounds were held today with , nursing, pharmacist and clinical coordinator. Patient's plan of care was discussed; medications were reviewed and discharge planning was addressed. ________________________________________________________________________ Total NON critical care TIME:  35    Minutes Total CRITICAL CARE TIME Spent:   Minutes non procedure based Comments >50% of visit spent in counseling and coordination of care    
________________________________________________________________________ America Wallace MD  
 
Procedures: see electronic medical records for all procedures/Xrays and details which were not copied into this note but were reviewed prior to creation of Plan. LABS: 
I reviewed today's most current labs and imaging studies. Pertinent labs include: 
Recent Labs 10/02/19 
0253 10/01/19 
0151 09/30/19 
1652 WBC 5.3 5.1 5.2 HGB 12.6 13.8 13.3 HCT 38.9 43.8 40.9  219 244 Recent Labs 10/02/19 
0253 10/01/19 
0151 09/30/19 
1652  140 142  
K 3.0* 3.5 3.7  100 100 CO2 35* 33* 38* GLU 96 178* 103* BUN 33* 38* 39* CREA 1.40* 1.75* 2.25* CA 9.4 10.0 10.3* ALB  --  3.3* 3.1* TBILI  --  0.6 0.9 SGOT  --  27 28 ALT  --  18 18 Signed: America Wallace MD

## 2019-10-02 NOTE — PROGRESS NOTES
Vitals:  
 10/02/19 1048 10/02/19 1216 10/02/19 1219 10/02/19 1222 BP: 121/59 118/77 129/84 116/76 BP 1 Location: Left arm Left arm Left arm Left arm BP Patient Position: At rest;Sitting Comment: LE elevated in recliner Sitting Standing Pulse: 88 91 89 88 Resp: 18 Temp: 97.5 °F (36.4 °C) SpO2: 97% 95% 95% 96% Weight:      
Height:

## 2019-10-03 NOTE — PROGRESS NOTES
[address] [address]  [phone] Cardiology Progress Note 
 
 
10/3/2019 1140AM 
 
Admit Date: 9/30/2019 Admit Diagnosis:  
[facility] [N17.9] Interval History/Subjective:  
 
Fior Logan is a 80 y.o. female with PMH a fib, HFpEF, CKD, HTN,  
GERD, lung CA who was admitted for Acute kidney injury ([facility] [address] [N17.9]. -VSS 
-K 3.7; creat up to 1.60 
-Weight same; I/O slightly neg 
-Ms. Padmaja Michaels is feeling good. She is not having any further SOB or WOOTEN. She recently walked with PT. No c/o pain. Some slight soreness in her left hip with walking. Visit Vitals /66 (BP 1 Location: Left arm, BP Patient Position: At rest;Sitting) Pulse 68 Temp 97.5 °F (36.4 °C) Resp 18 Ht 5' 2\" (1.575 m) Wt 73.5 kg (162 lb 1.6 oz) SpO2 97% BMI 29.65 kg/m² Current Facility-Administered Medications Medication Dose Route Frequency  nystatin (MYCOSTATIN) 100,000 unit/gram powder   Topical BID  metOLazone (ZAROXOLYN) tablet 2.5 mg  2.5 mg Oral Q MON, WED & FRI  dilTIAZem (CARDIZEM) IR tablet 30 mg  30 mg Oral Q6H  
 sodium chloride (NS) flush 5-40 mL  5-40 mL IntraVENous Q8H  
 sodium chloride (NS) flush 5-40 mL  5-40 mL IntraVENous PRN  
 acetaminophen (TYLENOL) tablet 650 mg  650 mg Oral Q6H PRN  
 naloxone (NARCAN) injection 0.4 mg  0.4 mg IntraVENous PRN  
 ondansetron (ZOFRAN) injection 4 mg  4 mg IntraVENous Q4H PRN  
 bisacodyl (DULCOLAX) suppository 10 mg  10 mg Rectal DAILY PRN  
 apixaban (ELIQUIS) tablet 2.5 mg  2.5 mg Oral BID  hydrALAZINE (APRESOLINE) tablet 10 mg  10 mg Oral TID  hydrALAZINE (APRESOLINE) 20 mg/mL injection 10 mg  10 mg IntraVENous Q6H PRN  
 senna-docusate (PERICOLACE) 8.6-50 mg per tablet 1 Tab  1 Tab Oral BID PRN  
 furosemide (LASIX) tablet 80 mg  80 mg Oral DAILY  furosemide (LASIX) tablet 40 mg  40 mg Oral QPM  
   
Objective:  
  
Physical Exam: General: pleasant, elderly AAF sitting in chair in NAD. Appears younger than stated age. Heart: irregularRR, no m/S3/JVD, split s2 Lungs: clear/dim bases Abdomen: Soft, +BS, NTND Extremities: LE espinoza +DP/PT, 1-2+ edema BLE Neurologic: Grossly normal 
Skin:  Warm and dry. Data Review:  
Recent Labs 10/02/19 
0253 10/01/19 
0151 09/30/19 
1652 WBC 5.3 5.1 5.2 HGB 12.6 13.8 13.3 HCT 38.9 43.8 40.9  219 244 Recent Labs 10/03/19 
0028 10/02/19 
9596 10/01/19 
0151 09/30/19 
1652  141 140 142  
K 3.7 3.0* 3.5 3.7  101 100 100 CO2 34* 35* 33* 38* * 96 178* 103* BUN 34* 33* 38* 39* CREA 1.60* 1.40* 1.75* 2.25* CA 9.9 9.4 10.0 10.3* MG 1.9  --   --   --   
ALB  --   --  3.3* 3.1* TBILI  --   --  0.6 0.9 SGOT  --   --  27 28 ALT  --   --  18 18 Recent Labs  
  09/30/19 
2134 TROIQ <0.05 Intake/Output Summary (Last 24 hours) at 10/3/2019 1205 Last data filed at 10/3/2019 5733 Gross per 24 hour Intake 640 ml Output 1375 ml Net -735 ml Telemetry: a fib 90s ECG: a fib CXRAY: \"Stable left mid and lower lung field opacification with volume loss. Stable tiny right pleural effusion possible. .\" 
CT chest: There is stable left lung volume loss. Left pleural effusion is 
unchanged. There is stable consolidation within the left midlung. Centrilobular emphysematous changes are noted within the right upper lung. There is a small focus of patchy airspace disease within the right upper lobe, new compared to prior CT dated July 2018. There is a very small right pleural effusion, new compared to prior CT dated July 2018. \". \" 
 
Assessment:  
 
Active Problems: 
  Acute kidney injury ([address] (9/30/2019) Plan:  
 
Chronic HFpEF:  compensated · Continue home lasix dose and the scheduled metolazone MWF  
· Continue to monitor and replete potassium as needed Chronic a fib:  Rate controlled · eliquis · Not on rate reduction meds HTN:  Stable · Amlodipine stopped for potential contribution to leg swelling · Appears to be tolerating hydralazine RUMA:  Slight worsening today · Meds, as above Michael Burden NP 
DNP, RN, [facility] Cardiology 10/3/2019 Patient seen, examined by me personally. Plan discussed as detailed. Agree with note as outlined by  NP. I confirm findings in history and physical exam. No additional findings noted. Agree with plan as outlined above.   
 
Kanwal Garcia MD

## 2019-10-03 NOTE — PROGRESS NOTES
Problem: Mobility Impaired (Adult and Pediatric) Goal: *Acute Goals and Plan of Care (Insert Text) Description FUNCTIONAL STATUS PRIOR TO ADMISSION: Patient is mod I with use of rollator at baseline. Has needed increased assistance over the past 4 days due to L hip pain. Gets assistance from caregiver and daughter for ADLs. Denies recent falls. Reports that she completes supine LE exercises daily. HOME SUPPORT PRIOR TO ADMISSION: The patient lived with daughter. Patient has caregivers when daughter is not present. Physical Therapy Goals Initiated 10/2/2019 1. Patient will move from supine to sit and sit to supine , scoot up and down and roll side to side in bed with modified independence within 7 day(s). 2.  Patient will transfer from bed to chair and chair to bed with modified independence using the least restrictive device within 7 day(s). 3.  Patient will perform sit to stand with modified independence within 7 day(s). 4.  Patient will ambulate with modified independence for 100 feet with the least restrictive device within 7 day(s). Outcome: Progressing Towards Goal 
Note: PHYSICAL THERAPY TREATMENT Patient: Mina Meraz (89 y.o. female) Date: 10/3/2019 Diagnosis: Acute kidney injury ([facility] [address] [N17.9] Precautions: Fall, DNR, Bed Alarm Chart, physical therapy assessment, plan of care and goals were reviewed. ASSESSMENT Patient continues with skilled PT services and is progressing towards goals, pt tolerated tx very well, no LOB or SOB, no c/o pain, did well with transfers and ther-ex, vc's for safety and proper RW use. Current Level of Function Impacting Discharge (mobility/balance): CGA x1 PLAN : 
Patient continues to benefit from skilled intervention to address the above impairments. Continue treatment per established plan of care. to address goals. Recommendation for discharge: (in order for the patient to meet his/her long term goals) Physical therapy at least 2 days/week in the home AND ensure assist and/or supervision for safety with mobility and ADL's This discharge recommendation: 
Has not yet been discussed the attending provider and/or case management Equipment recommendations for successful discharge (if) home: patient owns DME required for discharge OBJECTIVE DATA SUMMARY:  
 
Critical Behavior: 
Neurologic State: Alert Orientation Level: Disoriented to time Cognition: Appropriate decision making, Follows commands Safety/Judgement: Awareness of environment Functional Mobility Training: 
Bed Mobility: Pt sitting in chair on arrival. 
 
Transfers: 
Sit to Stand: Contact guard assistance Stand to Sit: Contact guard assistance Interventions: Tactile cues; Verbal cues Level of Assistance: Contact guard assistance Balance: 
Sitting: Intact; Without support Standing: Intact; With support Standing - Static: Good;Constant support Standing - Dynamic : Good;Constant support Ambulation/Gait Training: 
Distance (ft): 25 Feet (ft) Assistive Device: Walker, rolling;Gait belt Ambulation - Level of Assistance: Contact guard assistance Gait Abnormalities: Decreased step clearance Right Side Weight Bearing: Full Left Side Weight Bearing: Full Base of Support: Narrowed Speed/[place]: Pace decreased (<100 feet/min) Step Length: Left shortened;Right shortened Therapeutic Exercises:  
sitting EXERCISE Sets Reps Active Active Assist  
Passive Comments Ankle pumps 1 10 ? ? ? bilat Heel raises 1 10 ? ? ? \" Toe tap 1 10 ? ? ? \" Knee ext 1 10 ? ? ? \" Hip flex 1 10 ? ? ? \"  
 
Pain Ratin/10 Activity Tolerance: Good After treatment patient left in no apparent distress: Sitting in chair and Call bell within reach COMMUNICATION/COLLABORATION:  
The patients plan of care was discussed with: Registered Nurse Evaristo Olivares PTA Time Calculation: 25 mins

## 2019-10-03 NOTE — PROGRESS NOTES
Problem: Heart Failure: Day 2 Goal: Activity/Safety Outcome: Progressing Towards Goal 
Goal: Nutrition/Diet Outcome: Progressing Towards Goal 
Goal: Medications Outcome: Progressing Towards Goal 
Goal: Treatments/Interventions/Procedures Outcome: Progressing Towards Goal 
Goal: Psychosocial 
Outcome: Progressing Towards Goal 
Goal: *Oxygen saturation within defined limits Outcome: Progressing Towards Goal 
Goal: *Anxiety reduced or absent Outcome: Progressing Towards Goal

## 2019-10-03 NOTE — PROGRESS NOTES
HA: Home Health CM left voice message for pt's daughter, Sol Brown, requesting return call to discuss d/c planning. Pt has had HH in the past through [facility] in [facility]. Pt would benefit from PT/SN following this admission. CM will await return call. Danielle Horan MSW Care Manager [phone] 
 
 
UPDATE 10:27 AM 
CM received return call from pt's daughter. Daughter would like to use [facility] again for PT/SN. Referral sent to [facility] via [facility]. Additionally, daughter requested hospital bed for pt. CM will work on obtaining letter of necessity from MD and look for agencies that can provide bed to pt's location in the [facility].

## 2019-10-03 NOTE — PROGRESS NOTES
Hospitalist Progress Note NAME: Rex Ramon :  1918 MRN:  [id number] Assessment / Plan: 
Acute on chronic diastolic CHF POA 
-last EF is 65 %  
-neg balance of 500 ml so far 
-cont lasix and metolazone restarted per home dosing 
-stop Norvasc due to edema 
-not on BB at home 
-daily weights and I/O 
-Cards following 
-check orthostatics. Cont pt/ot Hypokalemia 
-k normal today Acute kidney injury POA admit BUN/creatinine 39 and 2.25 Stage III chronic kidney disease baseline creatinine 1.2-1.3 
-base line is around 1.3 and cr peaked at 2.25 
-cr is up to 1.6 today 
-check bmp in am  
  
Left hip pain POA x4 days Inability to ambulate x4 days Left hip x-rays were negative for fracture Saw PCP several days ago, steroid injection in the left hip Cont pt/ot 
  
Chronic atrial fibrillation on Eliquis Continue Eliquis. Cont added cardizem. 
  
History of left lung cancer status post XRT History of right breast cancer History of colon cancer Chest x-ray with significant volume loss on left appears stable from prior exams CT scan ordered by emergency room is essentially unchanged left effusion and volume loss There is stable airspace disease in the lung on the left New area of airspace disease on the right, I think this is nonspecific We will hold antibiotics Pro calcitonin is normal 
  
COPD Not currently on inhalers, not wheezing 
  
Essential hypertension POA Stop Norvasc due to lower extremity edema Cont added hydralazine Constipation 
-cont yokasta colace prn 
 
  
Overweight  POA Body mass index is 29.45 kg/m². 
  
 
 
Body mass index is 29.65 kg/m². Code status: DNR Prophylaxis: Eliquis Recommended Disposition: Home w/Family Subjective: Chief Complaint / Reason for Physician Visit Sob is better. No chest pain. Mild cough. No fever. Review of Systems: 
Symptom Y/N Comments  Symptom Y/N Comments Fever/Chills    Chest Pain Poor Appetite    Edema Cough    Abdominal Pain Sputum    Joint Pain SOB/WOOTEN    Pruritis/Rash Nausea/vomit    Tolerating PT/OT Diarrhea    Tolerating Diet Constipation    Other Could NOT obtain due to:   
 
Objective: VITALS:  
Last 24hrs VS reviewed since prior progress note. Most recent are: 
Patient Vitals for the past 24 hrs: 
 Temp Pulse Resp BP SpO2  
10/03/19 1535 97.7 °F (36.5 °C) 80 18 119/70 97 % 10/03/19 1242  79  125/62   
10/03/19 1044 97.5 °F (36.4 °C) 68 18 111/66 97 % 10/03/19 0738 98.3 °F (36.8 °C) 76 18 116/71 98 % 10/03/19 0540  70  128/77   
10/03/19 0335 97 °F (36.1 °C) 74 18 125/64 95 % 10/02/19 2257 97.7 °F (36.5 °C) 85 18 118/70 93 % 10/02/19 2210  98     
10/02/19 2112  92  123/75   
10/02/19 1938 97.5 °F (36.4 °C) 88 18 122/76 100 % Intake/Output Summary (Last 24 hours) at 10/3/2019 1607 Last data filed at 10/3/2019 3860 Gross per 24 hour Intake 520 ml Output 1075 ml Net -555 ml PHYSICAL EXAM: 
General: Alert, cooperative, no acute distress   
EENT:  EOMI. Anicteric sclerae. MMM Resp:  Good air entry, crackles +, no wheeze CV:  Regular  rhythm,  No edema GI:  Soft, Non distended, Non tender.  +Bowel sounds Neurologic:  Alert and awake, normal speech, Psych:   Not anxious nor agitated Skin:  No rashes. No jaundice Reviewed most current lab test results and cultures  YES Reviewed most current radiology test results   YES Review and summation of old records today    NO Reviewed patient's current orders and MAR    YES 
PMH/SH reviewed - no change compared to H&P Current Facility-Administered Medications:  
  nystatin (MYCOSTATIN) 100,000 unit/gram powder, , Topical, BID, Henrik Jernigan MD 
  metOLazone (ZAROXOLYN) tablet 2.5 mg, 2.5 mg, Oral, Q MON, WED & FRI, Ansley South, NP, 2.5 mg at 10/02/19 1723   dilTIAZem (CARDIZEM) IR tablet 30 mg, 30 mg, Oral, Q6H, Shaheen, Belinda Garcia MD, 30 mg at 10/03/19 1245   sodium chloride (NS) flush 5-40 mL, 5-40 mL, IntraVENous, Q8H, Sonja Sandoval MD, 10 mL at 10/03/19 0543   sodium chloride (NS) flush 5-40 mL, 5-40 mL, IntraVENous, PRN, Sonja Sandoval MD 
  acetaminophen (TYLENOL) tablet 650 mg, 650 mg, Oral, Q6H PRN, Luiza Butler MD, 650 mg at 10/02/19 2112 
  naloxone [facility]) injection 0.4 mg, 0.4 mg, IntraVENous, PRN, Sonja Sandoval MD 
  ondansetron [facility]) injection 4 mg, 4 mg, IntraVENous, Q4H PRN, Sonja Sandoval MD 
  bisacodyl (DULCOLAX) suppository 10 mg, 10 mg, Rectal, DAILY PRN, Luiza Butler MD, 10 mg at 10/02/19 2009   apixaban (ELIQUIS) tablet 2.5 mg, 2.5 mg, Oral, BID, Sonja Sandoval MD, 2.5 mg at 10/03/19 1939   hydrALAZINE (APRESOLINE) tablet 10 mg, 10 mg, Oral, TID, Sonja Sandoval MD, 10 mg at 10/03/19 5962   hydrALAZINE (APRESOLINE) 20 mg/mL injection 10 mg, 10 mg, IntraVENous, Q6H PRN, Sonja Sandoval MD 
  senna-docusate (PERICOLACE) 8.6-50 mg per tablet 1 Tab, 1 Tab, Oral, BID PRN, Clementine Yee MD, 1 Tab at 10/02/19 2112   furosemide (LASIX) tablet 80 mg, 80 mg, Oral, DAILY, Ansley South NP, 80 mg at 10/03/19 1180   furosemide (LASIX) tablet 40 mg, 40 mg, Oral, QPM, Ansley South NP, 40 mg at 10/02/19 1723 
________________________________________________________________________ Care Plan discussed with: 
  Comments Patient y Family RN y   
Care Manager Consultant Multidiciplinary team rounds were held today with , nursing, pharmacist and clinical coordinator. Patient's plan of care was discussed; medications were reviewed and discharge planning was addressed. ________________________________________________________________________ Total NON critical care TIME:  35    Minutes Total CRITICAL CARE TIME Spent:   Minutes non procedure based Comments >50% of visit spent in counseling and coordination of care    
________________________________________________________________________ Lorenzo Paz MD  
 
Procedures: see electronic medical records for all procedures/Xrays and details which were not copied into this note but were reviewed prior to creation of Plan. LABS: 
I reviewed today's most current labs and imaging studies. Pertinent labs include: 
Recent Labs 10/02/19 
0253 10/01/19 
0151 09/30/19 
1652 WBC 5.3 5.1 5.2 HGB 12.6 13.8 13.3 HCT 38.9 43.8 40.9  219 244 Recent Labs 10/03/19 
0028 10/02/19 
1180 10/01/19 
0151 09/30/19 
1652  141 140 142  
K 3.7 3.0* 3.5 3.7  101 100 100 CO2 34* 35* 33* 38* * 96 178* 103* BUN 34* 33* 38* 39* CREA 1.60* 1.40* 1.75* 2.25* CA 9.9 9.4 10.0 10.3* MG 1.9  --   --   --   
ALB  --   --  3.3* 3.1* TBILI  --   --  0.6 0.9 SGOT  --   --  27 28 ALT  --   --  18 18 Signed: Lorenzo Paz MD

## 2019-10-03 NOTE — CARDIO/PULMONARY
Cardiac Rehab Note: chart review CHF BUNDLE   
 
EF 55-60%  on 5/31/2018 per echo Smoking history assessed. Patient is a former smoker Current inpatient diet: DIET CARDIAC \"Living with Heart Failure\" book with daily weight calendar and s/s of heart failure magnet provided to Alexandraze Alis on 10/3/2019 Educated using teach back method. Instruction given on s/s of CHF, checking weight every am and calling MD if weight is up 2-3 lbs in a day or 5 lbs in a week (or as directed by the physician), fluid/Na restrictions, s/s of worsening CHF and when to call MD. Reviewed activity as tolerated with frequent rest periods as needed, taking medications as prescribed, and the importance of follow up visits with physician. States she lives with daughter and does stretches at home. States she does not use salt. Encouraged to keep up the good work and have moderation in choices but enjoy her life! Hank Snell verbalized understanding with questions answered.   Brien Gallagher RN

## 2019-10-03 NOTE — PROGRESS NOTES
1900: Bedside shift change report given to Pablo Bentley (oncoming nurse) by Kyrie Le (offgoing nurse). Report included the following information SBAR.  
 
2100: Patient complains of constipation, requesting suppository and stool softener. 2130: Patient is in a fib HR 90s-120. Paged Dr. Cortés File, orders received for PO Diltiazem now and every 6 hours. Hold for systolic BP less than 198 or HR less than 60. Instructed to call if HR sustains at 130 or greater 2250: Patient had small BM, resting quietly in bed. 
 
0700: 
Bedside shift change report GIVEN TO Lexi RN. Report included the following information SBAR. SIGNIFICANT CHANGES DURING SHIFT:   
 
 
CONCERNS TO ADDRESS WITH MD:   
 
 
 
 
[facility] NURSING NOTE Admission Date 9/30/2019 Admission Diagnosis Acute kidney injury ([address] [N17.9] Consults IP CONSULT TO CARDIOLOGY Cardiac Monitoring [x] Yes [] No  
  
Purposeful Hourly Rounding [x] Yes   
Shane Score Total Score: 3 Shane score 3 or > [x] Bed Alarm [] Avasys [] 1:1 sitter [] Patient refused (Signed refusal form in chart) Michael Score Micheal Score: 17 Michael score 14 or < [x] PMT consult [] Wound Care consult  
 []  Specialty bed  [] Nutrition consult Influenza Vaccine Received Flu Vaccine for Current Season (usually Sept-March): No  
 Patient/Guardian Refused (Notify MD): No  
  
Oxygen needs? [x] Room air Oxygen @  []1L    []2L    []3L   []4L    []5L   []6L via NC Chronic home O2 use? [] Yes [] No 
Perform O2 challenge test and document in progress note using smartphrase (.Homeoxygen) Last bowel movement Last Bowel Movement Date: 09/27/19 Urinary Catheter LDAs Peripheral IV 09/30/19 Left Antecubital (Active) Site Assessment Clean, dry, & intact 10/3/2019  4:00 AM  
Phlebitis Assessment 0 10/3/2019  4:00 AM  
Infiltration Assessment 0 10/3/2019  4:00 AM  
Dressing Status Clean, dry, & intact 10/3/2019  4:00 AM  
 Dressing Type Tape;Transparent 10/3/2019  4:00 AM  
Hub Color/Line Status Pink;Flushed 10/3/2019  4:00 AM  
                  
  
Readmission Risk Assessment Tool Score Medium Risk 12 Total Score 3 Has Seen PCP in Last 6 Months (Yes=3, No=0)  
 4 IP Visits Last 12 Months (1-3=4, 4=9, >4=11) 9 Pt. Coverage (Medicare=5 , Medicaid, or Self-Pay=4) Criteria that do not apply:  
 . Living with Significant Other. Assisted Living. LTAC. SNF. or  
Rehab Patient Length of Stay (>5 days = 3) Charlson Comorbidity Score (Age + Comorbid Conditions) Expected Length of Stay 4d 2h Actual Length of Stay 3

## 2019-10-04 NOTE — PROGRESS NOTES
Follow up with Cardiology Dr Severino Cano 10/23/19 @ 10:00, and PCP Dr Chetna Diego 10/10  /19 @ 10:30 . On AVS and Hug team notified.

## 2019-10-04 NOTE — PROGRESS NOTES
Problem: Heart Failure: Day 3 Goal: Activity/Safety Outcome: Progressing Towards Goal 
Goal: Diagnostic Test/Procedures Outcome: Progressing Towards Goal 
Goal: Nutrition/Diet Outcome: Progressing Towards Goal 
Goal: Medications Outcome: Progressing Towards Goal 
Goal: Respiratory Outcome: Progressing Towards Goal 
  
Problem: Pressure Injury - Risk of 
Goal: *Prevention of pressure injury Description Document Michael Scale and appropriate interventions in the flowsheet. Outcome: Progressing Towards Goal 
Note:  
Pressure Injury Interventions: 
Sensory Interventions: Assess changes in LOC, Keep linens dry and wrinkle-free, Minimize linen layers Moisture Interventions: Absorbent underpads, Minimize layers Activity Interventions: Increase time out of bed Mobility Interventions: Float heels, HOB 30 degrees or less Nutrition Interventions: Document food/fluid/supplement intake Friction and Shear Interventions: Foam dressings/transparent film/skin sealants, HOB 30 degrees or less, Minimize layers Problem: Falls - Risk of 
Goal: *Absence of Falls Description Document [place] Fall Risk and appropriate interventions in the flowsheet. Outcome: Progressing Towards Goal 
Note:  
Fall Risk Interventions: 
Mobility Interventions: Bed/chair exit alarm, Patient to call before getting OOB Mentation Interventions: Bed/chair exit alarm, Door open when patient unattended, Increase mobility Medication Interventions: Bed/chair exit alarm, Patient to call before getting OOB Elimination Interventions: Bed/chair exit alarm, Call light in reach, Patient to call for help with toileting needs

## 2019-10-04 NOTE — PROGRESS NOTES
1900: Bedside shift change report given to Dali Nelson (oncoming nurse) by Adilson Alcantara (offgoing nurse). Report included the following information SBAR.  
 
0600: Cardizem held- HR 58  
 
0700: 
Bedside shift change report GIVEN TO MC Yepez. Report included the following information SBAR. SIGNIFICANT CHANGES DURING SHIFT:  
 
 
 
 
CONCERNS TO ADDRESS WITH MD:   
 
 
 
 
[facility] NURSING NOTE Admission Date 9/30/2019 Admission Diagnosis Acute kidney injury ([address] [N17.9] Consults IP CONSULT TO CARDIOLOGY Cardiac Monitoring [x] Yes [] No  
  
Purposeful Hourly Rounding [x] Yes   
Shane Score Total Score: 3 Shane score 3 or > [x] Bed Alarm [] Avasys [] 1:1 sitter [] Patient refused (Signed refusal form in chart) Michael Score Michael Score: 17 Michael score 14 or < [x] PMT consult [] Wound Care consult  
 []  Specialty bed  [] Nutrition consult Influenza Vaccine Received Flu Vaccine for Current Season (usually Sept-March): No  
 Patient/Guardian Refused (Notify MD): No  
  
Oxygen needs? [x] Room air Oxygen @  []1L    []2L    []3L   []4L    []5L   []6L via NC Chronic home O2 use? [] Yes [] No 
Perform O2 challenge test and document in progress note using [employer] (.Homeoxygen) Last bowel movement Last Bowel Movement Date: 10/03/19 Urinary Catheter LDAs Peripheral IV 09/30/19 Left Antecubital (Active) Site Assessment Clean, dry, & intact 10/3/2019  8:00 PM  
Phlebitis Assessment 0 10/3/2019  8:00 PM  
Infiltration Assessment 0 10/3/2019  8:00 PM  
Dressing Status Clean, dry, & intact 10/3/2019  8:00 PM  
Dressing Type Tape;Transparent 10/3/2019  8:00 PM  
Hub Color/Line Status Pink;Flushed 10/3/2019  8:00 PM  
                  
  
Readmission Risk Assessment Tool Score Medium Risk 12 Total Score 3 Has Seen PCP in Last 6 Months (Yes=3, No=0)  
 4 IP Visits Last 12 Months (1-3=4, 4=9, >4=11) 9 Pt. Coverage (Medicare=5 , Medicaid, or Self-Pay=4) Criteria that do not apply:  
 . Living with Significant Other. Assisted Living. LTAC. SNF. or  
Rehab Patient Length of Stay (>5 days = 3) Charlson Comorbidity Score (Age + Comorbid Conditions) Expected Length of Stay 4d 2h Actual Length of Stay 4

## 2019-10-04 NOTE — PROGRESS NOTES
Patient discharged. Went over prescriptions and DC follow up with patient and family including labwork. IV discontinued. Patient transported downstairs to car.

## 2019-10-04 NOTE — PROGRESS NOTES
Pharmacist Discharge Medication Reconciliation Significant PMH:  
Past Medical History:  
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   
 gout Squamous cell [facility] Chief Complaint for this Admission: Chief Complaint Patient presents with Difficulty Walking  
  pt given cortisone injection for her hip on thursday after being \"unable to walk with her walker. \" denies falls Allergies: Penicillins Discharge Medications:  
Discharge Medication List as of 10/4/2019  1:29 PM  
  
 
START taking these medications Details  
hydrALAZINE (APRESOLINE) 10 mg tablet Take 1 Tab by mouth three (3) times daily for 30 days. , Normal, Disp-90 Tab, R-0  
  
  
 
CONTINUE these medications which have CHANGED Details  
metOLazone (ZAROXOLYN) 2.5 mg tablet Take 1 Tab by mouth every Monday, Wednesday, Friday. Indications: take per cardiology, Normal, Disp-90 Tab, R-1  
  
  
 
CONTINUE these medications which have NOT CHANGED Details  
nystatin (MYCOSTATIN) topical cream Apply  to affected area two (2) times a day. Apply to affected area twice daily for 10 days, Historical Med  
  
albuterol (PROVENTIL HFA) 90 mcg/actuation inhaler Take 2 Puffs by inhalation every four (4) hours as needed for Wheezing or Shortness of Breath., Historical Med  
  
furosemide (LASIX) 40 mg tablet Take 2 tab 80mg in AM and 1 tab 40mg in PM (Corrected Rx), Normal, Disp-270 Tab, R-1  
  
albuterol-ipratropium (DUO-NEB) 2.5 mg-0.5 mg/3 ml nebu Take 3 mL by inhalation every four (4) hours as needed (shortness of breath). , Historical Med  
  
apixaban (ELIQUIS) 2.5 mg tablet Take 1 Tab by mouth two (2) times a day., Normal, Disp-60 Tab, R-11  
  
 mv-mn/folic acid/calcium/vit K (ONE-A-DAY WOMEN'S 50 PLUS PO) Take 1 Tab by mouth daily. , Historical Med  
  
dextran 70/hypromellose/PF (NATURAL TEARS, PF, OP) Administer 1 Drop to right eye daily as needed (dry eye). , Historical Med  
  
mometasone (NASONEX) 50 mcg/actuation nasal spray 2 Sprays by Both Nostrils route daily as needed (congestion). , Historical Med  
  
potassium chloride (KLOR-CON) 20 mEq packet Take 20 mEq by mouth daily. , Historical Med  
  
colchicine (COLCRYS) 0.6 mg tablet Take 0.6 mg by mouth daily as needed (gout flare up). , Historical Med  
  
acetaminophen (TYLENOL EXTRA STRENGTH) 500 mg tablet Take 1,000 mg by mouth every six (6) hours as needed for Pain., Historical Med  
  
  
 
STOP taking these medications  
  
 amLODIPine (NORVASC) 2.5 mg tablet Comments:  
Reason for Stopping:   
   
  
 
 
The patient's chart, MAR and AVS were reviewed by [facility]. Discharging Provider: Eren Simons Thank You, Nilsa

## 2019-10-04 NOTE — PROGRESS NOTES
[address] [place] [address]  [phone] Cardiology Progress Note 
 
 
10/4/2019 1PM 
 
Admit Date: 9/30/2019 Admit Diagnosis:  
Acute [facility] ([facility] [N17.9] Interval History/Subjective:  
 
Louise Busby is a 80 y.o. female with PMH a fib, HFpEF, CKD, HTN,  
GERD, lung CA who was admitted for Acute kidney injury ([address] [N17.9]. -VSS 
-K 3.0; creat 1.59 
-Weight down 5#; I/O incomplete 
-Ms. January Deluna is feeling well. She is not having any further SOB or WOOTEN. No c/o pain. Visit Vitals /70 (BP 1 Location: Left arm, BP Patient Position: Sitting) Pulse 88 Temp 97.7 °F (36.5 °C) Resp 18 Ht 5' 2\" (1.575 m) Wt 71.6 kg (157 lb 14.4 oz) SpO2 98% BMI 28.88 kg/m² Current Facility-Administered Medications Medication Dose Route Frequency  nystatin (MYCOSTATIN) 100,000 unit/gram powder   Topical BID  metOLazone (ZAROXOLYN) tablet 2.5 mg  2.5 mg Oral Q MON, WED & FRI  dilTIAZem (CARDIZEM) IR tablet 30 mg  30 mg Oral Q6H  
 sodium chloride (NS) flush 5-40 mL  5-40 mL IntraVENous Q8H  
 sodium chloride (NS) flush 5-40 mL  5-40 mL IntraVENous PRN  
 acetaminophen (TYLENOL) tablet 650 mg  650 mg Oral Q6H PRN  
 naloxone (NARCAN) injection 0.4 mg  0.4 mg IntraVENous PRN  
 ondansetron (ZOFRAN) injection 4 mg  4 mg IntraVENous Q4H PRN  
 bisacodyl (DULCOLAX) suppository 10 mg  10 mg Rectal DAILY PRN  
 apixaban (ELIQUIS) tablet 2.5 mg  2.5 mg Oral BID  hydrALAZINE (APRESOLINE) tablet 10 mg  10 mg Oral TID  hydrALAZINE (APRESOLINE) 20 mg/mL injection 10 mg  10 mg IntraVENous Q6H PRN  
 senna-docusate (PERICOLACE) 8.6-50 mg per tablet 1 Tab  1 Tab Oral BID PRN  
 furosemide (LASIX) tablet 80 mg  80 mg Oral DAILY  furosemide (LASIX) tablet 40 mg  40 mg Oral QPM  
   
Objective:  
  
Physical Exam: 
 
General: pleasant, elderly AAF sitting in chair in NAD. Appears younger than stated age. Heart: irregularRR, no m/S3/JVD, split s2 Lungs: clear/dim bases Abdomen: Soft, +BS, NTND Extremities: LE espinoza +DP/PT, 1+ edema BLE Neurologic: Grossly normal 
Skin:  Warm and dry. Data Review:  
Recent Labs 10/02/19 
7316 WBC 5.3 HGB 12.6 HCT 38.9  Recent Labs 10/04/19 
6897 10/03/19 
0028 10/02/19 
5594  140 141  
K 3.0* 3.7 3.0*  
CL 98 100 101 CO2 35* 34* 35* * 114* 96 BUN 40* 34* 33* CREA 1.59* 1.60* 1.40* CA 9.8 9.9 9.4 MG  --  1.9  -- No results for input(s): TROIQ, CPK, CKMB in the last 72 hours. Intake/Output Summary (Last 24 hours) at 10/4/2019 1320 Last data filed at 10/4/2019 1226 Gross per 24 hour Intake 180 ml Output 450 ml Net -270 ml Telemetry: a fib 80s ECG: a fib CXRAY: \"Stable left mid and lower lung field opacification with volume loss. Stable tiny right pleural effusion possible. .\" 
CT chest: There is stable left lung volume loss. Left pleural effusion is 
unchanged. There is stable consolidation within the left midlung. Centrilobular emphysematous changes are noted within the right upper lung. There is a small focus of patchy airspace disease within the right upper lobe, new compared to prior CT dated July 2018. There is a very small right pleural effusion, new compared to prior CT dated July 2018. \". \" 
 
Assessment:  
 
Active Problems: 
  Acute kidney injury ([facility] (9/30/2019) Plan:  
 
Chronic HFpEF:  compensated · Continue home lasix dose and the scheduled metolazone MWF  
· Continue to monitor and replete potassium as needed Chronic a fib:  Rate controlled · eliquis · Was placed on cardizem overnight the other day, but hasn't had tachycardia other than that episode. HTN:  Stable · Amlodipine stopped for potential contribution to leg swelling · Tolerating hydralazine RUMA:  Steady · Meds, as above Patient stable for discharge from cardiology perspective. Should follow up in 1-2 weeks at our office. Ivelisse Figueroa NP 
DNP, RN, AGACNP-BC [address] [facility] Cardiology 10/4/2019 Patient seen, examined by me personally. Plan discussed as detailed. Agree with note as outlined by  NP. I confirm findings in history and physical exam. No additional findings noted. Agree with plan as outlined above. Discussed with Dr. Jesi Rivera.  
 
Rehana Whiting MD

## 2019-10-04 NOTE — PROGRESS NOTES
Transitional Care Discharge HUG Note ED [facility] Patient: Fransisca Mcneil MRN: [id number]  SSN: [id number] YOB: 1918  Age: 80 y.o. Sex: female Admit Date: 9/30/2019 LOS: 4 days Assessment /Risk 
  
HA Diagnosis: 1. Acute on chronic diastolic HF EF 01% RX adjustments incl - decr Metalozone, stop Norvasc (edema) 2. Acute on CKD III- resolving 3. AF - Eliquis 4. Hx of CA - colon, Br 
5. L HIP pain, difficulty with ambulation dc w HH services Readmission Risks:  HIGH 1. Advanced Age- care goals noted 2. Advanced disease multiple systems 3. Increased risk of falls due to recent worsening HIP pain Fluid Status: - 2.5L Nurse Navigator: NA 
HA appointment with noted on AVS 
 
25.0 - 29.9 Overweight / Body mass index is 28.88 kg/m². Code status: Durable DNR sent with patient Recommended Disposition: Home w/Family Subjective:  
 
80 y.o. Number of Admissions this year  2 Heart Failure Bundle YES Advance Care Plan:  
 
Reviewed and on file ROS:  
 
A comprehensive ROS was performed and was negative except for as per HPI. Objective: Allergies Allergen Reactions  Penicillins Hives Past Medical History:  
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   
 gout  Squamous cell lung cancer ([address] Past Surgical History:  
Procedure Laterality Date [facility] Right mastectomy  HX APPENDECTOMY  HX HEENT    
 right glass eye  CA COLONOSCOPY W/BIOPSY SINGLE/MULTIPLE  3/29/2013  CA COLSC FLX W/RMVL OF TUMOR POLYP LESION SNARE TQ  3/29/2013 Social History Tobacco Use Smoking Status Former Smoker  Types: Cigarettes  Last attempt to quit: 1967  Years since quittin.7 Smokeless Tobacco Never Used Current Facility-Administered Medications Medication Dose Route Frequency  nystatin (MYCOSTATIN) 100,000 unit/gram powder   Topical BID  metOLazone (ZAROXOLYN) tablet 2.5 mg  2.5 mg Oral Q MON, WED & FRI  dilTIAZem (CARDIZEM) IR tablet 30 mg  30 mg Oral Q6H  
 sodium chloride (NS) flush 5-40 mL  5-40 mL IntraVENous Q8H  
 sodium chloride (NS) flush 5-40 mL  5-40 mL IntraVENous PRN  
 acetaminophen (TYLENOL) tablet 650 mg  650 mg Oral Q6H PRN  
 naloxone (NARCAN) injection 0.4 mg  0.4 mg IntraVENous PRN  
 ondansetron (ZOFRAN) injection 4 mg  4 mg IntraVENous Q4H PRN  
 bisacodyl (DULCOLAX) suppository 10 mg  10 mg Rectal DAILY PRN  
 apixaban (ELIQUIS) tablet 2.5 mg  2.5 mg Oral BID  hydrALAZINE (APRESOLINE) tablet 10 mg  10 mg Oral TID  hydrALAZINE (APRESOLINE) 20 mg/mL injection 10 mg  10 mg IntraVENous Q6H PRN  
 senna-docusate (PERICOLACE) 8.6-50 mg per tablet 1 Tab  1 Tab Oral BID PRN  
 furosemide (LASIX) tablet 80 mg  80 mg Oral DAILY  furosemide (LASIX) tablet 40 mg  40 mg Oral QPM  
 
 
Prior to Admission Medications Prescriptions Last Dose Informant Patient Reported? Taking?  
acetaminophen (TYLENOL EXTRA STRENGTH) 500 mg tablet   Yes Yes Sig: Take 1,000 mg by mouth every six (6) hours as needed for Pain. albuterol (PROVENTIL HFA) 90 mcg/actuation inhaler   Yes Yes Sig: Take 2 Puffs by inhalation every four (4) hours as needed for Wheezing or Shortness of Breath. albuterol-ipratropium (DUO-NEB) 2.5 mg-0.5 mg/3 ml nebu 2019 at Unknown time  Yes Yes Sig: Take 3 mL by inhalation every four (4) hours as needed (shortness of breath). amLODIPine (NORVASC) 2.5 mg tablet 2019 at Unknown time  Yes Yes Sig: Take 2.5 mg by mouth daily. apixaban (ELIQUIS) 2.5 mg tablet 2019 at Unknown time  No Yes Sig: Take 1 Tab by mouth two (2) times a day. colchicine (COLCRYS) 0.6 mg tablet   Yes Yes Sig: Take 0.6 mg by mouth daily as needed (gout flare up). dextran 70/hypromellose/PF (NATURAL TEARS, PF, OP)   Yes Yes Sig: Administer 1 Drop to right eye daily as needed (dry eye). furosemide (LASIX) 40 mg tablet 2019 at Unknown time Self No Yes Sig: Take 2 tab 80mg in AM and 1 tab 40mg in PM (Corrected Rx) metOLazone (ZAROXOLYN) 2.5 mg tablet   No No  
Sig: Take 1 Tab by mouth daily. Indications: take per cardiology  
mometasone (NASONEX) 50 mcg/actuation nasal spray   Yes Yes Si Sprays by Both Nostrils route daily as needed (congestion). mv-mn/folic acid/calcium/vit K (ONE-A-DAY WOMEN'S 50 PLUS PO) 2019 at Unknown time  Yes Yes Sig: Take 1 Tab by mouth daily. nystatin (MYCOSTATIN) topical cream 2019  Yes Yes Sig: Apply  to affected area two (2) times a day. Apply to affected area twice daily for 10 days  
potassium chloride (KLOR-CON) 20 mEq packet 2019 at Unknown time  Yes Yes Sig: Take 20 mEq by mouth daily. Facility-Administered Medications: None Patient Vitals for the past 24 hrs: 
 Temp Pulse Resp BP SpO2  
10/04/19 1044 97.7 °F (36.5 °C) 88 18 127/70 98 % 10/04/19 0936  72     
10/04/19 0753    110/69   
10/04/19 0750    107/56   
10/04/19 0748 97.2 °F (36.2 °C) 61 18 108/41 98 % 10/04/19 0330 97.8 °F (36.6 °C) 78 18 124/64 98 % 10/03/19 2335 97.7 °F (36.5 °C) 82 18 130/67 97 % 10/03/19 2134  70  120/61   
10/03/19 1959 97.9 °F (36.6 °C) 84 18 101/58 96 % 10/03/19 1535 97.7 °F (36.5 °C) 80 18 119/70 97 % Intake and Output: 
 
Intake/Output Summary (Last 24 hours) at 10/4/2019 1323 Last data filed at 10/4/2019 1226 Gross per 24 hour Intake 180 ml Output 450 ml Net -270 ml PHYSICAL EXAM: 
 
Visit Vitals /70 (BP 1 Location: Left arm, BP Patient Position: Sitting) Pulse 88 Temp 97.7 °F (36.5 °C) Resp 18 Ht 5' 2\" (1.575 m) Wt 71.6 kg (157 lb 14.4 oz) SpO2 98% BMI 28.88 kg/m² General appearance: alert, cooperative, no distress, appears stated age Head: Normocephalic, without obvious abnormality, atraumatic Eyes: conjunctivae/corneas clear. PERRL, EOM's intact. Ears: hearing intact Throat: Lips, mucosa, and tongue normal. Teeth and gums normal 
Lungs: clear to auscultation bilaterally Heart: irregular rate and rhythm, S1, S2 normal, no murmur, click, rub or gallop Abdomen: soft, non-tender. Bowel sounds normal. No masses,  no organomegaly Extremities: edema +1 espinoza Pulses: 2+ and symmetric Skin: Skin color, texture, turgor normal. No rashes or lesions Neurologic: Non focal 
 
 
 
Lab/Data Review:  
Recent Results (from the past 24 hour(s)) METABOLIC PANEL, BASIC Collection Time: 10/04/19  3:42 AM  
Result Value Ref Range Sodium 138 136 - 145 mmol/L Potassium 3.0 (L) 3.5 - 5.1 mmol/L Chloride 98 97 - 108 mmol/L  
 CO2 35 (H) 21 - 32 mmol/L Anion gap 5 5 - 15 mmol/L Glucose 101 (H) 65 - 100 mg/dL BUN 40 (H) 6 - 20 MG/DL Creatinine 1.59 (H) 0.55 - 1.02 MG/DL  
 BUN/Creatinine ratio 25 (H) 12 - 20 GFR est AA 36 (L) >60 ml/min/1.73m2 GFR est non-AA 30 (L) >60 ml/min/1.73m2 Calcium 9.8 8.5 - 10.1 MG/DL Imaging Reviewed:  
 
Xr Hip Lt W Or Wo Pelv 2-3 Vws Result Date: 9/30/2019 IMPRESSION: No acute abnormality. Ct Chest Wo Cont Result Date: 9/30/2019 IMPRESSION: Very small right pleural effusion, new. Small focus of patchy airspace disease within the right upper lobe, new. Xr Chest [facility] Result Date: 9/30/2019 IMPRESSION: Stable left mid and lower lung field opacification with volume loss. Stable tiny right pleural effusion possible. .  
 
 
. Assessment:  
 
Active Problems: 
  Acute kidney injury ([address] (9/30/2019) Plan:  
 
The objective of todays visit was to review the transitional care plan with the patient. We discussed the importance of transitional care as it relates to reducing the likelihood of readmission. We reviewed the goals for the first 30 days following hospital discharge. The patient and I discussed wrap around services including nurse navigation, care coordination, home health, transitional care appointments with their primary care provider and specialist team and the role of dispatch health. Patient education focused on readmission zones as described as The Red Zone: High risk for readmission, days 1-21 The Yellow Zone: Moderate risk for readmission, days 22-29 The Green Zone: Lower risk for readmission, days 30 and after 1. AMD and DDNR reviewed 2 All labs, I/O's and imaging have been reviewed. 4. Medication Reconciliation  performed at discharge. Accessiblity good RX rationale explained not appropriate, [facility] dispenses medications Compliancy unknown 5. Collaborated with  RN and CM on this plan of care. 6. Not within [facility] area tenisha Ga Greater than 45  minutes were spent in patient management, greater than half of which was spent in counseling and coordination of care. Signed By: Vicki Lee NP October 4, 2019

## 2019-10-04 NOTE — PROGRESS NOTES
Problem: Mobility Impaired (Adult and Pediatric) Goal: *Acute Goals and Plan of Care (Insert Text) Description FUNCTIONAL STATUS PRIOR TO ADMISSION: Patient is mod I with use of rollator at baseline. Has needed increased assistance over the past 4 days due to L hip pain. Gets assistance from caregiver and daughter for ADLs. Denies recent falls. Reports that she completes supine LE exercises daily. HOME SUPPORT PRIOR TO ADMISSION: The patient lived with daughter. Patient has caregivers when daughter is not present. Physical Therapy Goals Initiated 10/2/2019 1. Patient will move from supine to sit and sit to supine , scoot up and down and roll side to side in bed with modified independence within 7 day(s). 2.  Patient will transfer from bed to chair and chair to bed with modified independence using the least restrictive device within 7 day(s). 3.  Patient will perform sit to stand with modified independence within 7 day(s). 4.  Patient will ambulate with modified independence for 100 feet with the least restrictive device within 7 day(s). Outcome: Progressing Towards Goal 
Note: PHYSICAL THERAPY TREATMENT Patient: Shwetha Guillen (89 y.o. female) Date: 10/4/2019 Diagnosis: Acute kidney injury ([facility] [N17.9] Precautions: Fall, DNR, Bed Alarm Chart, physical therapy assessment, plan of care and goals were reviewed. ASSESSMENT Patient continues with skilled PT services and is progressing towards goals, pt tolerated tx well, no LOB or SOB, increased endurance, did well with transfers and ther-ex, vc's for safety and proper RW use. Current Level of Function Impacting Discharge (mobility/balance): CGA x1 PLAN : 
Patient continues to benefit from skilled intervention to address the above impairments. Continue treatment per established plan of care. to address goals. Recommendation for discharge: (in order for the patient to meet his/her long term goals) Physical therapy at least 2 days/week in the home AND ensure assist and/or supervision for safety with mobility and ADL's This discharge recommendation: 
Has been made in collaboration with the attending provider and/or case management Equipment recommendations for successful discharge (if) home: needs hospital bed to keep head of bed above a 30 deg angle due to her CHF,  has rollator. OBJECTIVE DATA SUMMARY:  
 
Critical Behavior: 
Neurologic State: Alert Orientation Level: Disoriented to time, Oriented to person, Oriented to place, Oriented to situation Cognition: Appropriate decision making, Follows commands Safety/Judgement: Awareness of environment Functional Mobility Training: 
Bed Mobility: Pt sitting in chair on arrival. 
 
Transfers: 
Sit to Stand: Contact guard assistance Stand to Sit: Contact guard assistance Interventions: Tactile cues; Verbal cues Level of Assistance: Contact guard assistance Balance: 
Sitting: Intact; Without support Standing: Intact; With support Standing - Static: Good;Constant support Standing - Dynamic : Good;Constant support Ambulation/Gait Training: 
Distance (ft): 50 Feet (ft) Assistive Device: Walker, rolling;Gait belt Ambulation - Level of Assistance: Contact guard assistance Gait Abnormalities: Decreased step clearance Right Side Weight Bearing: Full Left Side Weight Bearing: Full Base of Support: Narrowed Speed/[place]: Pace decreased (<100 feet/min) Step Length: Left shortened;Right shortened Therapeutic Exercises:  
sitting EXERCISE Sets Reps Active Active Assist  
Passive Comments Ankle pumps 1 10 ? ? ? bilat Heel raises 1 10 ? ? ? \" Toe tap 1 10 ? ? ? \" Knee ext 1 10 ? ? ? \" Hip flex 1 10 ? ? ? \"  
 
Pain Ratin/10 Activity Tolerance: Good After treatment patient left in no apparent distress: Sitting in chair and Call bell within reach COMMUNICATION/COLLABORATION:  
 The patients plan of care was discussed with: Registered Nurse Tawana Chi PTA Time Calculation: 25 mins

## 2019-10-04 NOTE — PROGRESS NOTES
HA plan: Pt will discharge home today, transported by her daughter and son in a personal vehicle. Pt will receive [place] PT/SN through [facility] and is scheduled to follow-up with her Cardiologist on 10/23/19. Pt's family will call her PCP to schedule a f/u within 1 week of d/c. [address] has not notified CM of successful processing of the hospital bed yet, however they will call CM and pt's daughter once processed and ready to be delivered to the home. Pt/family verbalized understanding of the plan. No further concerns indicated at this time. AVS updated. Patient is ready for discharge from Care Managment standpoint. Medicare pt has received, reviewed, and signed 2nd IM letter informing them of their right to appeal the discharge. Signed copy has been placed on pt bedside chart. Care Management Interventions PCP Verified by CM: Yes Last Visit to PCP: 09/27/19 Mode of Transport at Discharge: Other (see comment)(daughter) Transition of Care Consult (CM Consult): Home Health([facility]) [address]: Yes MyChart Signup: No 
Discharge Durable Medical Equipment: Yes(hospital bed through [address] Physical Therapy Consult: Yes Occupational Therapy Consult: Yes Speech Therapy Consult: No 
Current Support Network: Has Personal Caregivers Confirm Follow Up Transport: Family Plan discussed with Pt/Family/Caregiver: Yes Freedom of Choice Offered: Yes Discharge Location Discharge Placement: Home with home health SETH Diaz Care Manager [phone]

## 2019-10-04 NOTE — PROGRESS NOTES
HA: Home [facility] accepted pt for [facility] PT/SN. Added to AVS. Additionally, CM found [employer] company who can deliver hospital bed to [place]. LUIS ANGEL spoke with Katharine, , with [address] regarding order (ph. [phone]). CM faxed Facesheet, H&P, PT note, [facility] order form, and signed MD letter of medical necessity to [address] (fx. [phone]). CM will await processing of order and call [facility] this afternoon to check status. Chiqui Walters, MSW Care Manager [phone]

## 2019-10-05 NOTE — DISCHARGE INSTRUCTIONS
Patient Discharge Instructions    Solomon Smalls / [id number] : 1918    Admitted 2019 Discharged: 10/4/2019         DISCHARGE DIAGNOSIS:   Acute on chronic diastolic CHF POA  Hypokalemia  Acute kidney injury POA admit BUN/creatinine 39 and 2.25  Stage III chronic kidney disease baseline creatinine 1.2-1.3  Left hip pain POA x4 days  Chronic atrial fibrillation on Eliquis  History of left lung cancer status post XRT  History of right breast cancer  History of colon cancer  COPD  Essential hypertension POA  Constipation  Overweight       Take Home Medications     {Medication reconciliation information is now added to the patient's AVS automatically when it is printed. There is no need to use this SmartLink in discharge instructions. Highlight this text and delete it to clear this message}      General drug facts     If you have a very bad allergy, wear an allergy ID at all times. It is important that you take the medication exactly as they are prescribed. Keep your medication in the bottles provided by the pharmacist.  Keep a list of all your drugs (prescription, natural products, vitamins, OTC) with you. Give this list to your doctor. Do not take other medications without consulting your doctor. Do not share your drugs with others and do not take anyone else's drugs. Keep all drugs out of the reach of children and pets. Most drugs may be thrown away in household trash after mixing with coffee grounds or inocencia litter and sealing in a plastic bag. Keep a list Call your doctor for help with any side effects. If in the [place] you may also call the FDA at 1-436-QMF-7161    Talk with the doctor before starting any new drug, including OTC, natural products, or vitamins. What to do at Home    1. Recommended diet: Cardiac    2. Recommended activity: Activity as tolerated    3.  If you experience any of the following symptoms then please call your primary care physician or return to the emergency room if you cannot get hold of your doctor:    4. Wound Care: None    5. Lab work: Bmp  on 10/8/19 with PCP. 6.Bring these papers with you to your follow up appointments. The papers will help your doctors be sure to continue the care plan from the hospital.      Follow-up with:   PCP: Oliver Coy MD  Follow-up Information     Follow up With Specialties Details Why Contact Info    Oilver Coy MD [facility] [place] [place]  [phone]      Halie Sullivan MD Cardiology, Cardio Vascular Surgery, Internal Medicine   [address]  [address] [address]  [phone]      [address] [place] [address]  for physical therapy and skilled nursing [facility] 1775    Oliver Coy MD [facility]  [phone]      Oliver Coy MD [facility] Schedule an appointment as soon as possible for a visit in 1 week  [address] [facility]      Halie Sullivan MD Cardiology, [address] [place] [address] Vascular Surgery, Internal Medicine Schedule an appointment as soon as possible for a visit in 2 weeks  [address]. [phone]             Please call for your own appointment        Information obtained by :  I understand that if any problems occur once I am at home I am to contact my physician. I understand and acknowledge receipt of the instructions indicated above.                                                                                                                                            Physician's or R.N.'s Signature                                                                  Date/Time                                                                                                                                              Patient or Representative Signature Date/Time

## 2019-10-05 NOTE — DISCHARGE SUMMARY
Hospitalist Discharge Summary Patient ID: 
Sunday Viktoria [id number] 
89 y.o. 
8/12/1918 9/30/2019 PCP on record: Martine Hernández MD 
 
Admit date: 9/30/2019 Discharge date and time: 10/4/2019 DISCHARGE DIAGNOSIS: 
 
Acute on chronic diastolic CHF POA Hypokalemia Acute kidney injury POA admit BUN/creatinine 39 and 2.25 Stage III chronic kidney disease baseline creatinine 1.2-1.3 Left hip pain POA x4 days Chronic atrial fibrillation on Eliquis History of left lung cancer status post XRT History of right breast cancer History of colon cancer COPD Essential hypertension POA Constipation CONSULTATIONS: 
IP CONSULT TO CARDIOLOGY Excerpted HPI from H&P of Ana Paula Norris MD: 
8-year-old -American female 
  
Lives with her daughter at baseline They have caregivers to come in to help daughter DNR/DNI Baseline ambulates independently Prior used to wear home oxygen, no longer uses her home nebulizer Normally stays in a recliner at home 
  
Known diastolic congestive heart failure Takes Lasix 80 mg in the morning 40 mg in the evening Uses metolazone as needed for weight gain 
  
4 days ago began to have trouble ambulating Would not get up and walk on her own, a change from baseline Was complaining of left hip pain moderate to severe, worse with movement No trauma or falls to the area Saw PCP on Friday Left Hip was injected with a steroid, no improvement initially Some shortness of breath but overall breathing is been stable Significant lower extremity edema per the family Because of the inability to walk they brought her to the emergency room as the main complaint 
  
Emergency room  
chest x-ray with volume loss on the left lung, left effusion, this is stable ED did CT scan which was essentially unchanged  
      except for some very mild patchy airspace disease in the right lung 
proBNP was elevated 3016 3+ lower extremity edema BUN/creatinine were elevated at 39 and 2.25, prior baseline creatinine 1.2-1.3 Received a dose of IV Lasix in the emergency room for concern for CHF Left hip x-ray showed no evidence of fracture We will called to admit the patient 
 
______________________________________________________________________ DISCHARGE SUMMARY/HOSPITAL COURSE:  for full details see H&P, daily progress notes, labs, consult notes. Acute on chronic diastolic CHF POA 
-last EF is 65 %  
-cont lasix and metolazone restarted per home dosing 
-stop Norvasc due to edema 
-not on BB at home 
-daily weights and I/O 
-Cards following and recommended out pt follow up as cr was holding steady and her sob is improved. -ortho stats negative. PT recommended Home with home health 
  
Hypokalemia 
-replaced  
  
Acute kidney injury POA admit BUN/creatinine 39 and 2.25 Stage III chronic kidney disease baseline creatinine 1.2-1.3 
-base line is around 1.3 and cr peaked at 2.25 
-cr is trending down to 1.4 
-check bmp in am  
-cards recommended to cont medications at current home doses 
  
Left hip pain POA x4 days Inability to ambulate x4 days Left hip x-rays were negative for fracture Saw PCP several days ago, steroid injection in the left hip Pain is resolved 
  
Chronic atrial fibrillation on Eliquis Continue Eliquis for now 
  History of left lung cancer status post XRT History of right breast cancer History of colon cancer Chest x-ray with significant volume loss on left appears stable from prior exams CT scan ordered by emergency room is essentially unchanged left effusion and volume loss There is stable airspace disease in the lung on the left New area of airspace disease on the right, I think this is nonspecific We will hold antibiotics Pro calcitonin is normal 
  
COPD Not currently on inhalers, not wheezing 
  
Essential hypertension POA Stop Norvasc due to lower extremity edema Cont added hydralazine 
  
Constipation -cont yokasta colace prn HTN 
-norvasc was stopped by cards and started on hydralazine Pt seen and examined today, vitals are stable, lab work is at base line and was cleared by cards to be discharged for out pt follow up. 
 
 
_______________________________________________________________________ Patient seen and examined by me on discharge day. Pertinent Findings: 
Gen:    Not in distress Chest: Clear lungs CVS:   Regular rhythm. No edema Abd:  Soft, not distended, not tender Neuro:  Alert, awake 
_______________________________________________________________________ DISCHARGE MEDICATIONS:  
Discharge Medication List as of 10/4/2019  1:29 PM  
  
START taking these medications Details  
hydrALAZINE (APRESOLINE) 10 mg tablet Take 1 Tab by mouth three (3) times daily for 30 days. , Normal, Disp-90 Tab, R-0  
  
  
CONTINUE these medications which have CHANGED Details  
metOLazone (ZAROXOLYN) 2.5 mg tablet Take 1 Tab by mouth every Monday, Wednesday, Friday. Indications: take per cardiology, Normal, Disp-90 Tab, R-1  
  
  
CONTINUE these medications which have NOT CHANGED Details  
nystatin (MYCOSTATIN) topical cream Apply  to affected area two (2) times a day. Apply to affected area twice daily for 10 days, Historical Med  
  
albuterol (PROVENTIL HFA) 90 mcg/actuation inhaler Take 2 Puffs by inhalation every four (4) hours as needed for Wheezing or Shortness of Breath., Historical Med  
  
furosemide (LASIX) 40 mg tablet Take 2 tab 80mg in AM and 1 tab 40mg in PM (Corrected Rx), Normal, Disp-270 Tab, R-1  
  
albuterol-ipratropium (DUO-NEB) 2.5 mg-0.5 mg/3 ml nebu Take 3 mL by inhalation every four (4) hours as needed (shortness of breath). , Historical Med  
  
apixaban (ELIQUIS) 2.5 mg tablet Take 1 Tab by mouth two (2) times a day., Normal, Disp-60 Tab, T-46  
  
mv-mn/folic acid/calcium/vit K (ONE-A-DAY WOMEN'S 50 PLUS PO) Take 1 Tab by mouth daily. , Historical Med  
  
 dextran 70/hypromellose/PF (NATURAL TEARS, PF, OP) Administer 1 Drop to right eye daily as needed (dry eye). , Historical Med  
  
mometasone (NASONEX) 50 mcg/actuation nasal spray 2 Sprays by Both Nostrils route daily as needed (congestion). , Historical Med  
  
potassium chloride (KLOR-CON) 20 mEq packet Take 20 mEq by mouth daily. , Historical Med  
  
colchicine (COLCRYS) 0.6 mg tablet Take 0.6 mg by mouth daily as needed (gout flare up). , Historical Med  
  
acetaminophen (TYLENOL EXTRA STRENGTH) 500 mg tablet Take 1,000 mg by mouth every six (6) hours as needed for Pain., Historical Med  
  
  
STOP taking these medications  
  
 amLODIPine (NORVASC) 2.5 mg tablet Comments:  
Reason for Stopping:   
   
  
 
 
 
Patient Follow Up Instructions: 1. Recommended diet: Cardiac 2. Recommended activity: Activity as tolerated 3. If you experience any of the following symptoms then please call your primary care physician or return to the emergency room if you cannot get hold of your doctor: 
 
4. Wound Care: None 5. Lab work: Bmp  on 10/8/19 with PCP. Follow-up Information Follow up With Specialties Details Why Contact Info Herschel Nyhan, MD Family Practice In 1 week  [address] [facility] [address] 
[phone] Salina Neri MD Cardiology, [address] [place] [address] Vascular Surgery, Internal Medicine Go on 10/23/2019 Cardiology follow-up at 10:00 am [address] [facility] 
[phone] [address] [facility] [address]  for physical therapy and skilled nursing [address] [id number] [phone] [facility]  The durable medical equipment company the hospital bed is being ordered through. They will call you to schedule delivery. UDAY Lubin [phone] [facility] 
[phone]  
  
 
________________________________________________________________ Risk of deterioration: High Condition at Discharge:  Stable 
__________________________________________________________________ Disposition Home with family and home health services 
 
____________________________________________________________________ Code Status: DNR/DNI 
___________________________________________________________________ Total time in minutes spent coordinating this discharge (includes going over instructions, follow-up, prescriptions, and preparing report for sign off to her PCP) :  35  minutes Signed: 
Gabe Diaz MD

## 2019-10-07 NOTE — PROGRESS NOTES
Hospital Discharge Follow-Up Date/Time:  10/7/2019 3:42 PM 
 
Patient was admitted to [facility] on 19 and discharged on 10/4/19 for CHF. The physician discharge summary was available at the time of outreach. Patient was contacted within 2 business days of discharge. Top Challenges reviewed with the provider 10/4/19 K+ 3.0 Will need recheck Advance Care Planning:  
Does patient have an Advance Directive:  reviewed and current Method of communication with provider :fax Inpatient RRAT score: 16 Was this a readmission? no  
Patient stated reason for the readmission: n/a Care Transition Nurse (CTN) contacted the daughter by telephone to perform post hospital discharge assessment. Verified name and  with daughter as identifiers. Provided introduction to self, and explanation of the CTN role. daughter received hospital discharge instructions. CTN reviewed discharge instructions and red flags with daughter who verbalized understanding. Daughter given an opportunity to ask questions and does not have any further questions or concerns at this time. The daughter agrees to contact the PCP office for questions related to their healthcare. CTN provided contact information for future reference. Disease Specific:   CHF Patients top risk factors for readmission:  functional physical ability, lack of knowledge about disease, medical condition Home Health orders at discharge: [facility] Home Health company: [address] [place] [address] Date of initial visit: 10/6/19 Durable Medical Equipment ordered at discharge: hospital bed [address] [facility] [address]: [employer] Durable Medical Equipment received: delivery set for 10/8/19 Medication(s):  
New Medications at Discharge: hydrALAZINE (APRESOLINE) 10 mg tablet Take 1 Tab by mouth three (3) times daily for 30 days. , Normal, Disp-90 Tab, R-0 Changed Medications at Discharge: metOLazone (ZAROXOLYN) 2.5 mg tablet Take 1 Tab by mouth every Monday, Wednesday, Friday. Indications: take per cardiology, Normal, Disp-90 Tab, R-1 Discontinued Medications at [address] [place] [address]) 2.5 mg tablet Comments:  
Reason for Stopping:   
 
Medication reconciliation was performed with daughter, who verbalizes understanding of administration of home medications. There were no barriers to obtaining medications identified at this time. Referral to Pharm D needed: no  
 
Current Outpatient Medications Medication Sig  
 nystatin (MYCOSTATIN) topical cream Apply 1 g to affected area two (2) times daily as needed for Skin Irritation (buttocks). Use either cream or powder.  furosemide (LASIX) 40 mg tablet Take 80 mg by mouth daily (with breakfast).  furosemide (LASIX) 40 mg tablet Take 40 mg by mouth daily (after lunch).  acetaminophen (TYLENOL 8 HOUR) 650 mg TbER Take 1,300 mg by mouth every eight (8) hours as needed for Pain.  nystatin (MYCOSTATIN) powder Apply 1 Each to affected area two (2) times daily as needed for Other (buttock irritation). use either powder or cream  
 hydrALAZINE (APRESOLINE) 10 mg tablet Take 1 Tab by mouth three (3) times daily for 30 days.  metOLazone (ZAROXOLYN) 2.5 mg tablet Take 1 Tab by mouth every Monday, Wednesday, Friday. Indications: take per cardiology  albuterol (PROVENTIL HFA) 90 mcg/actuation inhaler Take 2 Puffs by inhalation every four (4) hours as needed for Wheezing or Shortness of Breath.  furosemide (LASIX) 40 mg tablet Take 2 tab 80mg in AM and 1 tab 40mg in PM (Corrected Rx) (Patient not taking: Reported on 10/6/2019)  albuterol-ipratropium (DUO-NEB) 2.5 mg-0.5 mg/3 ml nebu Take 3 mL by inhalation every four (4) hours as needed (shortness of breath).  apixaban (ELIQUIS) 2.5 mg tablet Take 1 Tab by mouth two (2) times a day.  mv-mn/folic acid/calcium/vit K (ONE-A-DAY WOMEN'S 50 PLUS PO) Take 1 Tab by mouth daily.  dextran 70/hypromellose/PF (NATURAL TEARS, PF, OP) Administer 1 Drop to right eye daily as needed (dry eye).  mometasone (NASONEX) 50 mcg/actuation nasal spray 2 Sprays by Both Nostrils route daily as needed (congestion).  potassium chloride (KLOR-CON) 20 mEq packet Take 20 mEq by mouth daily.  colchicine (COLCRYS) 0.6 mg tablet Take 0.6 mg by mouth daily as needed (gout flare up). No current facility-administered medications for this visit. There are no discontinued medications. [facility] follow up appointment(s):  
Future Appointments Date Time Provider Thee Cabrera 10/10/2019 To Be Determined [facility]  
10/15/2019 To Be Determined [facility]  
10/18/2019 To Be Determined [facility]  
10/22/2019 To Be Determined [facility]  
10/23/2019 10:00 AM Lauren Kent MD [address] [facility] [facility]  
10/25/2019 To Be Determined [facility]  
10/29/2019 To Be Determined [facility]  
11/1/2019 To Be Determined [facility]  
11/5/2019 To Be Determined [facility]  
11/8/2019 To Be Determined [facility]  
11/12/2019 To Be Determined De Parisian [facility]  
11/15/2019 To Be Determined De Euceda [facility]  
11/19/2019 To Be Determined [facility]  
11/22/2019 To Be Determined [facility]  
11/26/2019 To Be Determined [facility]  
11/29/2019 To Be Determined [facility]): PCP 10/10/19 [facility]:  out of service area Goals  Attend follow up appointments on schedule 10/8/19 Daughter reports PCP appointment on 10/10/19, cardio on 10/23/19. Daughter/CG will report attendance at next week outreach. HOA  Prevent complications post hospitalization. 10/8/19 Daughter reports patient weight is 156 pounds on 10/7/19. Denies chest pain, SOB, and edema. Hospital bed is to be delivered today by [employer]. She is compliant with diet and medications. Daughter/CG will monitor CHF S&S and report at next week outreach.  HOA

## 2019-10-23 NOTE — PROGRESS NOTES
1. Have you been to the ER, urgent care clinic since your last visit? Hospitalized since your last visit? Yes When: 9/30/2019 Where: [facility] Reason for visit: Kidney injury 2. Have you seen or consulted any other health care providers outside of the [address] [place] [address] since your last visit? Include any pap smears or colon screening. No  
 
 
Visit Vitals /64 (BP 1 Location: Left arm, BP Patient Position: Sitting) Pulse 80 Resp 14 Ht 5' 2\" (1.575 m) Wt 150 lb (68 kg) SpO2 97% BMI 27.44 kg/m² Unable to obtain blood pressure Right arm due to Hx Breast surgery. Chief Complaint Patient presents with  
[facility] Follow Up   Nurse states concerns about spitting more than usual.

## 2019-10-23 NOTE — PROGRESS NOTES
[address], [address] [facility]  [phone] Subjective:  
  
Ok Warren is a 80 y.o. female  with PMH a fib, HFpEF, CKD, HTN,  GERD, lung CA  Presents to clinic for post hospital 
Follow up where she was admitted 9/30 - 10/4/19  for Acute kidney injury--cr 2.25 on admission. Today she reports improved sob, her home wt has been between 145-150 lbs since hospital dc, it was >150 lbs prior to hospital admission. She is able to get around with a walker, but has a caregiver during the day and family to stay with her at night. Some mild ankle swelling but overall has greatly improved. The patient denies chest pain/ shortness of breath, orthopnea, PND,  palpitations, syncope, or presyncope. Patient Active Problem List  
 Diagnosis Date Noted  Acute kidney injury ([address] 09/30/2019  CHF (congestive heart failure) ([address] 03/02/2019  Pleural effusion, left 07/17/2018  CHF, acute ([address] 07/17/2018  CKD (chronic kidney disease) stage 3, GFR 30-59 ml/min ([facility]) 05/29/2018  Chronic a-fib 05/29/2018  Diastolic CHF, acute on chronic ([address] 05/29/2018  Pleural effusion 05/29/2018  Pneumonia 04/18/2017  Sepsis(995.91) 04/18/2017  Dehydration 04/18/2017  Acute CHF ([address] 01/23/2015  SOB (shortness of breath) 01/21/2015  Pedal edema 01/21/2015  Squamous cell lung cancer ([address] 01/21/2015  Adenocarcinoma ([address] 04/05/2013  S/P right colectomy 04/05/2013  Malignant essential hypertension 04/01/2013  Colon cancer ([address] 03/30/2013  Ileitis, terminal ([address] 03/27/2013  A-fib ([address] 03/27/2013  
 HTN (hypertension) 03/27/2013 Lasha Urbina MD 
Past Medical History:  
Diagnosis Date  A-fib ([address]  Adenocarcinoma ([address]  Cancer ([facility] right breast CA, lung, colon  Chronic kidney disease  Gastrointestinal disorder GERD  Hypertension  Ileitis, terminal ([facility]  Other ill-defined conditions(799.89)   
 gout  Squamous cell lung cancer ([facility] Past Surgical History:  
Procedure Laterality Date  Right mastectomy  HX APPENDECTOMY  HX HEENT    
 right glass eye  [facility] COLONOSCOPY W/BIOPSY SINGLE/MULTIPLE  3/29/2013  NH COLSC FLX W/RMVL OF TUMOR POLYP LESION SNARE TQ  3/29/2013 Allergies Allergen Reactions  Penicillins Hives Family History Problem Relation Age of Onset  Colon Cancer Other  Hypertension Other Social History Socioeconomic History  Marital status:  Spouse name: Not on file  Number of children: Not on file  Years of education: Not on file  Highest education level: Not on file Occupational History  Not on file Social Needs  Financial resource strain: Not on file  Food insecurity:  
  Worry: Not on file Inability: Not on file  Transportation needs:  
  Medical: Not on file Non-medical: Not on file Tobacco Use  Smoking status: Former Smoker Types: Cigarettes Last attempt to quit: 1967 Years since quittin.8  Smokeless tobacco: Never Used Substance and Sexual Activity  Alcohol use: No  
 Drug use: No  
 Sexual activity: Not Currently Lifestyle  Physical activity:  
  Days per week: Not on file Minutes per session: Not on file  Stress: Not on file Relationships  Social connections:  
  Talks on phone: Not on file Gets together: Not on file Attends Sabianism service: Not on file Active member of club or organization: Not on file Attends meetings of clubs or organizations: Not on file Relationship status: Not on file  Intimate partner violence:  
  Fear of current or ex partner: Not on file Emotionally abused: Not on file Physically abused: Not on file Forced sexual activity: Not on file Other Topics Concern  Not on file Social History Narrative  Not on file Current Outpatient Medications Medication Sig  ciprofloxacin HCl (CIPRO) 250 mg tablet Take 250 mg by mouth two (2) times a day.  nystatin (MYCOSTATIN) topical cream Apply 1 g to affected area two (2) times daily as needed for Skin Irritation (buttocks). Use either cream or powder.  furosemide (LASIX) 40 mg tablet Take 80 mg by mouth daily (with breakfast).  furosemide (LASIX) 40 mg tablet Take 40 mg by mouth daily (after lunch).  acetaminophen (TYLENOL 8 HOUR) 650 mg TbER Take 1,300 mg by mouth every eight (8) hours as needed for Pain.  nystatin (MYCOSTATIN) powder Apply 1 Each to affected area two (2) times daily as needed for Other (buttock irritation). use either powder or cream  
 hydrALAZINE (APRESOLINE) 10 mg tablet Take 1 Tab by mouth three (3) times daily for 30 days.  metOLazone (ZAROXOLYN) 2.5 mg tablet Take 1 Tab by mouth every Monday, Wednesday, Friday. Indications: take per cardiology  albuterol (PROVENTIL HFA) 90 mcg/actuation inhaler Take 2 Puffs by inhalation every four (4) hours as needed for Wheezing or Shortness of Breath.  furosemide (LASIX) 40 mg tablet Take 2 tab 80mg in AM and 1 tab 40mg in PM (Corrected Rx) (Patient not taking: Reported on 10/6/2019)  albuterol-ipratropium (DUO-NEB) 2.5 mg-0.5 mg/3 ml nebu Take 3 mL by inhalation every four (4) hours as needed (shortness of breath).  apixaban (ELIQUIS) 2.5 mg tablet Take 1 Tab by mouth two (2) times a day.  mv-mn/folic acid/calcium/vit K (ONE-A-DAY WOMEN'S 50 PLUS PO) Take 1 Tab by mouth daily.  dextran 70/hypromellose/PF (NATURAL TEARS, PF, OP) Administer 1 Drop to right eye daily as needed (dry eye).  mometasone (NASONEX) 50 mcg/actuation nasal spray 2 Sprays by Both Nostrils route daily as needed (congestion).  potassium chloride (KLOR-CON) 20 mEq packet Take 20 mEq by mouth daily.  colchicine (COLCRYS) 0.6 mg tablet Take 0.6 mg by mouth daily as needed (gout flare up). No current facility-administered medications for this visit. Review of Symptoms: 
11 systems reviewed, negative other than as stated in the HPI Physical ExamPhysical Exam:   
Vitals:  
 10/23/19 1016 Resp: 14 Weight: 150 lb (68 kg) Height: 5' 2\" (1.575 m) Body mass index is 27.44 kg/m². General PE Gen:  NAD Mental Status - Alert. General Appearance - Not in acute distress. HEENT: 
PERRL, no carotid bruits or JVD Chest and Lung Exam  
Inspection: Accessory muscles - No use of accessory muscles in breathing. Auscultation:  
Breath sounds: - Normal.  
Cardiovascular Inspection: Jugular vein - Bilateral - Inspection Normal.  
Palpation/Percussion:  
Apical Impulse: - Normal.  
Auscultation: Rhythm - IRRegular. Heart Sounds - S1 WNL and S2 WNL. No S3 or S4. Murmurs & Other Heart Sounds: Auscultation of the heart reveals - No Murmurs. Peripheral Vascular Upper Extremity: Inspection - Bilateral - No Cyanotic nailbeds or Digital clubbing. Lower Extremity:  
Palpation: Edema - Bilateral - mild ankle swelling Abdomen:   Soft, non-tender, bowel sounds are active. Neuro: A&O times 3, CN and motor grossly WNL Labs:  
No results found for: CHOL, CHOLX, CHLST, CHOLV, 870602, HDL, HDLP, LDL, LDLC, DLDLP, TGLX, TRIGL, TRIGP, CHHD, CHHDX Lab Results Component Value Date/Time CK 45 03/02/2019 04:30 PM  
 
Lab Results Component Value Date/Time  Sodium 138 10/04/2019 03:42 AM  
 Potassium 3.0 (L) 10/04/2019 03:42 AM  
 Chloride 98 10/04/2019 03:42 AM  
 CO2 35 (H) 10/04/2019 03:42 AM  
 Anion gap 5 10/04/2019 03:42 AM  
 Glucose 101 (H) 10/04/2019 03:42 AM  
 BUN 40 (H) 10/04/2019 03:42 AM  
 Creatinine 1.59 (H) 10/04/2019 03:42 AM  
 BUN/Creatinine ratio 25 (H) 10/04/2019 03:42 AM  
 GFR est AA 36 (L) 10/04/2019 03:42 AM  
 GFR est non-AA 30 (L) 10/04/2019 03:42 AM  
 Calcium 9.8 10/04/2019 03:42 AM  
 Bilirubin, total 0.6 10/01/2019 01:51 AM  
 AST (SGOT) 27 10/01/2019 01:51 AM  
 Alk. phosphatase 125 (H) 10/01/2019 01:51 AM  
 Protein, total 8.2 10/01/2019 01:51 AM  
 Albumin 3.3 (L) 10/01/2019 01:51 AM  
 Globulin 4.9 (H) 10/01/2019 01:51 AM  
 A-G Ratio 0.7 (L) 10/01/2019 01:51 AM  
 ALT (SGPT) 18 10/01/2019 01:51 AM  
 
 
EKG: 
AF Assessment: 
 
 Assessment: 1. Diastolic CHF, chronic (HCC) 2. Essential hypertension 3. Chronic a-fib 4. CKD (chronic kidney disease) stage 3, GFR 30-59 ml/min ([facility]) No orders of the defined types were placed in this encounter. Plan: This is a 80 y.o. female  with PMH a fib, HFpEF, CKD, HTN,  GERD, lung CA  Presents to clinic for post hospital 
Follow up where she was admitted 9/30 - 10/4/19  for Acute kidney injury--cr 2.25 on admission. Today she reports improved sob, her home wt has been between 145-150 lbs since hospital dc, it was >150 lbs prior to hospital admission. She is able to get around with a walker, but has a caregiver during the day and family to stay with her at night. Some mild ankle swelling but overall has greatly improved. Chronic HFpEF Normal EF moderate MR per echo 5/18 Wt down Continue home lasix dose (80 mg in am and 40 mg in pm)  
pcp stopped scheduled metolazone 2.5 mg MWF for now K level was 3.0 on dc. She had labs done at PCP Monday and he increased  potassium supplement to twice a day 
 
  
Chronic a fib:   
Rate controlled Continue low anyi dose eliquis for cva risk reduction 
  
  
HTN:   
Tolerating hydralazine Recall: Amlodipine stopped for potential contribution to leg swelling 
 
  
RUMA atop CKD III Cr 1.59 on dc, down from 2.25 Continue current care and f/u in 3  Months. Lora Lopez NP [facility] 10/23/2019 Patient seen, examined by me personally. Plan discussed as detailed.  Agree with note as outlined by  NP. I confirm findings in history and physical exam. No additional findings noted. Agree with plan as outlined above.   
 
Buddy Franco MD

## 2019-11-23 NOTE — ED PROVIDER NOTES
EMERGENCY DEPARTMENT HISTORY AND PHYSICAL EXAM 
 
 
Date: 11/23/2019 Patient Name: Carissa Story Patient Age and Sex: 80 y.o. female History of Presenting Illness Chief Complaint Patient presents with  
 Hand Pain  
  hand pain both hands kept her awake all night. History Provided By: Patient and Patient's Son Ability to gather history was limited by: None HPI: Carissa Story, 80 y.o. female with history of atrial fibrillation, cancer, gout, GERD, CKD, complains of bilateral hand pain since yesterday. Pain is described as burning sensation involving from the mid hand distally through the fingers on both hands. No falls or injuries. No fevers. Seems to be consistent with prior episodes of gout. She is on chronic colchicine and allopurinol treatment for gout. No wrist pain. No other complaints. No fevers. Pain is 8 out of 10 severity, burning in nature, constant since yesterday. Did not improve with colchicine. Pt denies any other alleviating or exacerbating factors. There are no other complaints, changes or physical findings at this time. Past Medical History:  
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   
 gout  Squamous cell lung cancer ([address] Past Surgical History:  
Procedure Laterality Date [facility] Right mastectomy  HX APPENDECTOMY  HX HEENT    
 right glass eye  [place] COLONOSCOPY W/BIOPSY SINGLE/MULTIPLE  3/29/2013  [place] COLSC FLX W/RMVL OF TUMOR POLYP LESION SNARE TQ  3/29/2013 PCP: Molly Roman MD 
 
Past History Past Medical History: 
Past Medical History:  
Diagnosis Date  A-fib ([address]  Adenocarcinoma ([address]  Cancer ([address] right breast CA, lung, colon  Chronic kidney disease  Gastrointestinal disorder GERD  Hypertension  Ileitis, terminal ([facility]  Other ill-defined conditions(799.89)   
 gout  Squamous cell lung cancer ([facility] Past Surgical History: 
Past Surgical History:  
Procedure Laterality Date [facility] Right mastectomy  HX APPENDECTOMY  HX HEENT    
 right glass eye  [place] COLONOSCOPY W/BIOPSY SINGLE/MULTIPLE  3/29/2013  [place] COLSC FLX W/RMVL OF TUMOR POLYP LESION SNARE TQ  3/29/2013 Family History: 
Family History Problem Relation Age of Onset  Colon Cancer Other  Hypertension Other Social History: 
Social History Tobacco Use  Smoking status: Former Smoker Types: Cigarettes Last attempt to quit: 1967 Years since quittin.9  Smokeless tobacco: Never Used Substance Use Topics  Alcohol use: No  
 Drug use: No  
 
 
Allergies: Allergies Allergen Reactions  Penicillins Hives Current Medications: No current facility-administered medications on file prior to encounter. Current Outpatient Medications on File Prior to Encounter Medication Sig Dispense Refill  nystatin (MYCOSTATIN) topical cream Apply 1 g to affected area two (2) times daily as needed for Skin Irritation (buttocks). Use either cream or powder.  furosemide (LASIX) 40 mg tablet Take 80 mg by mouth daily (with breakfast).  furosemide (LASIX) 40 mg tablet Take 40 mg by mouth daily (after lunch).  acetaminophen (TYLENOL 8 HOUR) 650 mg TbER Take 1,300 mg by mouth every eight (8) hours as needed for Pain.  nystatin (MYCOSTATIN) powder Apply 1 Each to affected area two (2) times daily as needed for Other (buttock irritation). use either powder or cream    
 metOLazone (ZAROXOLYN) 2.5 mg tablet Take 1 Tab by mouth every Monday, Wednesday, Friday.  Indications: take per cardiology 90 Tab 1  
  albuterol (PROVENTIL HFA) 90 mcg/actuation inhaler Take 2 Puffs by inhalation every four (4) hours as needed for Wheezing or Shortness of Breath.  furosemide (LASIX) 40 mg tablet Take 2 tab 80mg in AM and 1 tab 40mg in PM (Corrected Rx) 270 Tab 1  
 albuterol-ipratropium (DUO-NEB) 2.5 mg-0.5 mg/3 ml nebu Take 3 mL by inhalation every four (4) hours as needed (shortness of breath).  apixaban (ELIQUIS) 2.5 mg tablet Take 1 Tab by mouth two (2) times a day. 60 Tab 11  
 mv-mn/folic acid/calcium/vit K (ONE-A-DAY WOMEN'S 50 PLUS PO) Take 1 Tab by mouth daily.  dextran 70/hypromellose/PF (NATURAL TEARS, PF, OP) Administer 1 Drop to right eye daily as needed (dry eye).  mometasone (NASONEX) 50 mcg/actuation nasal spray 2 Sprays by Both Nostrils route daily as needed (congestion).  potassium chloride (KLOR-CON) 20 mEq packet Take 20 mEq by mouth daily.  colchicine (COLCRYS) 0.6 mg tablet Take 0.6 mg by mouth daily as needed (gout flare up). Review of Systems Review of Systems Constitutional: Negative for fever. All other systems reviewed and are negative. Physical Exam  
Vital Signs Patient Vitals for the past 24 hrs: 
 Temp Pulse Resp BP SpO2  
11/23/19 1756 97.5 °F (36.4 °C) 83 20 135/83 96 % Physical Exam 
Vitals signs and nursing note reviewed. Constitutional:   
   General: She is not in acute distress. HENT:  
   Head: Normocephalic and atraumatic. Musculoskeletal: Normal range of motion. Right hand: Normal. She exhibits normal range of motion, no tenderness and no deformity. Left hand: Normal. She exhibits normal range of motion, no tenderness and no deformity. Comments: Normal examination bilateral hands. No significant swelling or erythema. No significant tenderness. Warm and well-perfused. Skin: 
   General: Skin is warm and dry. Findings: No erythema. Neurological:  
   General: No focal deficit present. Mental Status: She is alert and oriented to person, place, and time. Psychiatric:     
   Mood and Affect: Mood normal.     
   Behavior: Behavior normal.  
 
 
 
Diagnostic Study Results Labs Recent Results (from the past 24 hour(s)) POC CHEM8 Collection Time: 11/23/19  6:49 PM  
Result Value Ref Range Calcium, ionized (POC) 1.18 1.12 - 1.32 mmol/L Sodium (POC) 141 136 - 145 mmol/L Potassium (POC) 3.0 (L) 3.5 - 5.1 mmol/L Chloride (POC) 97 (L) 98 - 107 mmol/L  
 CO2 (POC) 37 (H) 21 - 32 mmol/L Anion gap (POC) 11 10 - 20 mmol/L Glucose (POC) 107 (H) 65 - 100 mg/dL BUN (POC) 37 (H) 9 - 20 mg/dL Creatinine (POC) 1.7 (H) 0.6 - 1.3 mg/dL GFRAA, POC 33 (L) >60 ml/min/1.73m2 GFRNA, POC 28 (L) >60 ml/min/1.73m2 Hematocrit (POC) 42 35.0 - 47.0 % Comment Comment Not Indicated. Radiologic Studies No orders to display CT Results  (Last 48 hours) None CXR Results  (Last 48 hours) None Procedures Procedures Medical Decision Making Provider Notes (Medical Decision Making):  
8-year-old female with history of gout and CKD, on chronic colchicine and allopurinol therapy, complaining of bilateral atraumatic pain in the hands and fingers, not involving the wrists or more proximally. Essentially normal physical exam, no concerning swelling or erythema. No significant tenderness. Normal neurovascular exam. 
 
Does not sound like peripheral neuropathy. No evidence of vascular injury or compromise. Does not appear to be cellulitis. We will check basic electrolytes and creatinine. Given her very advanced age, comorbidities, CKD, and already taking colchicine, safest course of action will probably be lowest tolerable dose of tramadol along with prednisone, and outpatient follow-up. Usman Fabian MD 
6:47 PM 
 
 
 
Consult required? No 
 
 
Medications Administered During ED Course: 
Medications predniSONE (DELTASONE) tablet 20 mg (20 mg Oral Given 11/23/19 1847)  
traMADol (ULTRAM) tablet 100 mg (100 mg Oral Given 11/23/19 1847) Diagnosis and Disposition Disposition:   
 
Clinical Impression: 1. Bilateral hand pain 2. Acute gout of hand, unspecified cause, unspecified laterality Attestation: 
I personally performed the services described in this documentation on this date 11/23/2019 for patient Fransisca Mcneil. Dorothy Bucio MD 
 
 
 
I was the first provider for this patient on this visit. To the best of my ability I reviewed relevant prior medical records, electrocardiograms, laboratories, and radiologic studies. The patient's presenting problems were discussed, and the patient was in agreement with the care plan formulated and outlined with them. Dorothy Bucio MD 
 
Please note that this dictation was completed with Dragon voice recognition software. Quite often unanticipated grammatical, syntax, homophones, and other interpretive errors are inadvertently transcribed by the computer software. Please disregard these errors and excuse any errors that have escaped final proofreading.

## 2019-11-24 NOTE — ED NOTES
Discharge summary and prescriptions reviewed with patient and patient's son. Verbalized understanding. All questions welcomed and answered. Off unit in wheelchair.

## 2019-11-24 NOTE — DISCHARGE INSTRUCTIONS
Take prednisone 20 mg daily for one week. You can also use Tramadol 50 mg to 100 mg a few times daily, as needed, for pain control. Continue the colchicine. Follow up with your doctor.

## 2020-01-01 ENCOUNTER — PATIENT OUTREACH (OUTPATIENT)
Dept: INTERNAL MEDICINE CLINIC | Age: 85
End: 2020-01-01

## 2020-01-01 ENCOUNTER — HOME CARE VISIT (OUTPATIENT)
Dept: SCHEDULING | Facility: HOME HEALTH | Age: 85
End: 2020-01-01
Payer: MEDICARE

## 2020-01-01 ENCOUNTER — PATIENT OUTREACH (OUTPATIENT)
Dept: CASE MANAGEMENT | Age: 85
End: 2020-01-01

## 2020-01-01 ENCOUNTER — OFFICE VISIT (OUTPATIENT)
Dept: CARDIOLOGY CLINIC | Age: 85
End: 2020-01-01

## 2020-01-01 ENCOUNTER — APPOINTMENT (OUTPATIENT)
Dept: GENERAL RADIOLOGY | Age: 85
DRG: 291 | End: 2020-01-01
Attending: EMERGENCY MEDICINE
Payer: MEDICARE

## 2020-01-01 ENCOUNTER — HOME HEALTH ADMISSION (OUTPATIENT)
Dept: HOME HEALTH SERVICES | Facility: HOME HEALTH | Age: 85
End: 2020-01-01
Payer: MEDICARE

## 2020-01-01 ENCOUNTER — APPOINTMENT (OUTPATIENT)
Dept: GENERAL RADIOLOGY | Age: 85
DRG: 291 | End: 2020-01-01
Attending: PHYSICIAN ASSISTANT
Payer: MEDICARE

## 2020-01-01 ENCOUNTER — HOME CARE VISIT (OUTPATIENT)
Dept: HOME HEALTH SERVICES | Facility: HOME HEALTH | Age: 85
End: 2020-01-01
Payer: MEDICARE

## 2020-01-01 ENCOUNTER — APPOINTMENT (OUTPATIENT)
Dept: NON INVASIVE DIAGNOSTICS | Age: 85
DRG: 291 | End: 2020-01-01
Attending: INTERNAL MEDICINE
Payer: MEDICARE

## 2020-01-01 ENCOUNTER — APPOINTMENT (OUTPATIENT)
Dept: GENERAL RADIOLOGY | Age: 85
DRG: 291 | End: 2020-01-01
Attending: NURSE PRACTITIONER
Payer: MEDICARE

## 2020-01-01 ENCOUNTER — TELEPHONE (OUTPATIENT)
Dept: CARDIOLOGY CLINIC | Age: 85
End: 2020-01-01

## 2020-01-01 ENCOUNTER — HOSPITAL ENCOUNTER (INPATIENT)
Age: 85
LOS: 5 days | Discharge: HOME HEALTH | DRG: 291 | End: 2020-02-24
Attending: EMERGENCY MEDICINE | Admitting: INTERNAL MEDICINE
Payer: MEDICARE

## 2020-01-01 ENCOUNTER — HOSPITAL ENCOUNTER (INPATIENT)
Age: 85
LOS: 7 days | Discharge: HOSPICE | DRG: 291 | End: 2020-04-15
Attending: EMERGENCY MEDICINE | Admitting: INTERNAL MEDICINE
Payer: MEDICARE

## 2020-01-01 VITALS
DIASTOLIC BLOOD PRESSURE: 66 MMHG | BODY MASS INDEX: 25.24 KG/M2 | TEMPERATURE: 97.7 F | HEART RATE: 88 BPM | RESPIRATION RATE: 20 BRPM | OXYGEN SATURATION: 96 % | SYSTOLIC BLOOD PRESSURE: 115 MMHG | WEIGHT: 138 LBS

## 2020-01-01 VITALS
DIASTOLIC BLOOD PRESSURE: 70 MMHG | RESPIRATION RATE: 16 BRPM | BODY MASS INDEX: 28.61 KG/M2 | DIASTOLIC BLOOD PRESSURE: 58 MMHG | WEIGHT: 138 LBS | WEIGHT: 155.5 LBS | HEART RATE: 73 BPM | HEIGHT: 62 IN | SYSTOLIC BLOOD PRESSURE: 100 MMHG | BODY MASS INDEX: 25.24 KG/M2 | SYSTOLIC BLOOD PRESSURE: 120 MMHG | HEART RATE: 81 BPM | TEMPERATURE: 97.8 F | OXYGEN SATURATION: 97 % | OXYGEN SATURATION: 96 % | RESPIRATION RATE: 16 BRPM

## 2020-01-01 VITALS
SYSTOLIC BLOOD PRESSURE: 118 MMHG | RESPIRATION RATE: 20 BRPM | OXYGEN SATURATION: 96 % | DIASTOLIC BLOOD PRESSURE: 70 MMHG | BODY MASS INDEX: 25.42 KG/M2 | WEIGHT: 139 LBS | TEMPERATURE: 98.3 F | HEART RATE: 80 BPM

## 2020-01-01 VITALS
WEIGHT: 155 LBS | SYSTOLIC BLOOD PRESSURE: 113 MMHG | RESPIRATION RATE: 16 BRPM | OXYGEN SATURATION: 94 % | BODY MASS INDEX: 28.52 KG/M2 | HEIGHT: 62 IN | TEMPERATURE: 96.6 F | DIASTOLIC BLOOD PRESSURE: 72 MMHG | HEART RATE: 110 BPM

## 2020-01-01 VITALS
TEMPERATURE: 97.7 F | DIASTOLIC BLOOD PRESSURE: 60 MMHG | OXYGEN SATURATION: 97 % | RESPIRATION RATE: 30 BRPM | SYSTOLIC BLOOD PRESSURE: 83 MMHG | HEART RATE: 76 BPM

## 2020-01-01 VITALS
RESPIRATION RATE: 18 BRPM | BODY MASS INDEX: 26.28 KG/M2 | WEIGHT: 142.8 LBS | SYSTOLIC BLOOD PRESSURE: 100 MMHG | HEART RATE: 80 BPM | TEMPERATURE: 97.6 F | OXYGEN SATURATION: 98 % | DIASTOLIC BLOOD PRESSURE: 55 MMHG | HEIGHT: 62 IN

## 2020-01-01 VITALS
RESPIRATION RATE: 24 BRPM | TEMPERATURE: 96.8 F | DIASTOLIC BLOOD PRESSURE: 60 MMHG | OXYGEN SATURATION: 96 % | HEART RATE: 79 BPM | SYSTOLIC BLOOD PRESSURE: 100 MMHG

## 2020-01-01 VITALS
HEART RATE: 65 BPM | OXYGEN SATURATION: 94 % | DIASTOLIC BLOOD PRESSURE: 61 MMHG | TEMPERATURE: 97.2 F | SYSTOLIC BLOOD PRESSURE: 110 MMHG

## 2020-01-01 VITALS
OXYGEN SATURATION: 95 % | DIASTOLIC BLOOD PRESSURE: 76 MMHG | SYSTOLIC BLOOD PRESSURE: 110 MMHG | TEMPERATURE: 97.8 F | WEIGHT: 121.13 LBS | HEART RATE: 62 BPM | BODY MASS INDEX: 22.15 KG/M2 | RESPIRATION RATE: 20 BRPM

## 2020-01-01 VITALS
RESPIRATION RATE: 18 BRPM | HEART RATE: 72 BPM | BODY MASS INDEX: 25.79 KG/M2 | WEIGHT: 141 LBS | SYSTOLIC BLOOD PRESSURE: 122 MMHG | OXYGEN SATURATION: 94 % | DIASTOLIC BLOOD PRESSURE: 70 MMHG

## 2020-01-01 VITALS
HEART RATE: 77 BPM | BODY MASS INDEX: 25.24 KG/M2 | SYSTOLIC BLOOD PRESSURE: 120 MMHG | WEIGHT: 138 LBS | TEMPERATURE: 97.9 F | RESPIRATION RATE: 20 BRPM | DIASTOLIC BLOOD PRESSURE: 80 MMHG | OXYGEN SATURATION: 93 %

## 2020-01-01 VITALS
OXYGEN SATURATION: 94 % | DIASTOLIC BLOOD PRESSURE: 63 MMHG | SYSTOLIC BLOOD PRESSURE: 90 MMHG | RESPIRATION RATE: 24 BRPM | HEART RATE: 74 BPM

## 2020-01-01 VITALS
OXYGEN SATURATION: 95 % | HEART RATE: 89 BPM | DIASTOLIC BLOOD PRESSURE: 70 MMHG | SYSTOLIC BLOOD PRESSURE: 146 MMHG | TEMPERATURE: 98.2 F

## 2020-01-01 VITALS
TEMPERATURE: 97.7 F | OXYGEN SATURATION: 95 % | HEART RATE: 71 BPM | SYSTOLIC BLOOD PRESSURE: 94 MMHG | DIASTOLIC BLOOD PRESSURE: 63 MMHG

## 2020-01-01 VITALS
HEART RATE: 73 BPM | OXYGEN SATURATION: 98 % | RESPIRATION RATE: 18 BRPM | SYSTOLIC BLOOD PRESSURE: 100 MMHG | DIASTOLIC BLOOD PRESSURE: 70 MMHG | TEMPERATURE: 97.8 F

## 2020-01-01 DIAGNOSIS — I50.32 DIASTOLIC CHF, CHRONIC (HCC): Primary | ICD-10-CM

## 2020-01-01 DIAGNOSIS — I27.81 COR PULMONALE (HCC): ICD-10-CM

## 2020-01-01 DIAGNOSIS — Z71.89 COUNSELING REGARDING ADVANCE CARE PLANNING AND GOALS OF CARE: ICD-10-CM

## 2020-01-01 DIAGNOSIS — J96.20 ACUTE ON CHRONIC RESPIRATORY FAILURE, UNSPECIFIED WHETHER WITH HYPOXIA OR HYPERCAPNIA (HCC): Primary | ICD-10-CM

## 2020-01-01 DIAGNOSIS — I50.813 ACUTE ON CHRONIC RIGHT-SIDED CONGESTIVE HEART FAILURE (HCC): ICD-10-CM

## 2020-01-01 DIAGNOSIS — I21.4 NSTEMI (NON-ST ELEVATED MYOCARDIAL INFARCTION) (HCC): Primary | ICD-10-CM

## 2020-01-01 DIAGNOSIS — I50.9 CONGESTIVE HEART FAILURE, UNSPECIFIED HF CHRONICITY, UNSPECIFIED HEART FAILURE TYPE (HCC): ICD-10-CM

## 2020-01-01 DIAGNOSIS — R06.02 SOB (SHORTNESS OF BREATH): ICD-10-CM

## 2020-01-01 DIAGNOSIS — R60.0 PEDAL EDEMA: ICD-10-CM

## 2020-01-01 DIAGNOSIS — N18.30 CKD (CHRONIC KIDNEY DISEASE) STAGE 3, GFR 30-59 ML/MIN (HCC): ICD-10-CM

## 2020-01-01 DIAGNOSIS — I48.20 CHRONIC A-FIB (HCC): ICD-10-CM

## 2020-01-01 LAB
ALBUMIN SERPL-MCNC: 2.3 G/DL (ref 3.5–5)
ALBUMIN SERPL-MCNC: 2.7 G/DL (ref 3.5–5)
ALBUMIN SERPL-MCNC: 2.9 G/DL (ref 3.5–5)
ALBUMIN/GLOB SERPL: 0.5 {RATIO} (ref 1.1–2.2)
ALBUMIN/GLOB SERPL: 0.5 {RATIO} (ref 1.1–2.2)
ALBUMIN/GLOB SERPL: 0.7 {RATIO} (ref 1.1–2.2)
ALP SERPL-CCNC: 111 U/L (ref 45–117)
ALP SERPL-CCNC: 166 U/L (ref 45–117)
ALP SERPL-CCNC: 167 U/L (ref 45–117)
ALT SERPL-CCNC: 19 U/L (ref 12–78)
ALT SERPL-CCNC: 22 U/L (ref 12–78)
ALT SERPL-CCNC: 24 U/L (ref 12–78)
ANION GAP SERPL CALC-SCNC: 10 MMOL/L (ref 5–15)
ANION GAP SERPL CALC-SCNC: 2 MMOL/L (ref 5–15)
ANION GAP SERPL CALC-SCNC: 4 MMOL/L (ref 5–15)
ANION GAP SERPL CALC-SCNC: 5 MMOL/L (ref 5–15)
ANION GAP SERPL CALC-SCNC: 6 MMOL/L (ref 5–15)
ANION GAP SERPL CALC-SCNC: 6 MMOL/L (ref 5–15)
ANION GAP SERPL CALC-SCNC: 7 MMOL/L (ref 5–15)
ANION GAP SERPL CALC-SCNC: 7 MMOL/L (ref 5–15)
APPEARANCE UR: CLEAR
APPEARANCE UR: CLEAR
AST SERPL-CCNC: 21 U/L (ref 15–37)
AST SERPL-CCNC: 22 U/L (ref 15–37)
AST SERPL-CCNC: 30 U/L (ref 15–37)
ATRIAL RATE: 258 BPM
ATRIAL RATE: 93 BPM
AV PEAK GRADIENT: 41.81 MMHG
AV VELOCITY RATIO: 0.79
BACTERIA SPEC CULT: NORMAL
BACTERIA URNS QL MICRO: ABNORMAL /HPF
BACTERIA URNS QL MICRO: NEGATIVE /HPF
BASOPHILS # BLD: 0 K/UL (ref 0–0.1)
BASOPHILS NFR BLD: 0 % (ref 0–1)
BASOPHILS NFR BLD: 1 % (ref 0–1)
BASOPHILS NFR BLD: 1 % (ref 0–1)
BILIRUB SERPL-MCNC: 0.7 MG/DL (ref 0.2–1)
BILIRUB SERPL-MCNC: 0.9 MG/DL (ref 0.2–1)
BILIRUB SERPL-MCNC: 1 MG/DL (ref 0.2–1)
BILIRUB UR QL CFM: NEGATIVE
BILIRUB UR QL: NEGATIVE
BNP SERPL-MCNC: 2760 PG/ML
BNP SERPL-MCNC: 2888 PG/ML
BNP SERPL-MCNC: ABNORMAL PG/ML
BNP SERPL-MCNC: ABNORMAL PG/ML
BUN SERPL-MCNC: 108 MG/DL (ref 6–20)
BUN SERPL-MCNC: 116 MG/DL (ref 6–20)
BUN SERPL-MCNC: 124 MG/DL (ref 6–20)
BUN SERPL-MCNC: 23 MG/DL (ref 6–20)
BUN SERPL-MCNC: 27 MG/DL (ref 6–20)
BUN SERPL-MCNC: 82 MG/DL (ref 6–20)
BUN SERPL-MCNC: 87 MG/DL (ref 6–20)
BUN SERPL-MCNC: 94 MG/DL (ref 6–20)
BUN/CREAT SERPL: 14 (ref 12–20)
BUN/CREAT SERPL: 14 (ref 12–20)
BUN/CREAT SERPL: 15 (ref 12–20)
BUN/CREAT SERPL: 15 (ref 12–20)
BUN/CREAT SERPL: 17 (ref 12–20)
BUN/CREAT SERPL: 19 (ref 12–20)
BUN/CREAT SERPL: 29 (ref 12–20)
BUN/CREAT SERPL: 31 (ref 12–20)
BUN/CREAT SERPL: 33 (ref 12–20)
BUN/CREAT SERPL: 38 (ref 12–20)
CALCIUM SERPL-MCNC: 10 MG/DL (ref 8.5–10.1)
CALCIUM SERPL-MCNC: 10.1 MG/DL (ref 8.5–10.1)
CALCIUM SERPL-MCNC: 10.2 MG/DL (ref 8.5–10.1)
CALCIUM SERPL-MCNC: 10.4 MG/DL (ref 8.5–10.1)
CALCIUM SERPL-MCNC: 10.4 MG/DL (ref 8.5–10.1)
CALCIUM SERPL-MCNC: 10.7 MG/DL (ref 8.5–10.1)
CALCIUM SERPL-MCNC: 9.6 MG/DL (ref 8.5–10.1)
CALCIUM SERPL-MCNC: 9.6 MG/DL (ref 8.5–10.1)
CALCIUM SERPL-MCNC: 9.8 MG/DL (ref 8.5–10.1)
CALCIUM SERPL-MCNC: 9.9 MG/DL (ref 8.5–10.1)
CALCULATED R AXIS, ECG10: -8 DEGREES
CALCULATED R AXIS, ECG10: 1 DEGREES
CALCULATED T AXIS, ECG11: 22 DEGREES
CALCULATED T AXIS, ECG11: 87 DEGREES
CC UR VC: NORMAL
CC UR VC: NORMAL
CHLORIDE SERPL-SCNC: 100 MMOL/L (ref 97–108)
CHLORIDE SERPL-SCNC: 102 MMOL/L (ref 97–108)
CHLORIDE SERPL-SCNC: 104 MMOL/L (ref 97–108)
CHLORIDE SERPL-SCNC: 104 MMOL/L (ref 97–108)
CHLORIDE SERPL-SCNC: 105 MMOL/L (ref 97–108)
CHLORIDE SERPL-SCNC: 90 MMOL/L (ref 97–108)
CHLORIDE SERPL-SCNC: 91 MMOL/L (ref 97–108)
CHLORIDE SERPL-SCNC: 91 MMOL/L (ref 97–108)
CHLORIDE SERPL-SCNC: 93 MMOL/L (ref 97–108)
CHLORIDE SERPL-SCNC: 97 MMOL/L (ref 97–108)
CK SERPL-CCNC: 30 U/L (ref 26–192)
CK SERPL-CCNC: 56 U/L (ref 26–192)
CO2 SERPL-SCNC: 30 MMOL/L (ref 21–32)
CO2 SERPL-SCNC: 31 MMOL/L (ref 21–32)
CO2 SERPL-SCNC: 32 MMOL/L (ref 21–32)
CO2 SERPL-SCNC: 33 MMOL/L (ref 21–32)
CO2 SERPL-SCNC: 34 MMOL/L (ref 21–32)
CO2 SERPL-SCNC: 34 MMOL/L (ref 21–32)
CO2 SERPL-SCNC: 35 MMOL/L (ref 21–32)
CO2 SERPL-SCNC: 35 MMOL/L (ref 21–32)
CO2 SERPL-SCNC: 36 MMOL/L (ref 21–32)
CO2 SERPL-SCNC: 37 MMOL/L (ref 21–32)
CO2 SERPL-SCNC: 37 MMOL/L (ref 21–32)
CO2 SERPL-SCNC: 38 MMOL/L (ref 21–32)
COLOR UR: ABNORMAL
COLOR UR: ABNORMAL
CREAT SERPL-MCNC: 1.35 MG/DL (ref 0.55–1.02)
CREAT SERPL-MCNC: 1.45 MG/DL (ref 0.55–1.02)
CREAT SERPL-MCNC: 1.5 MG/DL (ref 0.55–1.02)
CREAT SERPL-MCNC: 1.56 MG/DL (ref 0.55–1.02)
CREAT SERPL-MCNC: 1.64 MG/DL (ref 0.55–1.02)
CREAT SERPL-MCNC: 1.7 MG/DL (ref 0.55–1.02)
CREAT SERPL-MCNC: 2.64 MG/DL (ref 0.55–1.02)
CREAT SERPL-MCNC: 2.78 MG/DL (ref 0.55–1.02)
CREAT SERPL-MCNC: 2.87 MG/DL (ref 0.55–1.02)
CREAT SERPL-MCNC: 3.27 MG/DL (ref 0.55–1.02)
CREAT SERPL-MCNC: 3.49 MG/DL (ref 0.55–1.02)
CREAT SERPL-MCNC: 3.51 MG/DL (ref 0.55–1.02)
DIAGNOSIS, 93000: NORMAL
DIAGNOSIS, 93000: NORMAL
DIFFERENTIAL METHOD BLD: ABNORMAL
ECHO AV AREA PEAK VELOCITY: 1.2 CM2
ECHO AV PEAK GRADIENT: 3.6 MMHG
ECHO AV PEAK VELOCITY: 95.24 CM/S
ECHO AV REGURGITANT PHT: 640.9 CM
ECHO EST RA PRESSURE: 10 MMHG
ECHO LA AREA 4C: 18.3 CM2
ECHO LA VOL 4C: 50.64 ML (ref 22–52)
ECHO LA VOLUME INDEX A4C: 29.7 ML/M2 (ref 16–28)
ECHO LV EDV A4C: 23.7 ML
ECHO LV EDV INDEX A4C: 13.9 ML/M2
ECHO LV EJECTION FRACTION A4C: 46 %
ECHO LV ESV A4C: 12.8 ML
ECHO LV ESV INDEX A4C: 7.5 ML/M2
ECHO LV INTERNAL DIMENSION DIASTOLIC MMODE: 4.25 CM
ECHO LV INTERNAL DIMENSION SYSTOLIC MMODE: 2.4 CM
ECHO LV IVSD MMODE: 1.11 CM
ECHO LV IVSS MMODE: 1.36 CM
ECHO LV POSTERIOR WALL DIASTOLIC MMODE: 0.91 CM
ECHO LV POSTERIOR WALL SYSTOLIC MMODE: 1.87 CM
ECHO LVOT DIAM: 1.4 CM
ECHO LVOT PEAK GRADIENT: 2.3 MMHG
ECHO LVOT PEAK VELOCITY: 75.42 CM/S
ECHO MV REGURGITANT RADIUS PISA: 0.79 CM
ECHO PULMONARY ARTERY SYSTOLIC PRESSURE (PASP): 84.2 MMHG
ECHO PV MAX VELOCITY: 66.92 CM/S
ECHO PV PEAK GRADIENT: 1.8 MMHG
ECHO RA AREA 4C: 29.52 CM2
ECHO RIGHT VENTRICULAR SYSTOLIC PRESSURE (RVSP): 76.6 MMHG
ECHO RV INTERNAL DIMENSION: 3.27 CM
ECHO TV REGURGITANT MAX VELOCITY: 408.06 CM/S
ECHO TV REGURGITANT PEAK GRADIENT: 66.6 MMHG
EOSINOPHIL # BLD: 0 K/UL (ref 0–0.4)
EOSINOPHIL NFR BLD: 0 % (ref 0–7)
EOSINOPHIL NFR BLD: 1 % (ref 0–7)
EPITH CASTS URNS QL MICRO: ABNORMAL /LPF
EPITH CASTS URNS QL MICRO: ABNORMAL /LPF
ERYTHROCYTE [DISTWIDTH] IN BLOOD BY AUTOMATED COUNT: 18.6 % (ref 11.5–14.5)
ERYTHROCYTE [DISTWIDTH] IN BLOOD BY AUTOMATED COUNT: 18.7 % (ref 11.5–14.5)
ERYTHROCYTE [DISTWIDTH] IN BLOOD BY AUTOMATED COUNT: 19.1 % (ref 11.5–14.5)
ERYTHROCYTE [DISTWIDTH] IN BLOOD BY AUTOMATED COUNT: 19.3 % (ref 11.5–14.5)
ERYTHROCYTE [DISTWIDTH] IN BLOOD BY AUTOMATED COUNT: 19.5 % (ref 11.5–14.5)
ERYTHROCYTE [DISTWIDTH] IN BLOOD BY AUTOMATED COUNT: 19.6 % (ref 11.5–14.5)
ERYTHROCYTE [DISTWIDTH] IN BLOOD BY AUTOMATED COUNT: 19.9 % (ref 11.5–14.5)
ERYTHROCYTE [DISTWIDTH] IN BLOOD BY AUTOMATED COUNT: 20.2 % (ref 11.5–14.5)
GLOBULIN SER CALC-MCNC: 4.4 G/DL (ref 2–4)
GLOBULIN SER CALC-MCNC: 4.8 G/DL (ref 2–4)
GLOBULIN SER CALC-MCNC: 5.2 G/DL (ref 2–4)
GLUCOSE SERPL-MCNC: 100 MG/DL (ref 65–100)
GLUCOSE SERPL-MCNC: 100 MG/DL (ref 65–100)
GLUCOSE SERPL-MCNC: 102 MG/DL (ref 65–100)
GLUCOSE SERPL-MCNC: 111 MG/DL (ref 65–100)
GLUCOSE SERPL-MCNC: 112 MG/DL (ref 65–100)
GLUCOSE SERPL-MCNC: 58 MG/DL (ref 65–100)
GLUCOSE SERPL-MCNC: 66 MG/DL (ref 65–100)
GLUCOSE SERPL-MCNC: 70 MG/DL (ref 65–100)
GLUCOSE SERPL-MCNC: 89 MG/DL (ref 65–100)
GLUCOSE SERPL-MCNC: 90 MG/DL (ref 65–100)
GLUCOSE SERPL-MCNC: 92 MG/DL (ref 65–100)
GLUCOSE SERPL-MCNC: 92 MG/DL (ref 65–100)
GLUCOSE UR STRIP.AUTO-MCNC: NEGATIVE MG/DL
GLUCOSE UR STRIP.AUTO-MCNC: NEGATIVE MG/DL
HCT VFR BLD AUTO: 41.1 % (ref 35–47)
HCT VFR BLD AUTO: 41.2 % (ref 35–47)
HCT VFR BLD AUTO: 42.1 % (ref 35–47)
HCT VFR BLD AUTO: 42.1 % (ref 35–47)
HCT VFR BLD AUTO: 42.8 % (ref 35–47)
HCT VFR BLD AUTO: 43 % (ref 35–47)
HCT VFR BLD AUTO: 43 % (ref 35–47)
HCT VFR BLD AUTO: 43.4 % (ref 35–47)
HCT VFR BLD AUTO: 43.4 % (ref 35–47)
HCT VFR BLD AUTO: 44.2 % (ref 35–47)
HCT VFR BLD AUTO: 45.3 % (ref 35–47)
HCT VFR BLD AUTO: 45.4 % (ref 35–47)
HGB BLD-MCNC: 13.5 G/DL (ref 11.5–16)
HGB BLD-MCNC: 13.7 G/DL (ref 11.5–16)
HGB BLD-MCNC: 13.9 G/DL (ref 11.5–16)
HGB BLD-MCNC: 14.1 G/DL (ref 11.5–16)
HGB BLD-MCNC: 14.1 G/DL (ref 11.5–16)
HGB BLD-MCNC: 14.2 G/DL (ref 11.5–16)
HGB BLD-MCNC: 14.2 G/DL (ref 11.5–16)
HGB BLD-MCNC: 14.3 G/DL (ref 11.5–16)
HGB BLD-MCNC: 14.6 G/DL (ref 11.5–16)
HGB BLD-MCNC: 14.6 G/DL (ref 11.5–16)
HGB BLD-MCNC: 14.9 G/DL (ref 11.5–16)
HGB BLD-MCNC: 15.4 G/DL (ref 11.5–16)
HGB UR QL STRIP: NEGATIVE
HGB UR QL STRIP: NEGATIVE
HYALINE CASTS URNS QL MICRO: ABNORMAL /LPF (ref 0–5)
HYALINE CASTS URNS QL MICRO: ABNORMAL /LPF (ref 0–5)
IMM GRANULOCYTES # BLD AUTO: 0 K/UL (ref 0–0.04)
IMM GRANULOCYTES # BLD AUTO: 0 K/UL (ref 0–0.04)
IMM GRANULOCYTES # BLD AUTO: 0.2 K/UL (ref 0–0.04)
IMM GRANULOCYTES # BLD AUTO: 0.3 K/UL (ref 0–0.04)
IMM GRANULOCYTES NFR BLD AUTO: 0 % (ref 0–0.5)
IMM GRANULOCYTES NFR BLD AUTO: 0 % (ref 0–0.5)
IMM GRANULOCYTES NFR BLD AUTO: 1 % (ref 0–0.5)
KETONES UR QL STRIP.AUTO: NEGATIVE MG/DL
KETONES UR QL STRIP.AUTO: NEGATIVE MG/DL
LACTATE SERPL-SCNC: 2.2 MMOL/L (ref 0.4–2)
LEUKOCYTE ESTERASE UR QL STRIP.AUTO: ABNORMAL
LEUKOCYTE ESTERASE UR QL STRIP.AUTO: ABNORMAL
LVFS: 43.62 %
LYMPHOCYTES # BLD: 0.2 K/UL (ref 0.8–3.5)
LYMPHOCYTES # BLD: 0.3 K/UL (ref 0.8–3.5)
LYMPHOCYTES # BLD: 0.5 K/UL (ref 0.8–3.5)
LYMPHOCYTES # BLD: 0.6 K/UL (ref 0.8–3.5)
LYMPHOCYTES NFR BLD: 1 % (ref 12–49)
LYMPHOCYTES NFR BLD: 1 % (ref 12–49)
LYMPHOCYTES NFR BLD: 12 % (ref 12–49)
LYMPHOCYTES NFR BLD: 14 % (ref 12–49)
LYMPHOCYTES NFR BLD: 2 % (ref 12–49)
LYMPHOCYTES NFR BLD: 2 % (ref 12–49)
MAGNESIUM SERPL-MCNC: 2.6 MG/DL (ref 1.6–2.4)
MAGNESIUM SERPL-MCNC: 2.7 MG/DL (ref 1.6–2.4)
MAGNESIUM SERPL-MCNC: 3.4 MG/DL (ref 1.6–2.4)
MCH RBC QN AUTO: 27.7 PG (ref 26–34)
MCH RBC QN AUTO: 27.8 PG (ref 26–34)
MCH RBC QN AUTO: 27.9 PG (ref 26–34)
MCH RBC QN AUTO: 27.9 PG (ref 26–34)
MCH RBC QN AUTO: 28.1 PG (ref 26–34)
MCH RBC QN AUTO: 28.3 PG (ref 26–34)
MCHC RBC AUTO-ENTMCNC: 31.4 G/DL (ref 30–36.5)
MCHC RBC AUTO-ENTMCNC: 31.9 G/DL (ref 30–36.5)
MCHC RBC AUTO-ENTMCNC: 32.1 G/DL (ref 30–36.5)
MCHC RBC AUTO-ENTMCNC: 32.2 G/DL (ref 30–36.5)
MCHC RBC AUTO-ENTMCNC: 32.5 G/DL (ref 30–36.5)
MCHC RBC AUTO-ENTMCNC: 32.7 G/DL (ref 30–36.5)
MCHC RBC AUTO-ENTMCNC: 33.9 G/DL (ref 30–36.5)
MCHC RBC AUTO-ENTMCNC: 34 G/DL (ref 30–36.5)
MCHC RBC AUTO-ENTMCNC: 34.1 G/DL (ref 30–36.5)
MCHC RBC AUTO-ENTMCNC: 34.3 G/DL (ref 30–36.5)
MCHC RBC AUTO-ENTMCNC: 34.5 G/DL (ref 30–36.5)
MCHC RBC AUTO-ENTMCNC: 34.7 G/DL (ref 30–36.5)
MCV RBC AUTO: 81.6 FL (ref 80–99)
MCV RBC AUTO: 81.6 FL (ref 80–99)
MCV RBC AUTO: 81.7 FL (ref 80–99)
MCV RBC AUTO: 82 FL (ref 80–99)
MCV RBC AUTO: 82.2 FL (ref 80–99)
MCV RBC AUTO: 83.3 FL (ref 80–99)
MCV RBC AUTO: 85.1 FL (ref 80–99)
MCV RBC AUTO: 86.4 FL (ref 80–99)
MCV RBC AUTO: 87 FL (ref 80–99)
MCV RBC AUTO: 87.1 FL (ref 80–99)
MCV RBC AUTO: 87.4 FL (ref 80–99)
MCV RBC AUTO: 88.4 FL (ref 80–99)
MONOCYTES # BLD: 0.5 K/UL (ref 0–1)
MONOCYTES # BLD: 0.5 K/UL (ref 0–1)
MONOCYTES # BLD: 1.2 K/UL (ref 0–1)
MONOCYTES # BLD: 1.3 K/UL (ref 0–1)
MONOCYTES # BLD: 1.7 K/UL (ref 0–1)
MONOCYTES # BLD: 2 K/UL (ref 0–1)
MONOCYTES NFR BLD: 10 % (ref 5–13)
MONOCYTES NFR BLD: 12 % (ref 5–13)
MONOCYTES NFR BLD: 13 % (ref 5–13)
MONOCYTES NFR BLD: 13 % (ref 5–13)
MONOCYTES NFR BLD: 5 % (ref 5–13)
MONOCYTES NFR BLD: 6 % (ref 5–13)
NEUTS SEG # BLD: 13.1 K/UL (ref 1.8–8)
NEUTS SEG # BLD: 15.1 K/UL (ref 1.8–8)
NEUTS SEG # BLD: 20.7 K/UL (ref 1.8–8)
NEUTS SEG # BLD: 23.1 K/UL (ref 1.8–8)
NEUTS SEG # BLD: 3 K/UL (ref 1.8–8)
NEUTS SEG # BLD: 3.2 K/UL (ref 1.8–8)
NEUTS SEG NFR BLD: 72 % (ref 32–75)
NEUTS SEG NFR BLD: 75 % (ref 32–75)
NEUTS SEG NFR BLD: 84 % (ref 32–75)
NEUTS SEG NFR BLD: 87 % (ref 32–75)
NEUTS SEG NFR BLD: 92 % (ref 32–75)
NEUTS SEG NFR BLD: 93 % (ref 32–75)
NITRITE UR QL STRIP.AUTO: NEGATIVE
NITRITE UR QL STRIP.AUTO: NEGATIVE
NRBC # BLD: 0 K/UL (ref 0–0.01)
NRBC # BLD: 0.02 K/UL (ref 0–0.01)
NRBC # BLD: 0.03 K/UL (ref 0–0.01)
NRBC BLD-RTO: 0 PER 100 WBC
NRBC BLD-RTO: 0.1 PER 100 WBC
NRBC BLD-RTO: 0.2 PER 100 WBC
PH UR STRIP: 5 [PH] (ref 5–8)
PH UR STRIP: 7 [PH] (ref 5–8)
PISA AR MAX VEL: 323.32 CM/S
PLATELET # BLD AUTO: 179 K/UL (ref 150–400)
PLATELET # BLD AUTO: 188 K/UL (ref 150–400)
PLATELET # BLD AUTO: 189 K/UL (ref 150–400)
PLATELET # BLD AUTO: 192 K/UL (ref 150–400)
PLATELET # BLD AUTO: 193 K/UL (ref 150–400)
PLATELET # BLD AUTO: 208 K/UL (ref 150–400)
PLATELET # BLD AUTO: 211 K/UL (ref 150–400)
PLATELET # BLD AUTO: 213 K/UL (ref 150–400)
PLATELET # BLD AUTO: 219 K/UL (ref 150–400)
PLATELET # BLD AUTO: 221 K/UL (ref 150–400)
PLATELET # BLD AUTO: 240 K/UL (ref 150–400)
PLATELET # BLD AUTO: 262 K/UL (ref 150–400)
PMV BLD AUTO: 10.4 FL (ref 8.9–12.9)
PMV BLD AUTO: 11.1 FL (ref 8.9–12.9)
PMV BLD AUTO: 11.1 FL (ref 8.9–12.9)
PMV BLD AUTO: 11.2 FL (ref 8.9–12.9)
PMV BLD AUTO: 11.3 FL (ref 8.9–12.9)
PMV BLD AUTO: 11.6 FL (ref 8.9–12.9)
PMV BLD AUTO: 11.6 FL (ref 8.9–12.9)
PMV BLD AUTO: 11.7 FL (ref 8.9–12.9)
PMV BLD AUTO: 11.8 FL (ref 8.9–12.9)
PMV BLD AUTO: 12.1 FL (ref 8.9–12.9)
POTASSIUM SERPL-SCNC: 3.3 MMOL/L (ref 3.5–5.1)
POTASSIUM SERPL-SCNC: 3.5 MMOL/L (ref 3.5–5.1)
POTASSIUM SERPL-SCNC: 3.9 MMOL/L (ref 3.5–5.1)
POTASSIUM SERPL-SCNC: 3.9 MMOL/L (ref 3.5–5.1)
POTASSIUM SERPL-SCNC: 4.8 MMOL/L (ref 3.5–5.1)
POTASSIUM SERPL-SCNC: 4.9 MMOL/L (ref 3.5–5.1)
POTASSIUM SERPL-SCNC: 4.9 MMOL/L (ref 3.5–5.1)
POTASSIUM SERPL-SCNC: 5.2 MMOL/L (ref 3.5–5.1)
POTASSIUM SERPL-SCNC: 5.7 MMOL/L (ref 3.5–5.1)
POTASSIUM SERPL-SCNC: 6 MMOL/L (ref 3.5–5.1)
PROCALCITONIN SERPL-MCNC: 3.78 NG/ML
PROCALCITONIN SERPL-MCNC: 3.87 NG/ML
PROCALCITONIN SERPL-MCNC: 4.71 NG/ML
PROT SERPL-MCNC: 7.1 G/DL (ref 6.4–8.2)
PROT SERPL-MCNC: 7.3 G/DL (ref 6.4–8.2)
PROT SERPL-MCNC: 7.9 G/DL (ref 6.4–8.2)
PROT UR STRIP-MCNC: ABNORMAL MG/DL
PROT UR STRIP-MCNC: NEGATIVE MG/DL
PULMONARY ARTERY END DIASTOLIC PRESSURE: 21.2 MMHG
PULMONARY ARTERY MEAN PRESURE: 39.6 MMHG
PV END DIASTOLIC VELOCITY: 1.7 MMHG
Q-T INTERVAL, ECG07: 354 MS
Q-T INTERVAL, ECG07: 378 MS
QRS DURATION, ECG06: 78 MS
QRS DURATION, ECG06: 82 MS
QTC CALCULATION (BEZET), ECG08: 428 MS
QTC CALCULATION (BEZET), ECG08: 464 MS
RBC # BLD AUTO: 4.87 M/UL (ref 3.8–5.2)
RBC # BLD AUTO: 4.92 M/UL (ref 3.8–5.2)
RBC # BLD AUTO: 4.99 M/UL (ref 3.8–5.2)
RBC # BLD AUTO: 5 M/UL (ref 3.8–5.2)
RBC # BLD AUTO: 5.01 M/UL (ref 3.8–5.2)
RBC # BLD AUTO: 5.05 M/UL (ref 3.8–5.2)
RBC # BLD AUTO: 5.1 M/UL (ref 3.8–5.2)
RBC # BLD AUTO: 5.12 M/UL (ref 3.8–5.2)
RBC # BLD AUTO: 5.2 M/UL (ref 3.8–5.2)
RBC # BLD AUTO: 5.24 M/UL (ref 3.8–5.2)
RBC # BLD AUTO: 5.27 M/UL (ref 3.8–5.2)
RBC # BLD AUTO: 5.45 M/UL (ref 3.8–5.2)
RBC #/AREA URNS HPF: ABNORMAL /HPF (ref 0–5)
RBC #/AREA URNS HPF: ABNORMAL /HPF (ref 0–5)
RBC MORPH BLD: ABNORMAL
SERVICE CMNT-IMP: NORMAL
SODIUM SERPL-SCNC: 128 MMOL/L (ref 136–145)
SODIUM SERPL-SCNC: 130 MMOL/L (ref 136–145)
SODIUM SERPL-SCNC: 131 MMOL/L (ref 136–145)
SODIUM SERPL-SCNC: 132 MMOL/L (ref 136–145)
SODIUM SERPL-SCNC: 140 MMOL/L (ref 136–145)
SODIUM SERPL-SCNC: 141 MMOL/L (ref 136–145)
SODIUM SERPL-SCNC: 141 MMOL/L (ref 136–145)
SODIUM SERPL-SCNC: 142 MMOL/L (ref 136–145)
SODIUM SERPL-SCNC: 143 MMOL/L (ref 136–145)
SODIUM SERPL-SCNC: 144 MMOL/L (ref 136–145)
SP GR UR REFRACTOMETRY: 1.01 (ref 1–1.03)
SP GR UR REFRACTOMETRY: 1.02 (ref 1–1.03)
TROPONIN I SERPL-MCNC: 0.08 NG/ML
TROPONIN I SERPL-MCNC: 0.09 NG/ML
TROPONIN I SERPL-MCNC: 0.09 NG/ML
TROPONIN I SERPL-MCNC: 0.11 NG/ML
TSH SERPL DL<=0.05 MIU/L-ACNC: 0.71 UIU/ML (ref 0.36–3.74)
UA: UC IF INDICATED,UAUC: ABNORMAL
UA: UC IF INDICATED,UAUC: ABNORMAL
UROBILINOGEN UR QL STRIP.AUTO: 0.2 EU/DL (ref 0.2–1)
UROBILINOGEN UR QL STRIP.AUTO: 0.2 EU/DL (ref 0.2–1)
VENTRICULAR RATE, ECG03: 88 BPM
VENTRICULAR RATE, ECG03: 91 BPM
WBC # BLD AUTO: 15.6 K/UL (ref 3.6–11)
WBC # BLD AUTO: 16.4 K/UL (ref 3.6–11)
WBC # BLD AUTO: 16.7 K/UL (ref 3.6–11)
WBC # BLD AUTO: 17.2 K/UL (ref 3.6–11)
WBC # BLD AUTO: 17.3 K/UL (ref 3.6–11)
WBC # BLD AUTO: 22.4 K/UL (ref 3.6–11)
WBC # BLD AUTO: 24.9 K/UL (ref 3.6–11)
WBC # BLD AUTO: 4 K/UL (ref 3.6–11)
WBC # BLD AUTO: 4.1 K/UL (ref 3.6–11)
WBC # BLD AUTO: 4.2 K/UL (ref 3.6–11)
WBC # BLD AUTO: 4.3 K/UL (ref 3.6–11)
WBC # BLD AUTO: 4.4 K/UL (ref 3.6–11)
WBC URNS QL MICRO: ABNORMAL /HPF (ref 0–4)
WBC URNS QL MICRO: ABNORMAL /HPF (ref 0–4)

## 2020-01-01 PROCEDURE — 3331090001 HH PPS REVENUE CREDIT

## 2020-01-01 PROCEDURE — 94760 N-INVAS EAR/PLS OXIMETRY 1: CPT

## 2020-01-01 PROCEDURE — 71045 X-RAY EXAM CHEST 1 VIEW: CPT

## 2020-01-01 PROCEDURE — 74011250637 HC RX REV CODE- 250/637: Performed by: INTERNAL MEDICINE

## 2020-01-01 PROCEDURE — 51798 US URINE CAPACITY MEASURE: CPT

## 2020-01-01 PROCEDURE — G0157 HHC PT ASSISTANT EA 15: HCPCS

## 2020-01-01 PROCEDURE — 65660000000 HC RM CCU STEPDOWN

## 2020-01-01 PROCEDURE — 74011250637 HC RX REV CODE- 250/637: Performed by: NURSE PRACTITIONER

## 2020-01-01 PROCEDURE — 74011250637 HC RX REV CODE- 250/637: Performed by: FAMILY MEDICINE

## 2020-01-01 PROCEDURE — 77010033678 HC OXYGEN DAILY

## 2020-01-01 PROCEDURE — 36415 COLL VENOUS BLD VENIPUNCTURE: CPT

## 2020-01-01 PROCEDURE — 3331090002 HH PPS REVENUE DEBIT

## 2020-01-01 PROCEDURE — 85027 COMPLETE CBC AUTOMATED: CPT

## 2020-01-01 PROCEDURE — 80048 BASIC METABOLIC PNL TOTAL CA: CPT

## 2020-01-01 PROCEDURE — 97535 SELF CARE MNGMENT TRAINING: CPT

## 2020-01-01 PROCEDURE — 74011250636 HC RX REV CODE- 250/636: Performed by: INTERNAL MEDICINE

## 2020-01-01 PROCEDURE — 84484 ASSAY OF TROPONIN QUANT: CPT

## 2020-01-01 PROCEDURE — 81001 URINALYSIS AUTO W/SCOPE: CPT

## 2020-01-01 PROCEDURE — 92526 ORAL FUNCTION THERAPY: CPT

## 2020-01-01 PROCEDURE — 77030040392 HC DRSG OPTIFOAM MDII -A

## 2020-01-01 PROCEDURE — 84145 PROCALCITONIN (PCT): CPT

## 2020-01-01 PROCEDURE — 83735 ASSAY OF MAGNESIUM: CPT

## 2020-01-01 PROCEDURE — G0299 HHS/HOSPICE OF RN EA 15 MIN: HCPCS

## 2020-01-01 PROCEDURE — 85025 COMPLETE CBC W/AUTO DIFF WBC: CPT

## 2020-01-01 PROCEDURE — 93005 ELECTROCARDIOGRAM TRACING: CPT

## 2020-01-01 PROCEDURE — 74011250636 HC RX REV CODE- 250/636: Performed by: NURSE PRACTITIONER

## 2020-01-01 PROCEDURE — 97161 PT EVAL LOW COMPLEX 20 MIN: CPT

## 2020-01-01 PROCEDURE — 97530 THERAPEUTIC ACTIVITIES: CPT

## 2020-01-01 PROCEDURE — 83880 ASSAY OF NATRIURETIC PEPTIDE: CPT

## 2020-01-01 PROCEDURE — 83605 ASSAY OF LACTIC ACID: CPT

## 2020-01-01 PROCEDURE — 92610 EVALUATE SWALLOWING FUNCTION: CPT

## 2020-01-01 PROCEDURE — 400013 HH SOC

## 2020-01-01 PROCEDURE — 84443 ASSAY THYROID STIM HORMONE: CPT

## 2020-01-01 PROCEDURE — 82550 ASSAY OF CK (CPK): CPT

## 2020-01-01 PROCEDURE — 97116 GAIT TRAINING THERAPY: CPT

## 2020-01-01 PROCEDURE — G0151 HHCP-SERV OF PT,EA 15 MIN: HCPCS

## 2020-01-01 PROCEDURE — 77030018836 HC SOL IRR NACL ICUM -A

## 2020-01-01 PROCEDURE — 93306 TTE W/DOPPLER COMPLETE: CPT

## 2020-01-01 PROCEDURE — 87040 BLOOD CULTURE FOR BACTERIA: CPT

## 2020-01-01 PROCEDURE — G0300 HHS/HOSPICE OF LPN EA 15 MIN: HCPCS

## 2020-01-01 PROCEDURE — 87086 URINE CULTURE/COLONY COUNT: CPT

## 2020-01-01 PROCEDURE — 65270000029 HC RM PRIVATE

## 2020-01-01 PROCEDURE — 80053 COMPREHEN METABOLIC PANEL: CPT

## 2020-01-01 PROCEDURE — 97110 THERAPEUTIC EXERCISES: CPT

## 2020-01-01 PROCEDURE — 77030038269 HC DRN EXT URIN PURWCK BARD -A

## 2020-01-01 PROCEDURE — 99285 EMERGENCY DEPT VISIT HI MDM: CPT

## 2020-01-01 PROCEDURE — 97165 OT EVAL LOW COMPLEX 30 MIN: CPT

## 2020-01-01 RX ORDER — SODIUM CHLORIDE 0.9 % (FLUSH) 0.9 %
5-40 SYRINGE (ML) INJECTION AS NEEDED
Status: DISCONTINUED | OUTPATIENT
Start: 2020-01-01 | End: 2020-01-01 | Stop reason: HOSPADM

## 2020-01-01 RX ORDER — LEVOFLOXACIN 5 MG/ML
500 INJECTION, SOLUTION INTRAVENOUS
Status: DISCONTINUED | OUTPATIENT
Start: 2020-01-01 | End: 2020-01-01

## 2020-01-01 RX ORDER — ONDANSETRON 2 MG/ML
4 INJECTION INTRAMUSCULAR; INTRAVENOUS
Status: DISCONTINUED | OUTPATIENT
Start: 2020-01-01 | End: 2020-01-01 | Stop reason: HOSPADM

## 2020-01-01 RX ORDER — LEVOFLOXACIN 5 MG/ML
750 INJECTION, SOLUTION INTRAVENOUS ONCE
Status: COMPLETED | OUTPATIENT
Start: 2020-01-01 | End: 2020-01-01

## 2020-01-01 RX ORDER — COLCHICINE 0.6 MG/1
0.6 TABLET ORAL
Status: DISCONTINUED | OUTPATIENT
Start: 2020-01-01 | End: 2020-01-01 | Stop reason: HOSPADM

## 2020-01-01 RX ORDER — HYDRALAZINE HYDROCHLORIDE 10 MG/1
10 TABLET, FILM COATED ORAL DAILY
Status: DISCONTINUED | OUTPATIENT
Start: 2020-01-01 | End: 2020-01-01 | Stop reason: HOSPADM

## 2020-01-01 RX ORDER — ACETAMINOPHEN 325 MG/1
650 TABLET ORAL
Status: DISCONTINUED | OUTPATIENT
Start: 2020-01-01 | End: 2020-01-01 | Stop reason: HOSPADM

## 2020-01-01 RX ORDER — FUROSEMIDE 40 MG/1
80 TABLET ORAL 2 TIMES DAILY
Qty: 360 TAB | Refills: 3 | Status: SHIPPED | OUTPATIENT
Start: 2020-01-01

## 2020-01-01 RX ORDER — POTASSIUM CHLORIDE 20 MEQ/1
40 TABLET, EXTENDED RELEASE ORAL
Status: COMPLETED | OUTPATIENT
Start: 2020-01-01 | End: 2020-01-01

## 2020-01-01 RX ORDER — DOCUSATE SODIUM 100 MG/1
100 CAPSULE, LIQUID FILLED ORAL
COMMUNITY

## 2020-01-01 RX ORDER — ALBUTEROL SULFATE 90 UG/1
2 AEROSOL, METERED RESPIRATORY (INHALATION)
Status: DISCONTINUED | OUTPATIENT
Start: 2020-01-01 | End: 2020-01-01 | Stop reason: HOSPADM

## 2020-01-01 RX ORDER — TRAMADOL HYDROCHLORIDE 50 MG/1
50 TABLET ORAL
COMMUNITY
End: 2020-01-01

## 2020-01-01 RX ORDER — ONDANSETRON 2 MG/ML
4 INJECTION INTRAMUSCULAR; INTRAVENOUS
Status: DISCONTINUED | OUTPATIENT
Start: 2020-01-01 | End: 2020-01-01 | Stop reason: DRUGHIGH

## 2020-01-01 RX ORDER — ONDANSETRON 4 MG/1
4 TABLET, ORALLY DISINTEGRATING ORAL
Qty: 10 TAB | Refills: 0 | Status: SHIPPED | OUTPATIENT
Start: 2020-01-01 | End: 2020-01-01

## 2020-01-01 RX ORDER — ALBUTEROL SULFATE 0.83 MG/ML
2.5 SOLUTION RESPIRATORY (INHALATION)
Status: DISCONTINUED | OUTPATIENT
Start: 2020-01-01 | End: 2020-01-01 | Stop reason: HOSPADM

## 2020-01-01 RX ORDER — SODIUM CHLORIDE 0.9 % (FLUSH) 0.9 %
5-40 SYRINGE (ML) INJECTION EVERY 8 HOURS
Status: DISCONTINUED | OUTPATIENT
Start: 2020-01-01 | End: 2020-01-01 | Stop reason: HOSPADM

## 2020-01-01 RX ORDER — METOLAZONE 2.5 MG/1
2.5 TABLET ORAL
Status: DISCONTINUED | OUTPATIENT
Start: 2020-01-01 | End: 2020-01-01 | Stop reason: HOSPADM

## 2020-01-01 RX ORDER — BISACODYL 5 MG
5 TABLET, DELAYED RELEASE (ENTERIC COATED) ORAL DAILY PRN
Status: DISCONTINUED | OUTPATIENT
Start: 2020-01-01 | End: 2020-01-01 | Stop reason: HOSPADM

## 2020-01-01 RX ORDER — NYSTATIN 100000 [USP'U]/G
POWDER TOPICAL
Status: DISCONTINUED | OUTPATIENT
Start: 2020-01-01 | End: 2020-01-01 | Stop reason: HOSPADM

## 2020-01-01 RX ORDER — ISOSORBIDE MONONITRATE 30 MG/1
30 TABLET, EXTENDED RELEASE ORAL DAILY
Status: DISCONTINUED | OUTPATIENT
Start: 2020-01-01 | End: 2020-01-01 | Stop reason: HOSPADM

## 2020-01-01 RX ORDER — AMOXICILLIN 250 MG
1 CAPSULE ORAL DAILY
COMMUNITY
End: 2020-01-01

## 2020-01-01 RX ORDER — FUROSEMIDE 10 MG/ML
40 INJECTION INTRAMUSCULAR; INTRAVENOUS 2 TIMES DAILY
Status: DISCONTINUED | OUTPATIENT
Start: 2020-01-01 | End: 2020-01-01 | Stop reason: HOSPADM

## 2020-01-01 RX ORDER — HYDRALAZINE HYDROCHLORIDE 10 MG/1
10 TABLET, FILM COATED ORAL DAILY
Refills: 0 | COMMUNITY
Start: 2019-01-01

## 2020-01-01 RX ORDER — ACETAMINOPHEN 325 MG/1
650 TABLET ORAL
Status: DISCONTINUED | OUTPATIENT
Start: 2020-01-01 | End: 2020-01-01

## 2020-01-01 RX ORDER — FUROSEMIDE 40 MG/1
40 TABLET ORAL
Status: CANCELLED | OUTPATIENT
Start: 2020-01-01

## 2020-01-01 RX ORDER — FUROSEMIDE 10 MG/ML
80 INJECTION INTRAMUSCULAR; INTRAVENOUS 2 TIMES DAILY
Status: DISCONTINUED | OUTPATIENT
Start: 2020-01-01 | End: 2020-01-01 | Stop reason: HOSPADM

## 2020-01-01 RX ORDER — BALSAM PERU/CASTOR OIL
OINTMENT (GRAM) TOPICAL 3 TIMES DAILY
Status: DISCONTINUED | OUTPATIENT
Start: 2020-01-01 | End: 2020-01-01 | Stop reason: HOSPADM

## 2020-01-01 RX ORDER — ALBUTEROL SULFATE 90 UG/1
2 AEROSOL, METERED RESPIRATORY (INHALATION)
Status: DISCONTINUED | OUTPATIENT
Start: 2020-01-01 | End: 2020-01-01

## 2020-01-01 RX ORDER — ISOSORBIDE MONONITRATE 30 MG/1
30 TABLET, EXTENDED RELEASE ORAL DAILY
Qty: 30 TAB | Refills: 3 | Status: SHIPPED | OUTPATIENT
Start: 2020-01-01

## 2020-01-01 RX ORDER — FUROSEMIDE 10 MG/ML
60 INJECTION INTRAMUSCULAR; INTRAVENOUS 2 TIMES DAILY
Status: DISCONTINUED | OUTPATIENT
Start: 2020-01-01 | End: 2020-01-01

## 2020-01-01 RX ORDER — DOCUSATE SODIUM 100 MG/1
100 CAPSULE, LIQUID FILLED ORAL
Status: DISCONTINUED | OUTPATIENT
Start: 2020-01-01 | End: 2020-01-01 | Stop reason: HOSPADM

## 2020-01-01 RX ADMIN — NYSTATIN: 100000 POWDER TOPICAL at 08:55

## 2020-01-01 RX ADMIN — APIXABAN 2.5 MG: 2.5 TABLET, FILM COATED ORAL at 09:37

## 2020-01-01 RX ADMIN — HYDRALAZINE HYDROCHLORIDE 10 MG: 10 TABLET, FILM COATED ORAL at 08:23

## 2020-01-01 RX ADMIN — APIXABAN 2.5 MG: 2.5 TABLET, FILM COATED ORAL at 18:01

## 2020-01-01 RX ADMIN — Medication 10 ML: at 15:16

## 2020-01-01 RX ADMIN — CASTOR OIL AND BALSAM, PERU: 788; 87 OINTMENT TOPICAL at 16:42

## 2020-01-01 RX ADMIN — FUROSEMIDE 60 MG: 10 INJECTION, SOLUTION INTRAMUSCULAR; INTRAVENOUS at 09:38

## 2020-01-01 RX ADMIN — Medication 10 ML: at 23:55

## 2020-01-01 RX ADMIN — Medication 10 ML: at 06:55

## 2020-01-01 RX ADMIN — FUROSEMIDE 40 MG: 10 INJECTION, SOLUTION INTRAMUSCULAR; INTRAVENOUS at 18:59

## 2020-01-01 RX ADMIN — Medication 10 ML: at 05:58

## 2020-01-01 RX ADMIN — MULTIPLE VITAMINS W/ MINERALS TAB 1 TABLET: TAB at 08:54

## 2020-01-01 RX ADMIN — Medication 10 ML: at 13:07

## 2020-01-01 RX ADMIN — Medication 10 ML: at 21:50

## 2020-01-01 RX ADMIN — APIXABAN 2.5 MG: 2.5 TABLET, FILM COATED ORAL at 08:24

## 2020-01-01 RX ADMIN — POTASSIUM BICARBONATE 40 MEQ: 782 TABLET, EFFERVESCENT ORAL at 09:33

## 2020-01-01 RX ADMIN — ISOSORBIDE MONONITRATE 30 MG: 30 TABLET, EXTENDED RELEASE ORAL at 08:53

## 2020-01-01 RX ADMIN — FUROSEMIDE 60 MG: 10 INJECTION, SOLUTION INTRAMUSCULAR; INTRAVENOUS at 09:01

## 2020-01-01 RX ADMIN — ACETAMINOPHEN 650 MG: 325 TABLET ORAL at 09:44

## 2020-01-01 RX ADMIN — Medication 10 ML: at 02:55

## 2020-01-01 RX ADMIN — HYDRALAZINE HYDROCHLORIDE 10 MG: 10 TABLET, FILM COATED ORAL at 09:49

## 2020-01-01 RX ADMIN — HYDRALAZINE HYDROCHLORIDE 10 MG: 10 TABLET, FILM COATED ORAL at 09:39

## 2020-01-01 RX ADMIN — Medication 10 ML: at 05:43

## 2020-01-01 RX ADMIN — POTASSIUM CHLORIDE 40 MEQ: 20 TABLET, EXTENDED RELEASE ORAL at 11:13

## 2020-01-01 RX ADMIN — ISOSORBIDE MONONITRATE 30 MG: 30 TABLET, EXTENDED RELEASE ORAL at 09:49

## 2020-01-01 RX ADMIN — ISOSORBIDE MONONITRATE 30 MG: 30 TABLET, EXTENDED RELEASE ORAL at 09:00

## 2020-01-01 RX ADMIN — HYDRALAZINE HYDROCHLORIDE 10 MG: 10 TABLET, FILM COATED ORAL at 10:27

## 2020-01-01 RX ADMIN — FUROSEMIDE 60 MG: 10 INJECTION, SOLUTION INTRAMUSCULAR; INTRAVENOUS at 18:49

## 2020-01-01 RX ADMIN — Medication 10 ML: at 19:36

## 2020-01-01 RX ADMIN — POTASSIUM BICARBONATE 40 MEQ: 782 TABLET, EFFERVESCENT ORAL at 09:44

## 2020-01-01 RX ADMIN — METOLAZONE 2.5 MG: 2.5 TABLET ORAL at 23:23

## 2020-01-01 RX ADMIN — FUROSEMIDE 80 MG: 10 INJECTION, SOLUTION INTRAMUSCULAR; INTRAVENOUS at 09:49

## 2020-01-01 RX ADMIN — FUROSEMIDE 60 MG: 10 INJECTION, SOLUTION INTRAMUSCULAR; INTRAVENOUS at 17:12

## 2020-01-01 RX ADMIN — APIXABAN 2.5 MG: 2.5 TABLET, FILM COATED ORAL at 08:53

## 2020-01-01 RX ADMIN — FUROSEMIDE 60 MG: 10 INJECTION, SOLUTION INTRAMUSCULAR; INTRAVENOUS at 09:59

## 2020-01-01 RX ADMIN — Medication 10 ML: at 16:40

## 2020-01-01 RX ADMIN — Medication 10 ML: at 13:27

## 2020-01-01 RX ADMIN — ACETAMINOPHEN 650 MG: 325 TABLET ORAL at 06:58

## 2020-01-01 RX ADMIN — CASTOR OIL AND BALSAM, PERU: 788; 87 OINTMENT TOPICAL at 17:36

## 2020-01-01 RX ADMIN — POTASSIUM BICARBONATE 20 MEQ: 782 TABLET, EFFERVESCENT ORAL at 11:57

## 2020-01-01 RX ADMIN — APIXABAN 2.5 MG: 2.5 TABLET, FILM COATED ORAL at 09:49

## 2020-01-01 RX ADMIN — MULTIPLE VITAMINS W/ MINERALS TAB 1 TABLET: TAB at 09:00

## 2020-01-01 RX ADMIN — FUROSEMIDE 40 MG: 10 INJECTION, SOLUTION INTRAMUSCULAR; INTRAVENOUS at 19:15

## 2020-01-01 RX ADMIN — Medication 10 ML: at 21:17

## 2020-01-01 RX ADMIN — POTASSIUM BICARBONATE 20 MEQ: 782 TABLET, EFFERVESCENT ORAL at 08:53

## 2020-01-01 RX ADMIN — FUROSEMIDE 60 MG: 10 INJECTION, SOLUTION INTRAMUSCULAR; INTRAVENOUS at 19:35

## 2020-01-01 RX ADMIN — POTASSIUM CHLORIDE 40 MEQ: 20 TABLET, EXTENDED RELEASE ORAL at 17:55

## 2020-01-01 RX ADMIN — FUROSEMIDE 60 MG: 10 INJECTION, SOLUTION INTRAMUSCULAR; INTRAVENOUS at 10:27

## 2020-01-01 RX ADMIN — Medication 10 ML: at 14:00

## 2020-01-01 RX ADMIN — ACETAMINOPHEN 650 MG: 325 TABLET ORAL at 07:22

## 2020-01-01 RX ADMIN — FUROSEMIDE 60 MG: 10 INJECTION, SOLUTION INTRAMUSCULAR; INTRAVENOUS at 09:20

## 2020-01-01 RX ADMIN — FUROSEMIDE 40 MG: 10 INJECTION, SOLUTION INTRAMUSCULAR; INTRAVENOUS at 08:54

## 2020-01-01 RX ADMIN — CASTOR OIL AND BALSAM, PERU: 788; 87 OINTMENT TOPICAL at 08:54

## 2020-01-01 RX ADMIN — POTASSIUM CHLORIDE 40 MEQ: 20 TABLET, EXTENDED RELEASE ORAL at 09:49

## 2020-01-01 RX ADMIN — HYDRALAZINE HYDROCHLORIDE 10 MG: 10 TABLET, FILM COATED ORAL at 08:54

## 2020-01-01 RX ADMIN — HYDRALAZINE HYDROCHLORIDE 10 MG: 10 TABLET, FILM COATED ORAL at 09:20

## 2020-01-01 RX ADMIN — COLCHICINE 0.6 MG: 0.6 TABLET, FILM COATED ORAL at 09:34

## 2020-01-01 RX ADMIN — APIXABAN 2.5 MG: 2.5 TABLET, FILM COATED ORAL at 19:35

## 2020-01-01 RX ADMIN — METOLAZONE 2.5 MG: 2.5 TABLET ORAL at 10:28

## 2020-01-01 RX ADMIN — LEVOFLOXACIN 750 MG: 5 INJECTION, SOLUTION INTRAVENOUS at 21:38

## 2020-01-01 RX ADMIN — APIXABAN 2.5 MG: 2.5 TABLET, FILM COATED ORAL at 09:20

## 2020-01-01 RX ADMIN — FUROSEMIDE 60 MG: 10 INJECTION, SOLUTION INTRAMUSCULAR; INTRAVENOUS at 08:23

## 2020-01-01 RX ADMIN — Medication 10 ML: at 21:04

## 2020-01-01 RX ADMIN — APIXABAN 2.5 MG: 2.5 TABLET, FILM COATED ORAL at 21:37

## 2020-01-01 RX ADMIN — Medication 10 ML: at 14:03

## 2020-01-01 RX ADMIN — CASTOR OIL AND BALSAM, PERU: 788; 87 OINTMENT TOPICAL at 21:17

## 2020-01-01 RX ADMIN — Medication 10 ML: at 17:29

## 2020-01-01 RX ADMIN — CASTOR OIL AND BALSAM, PERU: 788; 87 OINTMENT TOPICAL at 21:03

## 2020-01-01 RX ADMIN — POTASSIUM BICARBONATE 20 MEQ: 782 TABLET, EFFERVESCENT ORAL at 09:00

## 2020-01-01 RX ADMIN — POTASSIUM BICARBONATE 20 MEQ: 782 TABLET, EFFERVESCENT ORAL at 10:27

## 2020-01-01 RX ADMIN — FUROSEMIDE 80 MG: 10 INJECTION, SOLUTION INTRAMUSCULAR; INTRAVENOUS at 08:54

## 2020-01-01 RX ADMIN — FUROSEMIDE 60 MG: 10 INJECTION, SOLUTION INTRAMUSCULAR; INTRAVENOUS at 17:55

## 2020-01-01 RX ADMIN — APIXABAN 2.5 MG: 2.5 TABLET, FILM COATED ORAL at 09:34

## 2020-01-01 RX ADMIN — APIXABAN 2.5 MG: 2.5 TABLET, FILM COATED ORAL at 17:12

## 2020-01-01 RX ADMIN — APIXABAN 2.5 MG: 2.5 TABLET, FILM COATED ORAL at 17:54

## 2020-01-01 RX ADMIN — FUROSEMIDE 60 MG: 10 INJECTION, SOLUTION INTRAMUSCULAR; INTRAVENOUS at 18:00

## 2020-01-01 RX ADMIN — Medication 10 ML: at 04:22

## 2020-01-01 RX ADMIN — POTASSIUM BICARBONATE 20 MEQ: 782 TABLET, EFFERVESCENT ORAL at 08:24

## 2020-01-01 RX ADMIN — Medication 10 ML: at 08:54

## 2020-01-01 RX ADMIN — CASTOR OIL AND BALSAM, PERU: 788; 87 OINTMENT TOPICAL at 09:59

## 2020-01-01 RX ADMIN — MULTIPLE VITAMINS W/ MINERALS TAB 1 TABLET: TAB at 10:27

## 2020-01-01 RX ADMIN — APIXABAN 2.5 MG: 2.5 TABLET, FILM COATED ORAL at 17:29

## 2020-01-01 RX ADMIN — ISOSORBIDE MONONITRATE 30 MG: 30 TABLET, EXTENDED RELEASE ORAL at 09:39

## 2020-01-01 RX ADMIN — ISOSORBIDE MONONITRATE 30 MG: 30 TABLET, EXTENDED RELEASE ORAL at 13:22

## 2020-01-01 RX ADMIN — Medication 10 ML: at 05:52

## 2020-01-01 RX ADMIN — Medication 10 ML: at 21:37

## 2020-01-01 RX ADMIN — LEVOFLOXACIN 500 MG: 5 INJECTION, SOLUTION INTRAVENOUS at 21:59

## 2020-01-01 RX ADMIN — Medication 10 ML: at 21:59

## 2020-01-01 RX ADMIN — Medication 10 ML: at 22:02

## 2020-01-01 RX ADMIN — Medication 10 ML: at 01:41

## 2020-01-01 RX ADMIN — FUROSEMIDE 60 MG: 10 INJECTION, SOLUTION INTRAMUSCULAR; INTRAVENOUS at 17:59

## 2020-01-01 RX ADMIN — Medication 10 ML: at 03:34

## 2020-01-01 RX ADMIN — APIXABAN 2.5 MG: 2.5 TABLET, FILM COATED ORAL at 17:59

## 2020-01-01 RX ADMIN — Medication 10 ML: at 21:39

## 2020-01-01 RX ADMIN — ACETAMINOPHEN 650 MG: 325 TABLET ORAL at 23:44

## 2020-01-01 RX ADMIN — HYDRALAZINE HYDROCHLORIDE 10 MG: 10 TABLET, FILM COATED ORAL at 08:53

## 2020-01-01 RX ADMIN — FUROSEMIDE 80 MG: 10 INJECTION, SOLUTION INTRAMUSCULAR; INTRAVENOUS at 17:54

## 2020-01-01 RX ADMIN — APIXABAN 2.5 MG: 2.5 TABLET, FILM COATED ORAL at 10:27

## 2020-01-01 RX ADMIN — Medication 10 ML: at 15:13

## 2020-01-01 RX ADMIN — METOLAZONE 2.5 MG: 2.5 TABLET ORAL at 22:02

## 2020-01-01 RX ADMIN — ISOSORBIDE MONONITRATE 30 MG: 30 TABLET, EXTENDED RELEASE ORAL at 09:21

## 2020-01-01 RX ADMIN — Medication 10 ML: at 06:21

## 2020-01-01 RX ADMIN — ISOSORBIDE MONONITRATE 30 MG: 30 TABLET, EXTENDED RELEASE ORAL at 09:33

## 2020-01-01 RX ADMIN — ISOSORBIDE MONONITRATE 30 MG: 30 TABLET, EXTENDED RELEASE ORAL at 08:23

## 2020-01-01 RX ADMIN — HYDRALAZINE HYDROCHLORIDE 10 MG: 10 TABLET, FILM COATED ORAL at 09:34

## 2020-01-01 RX ADMIN — Medication 10 ML: at 23:38

## 2020-01-01 RX ADMIN — Medication 10 ML: at 23:42

## 2020-01-01 RX ADMIN — POTASSIUM BICARBONATE 20 MEQ: 782 TABLET, EFFERVESCENT ORAL at 09:20

## 2020-01-01 RX ADMIN — Medication 10 ML: at 06:26

## 2020-01-01 RX ADMIN — POTASSIUM BICARBONATE 40 MEQ: 782 TABLET, EFFERVESCENT ORAL at 08:53

## 2020-01-01 RX ADMIN — ISOSORBIDE MONONITRATE 30 MG: 30 TABLET, EXTENDED RELEASE ORAL at 10:27

## 2020-01-01 RX ADMIN — Medication 10 ML: at 02:49

## 2020-01-01 RX ADMIN — HYDRALAZINE HYDROCHLORIDE 10 MG: 10 TABLET, FILM COATED ORAL at 11:57

## 2020-01-01 RX ADMIN — APIXABAN 2.5 MG: 2.5 TABLET, FILM COATED ORAL at 18:59

## 2020-01-01 RX ADMIN — MULTIPLE VITAMINS W/ MINERALS TAB 1 TABLET: TAB at 08:24

## 2020-01-01 RX ADMIN — APIXABAN 2.5 MG: 2.5 TABLET, FILM COATED ORAL at 11:57

## 2020-01-01 RX ADMIN — APIXABAN 2.5 MG: 2.5 TABLET, FILM COATED ORAL at 17:55

## 2020-01-01 RX ADMIN — APIXABAN 2.5 MG: 2.5 TABLET, FILM COATED ORAL at 09:01

## 2020-01-01 RX ADMIN — HYDRALAZINE HYDROCHLORIDE 10 MG: 10 TABLET, FILM COATED ORAL at 09:00

## 2020-01-01 RX ADMIN — MULTIPLE VITAMINS W/ MINERALS TAB 1 TABLET: TAB at 09:20

## 2020-01-29 NOTE — PROGRESS NOTES
1. Have you been to the ER, urgent care clinic since your last visit? Hospitalized since your last visit? YES, ER DEC, PAIN IN HANDS. 2. Have you seen or consulted any other health care providers outside of the [address] [place] [address] since your last visit? Include any pap smears or colon screening. YES, PCP. 3 MONTH F/U. C/O SWELLING IN BLE, SOB.

## 2020-01-29 NOTE — PROGRESS NOTES
NAME:  Carissa Gomez :   1918 MRN:   [id number] PCP:  Tito Snider MD 
 
    
 
Subjective: The patient is a 8 y.o. year old female  who returns for a routine follow-up. Since the last visit, patient reports no change in exercise tolerance, chest pain,  medication intolerance, palpitations. C/o  shortness of breath, edema, weight gain. No  PND/orthopnea wheezing, sputum, syncope, dizziness or light headedness. Past Medical History:  
Diagnosis Date  A-fib ([facility]  Adenocarcinoma ([facility]  Cancer ([facility] right breast CA, lung, colon  Chronic kidney disease  Gastrointestinal disorder GERD  Hypertension  Ileitis, terminal ([facility]  Other ill-defined conditions(799.89)   
 gout  [facility] ICD-10-CM ICD-9-CM 1. Diastolic CHF, chronic (HCC) I50.32 428.32   
  428.0 2. Chronic a-fib I48.20 427.31 AMB POC EKG ROUTINE W/ 12 LEADS, INTER & REP 3. CKD (chronic kidney disease) stage 3, GFR 30-59 ml/min ([facility]) N18.3 585.3 Social History Tobacco Use  Smoking status: Former Smoker Types: Cigarettes Last attempt to quit: 1967 Years since quittin.1  Smokeless tobacco: Never Used Substance Use Topics  Alcohol use: No  
  
Family History Problem Relation Age of Onset  Colon Cancer Other  Hypertension Other Review of Systems General: Pt denies excessive weight gain or loss. Pt is able to conduct ADL's HEENT: Denies blurred vision, headaches, epistaxis and difficulty swallowing. Respiratory: Denies shortness of breath, WOOTEN, wheezing or stridor. Cardiovascular: Denies precordial pain, palpitations, edema or PND Gastrointestinal: Denies poor appetite, indigestion, abdominal pain or blood in stool Musculoskeletal: Denies pain or swelling from muscles or joints Neurologic: Denies tremor, paresthesias, or sensory motor disturbance Skin: Denies rash, itching or texture change. Objective:  
 
 
Vitals:  
 01/29/20 1107 BP: 120/70 Pulse: 81 Resp: 16 SpO2: 97% Weight: 155 lb 8 oz (70.5 kg) Height: 5' 2\" (1.575 m) Body mass index is 28.44 kg/m². Cindy Christy Mental Status - Alert. General Appearance - Not in acute distress. Chest and Lung Exam  Inspection: Accessory muscles - No use of accessory muscles in breathing. Auscultation:  
Breath sounds: - Normal. 
 
Cardiovascular  
Inspection: Jugular vein - Bilateral - Inspection Normal. 
Palpation/Percussion:  
Apical Impulse: - Normal. 
Auscultation: Rhythm - Regular. Heart Sounds - S1 WNL and S2 WNL. No S3 or S4. Murmurs & Other Heart Sounds: Auscultation of the heart reveals - No Murmurs. Peripheral Vascular  
Upper Extremity: Inspection - Bilateral - No Cyanotic nailbeds or Digital clubbing. Lower Extremity:  
Palpation: Edema - Bilateral - 2+ edema. Data Review:  
 
EKG -EKG:  atrial fibrillation, rate 60. Medications reviewed Current Outpatient Medications Medication Sig  
 hydrALAZINE (APRESOLINE) 10 mg tablet Take 10 mg by mouth daily.  nystatin (MYCOSTATIN) topical cream Apply 1 g to affected area two (2) times daily as needed for Skin Irritation (buttocks). Use either cream or powder.  acetaminophen (TYLENOL 8 HOUR) 650 mg TbER Take 1,300 mg by mouth every eight (8) hours as needed for Pain.  metOLazone (ZAROXOLYN) 2.5 mg tablet Take 1 Tab by mouth every Monday, Wednesday, Friday. Indications: take per cardiology (Patient taking differently: Take 2.5 mg by mouth as needed. Indications: take per cardiology)  albuterol (PROVENTIL HFA) 90 mcg/actuation inhaler Take 2 Puffs by inhalation every four (4) hours as needed for Wheezing or Shortness of Breath.  furosemide (LASIX) 40 mg tablet Take 2 tab 80mg in AM and 1 tab 40mg in PM (Corrected Rx)  albuterol-ipratropium (DUO-NEB) 2.5 mg-0.5 mg/3 ml nebu Take 3 mL by inhalation every four (4) hours as needed (shortness of breath).  apixaban (ELIQUIS) 2.5 mg tablet Take 1 Tab by mouth two (2) times a day.  mv-mn/folic acid/calcium/vit K (ONE-A-DAY WOMEN'S 50 PLUS PO) Take 1 Tab by mouth daily.  dextran 70/hypromellose/PF (NATURAL TEARS, PF, OP) Administer 1 Drop to right eye daily as needed (dry eye).  mometasone (NASONEX) 50 mcg/actuation nasal spray 2 Sprays by Both Nostrils route daily as needed (congestion).  potassium chloride (KLOR-CON) 20 mEq packet Take 20 mEq by mouth daily.  colchicine (COLCRYS) 0.6 mg tablet Take 0.6 mg by mouth daily as needed (gout flare up).  nystatin (MYCOSTATIN) powder Apply 1 Each to affected area two (2) times daily as needed for Other (buttock irritation). use either powder or cream  
 
No current facility-administered medications for this visit. Assessment: ICD-10-CM ICD-9-CM 1. Diastolic CHF, chronic ([facility]) I50.32 428.32   
  428.0 2. Chronic a-fib I48.20 427.31 AMB POC EKG ROUTINE W/ 12 LEADS, INTER & REP 3. CKD (chronic kidney disease) stage 3, GFR 30-59 ml/min ([facility]) N18.3 585. 3 Plan:  
 
Patient presents decompensated diastolic HF. Will continue lasix 80/40. In addition resume metolazone 2.5 mg daily for 3 days and after thet every other day if needed for 3 more days. Has f/u with pcp next week for labs. Discussed concern for ARF with daughters. Continue current care and follow up in 2 weeks.  
 
Greg Sanchez MD

## 2020-01-29 NOTE — LETTER
1/29/20 Patient: Higinio Roman YOB: 1918 Date of Visit: 1/29/2020 Jesús Chatterjee MD 
[address] [facility] [address] VIA Facsimile: [phone] Dear Jesús Chatterjee MD, Thank you for referring Ms. Gentry Titus to [facility] for evaluation. My notes for this consultation are attached. If you have questions, please do not hesitate to call me. I look forward to following your patient along with you. Sincerely, Soniya Choudhary MD

## 2020-02-19 PROBLEM — I21.4 NSTEMI (NON-ST ELEVATED MYOCARDIAL INFARCTION) (HCC): Status: ACTIVE | Noted: 2020-01-01

## 2020-02-19 NOTE — ED NOTES
Assisted pt to use bedpan; incontinent of urine. Turned and placed on clean linens; heels elevated. Daughter remains at bedside. Waiting xray for return.

## 2020-02-19 NOTE — ED NOTES
Pt c/o shortness of breath for 3 days but has gotten worse. Pt legs have been swelling to bilateral legs. Pitting present. Pt has a hx of COPD.

## 2020-02-20 PROBLEM — R77.8 ELEVATED TROPONIN: Status: ACTIVE | Noted: 2020-01-01

## 2020-02-20 NOTE — CDMP QUERY
Patient admitted with NSTEMI, noted to have afib. If possible, please document in progress notes and d/c summary if you are evaluating and/or treating any of the following: 
 
=> Paroxysmal Atrial Fibrillation 
=> Persistent Atrial Fibrillation 
=> Permanent Atrial Fibrillation  
=> Other Explanation of clinical findings 
=> Clinically Undetermined (no explanation for clinical findings) The medical record reflects the following: 
   Risk Factors: chf, ckd Clinical Indicators:H&P-atrial fibrillation Treatment: Eliquis Please clarify and document your clinical opinion in the progress notes and discharge summary including the definitive and/or presumptive diagnosis, (suspected or probable), related to the above clinical findings. Please include clinical findings supporting your diagnosis. 
------------------------------------------------------------------------------------------------- 
Paroxysmal:  begins suddenly and stops on its own. Symptoms can be mild or severe. They stop within about a week, but usually in less than 24 hours. Persistent: continues for more than a week. It may stop on its own, or it can be stopped with treatment. Permanent: cannot be restored with treatment Thanks, Troy Gay Rn/CHIARA

## 2020-02-20 NOTE — PROGRESS NOTES
Problem: Falls - Risk of 
Goal: *Absence of Falls Description Document Scott Kwok Fall Risk and appropriate interventions in the flowsheet. Outcome: Progressing Towards Goal 
Note: Fall Risk Interventions: 
Mobility Interventions: Bed/chair exit alarm, Patient to call before getting OOB, Utilize walker, cane, or other assistive device, Communicate number of staff needed for ambulation/transfer Medication Interventions: Assess postural VS orthostatic hypotension, Bed/chair exit alarm, Teach patient to arise slowly Elimination Interventions: Bed/chair exit alarm, Call light in reach, Patient to call for help with toileting needs

## 2020-02-20 NOTE — CONSULTS
[address] [facility] Date of  Admission: 2/19/2020  5:20 PM  
 
Admission type:Emergency Consult for: nstemi Consult by: hospitalist  
 
 Subjective:  
 
Donnia Sandhoff is a 80 y.o. female with PMH a fib, HFpEF, CKD, HT, GERd, lung CA who was admitted for NSTEMI (non-ST elevated myocardial infarction) ([facility] [I21.4]. Per ED provider note Donnia Sandhoff presented to the ED with c/o SOB and swelling for 2-3 days and chest pain. Received ASA and nitro from EMS. Cardiology consulted for nstemi with troponin of 0.08/0.09. On assessment, Donnia Sandhoff is feeling a lot better. She denies current SOB or chest pain. She states her weights did start to go up for a few days, but she doesn't know how much. Donnia Sandhoff  follows with Dr. Jerod Salas for cardiology. Last ECHO 05/18 with EF 55-60%; JC; severe TR; mod pHTN. Cardiac risk factors: smoking/ tobacco exposure, obesity, sedentary life style, hypertension, post-menopausal. 
 
 
Patient Active Problem List  
 Diagnosis Date Noted  Elevated troponin 02/20/2020  
 [facility]-ST elevated [facility]) [facility] 02/19/2020  Acute kidney injury ([facility] 09/30/2019  CHF (congestive heart failure) ([facility] 03/02/2019  Pleural effusion, left 07/17/2018  CHF, acute ([facility] 07/17/2018  CKD (chronic kidney disease) stage 3, GFR 30-59 ml/min ([facility]) 05/29/2018  Chronic a-fib 05/29/2018  Diastolic CHF, acute on chronic ([facility] 05/29/2018  Pleural effusion 05/29/2018  Pneumonia 04/18/2017  Sepsis(995.91) 04/18/2017  Dehydration 04/18/2017  Acute CHF ([facility] 01/23/2015  SOB (shortness of breath) 01/21/2015  Pedal edema 01/21/2015  Squamous cell lung cancer ([facility] 01/21/2015  Adenocarcinoma ([facility] 04/05/2013  S/P right colectomy 04/05/2013  Malignant essential hypertension 04/01/2013  Colon cancer ([facility] 03/30/2013  Ileitis, terminal ([facility] 03/27/2013  A-fib ([place] [address] 2013  
 HTN (hypertension) 2013 Matt Salazar MD 
Past Medical History:  
Diagnosis Date  A-fib ([place] [address]  Adenocarcinoma ([place] [address]  Cancer ([place] [address] right breast CA, lung, colon  Chronic kidney disease  Gastrointestinal disorder GERD  Hypertension  Ileitis, terminal ([place] [address]  Other ill-defined conditions(799.89)   
 gout  Squamous cell lung cancer ([place] [address] Social History Socioeconomic History  Marital status:  Spouse name: Not on file  Number of children: Not on file  Years of education: Not on file  Highest education level: Not on file Tobacco Use  Smoking status: Former Smoker Types: Cigarettes Last attempt to quit: 1967 Years since quittin.1  Smokeless tobacco: Never Used Substance and Sexual Activity  Alcohol use: No  
 Drug use: No  
 Sexual activity: Not Currently Allergies Allergen Reactions  Penicillins Hives Family History Problem Relation Age of Onset  Colon Cancer Other  Hypertension Other Current Facility-Administered Medications Medication Dose Route Frequency  sodium chloride (NS) flush 5-40 mL  5-40 mL IntraVENous Q8H  
 sodium chloride (NS) flush 5-40 mL  5-40 mL IntraVENous PRN  
 acetaminophen (TYLENOL) tablet 650 mg  650 mg Oral Q6H PRN  
 ondansetron (ZOFRAN) injection 4 mg  4 mg IntraVENous Q6H PRN  
 bisacodyL (DULCOLAX) tablet 5 mg  5 mg Oral DAILY PRN  
 hydrALAZINE (APRESOLINE) tablet 10 mg  10 mg Oral DAILY  colchicine tablet 0.6 mg  0.6 mg Oral DAILY PRN  
 apixaban (ELIQUIS) tablet 2.5 mg  2.5 mg Oral BID  [START ON 2020] metOLazone (ZAROXOLYN) tablet 2.5 mg  2.5 mg Oral Q MON, WED & FRI  potassium bicarb-citric acid (EFFER-K) tablet 20 mEq  20 mEq Oral DAILY  furosemide (LASIX) injection 60 mg  60 mg IntraVENous BID  albuterol (PROVENTIL VENTOLIN) nebulizer solution 2.5 mg  2.5 mg Nebulization Q4H PRN  
  
 Review of Symptoms:  
11 systems reviewed, negative other than as stated in the HPI Objective:  
  
Visit Vitals /78 (BP 1 Location: Left arm, BP Patient Position: At rest) Pulse 75 Temp 97.4 °F (36.3 °C) Resp 22 Ht 5' 2\" (1.575 m) Wt 69.3 kg (152 lb 12.5 oz) SpO2 92% BMI 27.94 kg/m² Physical:  
General: pleasant, elderly AAF resting in bed in NAD Heart: RRR, no m/S3/JVD Lungs: clear/dim Abdomen: Soft, +BS, NTND Extremities: LE espinoza +DP/PT, 3+ edema BLE Neurologic: Grossly normal 
 
Data Review:  
Recent Labs  
  02/20/20 
0345 02/19/20 
1719 WBC 4.2 4.1 HGB 13.9 14.6 HCT 44.2 45.3  262 Recent Labs  
  02/20/20 
0345 02/19/20 
1719  141  
K 3.5 3.9  104 CO2 34* 35* * 100 BUN 23* 23* CREA 1.50* 1.70* CA 10.0 10.0 ALB  --  2.9* TBILI  --  0.7 SGOT  --  30 ALT  --  24 Recent Labs  
  02/20/20 
1031 02/20/20 
0345 02/19/20 2037 02/19/20 
1719 TROIQ 0.09* 0.09* 0.08*  --   
CPK  --   --   --  56 Intake/Output Summary (Last 24 hours) at 2/20/2020 1256 Last data filed at 2/20/2020 7681 Gross per 24 hour Intake 0 ml Output 500 ml Net -500 ml Cardiographics Telemetry: a fib ECG: a fib Echocardiogram: EF 55-60%; JC; mod MR; severe TR; severe pHTN 
CXRAY: no acute process Assessment:  
  
 Active Problems: 
  CKD (chronic kidney disease) stage 3, GFR 30-59 ml/min ([facility] (5/29/2018) Chronic a-fib (5/29/2018) Diastolic CHF, acute on chronic ([facility] (5/29/2018) Elevated troponin (2/20/2020) Plan:  
 
Jaiden Hughes is a 80 y.o. female who presented to the ED with c/o SOB, swelling, and chest pain. CXR did not show anything acute. Creatinine 1.7 to 1.5;  proBNP 2760. Large amount of peripheral edema. Received ASA and nitro from EMS. Troponin 0.08/0.09/0.09 
· ECHO shows EF of 55-60% and no significant changes from prior · Although CXR clear, likely component of acute on chronic diastolic heart failure. Low albumin may be contributing some to swelling. · Continue diuresis with IV lasix and metolazone · Mild, flat troponin may be from heart failure and trend is not consistent with MI. Chest pain resolved. Add small dose of imdur. · No invasive ischemic work up planned. Not adding ASA due to eliquis. Not adding statin due to age. BB previously discontinued due to lung disease. · eliquis for a fib. Rate controlled · HTN controlled on current agents. · RUMA improving with diuresis Thank you for consulting [address] [place] [address] [facility] Dinesh Asp, NP 
DNP, RN, AGACNP-[address] [facility] 2/20/2020 Patient seen, examined by me personally. Plan discussed as detailed. Agree with note as outlined by  NP. I confirm findings in history and physical exam. No additional findings noted. Agree with plan as outlined above. Does not appear to be any distress. Chest xrayy reviewed. Shows moderate left pleural effusion. May benefit from thoracentesis. Continue diuresis for now. Discussed with son. Troponin non specific.   
 
Lauryn Huang MD

## 2020-02-20 NOTE — ED NOTES
1931: Bedside and Verbal shift change report given to Aditya Yu (oncoming nurse) by Kenrick Ta (offgoing nurse). Report included the following information SBAR, Kardex, ED Summary, Intake/Output and Cardiac Rhythm NSR. Patient's daughter reports that the patient normally ambulates independently using a rollator, however the patient's is unable to due to swelling. Patient's daughter reports her symptoms have worsened over the last 2-3 days. Patient reports chest pain to her daughter prior to EMS being called. Patient denies any chest pain at this time. Patient is on the monitor x3 and has her call bell at bedside. 1939: Patient's daughter remains at bedside. Patient denies any needs at this time. White board updated. 2036: PCT at bedside obtaining blood specimen. 2136: Call completed to Wade De La Cruz MD advised patient family is requesting an update. Family notified that the MD has been notified and will come as soon as possible. [id number] Lott MD at bedside. 2200: Nurse paged to bedside. Patient requesting to use the bedside commode, assisted. 2337: Nurse paged to bedside. Patient's family is requesting an update. [id number] Lott MD at bedside. Patient & family updated on plan of care. Patient to be admitted. 0148:Nurse paged to bedside. Patient's son is requesting a status update. Consulted with Laura Mendez MD reports he will see the patient soon as he can, however the patient will be an ED hold. Patient's son update. 0150: Call completed to [facility], spoke with Chiquita Plaza. Chiquita Plaza will attempt to locate a hospital bed. Patient & son updated, understanding verbalized. 0200: Notified by Charge RN that the patient will receive an bed assignment. Patient & son updated. 5854: Nurse at bedside. Patient assisted to bedside commode. Patient voided 500. I&O updated. Patient provided with an additional pillow and son provided with a recliner, pillow and blanket. 0256: TRANSFER - OUT REPORT: 
 
Verbal report given to Enrico (name) on Barbie Connor  being transferred to PCU (unit) for routine progression of care Report consisted of patients Situation, Background, Assessment and  
Recommendations(SBAR). Information from the following report(s) SBAR, Kardex, ED Summary, Intake/Output, Recent Results and Cardiac Rhythm NSR was reviewed with the receiving nurse. Lines:   None Opportunity for questions and clarification was provided. Patient transported with: 
 Sophia Learning

## 2020-02-20 NOTE — PROGRESS NOTES
Hospitalist Progress Note NAME: Paola Beavers :  1918 MRN:  [id number] Admit date: 2020 Today's date: 20 PCP: MD Duke Holbrook M.D. Cell [phone] Assessment / Plan: 
Acute diastolic CHF POA LVEF 45-07% Severe pulmonary HTN POA PA pressure 84 Chronic atrial fib POA Increasing weakness, WOOTEN, increasing LE edema IV lasix ASA TTE LVEF 55 to 60%, normal RV function, PA pressure 84, moderate MR Hydralazine Lasix and metolazone Cardiology following Continue Eliquis 
  
Chronic kidney disease POA stage 4 Renal fuction close to baseline Monitoring with diuresis Overweight POA Body mass index is 27.94 kg/m². 
 
  
Code Status: Full Surrogate Decision Maker: 
  
DVT Prophylaxis: Eliquis GI Prophylaxis: not indicated 
  
Body mass index is 27.94 kg/m². Subjective: Chief Complaint / Reason for Physician Visit f/u CHF \"My breathing feels okay\". Discussed with RN events overnight. Still with edema, generalized weakness No Cp or SOB at rest 
 
Review of Systems: 
Symptom Y/N Comments  Symptom Y/N Comments Fever/Chills n   Chest Pain n   
Poor Appetite    Edema y Cough n   Abdominal Pain n   
Sputum    Joint Pain SOB/WOOTEN n   Headache Nausea/vomit n   Tolerating PT/OT Diarrhea    Tolerating Diet y Constipation    Other Could NOT obtain due to:   
 
Objective: VITALS:  
Last 24hrs VS reviewed since prior progress note. Most recent are: 
Patient Vitals for the past 24 hrs: 
 Temp Pulse Resp BP SpO2  
20 1524 97.6 °F (36.4 °C) 80 22 103/68 96 % 20 1042 97.4 °F (36.3 °C) 75 22 133/78 92 % 20 0853    129/77   
20 0729 97.6 °F (36.4 °C) 69 22 129/79 97 % 20 0311 97.4 °F (36.3 °C) 77 26 135/80 97 % 20 0230 97.7 °F (36.5 °C) 76 26 129/77 97 % 20 0200  77 22 126/71 96 % 20 0130  76 23 116/78 96 % 02/20/20 0100  77 18 128/83 97 % 02/20/20 0030  76 26 133/78 96 % 02/20/20 0001  77 30 119/70 96 % 02/19/20 2343  81 22 122/78 96 % 02/19/20 2230  80 24 112/62 97 % 02/19/20 2200  82 21 (!) 55/44 96 % 02/19/20 1900  74 22 110/76 96 % 02/19/20 1744  80 26  97 % Intake/Output Summary (Last 24 hours) at 2/20/2020 1637 Last data filed at 2/20/2020 9393 Gross per 24 hour Intake 0 ml Output 500 ml Net -500 ml Wt Readings from Last 12 Encounters:  
02/20/20 69.3 kg (152 lb 12.5 oz) 01/29/20 70.5 kg (155 lb 8 oz)  
11/15/19 68.5 kg (151 lb)  
11/29/19 68.5 kg (151 lb)  
11/23/19 68 kg (150 lb)  
11/22/19 68.5 kg (151 lb)  
11/18/19 68 kg (150 lb)  
11/15/19 69.4 kg (153 lb)  
11/11/19 68.9 kg (152 lb) 11/08/19 69.4 kg (153 lb) 11/05/19 67.6 kg (149 lb) 10/29/19 68 kg (150 lb) PHYSICAL EXAM: 
General: WD, WN. Alert, cooperative, no acute distress EENT:  PERRL. Anicteric sclerae. MMM Neck:  No meningismus, no thyromegaly Resp:  Decreased left base bilaterally, no wheezing or rales. No accessory muscle use CV:  Regular  rhythm, 2+  edema GI:  Soft, Non distended, Non tender. +Bowel sounds, no rebound Neurologic:  Alert, normal speech, non focal motor exam 
Psych:   Not anxious nor agitated Skin:  No rashes. No jaundice Reviewed most current lab test results and cultures  YES Reviewed most current radiology test results   YES Review and summation of old records today    NO Reviewed patient's current orders and MAR    YES 
PMH/SH reviewed - no change compared to H&P 
________________________________________________________________________ Care Plan discussed with: 
  Comments Patient x Family RN x Care Manager Consultant Multidiciplinary team rounds were held today with , nursing, pharmacist and clinical coordinator.   Patient's plan of care was discussed; medications were reviewed and discharge planning was addressed. ________________________________________________________________________ Comments >50% of visit spent in counseling and coordination of care    
________________________________________________________________________ Madeleine Serna MD  
 
Procedures: see electronic medical records for all procedures/Xrays and details which were not copied into this note but were reviewed prior to creation of Plan. LABS: 
I reviewed today's most current labs and imaging studies. Pertinent labs include: 
Recent Labs  
  02/20/20 
0345 02/19/20 
1719 WBC 4.2 4.1 HGB 13.9 14.6 HCT 44.2 45.3  262 Recent Labs  
  02/20/20 
0345 02/19/20 
1719  141  
K 3.5 3.9  104 CO2 34* 35* * 100 BUN 23* 23* CREA 1.50* 1.70* CA 10.0 10.0 ALB  --  2.9* TBILI  --  0.7 SGOT  --  30 ALT  --  24

## 2020-02-20 NOTE — PROGRESS NOTES
HA:  
1) Home with home services [facility] or hospice) vs. Rehab pending progression during hospitlization stay 2) Pt will need 2ND IM letter at time of discharge 3) Pt would benefit from Palliative Consult to assist with AMD and goals of care 4) Pt may need [facility] transportation at time of discharge Reason for Admission: NSTEMI  
 
RUR Score: 17 MODERATE Do you (patient/family) have any concerns for transition/discharge? None identified at this time Plan for utilizing home health: Appropriate pending progression during hospitalization stay Current Advanced Directive/Advance Care Plan:  Pt has an active DDNR on file but is listed as a full code. Dtr stated she wishes to speak with attending and pt regarding code status before changing this as she would like to make sure her Mom understands what it means to have a DDNR. Pt's dtr is MPOA along Transition of Care Plan:        
Pt is a 8 year old,  [place] female, admitted with NSTEMI Pt was alert and oriented when meeting with CM but was very [place]. CM contacted pt's dtr who confirmed address, emergency contact and PCP. Dtr states she is pt's caregiver and lives with her in a one level home, no steps to enter. Pt's dtr is her caregiver but pt also has paid caregivers 5 days a week 9-4 PM. Pt has the following DME: hospital bed, rollator, wc, cane and shower chair. Pt has had HH in the past with [facility] (2019) and has not been to SNF/IPR in the past. Pt's preferred pharmacy is the [facility], pt's dtr states no problems affording or accessing medications. Pt may need [facility] transportation at time of discharge pending progression during hospitalization stay. CM will continue to follow and remain available for support throughout the hospitalization stay. Care Management Interventions PCP Verified by CM: Yes Mode of Transport at Discharge: BLS Transition of Care Consult (CM Consult): Discharge Planning [place] Signup: No 
Discharge Durable Medical Equipment: No 
Physical Therapy Consult: Yes Occupational Therapy Consult: Yes Speech Therapy Consult: No 
Current Support Network: Has Personal Caregivers, Family Lives [facility], Lives with Caregiver Confirm Follow Up Transport: Family The Patient and/or Patient Representative was Provided with a Choice of Provider and Agrees with the Discharge Plan?: Yes Freedom of Choice List was Provided with Basic Dialogue that Supports the Patient's Individualized Plan of Care/Goals, Treatment Preferences and Shares the Quality Data Associated with the Providers?: Yes Discharge Location Discharge Placement: Other:(Home with HH vs. Home with Hospice pending progression ) SETH Hinds, CM [facility] [phone]

## 2020-02-20 NOTE — ED PROVIDER NOTES
EMERGENCY DEPARTMENT HISTORY AND PHYSICAL EXAM 
 
 
Date: 2/19/2020 Patient Name: Carissa Gomez Patient Age and Sex: 80 y.o. female History of Presenting Illness Chief Complaint Patient presents with  Shortness of Breath SOB and BLE swelling up to thighs. patient reports SOB even at rest  
 Leg Swelling History Provided By: Patient HPI: Carissa Gomez, is a 80 y.o. female whose medical history is noted below presents with worsening lower extremity edema and shortness of breath for the past 2 to 3 days, today also had an episode of substernal chest pain that lasted a few hours and resolved after she called 911 and received a full aspirin and nitro from the ambulance crew. Patient lives at home and is cared for by her family. Has been compliant with all of her medications. At this time denies any symptoms. She is on Eliquis and takes a baby aspirin daily. Pt denies any other alleviating or exacerbating factors. There are no other complaints, changes or physical findings at this time. Past Medical History:  
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   
 gout  Squamous cell lung cancer ([address] Past Surgical History:  
Procedure Laterality Date Andres Carry Right mastectomy  HX APPENDECTOMY  HX HEENT    
 right glass eye  [place] COLONOSCOPY W/BIOPSY SINGLE/MULTIPLE  3/29/2013  [place] COLSC FLX W/RMVL OF TUMOR POLYP LESION SNARE TQ  3/29/2013 PCP: Tito Snider MD 
 
Past History Past Medical History: 
Past Medical History:  
Diagnosis Date  A-fib ([address]  Adenocarcinoma ([address]  Cancer ([address] right breast CA, lung, colon  Chronic kidney disease  Gastrointestinal disorder GERD  
 Hypertension  Ileitis, terminal ([facility]  Other ill-defined conditions(799.89)   
 gout  Squamous cell lung cancer ([facility] [facility] Past Surgical History: 
Past Surgical History:  
Procedure Laterality Date Nuno Cook Right mastectomy  HX APPENDECTOMY  HX HEENT    
 right glass eye  [place] COLONOSCOPY W/BIOPSY SINGLE/MULTIPLE  3/29/2013  [place] COLSC FLX W/RMVL OF TUMOR POLYP LESION SNARE TQ  3/29/2013 Family History: 
Family History Problem Relation Age of Onset  Colon Cancer Other  Hypertension Other Social History: 
Social History Tobacco Use  Smoking status: Former Smoker Types: Cigarettes Last attempt to quit: 1967 Years since quittin.1  Smokeless tobacco: Never Used Substance Use Topics  Alcohol use: No  
 Drug use: No  
 
 
Allergies: Allergies Allergen Reactions  Penicillins Hives Current Medications: No current facility-administered medications on file prior to encounter. Current Outpatient Medications on File Prior to Encounter Medication Sig Dispense Refill  hydrALAZINE (APRESOLINE) 10 mg tablet Take 10 mg by mouth daily. 0  
 nystatin (MYCOSTATIN) topical cream Apply 1 g to affected area two (2) times daily as needed for Skin Irritation (buttocks). Use either cream or powder.  acetaminophen (TYLENOL 8 HOUR) 650 mg TbER Take 1,300 mg by mouth every eight (8) hours as needed for Pain.  nystatin (MYCOSTATIN) powder Apply 1 Each to affected area two (2) times daily as needed for Other (buttock irritation). use either powder or cream    
 metOLazone (ZAROXOLYN) 2.5 mg tablet Take 1 Tab by mouth every Monday, Wednesday, Friday. Indications: take per cardiology (Patient taking differently: Take 2.5 mg by mouth as needed.  Indications: take per cardiology) 90 Tab 1  
 albuterol (PROVENTIL HFA) 90 mcg/actuation inhaler Take 2 Puffs by inhalation every four (4) hours as needed for Wheezing or Shortness of Breath.  furosemide (LASIX) 40 mg tablet Take 2 tab 80mg in AM and 1 tab 40mg in PM (Corrected Rx) 270 Tab 1  
 albuterol-ipratropium (DUO-NEB) 2.5 mg-0.5 mg/3 ml nebu Take 3 mL by inhalation every four (4) hours as needed (shortness of breath).  apixaban (ELIQUIS) 2.5 mg tablet Take 1 Tab by mouth two (2) times a day. 60 Tab 11  
 mv-mn/folic acid/calcium/vit K (ONE-A-DAY WOMEN'S 50 PLUS PO) Take 1 Tab by mouth daily.  dextran 70/hypromellose/PF (NATURAL TEARS, PF, OP) Administer 1 Drop to right eye daily as needed (dry eye).  mometasone (NASONEX) 50 mcg/actuation nasal spray 2 Sprays by Both Nostrils route daily as needed (congestion).  potassium chloride (KLOR-CON) 20 mEq packet Take 20 mEq by mouth daily.  colchicine (COLCRYS) 0.6 mg tablet Take 0.6 mg by mouth daily as needed (gout flare up). Review of Systems Review of Systems Constitutional: Negative. Negative for appetite change, chills and fever. HENT: Negative for congestion, ear pain, rhinorrhea, sinus pain, trouble swallowing and voice change. Respiratory: Positive for chest tightness and shortness of breath. Negative for cough, wheezing and stridor. Cardiovascular: Positive for leg swelling. Negative for chest pain and palpitations. Gastrointestinal: Negative for abdominal pain, blood in stool, constipation, diarrhea, nausea and vomiting. Genitourinary: Negative for difficulty urinating, dysuria, flank pain, frequency and hematuria. Musculoskeletal: Negative for arthralgias and joint swelling. Skin: Negative. Neurological: Negative for dizziness, syncope, weakness, numbness and headaches. All other systems reviewed and are negative. Physical Exam  
Physical Exam 
Vitals signs and nursing note reviewed. Constitutional:   
   Appearance: She is not toxic-appearing. HENT:  
   Mouth/Throat: Mouth: Mucous membranes are moist.  
Eyes:  
   General: No scleral icterus. Extraocular Movements: Extraocular movements intact. Conjunctiva/sclera: Conjunctivae normal.  
   Pupils: Pupils are equal, round, and reactive to light. Neck: Musculoskeletal: Normal range of motion and neck supple. Vascular: JVD present. Cardiovascular:  
   Rate and Rhythm: Normal rate and regular rhythm. Pulses: Normal pulses. Heart sounds: Normal heart sounds. Pulmonary:  
   Effort: Accessory muscle usage present. No respiratory distress. Breath sounds: Examination of the right-lower field reveals decreased breath sounds. Examination of the left-lower field reveals decreased breath sounds. Decreased breath sounds present. No wheezing. Chest:  
   Chest wall: No tenderness. Abdominal:  
   General: Bowel sounds are normal. There is no distension. Palpations: Abdomen is soft. Tenderness: There is no abdominal tenderness. Musculoskeletal: Normal range of motion. General: No swelling or tenderness. Right lower leg: No edema. Left lower leg: No edema. Skin: 
   General: Skin is warm and dry. Capillary Refill: Capillary refill takes less than 2 seconds. Findings: No erythema or rash. Neurological:  
   General: No focal deficit present. Mental Status: Mental status is at baseline. Cranial Nerves: Cranial nerves are intact. Motor: Motor function is intact. Psychiatric:     
   Mood and Affect: Mood normal.     
   Behavior: Behavior normal.  
 
 
 
Diagnostic Study Results Labs - Recent Results (from the past 24 hour(s)) CBC WITH AUTOMATED DIFF Collection Time: 02/19/20  5:19 PM  
Result Value Ref Range WBC 4.1 3.6 - 11.0 K/uL  
 RBC 5.20 3.80 - 5.20 M/uL  
 HGB 14.6 11.5 - 16.0 g/dL HCT 45.3 35.0 - 47.0 % MCV 87.1 80.0 - 99.0 FL  
 MCH 28.1 26.0 - 34.0 PG  
 MCHC 32.2 30.0 - 36.5 g/dL RDW 19.9 (H) 11.5 - 14.5 % PLATELET 171 926 - 708 K/uL MPV 11.1 8.9 - 12.9 FL  
 NRBC 0.0 0  WBC ABSOLUTE NRBC 0.00 0.00 - 0.01 K/uL NEUTROPHILS 72 32 - 75 % LYMPHOCYTES 14 12 - 49 % MONOCYTES 13 5 - 13 % EOSINOPHILS 0 0 - 7 % BASOPHILS 1 0 - 1 % IMMATURE GRANULOCYTES 0 0.0 - 0.5 % ABS. NEUTROPHILS 3.0 1.8 - 8.0 K/UL  
 ABS. LYMPHOCYTES 0.6 (L) 0.8 - 3.5 K/UL  
 ABS. MONOCYTES 0.5 0.0 - 1.0 K/UL  
 ABS. EOSINOPHILS 0.0 0.0 - 0.4 K/UL  
 ABS. BASOPHILS 0.0 0.0 - 0.1 K/UL  
 ABS. IMM. GRANS. 0.0 0.00 - 0.04 K/UL  
 DF AUTOMATED    
 RBC COMMENTS OVALOCYTES 1+ RBC COMMENTS TARGET CELLS 
PRESENT 
    
METABOLIC PANEL, COMPREHENSIVE Collection Time: 02/19/20  5:19 PM  
Result Value Ref Range Sodium 141 136 - 145 mmol/L Potassium 3.9 3.5 - 5.1 mmol/L Chloride 104 97 - 108 mmol/L  
 CO2 35 (H) 21 - 32 mmol/L Anion gap 2 (L) 5 - 15 mmol/L Glucose 100 65 - 100 mg/dL BUN 23 (H) 6 - 20 MG/DL Creatinine 1.70 (H) 0.55 - 1.02 MG/DL  
 BUN/Creatinine ratio 14 12 - 20 GFR est AA 33 (L) >60 ml/min/1.73m2 GFR est non-AA 28 (L) >60 ml/min/1.73m2 Calcium 10.0 8.5 - 10.1 MG/DL Bilirubin, total 0.7 0.2 - 1.0 MG/DL  
 ALT (SGPT) 24 12 - 78 U/L  
 AST (SGOT) 30 15 - 37 U/L Alk. phosphatase 111 45 - 117 U/L Protein, total 7.3 6.4 - 8.2 g/dL Albumin 2.9 (L) 3.5 - 5.0 g/dL Globulin 4.4 (H) 2.0 - 4.0 g/dL A-G Ratio 0.7 (L) 1.1 - 2.2 CK Collection Time: 02/19/20  5:19 PM  
Result Value Ref Range CK 56 26 - 192 U/L  
NT-PRO BNP Collection Time: 02/19/20  5:19 PM  
Result Value Ref Range NT pro-BNP 2,760 (H) <450 PG/ML  
URINALYSIS W/ REFLEX CULTURE Collection Time: 02/19/20  8:30 PM  
Result Value Ref Range Color YELLOW/STRAW Appearance CLEAR CLEAR Specific gravity 1.009 1.003 - 1.030    
 pH (UA) 7.0 5.0 - 8.0 Protein NEGATIVE  NEG mg/dL Glucose NEGATIVE  NEG mg/dL Ketone NEGATIVE  NEG mg/dL Bilirubin NEGATIVE  NEG Blood NEGATIVE  NEG Urobilinogen 0.2 0.2 - 1.0 EU/dL Nitrites NEGATIVE  NEG Leukocyte Esterase TRACE (A) NEG    
 WBC 0-4 0 - 4 /hpf  
 RBC 0-5 0 - 5 /hpf Epithelial cells FEW FEW /lpf Bacteria 1+ (A) NEG /hpf  
 UA:UC IF INDICATED URINE CULTURE ORDERED Hyaline cast 2-5 0 - 5 /lpf  
TROPONIN I Collection Time: 02/19/20  8:37 PM  
Result Value Ref Range Troponin-I, Qt. 0.08 (H) <0.05 ng/mL Radiologic Studies -  
XR CHEST PORT Final Result IMPRESSION: No acute finding or significant change. Medical Decision Making I am the first provider for this patient. Records Reviewed: I reviewed our electronic medical record system for any past medical records that were available that may contribute to the patient's current condition, including their PMH, surgical history, social and family history. Reviewed the nursing notes and vital signs from today's visit. Vital Signs-Reviewed the patient's vital signs. Patient Vitals for the past 24 hrs: 
 Pulse Resp BP SpO2  
02/19/20 2230 80 24 112/62 97 % 02/19/20 2200 82 21 (!) 55/44 96 % 02/19/20 1744 80 26  97 % 02/19/20 1602 85 18 146/84 96 % ED ECG interpretation: 
ECG shows atrial fibrillation, with HR 91, normal intervals and no ST changes concerning for ischemia. This ECG was interpreted by Linnette Arevalo M.D. Provider Notes (Medical Decision Making):  
Patient presents with increased fluid retention, sob that was initially exertional and now also at rest, lastly chest pain episode today. All concerning for unstable angina. Trop is elevated today. Her renal function is not normal but despite this troponins have always been negative. Will presume that trop elevation is cardiac in origin until proven otherwise. She has received a full asa and is already anticoagulated. No cp now, so would only trend troponins at this time. Her renal function is getting slightly worse. She needs diuresis but done gently with close observation of kidneys. ED Course:  
Initial assessment performed. The patients presenting problems have been discussed, and they are in agreement with the care plan formulated and outlined with them. I have encouraged them to ask questions as they arise throughout their visit. DISPOSITION: ADMIT Patient is being admitted to the hospital.  Their test results and reasons for admission have been discussed. The patient and/or available family express agreement with and understanding of the need to be admitted and their admission diagnosis. Thank you for resuming the care of this patient. Please don't hesitate to contact me in the emergency department if you  have any additional questions. Rosita England MD, MSc 
 
 
Diagnosis Clinical Impression: 1. NSTEMI (non-ST elevated myocardial infarction) ([facility] [address] 2. Congestive heart failure, unspecified HF chronicity, unspecified heart failure type ([facility] CRITICAL CARE NOTE : 
 
IMPENDING DETERIORATION -Cardiovascular ASSOCIATED RISK FACTORS - Dysrhythmia and Metabolic changes MANAGEMENT- Bedside Assessment INTERPRETATION -  Xrays, ECG and Blood Pressure INTERVENTIONS - hemodynamic mngmt CASE REVIEW - Hospitalist, Nursing and Family TREATMENT RESPONSE -Stable PERFORMED BY - Self NOTES   : 
 
I have spent 30 minutes of critical care time involved in lab review, consultations with specialist, family decision- making, bedside attention and documentation. During this entire length of time I was immediately available to the patient . Critical Care: The reason for providing this level of medical care for this critically ill patient was due to a critical illness that impaired one or more vital organ systems, such that there was a high probability of imminent or life threatening deterioration in the patient's condition. This care involved high complexity decision making to assess, manipulate, and support vital system functions, to treat this degree of vital organ system failure, and to prevent further life threatening deterioration of the patients condition. Arcelia Whaley MD 
 
Attestation: 
I personally performed the services described in this documentation on this date 2/19/2020 for patient Johanna Whitaker. Arcelia Whaley MD 
 
Please note that this dictation was completed with [employer], the computer voice recognition software. Quite often unanticipated grammatical, syntax, homophones, and other interpretive errors are inadvertently transcribed by the computer software. Please disregard these errors. Please excuse any errors that have escaped final proofreading.

## 2020-02-20 NOTE — H&P
Hospitalist Admission Note NAME: Donnia Sandhoff :  1918 MRN:  [id number] Date/Time:  2020 2:49 AM 
 
Patient PCP: Sonia Gilbert MD 
______________________________________________________________________ Given the patient's current clinical presentation, I have a high level of concern for decompensation if discharged from the emergency department. Complex decision making was performed, which includes reviewing the patient's available past medical records, laboratory results, and x-ray films. My assessment of this patient's clinical condition and my plan of care is as follows. Assessment / Plan: 
Non-STEMI Admit patient to telemetry  Follow-up serial troponin 
cardiology consultation  start patient on aspirin  Echocardiogram 
 
 
Acute on chronic diastolic CHF  Start patient on Lasix Echocardiogram 
 
DuoNeb nebulizer as needed Atrial fibrillationcontinue Eliquis Chronic kidney disease stage 4 
-renal fuction close to baseline Monitoring avoid nephrotoxic medication Code Status: Full Surrogate Decision Maker: DVT Prophylaxis: Eliquis GI Prophylaxis: not indicated Subjective: CHIEF COMPLAINT: SOB HISTORY OF PRESENT ILLNESS:    
8 years old female from home with past medical history significant for diastolic CHF, hypertension, atrial fibrillation, chronic kidney disease, GERD presented to the hospital for evaluation of generalized weakness associated with worsening bilateral leg swelling associated with shortness of breath associated with chest pressure sensation denies any cough denies any fever any chills, blood work in ED was significant for mild elevated troponin  0.08. We were asked to admit for work up and evaluation of the above problems. Past Medical History:  
Diagnosis Date  A-fib ([facility] [address]  Adenocarcinoma ([facility] [address]  Cancer ([facility] [address] right breast CA, lung, colon  Chronic kidney disease  Gastrointestinal disorder GERD  Hypertension  Ileitis, terminal ([facility] [address]  Other ill-defined conditions(799.89)   
 gout  Squamous cell lung cancer ([facility] [address] Past Surgical History:  
Procedure Laterality Date [address] [facility] Right mastectomy  HX APPENDECTOMY  HX HEENT    
 right glass eye  LA COLONOSCOPY W/BIOPSY SINGLE/MULTIPLE  3/29/2013  LA COLSC FLX W/RMVL OF TUMOR POLYP LESION SNARE TQ  3/29/2013 Social History Tobacco Use  Smoking status: Former Smoker Types: Cigarettes Last attempt to quit: 1967 Years since quittin.1  Smokeless tobacco: Never Used Substance Use Topics  Alcohol use: No  
  
 
Family History Problem Relation Age of Onset  Colon Cancer Other  Hypertension Other Allergies Allergen Reactions  Penicillins Hives Prior to Admission medications Medication Sig Start Date End Date Taking? Authorizing Provider  
hydrALAZINE (APRESOLINE) 10 mg tablet Take 10 mg by mouth daily. 11/15/19   Provider, Paulo  
nystatin (MYCOSTATIN) topical cream Apply 1 g to affected area two (2) times daily as needed for Skin Irritation (buttocks). Use either cream or powder. 10/6/19   Judith Beach MD  
acetaminophen (TYLENOL 8 HOUR) 650 mg TbER Take 1,300 mg by mouth every eight (8) hours as needed for Pain. 10/6/19   Judith Beach MD  
nystatin (MYCOSTATIN) powder Apply 1 Each to affected area two (2) times daily as needed for Other (buttock irritation). use either powder or cream 10/6/19   Judith Beach MD  
metOLazone (ZAROXOLYN) 2.5 mg tablet Take 1 Tab by mouth every Monday, Wednesday, Friday. Indications: take per cardiology Patient taking differently: Take 2.5 mg by mouth as needed.  Indications: take per cardiology 10/4/19   Victoria Ríos NP  
albuterol (PROVENTIL HFA) 90 mcg/actuation inhaler Take 2 Puffs by inhalation every four (4) hours as needed for Wheezing or Shortness of Breath. 7/1/18   Paulina West MD  
furosemide (LASIX) 40 mg tablet Take 2 tab 80mg in AM and 1 tab 40mg in PM (Corrected Rx) 6/29/18   Gladis Arroyo NP  
albuterol-ipratropium (DUO-NEB) 2.5 mg-0.5 mg/3 ml nebu Take 3 mL by inhalation every four (4) hours as needed (shortness of breath). Provider, Paulo  
apixaban (ELIQUIS) 2.5 mg tablet Take 1 Tab by mouth two (2) times a day. 5/31/18   Thania Ruff NP  
mv-mn/folic acid/calcium/vit K (ONE-A-DAY WOMEN'S 50 PLUS PO) Take 1 Tab by mouth daily. Marky Dejesus MD  
dextran 70/hypromellose/PF (NATURAL TEARS, PF, OP) Administer 1 Drop to right eye daily as needed (dry eye). Marky Dejesus MD  
mometasone (NASONEX) 50 mcg/actuation nasal spray 2 Sprays by Both Nostrils route daily as needed (congestion). Marky Dejesus MD  
potassium chloride (KLOR-CON) 20 mEq packet Take 20 mEq by mouth daily. Marky Dejesus MD  
colchicine (COLCRYS) 0.6 mg tablet Take 0.6 mg by mouth daily as needed (gout flare up). Marky Dejesus MD  
 
 
REVIEW OF SYSTEMS:    
I am not able to complete the review of systems because: The patient is intubated and sedated The patient has altered mental status due to his acute medical problems The patient has baseline aphasia from prior stroke(s) The patient has baseline dementia and is not reliable historian The patient is in acute medical distress and unable to provide information Total of 12 systems reviewed as follows:   
   POSITIVE= underlined text  Negative = text not underlined General:  fever, chills, sweats, generalized weakness, weight loss/gain,  
   loss of appetite Eyes:    blurred vision, eye pain, loss of vision, double vision ENT:    rhinorrhea, pharyngitis Respiratory:   cough, sputum production, SOB, WOOTEN, wheezing, pleuritic pain  
Cardiology:   chest pain, palpitations, orthopnea, PND, edema, syncope Gastrointestinal:  abdominal pain , N/V, diarrhea, dysphagia, constipation, bleeding Genitourinary:  frequency, urgency, dysuria, hematuria, incontinence Muskuloskeletal :  arthralgia, myalgia, back pain Hematology:  easy bruising, nose or gum bleeding, lymphadenopathy Dermatological: rash, ulceration, pruritis, color change / jaundice Endocrine:   hot flashes or polydipsia Neurological:  headache, dizziness, confusion, focal weakness, paresthesia, Speech difficulties, memory loss, gait difficulty Psychological: Feelings of anxiety, depression, agitation Objective: VITALS:   
Visit Vitals /77 Pulse 76 Resp 26 Ht 5' 2\" (1.575 m) Wt 69.3 kg (152 lb 12.8 oz) SpO2 97% BMI 27.95 kg/m² PHYSICAL EXAM: 
 
 
_______________________________________________________________________ Care Plan discussed with: 
  Comments Patient y Family RN Care Manager Consultant:     
_______________________________________________________________________ Expected  Disposition:  
Home with Family y HH/PT/OT/RN   
 SNF/LTC   
LASHONDA   
________________________________________________________________________ TOTAL TIME:  60 Minutes Critical Care Provided     Minutes non procedure based Comments  
 y Reviewed previous records  
>50% of visit spent in counseling and coordination of care y Discussion with patient and/or family and questions answered 
  
 
________________________________________________________________________ Signed: Albert Mckinnno MD 
 
Procedures: see electronic medical records for all procedures/Xrays and details which were not copied into this note but were reviewed prior to creation of Plan. LAB DATA REVIEWED:   
Recent Results (from the past 24 hour(s)) CBC WITH AUTOMATED DIFF Collection Time: 02/19/20  5:19 PM  
Result Value Ref Range WBC 4.1 3.6 - 11.0 K/uL  
 RBC 5.20 3.80 - 5.20 M/uL  
 HGB 14.6 11.5 - 16.0 g/dL HCT 45.3 35.0 - 47.0 % MCV 87.1 80.0 - 99.0 FL  
 MCH 28.1 26.0 - 34.0 PG  
 MCHC 32.2 30.0 - 36.5 g/dL  
 RDW 19.9 (H) 11.5 - 14.5 % PLATELET 528 346 - 238 K/uL MPV 11.1 8.9 - 12.9 FL  
 NRBC 0.0 0  WBC ABSOLUTE NRBC 0.00 0.00 - 0.01 K/uL NEUTROPHILS 72 32 - 75 % LYMPHOCYTES 14 12 - 49 % MONOCYTES 13 5 - 13 % EOSINOPHILS 0 0 - 7 % BASOPHILS 1 0 - 1 % IMMATURE GRANULOCYTES 0 0.0 - 0.5 % ABS. NEUTROPHILS 3.0 1.8 - 8.0 K/UL  
 ABS. LYMPHOCYTES 0.6 (L) 0.8 - 3.5 K/UL  
 ABS. MONOCYTES 0.5 0.0 - 1.0 K/UL  
 ABS. EOSINOPHILS 0.0 0.0 - 0.4 K/UL  
 ABS. BASOPHILS 0.0 0.0 - 0.1 K/UL  
 ABS. IMM. GRANS. 0.0 0.00 - 0.04 K/UL  
 DF AUTOMATED    
 RBC COMMENTS OVALOCYTES 1+ RBC COMMENTS TARGET CELLS 
PRESENT 
    
METABOLIC PANEL, COMPREHENSIVE Collection Time: 02/19/20  5:19 PM  
Result Value Ref Range Sodium 141 136 - 145 mmol/L Potassium 3.9 3.5 - 5.1 mmol/L Chloride 104 97 - 108 mmol/L  
 CO2 35 (H) 21 - 32 mmol/L Anion gap 2 (L) 5 - 15 mmol/L Glucose 100 65 - 100 mg/dL  BUN 23 (H) 6 - 20 MG/DL  
 Creatinine 1.70 (H) 0.55 - 1.02 MG/DL  
 BUN/Creatinine ratio 14 12 - 20 GFR est AA 33 (L) >60 ml/min/1.73m2 GFR est non-AA 28 (L) >60 ml/min/1.73m2 Calcium 10.0 8.5 - 10.1 MG/DL Bilirubin, total 0.7 0.2 - 1.0 MG/DL  
 ALT (SGPT) 24 12 - 78 U/L  
 AST (SGOT) 30 15 - 37 U/L Alk. phosphatase 111 45 - 117 U/L Protein, total 7.3 6.4 - 8.2 g/dL Albumin 2.9 (L) 3.5 - 5.0 g/dL Globulin 4.4 (H) 2.0 - 4.0 g/dL A-G Ratio 0.7 (L) 1.1 - 2.2 CK Collection Time: 02/19/20  5:19 PM  
Result Value Ref Range CK 56 26 - 192 U/L  
NT-PRO BNP Collection Time: 02/19/20  5:19 PM  
Result Value Ref Range NT pro-BNP 2,760 (H) <450 PG/ML  
URINALYSIS W/ REFLEX CULTURE Collection Time: 02/19/20  8:30 PM  
Result Value Ref Range Color YELLOW/STRAW Appearance CLEAR CLEAR Specific gravity 1.009 1.003 - 1.030    
 pH (UA) 7.0 5.0 - 8.0 Protein NEGATIVE  NEG mg/dL Glucose NEGATIVE  NEG mg/dL Ketone NEGATIVE  NEG mg/dL Bilirubin NEGATIVE  NEG Blood NEGATIVE  NEG Urobilinogen 0.2 0.2 - 1.0 EU/dL Nitrites NEGATIVE  NEG Leukocyte Esterase TRACE (A) NEG    
 WBC 0-4 0 - 4 /hpf  
 RBC 0-5 0 - 5 /hpf Epithelial cells FEW FEW /lpf Bacteria 1+ (A) NEG /hpf  
 UA:UC IF INDICATED URINE CULTURE ORDERED Hyaline cast 2-5 0 - 5 /lpf  
TROPONIN I Collection Time: 02/19/20  8:37 PM  
Result Value Ref Range Troponin-I, Qt. 0.08 (H) <0.05 ng/mL

## 2020-02-20 NOTE — PROGRESS NOTES
TRANSFER - IN REPORT: 
 
Verbal report received from Shruthi(vinh) on Daniel Ford  being received from ED(unit) for routine progression of care Report consisted of patients Situation, Background, Assessment and  
Recommendations(SBAR). Information from the following report(s) SBAR was reviewed with the receiving nurse. Opportunity for questions and clarification was provided. Assessment completed upon patients arrival to unit and care assumed.

## 2020-02-20 NOTE — PROGRESS NOTES
Transitional Care Team: Initial HUG Note Date of Assessment: 02/20/20 Time of Assessment:  3:58 PM 
 
Min Monaco is a 80 y.o. female inpatient at  [facility]. Assessment & Plan Pt A+O. Denies chest pain, dyspnea at rest in bed. Family at bedside. Pt states breathing is improved. Inaccurate I++O with incontinence, but weight as documented is the same. Continue attempt to diurese per MD notes. Anticipate discharge home with possible benefit of [facility] services. Primary Diagnosis: heart failure Advance Directive:   See ACP note from me. Current Code Status:  DDNR Referral to Hospice/ Palliative Care Appropriate: possibly- does not seem at end of life and has no palliative care needs at this time Awareness of Medical Conditions: (Trajectory of illness and pts expectations). Aware heart is likely to continue to weaken with age Discharge Needs: (to include safety issues) HH Barriers Identified: none Patient is willing to go to SNF/Inpatient Rehab if recommended: reluctant Medication Review:  Await AVS. 
 
Can patient afford medications:  yes. Patient is Compliant with Medication regimen:yes Who manages medications at home: family Best Patient Contact Number: toy Foote- [phone] HUG (Healthy Understanding of Goals) program introduced to patient/family. The Transitional Care Team bridges the gaps in care and education surrounding discharge from the acute care facility. The objective is to empower the patient and family in taking a proactive role in preventing readmission within the first thirty days after discharge. The team is also involved in the efforts to reduce readmission to the acute care setting after stabilization and discharge from the acute care environment either to skilled nursing facilities or community.   
 
G RN/NP will return with G Calendar/ follow up appointments/ Ambulatory Nurse Navigator name and contact information when the patient is ready for discharge. Future Appointments Date Time Provider Thee Deborah 7/28/2020 11:15 AM Zaid Cantu MD [address] [facility] [facility] follow up appointment(s):none yet [facility]: call information given to lives outside service area Patient education focused on readmission zones as described as: The Red Zone: High risk for readmission, days 1-21 The Yellow Zone: Moderate  risk for readmission, days 22-29 The Green Zone: Lower risk for readmission, days 30 and after The [facility] Team will attempt to follow the patient from a distance while inpatient as well as be available for further transition/disposition needs. The [facility] team will continue to offer support during the 30- 90 day discharge from acute care setting. Will notify Ambulatory {Blank Single Select Template:[id number][de-identified] \"HA   RN. Past Medical History:  
Diagnosis Date  A-fib ([facility] [address]  Adenocarcinoma ([address]  Cancer ([address] right breast CA, lung, colon  Chronic kidney disease  Gastrointestinal disorder GERD  Hypertension  Ileitis, terminal ([address]  Other ill-defined conditions(799.89)   
 gout  Squamous cell lung cancer ([address]

## 2020-02-21 NOTE — PROGRESS NOTES
Problem: Mobility Impaired (Adult and Pediatric) Goal: *Acute Goals and Plan of Care (Insert Text) Description FUNCTIONAL STATUS PRIOR TO ADMISSION: Patient was modified independent using a rollator for functional mobility. Pt required assistance for bathing and dressing HOME SUPPORT PRIOR TO ADMISSION: The patient lived with her daughter. Pt reported she has home health nurses that come to assist as well. Pt with a very supportive family. Physical Therapy Goals Initiated 2/21/2020 1. Patient will move from supine to sit and sit to supine  in bed with supervision/set-up within 7 day(s). 2.  Patient will transfer from bed to chair and chair to bed with supervision/set-up using the least restrictive device within 7 day(s). 3.  Patient will perform sit to stand with supervision/set-up within 7 day(s). 4.  Patient will ambulate with supervision/set-up for 50 feet with the least restrictive device within 7 day(s). Outcome: Progressing Towards Goal 
 PHYSICAL THERAPY EVALUATION Patient: Shira Collins (89 y.o. female) Date: 2/21/2020 Primary Diagnosis: NSTEMI (non-ST elevated myocardial infarction) ([facility] [address] [I21.4] Precautions:   Fall ASSESSMENT Based on the objective data described below, the patient presents with generalized weakness, decreased functional mobility, decreased tolerance to activity s/p admission on 2/19/2020 for NSTEMI. Pt received supine in bed and agreeable to therapy. At baseline, pt is typically ambulatory with rollator and needs assistance for ADL's from daughter and home health aide. Pt tolerated evaluation well. Pt able to mobilize EOB with min A, transfer with RW with min A and gait training in the room with RW and CGA. Pt demonstrated decreased jenna, slight forward flexed trunk. During second attempt at standing noted increased LE jerking and pt reported this occurs at home but always has someone with her.   Pt will benefit from HHPT upon discharge and continued 24 hour supervision and assistance as needed. Current Level of Function Impacting Discharge (mobility/balance): min A bed mobility, min A transfers with RW and gait training within the room Functional Outcome Measure: The patient scored Total Score: 17/28 on the Tinetti outcome measure which is indicative of high fall risk. Patient will benefit from skilled therapy intervention to address the above noted impairments. PLAN : 
Recommendations and Planned Interventions: bed mobility training, transfer training, gait training, therapeutic exercises, neuromuscular re-education, patient and family training/education, and therapeutic activities Frequency/Duration: Patient will be followed by physical therapy:  5 times a week to address goals. Recommendation for discharge: (in order for the patient to meet his/her long term goals) Physical therapy at least 2 days/week in the home AND ensure assist and/or supervision for safety with mobility and ADLS SUBJECTIVE:  
Patient stated I always have someone around to help me.  OBJECTIVE DATA SUMMARY:  
HISTORY:   
Past Medical History:  
Diagnosis Date [facility] ([facility] Cancer ([facility] [address] right breast CA, lung, colon Chronic kidney disease Gastrointestinal disorder GERD Hypertension Ileitis, terminal ([facility] [address] Other ill-defined conditions(799.89)   
 gout Squamous cell lung cancer ([facility] [address] Past Surgical History:  
Procedure Laterality Date [address] [facility] Right mastectomy HX APPENDECTOMY HX HEENT    
 right glass eye [place] COLONOSCOPY W/BIOPSY SINGLE/MULTIPLE  3/29/2013 [place] COLSC FLX W/RMVL OF TUMOR POLYP LESION SNARE TQ  3/29/2013 Home Situation Home Environment: Private residence # Steps to Enter: 0 One/Two Story Residence: One story Living Alone: No 
 Support Systems: Child(indra), Family member(s), Home care staff Patient Expects to be Discharged to[de-identified] Private residence Current DME Used/Available at Home: juanjose Hogan EXAMINATION/PRESENTATION/DECISION MAKING:  
 
  
Hearing: Auditory Auditory Impairment: None Range Of Motion: 
AROM: Generally decreased, functional 
  
  
Strength:   
Strength: Generally decreased, functional 
  
Tone & Sensation:  
  
Sensation: Intact Functional Mobility: 
Bed Mobility: 
  
Supine to Sit: Minimum assistance; Additional time; Adaptive equipment Scooting: Minimum assistance Transfers: 
Sit to Stand: Minimum assistance Stand to Sit: Contact guard assistance Balance:  
Sitting: Intact Standing: Impaired Standing - Static: Good Standing - Dynamic : Fair Ambulation/Gait Training: 
Distance (ft): 12 Feet (ft) Assistive Device: Gait belt;Walker, rolling Ambulation - Level of Assistance: Contact guard assistance;Minimal assistance Gait Abnormalities: Decreased step clearance(slight forward flexed trunk) Base of Support: Widened Speed/[place]: Pace decreased (<100 feet/min) Step Length: Right shortened;Left shortened Functional Measure: 
Tinetti test: 
 
Sitting Balance: 1 Arises: 0 Attempts to Rise: 1 Immediate Standing Balance: 1 Standing Balance: 1 Nudged: 1 Eyes Closed: 0 Turn 360 Degrees - Continuous/Discontinuous: 0 Turn 360 Degrees - Steady/Unsteady: 1 Sitting Down: 1 Balance Score: 7 Balance total score Indication of Gait: 1 
R Step Length/Height: 1 L Step Length/Height: 1 
R Foot Clearance: 1 L Foot Clearance: 1 Step Symmetry: 1 Step Continuity: 1 Path: 1 Trunk: 1 Walking Time: 1 Gait Score: 10 Gait total score Total Score: 17/28 Overall total score Tinetti Tool Score Risk of Falls 
<19 = High Fall Risk 19-24 = Moderate Fall Risk 25-28 = Low Fall Risk Malgorzata CASTRO.  Performance-Oriented Assessment of Mobility Problems in Elderly Patients. [facility] 66; [id number]. (Scoring Description: PT Bulletin Feb. 10, 1993) Older adults: Linda Kerwin et al, 2009; n = [address] [facility] elderly evaluated with ABC, TASHA, ADL, and IADL) · Mean TASHA score for males aged 69-68 years = 26.21(3.40) · Mean TASHA score for females age 69-68 years = 25.16(4.30) · Mean TASHA score for males over 80 years = 23.29(6.02) · Mean TASHA score for females over 80 years = 17.20(8.32) Based on the above components, the patient evaluation is determined to be of the following complexity level: LOW Activity Tolerance:  
Good Please refer to the flowsheet for vital signs taken during this treatment. After treatment patient left in no apparent distress:  
Sitting in chair, Call bell within reach, Bed / chair alarm activated, and Caregiver / family present COMMUNICATION/EDUCATION:  
The patients plan of care was discussed with: Registered Nurse. Fall prevention education was provided and the patient/caregiver indicated understanding., Patient/family have participated as able in goal setting and plan of care. , and Patient/family agree to work toward stated goals and plan of care. Thank you for this referral. 
Brook Boas, PT, DPT Time Calculation: 19 mins

## 2020-02-21 NOTE — PROGRESS NOTES
Hospitalist Progress Note NAME: Sahra Rudd :  1918 MRN:  [id number] Admit date: 2020 Today's date: 20 PCP: MD Иван Rodriguez M.D. Cell [phone] Assessment / Plan: 
Acute diastolic CHF POA LVEF 13-89% Severe pulmonary HTN POA PA pressure 84 Permanent atrial fib POA Hypokalemia Increasing weakness, WOOTEN, increasing LE edema IV lasix ASA TTE LVEF 55 to 60%, normal RV function, PA pressure 84, moderate MR Hydralazine Lasix and metolazone Cardiology following Continue Eliquis PO KCL 
D/C in 1-2 days, set up home health Chronic kidney disease POA stage 4 Renal fuction close to baseline Monitoring with diuresis History of lung cancer left POA Chronic volume loss left lung POA present going back to at least 2018 No indication for intervention Overweight POA Body mass index is 27.94 kg/m². Code Status: Full Surrogate Decision Maker: 
  
DVT Prophylaxis: Eliquis GI Prophylaxis: not indicated 
  
Body mass index is 27.94 kg/m². Subjective: Chief Complaint / Reason for Physician Visit f/u CHF \"My breathing feels okay\". Discussed with RN events overnight. Still with edema, generalized weakness No Cp or SOB at rest 
 
Review of Systems: 
Symptom Y/N Comments  Symptom Y/N Comments Fever/Chills n   Chest Pain n   
Poor Appetite    Edema y Cough n   Abdominal Pain n   
Sputum    Joint Pain SOB/WOOTEN n   Headache Nausea/vomit n   Tolerating PT/OT Diarrhea    Tolerating Diet y Constipation    Other Could NOT obtain due to:   
 
Objective: VITALS:  
Last 24hrs VS reviewed since prior progress note. Most recent are: 
Patient Vitals for the past 24 hrs: 
 Temp Pulse Resp BP SpO2  
20 1539 97.4 °F (36.3 °C) 74 20 127/77 96 % 20 1153 97.3 °F (36.3 °C) 83 18 101/58 95 % 20 0809 97.3 °F (36.3 °C) 84 20 130/79 97 % 02/21/20 0322 97.7 °F (36.5 °C) 78 22 112/68 92 % 02/21/20 0002 97.4 °F (36.3 °C) 80 22 122/75 94 % 02/20/20 2014 97.5 °F (36.4 °C) 75 22 119/66 95 % Intake/Output Summary (Last 24 hours) at 2/21/2020 1729 Last data filed at 2/21/2020 1153 Gross per 24 hour Intake 840 ml Output 800 ml Net 40 ml Wt Readings from Last 12 Encounters:  
02/20/20 69.3 kg (152 lb 12.5 oz) 01/29/20 70.5 kg (155 lb 8 oz)  
11/15/19 68.5 kg (151 lb)  
11/29/19 68.5 kg (151 lb)  
11/23/19 68 kg (150 lb)  
11/22/19 68.5 kg (151 lb)  
11/18/19 68 kg (150 lb)  
11/15/19 69.4 kg (153 lb)  
11/11/19 68.9 kg (152 lb) 11/08/19 69.4 kg (153 lb) 11/05/19 67.6 kg (149 lb) 10/29/19 68 kg (150 lb) PHYSICAL EXAM: 
General: WD, WN. Alert, cooperative, no acute distress EENT:  PERRL. Anicteric sclerae. MMM Neck:  No meningismus, no thyromegaly Resp:  Decreased left lung fields, no wheezing or rales. No accessory muscle use CV:  Regular  rhythm, 2+  edema GI:  Soft, Non distended, Non tender. +Bowel sounds, no rebound Neurologic:  Alert, normal speech, non focal motor exam 
Psych:   Not anxious nor agitated Skin:  No rashes. No jaundice Reviewed most current lab test results and cultures  YES Reviewed most current radiology test results   YES Review and summation of old records today    NO Reviewed patient's current orders and MAR    YES 
PMH/SH reviewed - no change compared to H&P 
________________________________________________________________________ Care Plan discussed with: 
  Comments Patient x Family RN x Care Manager Consultant  x Cardiology Multidiciplinary team rounds were held today with , nursing, pharmacist and clinical coordinator. Patient's plan of care was discussed; medications were reviewed and discharge planning was addressed. ________________________________________________________________________ Comments >50% of visit spent in counseling and coordination of care    
________________________________________________________________________ Desmond Gutierrez MD  
 
Procedures: see electronic medical records for all procedures/Xrays and details which were not copied into this note but were reviewed prior to creation of Plan. LABS: 
I reviewed today's most current labs and imaging studies. Pertinent labs include: 
Recent Labs  
  02/21/20 
0407 02/20/20 
0345 02/19/20 
1719 WBC 4.0 4.2 4.1 HGB 13.5 13.9 14.6 HCT 42.1 44.2 45.3  240 262 Recent Labs  
  02/21/20 
0407 02/20/20 
0345 02/19/20 
1719  143 141  
K 3.3* 3.5 3.9  104 104 CO2 33* 34* 35* GLU 89 112* 100 BUN 23* 23* 23* CREA 1.35* 1.50* 1.70* CA 9.6 10.0 10.0 MG 2.7*  --   --   
ALB  --   --  2.9* TBILI  --   --  0.7 SGOT  --   --  30 ALT  --   --  24

## 2020-02-21 NOTE — PROGRESS NOTES
Problem: Falls - Risk of 
Goal: *Absence of Falls Description Document Matthew Harish Fall Risk and appropriate interventions in the flowsheet. Outcome: Progressing Towards Goal 
Note: Fall Risk Interventions: 
Mobility Interventions: Bed/chair exit alarm, Communicate number of staff needed for ambulation/transfer, OT consult for ADLs, PT Consult for mobility concerns, PT Consult for assist device competence, Utilize walker, cane, or other assistive device Medication Interventions: Assess postural VS orthostatic hypotension, Bed/chair exit alarm, Teach patient to arise slowly, Patient to call before getting OOB Elimination Interventions: Bed/chair exit alarm, Call light in reach, Patient to call for help with toileting needs

## 2020-02-21 NOTE — PROGRESS NOTES
Report received from Rhianna Garcia , [facility]. SBAR were discussed.  
 
Christopher Lamb RN

## 2020-02-21 NOTE — PROGRESS NOTES
Received report from Raul Santos, [facility]. Assumed care of patient.

## 2020-02-21 NOTE — PROGRESS NOTES
Spiritual Care Partner Volunteer visited patient in [facility] on February 21, 2020. Documented by: 
  
Sylvan Prader, MPS, [facility], Staff  [facility]  Paging Service  [phone])

## 2020-02-21 NOTE — PROGRESS NOTES
[address] [place] [address]  [phone] Cardiology Progress Note 2/21/2020 10:00AM 
 
Admit Date: 2/19/2020 Admit Diagnosis:  
NSTEMI (non-ST elevated myocardial infarction) ([facility] [address] [I21.4] Subjective:  
 
Charlene Willis has no c/o SOB, CP, on RA with sats 93-05%. Diuresed 1.2L. Family at bedside Visit Vitals /58 (BP 1 Location: Left arm, BP Patient Position: At rest) Pulse 83 Temp 97.3 °F (36.3 °C) Resp 18 Ht 5' 2\" (1.575 m) Wt 69.3 kg (152 lb 12.5 oz) SpO2 95% BMI 27.94 kg/m² Current Facility-Administered Medications Medication Dose Route Frequency  [START ON 2/22/2020] potassium bicarb-citric acid (EFFER-K) tablet 40 mEq  40 mEq Oral DAILY  sodium chloride (NS) flush 5-40 mL  5-40 mL IntraVENous Q8H  
 sodium chloride (NS) flush 5-40 mL  5-40 mL IntraVENous PRN  
 acetaminophen (TYLENOL) tablet 650 mg  650 mg Oral Q6H PRN  
 ondansetron (ZOFRAN) injection 4 mg  4 mg IntraVENous Q6H PRN  
 bisacodyL (DULCOLAX) tablet 5 mg  5 mg Oral DAILY PRN  
 hydrALAZINE (APRESOLINE) tablet 10 mg  10 mg Oral DAILY  colchicine tablet 0.6 mg  0.6 mg Oral DAILY PRN  
 apixaban (ELIQUIS) tablet 2.5 mg  2.5 mg Oral BID  metOLazone (ZAROXOLYN) tablet 2.5 mg  2.5 mg Oral Q MON, WED & FRI  furosemide (LASIX) injection 60 mg  60 mg IntraVENous BID  albuterol (PROVENTIL VENTOLIN) nebulizer solution 2.5 mg  2.5 mg Nebulization Q4H PRN  
 isosorbide mononitrate ER (IMDUR) tablet 30 mg  30 mg Oral DAILY Objective:  
  
Physical Exam: 
General Appearance:  elderly AAF in no acute distress Chest:  diminished on left side,  
Cardiovascular:  irr, irr, no murmur. Abdomen:   Soft, non-tender, bowel sounds are active. Extremities: no peripheral edema Skin:  Warm and dry. Data Review:  
Recent Labs  
  02/21/20 
0407 02/20/20 
0345 02/19/20 
1719 WBC 4.0 4.2 4.1 HGB 13.5 13.9 14.6 HCT 42.1 44.2 45.3  240 262 Recent Labs  
  02/21/20 
0407 02/20/20 
0345 02/19/20 
1719  143 141  
K 3.3* 3.5 3.9  104 104 CO2 33* 34* 35* GLU 89 112* 100 BUN 23* 23* 23* CREA 1.35* 1.50* 1.70* CA 9.6 10.0 10.0 MG 2.7*  --   --   
ALB  --   --  2.9* TBILI  --   --  0.7 SGOT  --   --  30 ALT  --   --  24 Recent Labs  
  02/20/20 
1031 02/20/20 
0345 02/19/20 
2037 02/19/20 
1719 TROIQ 0.09* 0.09* 0.08*  --   
CPK  --   --   --  56 Intake/Output Summary (Last 24 hours) at 2/21/2020 1425 Last data filed at 2/21/2020 1488 Gross per 24 hour Intake 480 ml Output 1000 ml Net -520 ml Telemetry: afib Assessment:  
 
Active Problems: 
  CKD (chronic kidney disease) stage 3, GFR 30-59 ml/min ([facility] [address] (5/29/2018) Chronic a-fib (5/29/2018) Diastolic CHF, acute on chronic ([facility] [address] (5/29/2018) Elevated troponin (2/20/2020) Plan:  
 
Acute on chronic diastolic HF: (EF 90-31%) Diuresing well, no further c/o SOB, but has not ambulated much Discussed possible thoracentesis with Hospitalist who does not feel this is indicated. Continue diuresis with lasix and metolazone Elevated troponin: Not ACS, likely r/t HF No further cardiology evaluation indicated No ASA as pt high risk for bleeding if added to eliquis No BB, discontinued a while back for lung disease. Afib: 
Rate controlled Continue on Eliquis RUMA:\ Improving slowly 
 
  
Ryan Lopez [facility] 2/21/2020 Patient seen, examined by me personally. Plan discussed as detailed. Agree with note as outlined by  NP. I confirm findings in history and physical exam. No additional findings noted. Agree with plan as outlined above.   
 
Olinda Villalobos MD

## 2020-02-22 NOTE — PROGRESS NOTES
Hospitalist Progress Note NAME: Ling Trujillo :  1918 MRN:  [id number] Admit date: 2020 Today's date: 20 PCP: MD Holger Fried M.D. Cell [phone] Assessment / Plan: 
Acute diastolic CHF POA LVEF 84-85% Severe pulmonary HTN POA PA pressure 84 Permanent atrial fib POA Hypokalemia Increasing weakness, WOOTEN, increasing LE edema IV lasix, increase dose, metolazone 3x per week ASA TTE LVEF 55 to 60%, normal RV function, PA pressure 84, moderate MR Hydralazine Cardiology following Continue Eliquis PO KCL 
D/C in Am if stable Serial labs Chronic kidney disease POA stage 4 Renal fuction close to baseline Monitoring with diuresis History of lung cancer left POA Chronic volume loss left lung POA present going back to at least  No indication for intervention Overweight POA Body mass index is 28.2 kg/m². Code Status: Full Surrogate Decision Maker: 
  
DVT Prophylaxis: Eliquis GI Prophylaxis: not indicated 
  
Body mass index is 28.2 kg/m². Subjective: Chief Complaint / Reason for Physician Visit f/u CHF \"My breathing feels okay\". Discussed with RN events overnight. Still with edema, generalized weakness No Cp or SOB at rest 
 
Review of Systems: 
Symptom Y/N Comments  Symptom Y/N Comments Fever/Chills n   Chest Pain n   
Poor Appetite    Edema y Cough n   Abdominal Pain n   
Sputum    Joint Pain SOB/WOOTEN n   Headache Nausea/vomit n   Tolerating PT/OT Diarrhea    Tolerating Diet y Constipation    Other Could NOT obtain due to:   
 
Objective: VITALS:  
Last 24hrs VS reviewed since prior progress note. Most recent are: 
Patient Vitals for the past 24 hrs: 
 Temp Pulse Resp BP SpO2  
20 1435 97.6 °F (36.4 °C) 78 20 123/70 95 % 20 1037 97.5 °F (36.4 °C) 78 20 106/64 93 % 20 1026  74  111/70   
20 0712 97.5 °F (36.4 °C) 71 20 114/65 97 % 02/22/20 0327 97.4 °F (36.3 °C) 79 20 130/64 96 % 02/21/20 2327 97.3 °F (36.3 °C) 76 20 128/80 96 % 02/21/20 2202  77  138/88   
02/21/20 1835 97.8 °F (36.6 °C) 78 20 96/54 96 % Intake/Output Summary (Last 24 hours) at 2/22/2020 1633 Last data filed at 2/22/2020 1308 Gross per 24 hour Intake 410 ml Output 1400 ml Net -990 ml Wt Readings from Last 12 Encounters:  
02/22/20 69.9 kg (154 lb 3.2 oz) 01/29/20 70.5 kg (155 lb 8 oz)  
11/15/19 68.5 kg (151 lb)  
11/29/19 68.5 kg (151 lb)  
11/23/19 68 kg (150 lb)  
11/22/19 68.5 kg (151 lb)  
11/18/19 68 kg (150 lb)  
11/15/19 69.4 kg (153 lb)  
11/11/19 68.9 kg (152 lb) 11/08/19 69.4 kg (153 lb) 11/05/19 67.6 kg (149 lb) 10/29/19 68 kg (150 lb) PHYSICAL EXAM: 
General: WD, WN. Alert, cooperative, no acute distress EENT:  PERRL. Anicteric sclerae. MMM Neck:  No meningismus, no thyromegaly Resp:  Decreased left lung fields, no wheezing or rales. No accessory muscle use CV:  Regular  rhythm, 2+  edema GI:  Soft, Non distended, Non tender. +Bowel sounds, no rebound Neurologic:  Alert, normal speech, non focal motor exam 
Psych:   Not anxious nor agitated Skin:  No rashes. No jaundice Reviewed most current lab test results and cultures  YES Reviewed most current radiology test results   YES Review and summation of old records today    NO Reviewed patient's current orders and MAR    YES 
PMH/SH reviewed - no change compared to H&P 
________________________________________________________________________ Care Plan discussed with: 
  Comments Patient x Family RN x Care Manager Consultant  x Cardiology Multidiciplinary team rounds were held today with , nursing, pharmacist and clinical coordinator. Patient's plan of care was discussed; medications were reviewed and discharge planning was addressed. ________________________________________________________________________ Comments >50% of visit spent in counseling and coordination of care    
________________________________________________________________________ Florentin Kaur MD  
 
Procedures: see electronic medical records for all procedures/Xrays and details which were not copied into this note but were reviewed prior to creation of Plan. LABS: 
I reviewed today's most current labs and imaging studies. Pertinent labs include: 
Recent Labs  
  02/22/20 
0126 02/21/20 
0407 02/20/20 
0345 WBC 4.4 4.0 4.2 HGB 14.1 13.5 13.9 HCT 43.4 42.1 44.2  221 240 Recent Labs  
  02/22/20 
0126 02/21/20 
0407 02/20/20 
0345 02/19/20 
1719  144 143 141  
K 3.5 3.3* 3.5 3.9  105 104 104 CO2 35* 33* 34* 35* * 89 112* 100 BUN 23* 23* 23* 23* CREA 1.64* 1.35* 1.50* 1.70* CA 9.6 9.6 10.0 10.0 MG 2.6* 2.7*  --   --   
ALB  --   --   --  2.9* TBILI  --   --   --  0.7 SGOT  --   --   --  30 ALT  --   --   --  24

## 2020-02-22 NOTE — PROGRESS NOTES
Bedside shift change report GIVEN TO Aston Boggs RN. Report included the following information SBAR. SIGNIFICANT CHANGES DURING SHIFT:  Lasix dose increased. Labs due in the AM. Administered tylenol for hand soreness. Ambulated russell with PT. Up in recliner for lunch and dinner. CONCERNS TO ADDRESS WITH MD:   
 
 
 
 
[facility] NURSING NOTE Admission Date 2/19/2020 Admission Diagnosis NSTEMI (non-ST elevated myocardial infarction) ([facility] [address] [I21.4] Consults IP CONSULT TO CARDIOLOGY Cardiac Monitoring [x] Yes [] No  
  
Purposeful Hourly Rounding [] Yes   
Shane Score Total Score: 4 Shane score 3 or > [x] Bed Alarm [] Avasys [] 1:1 sitter [] Patient refused (Signed refusal form in chart) Michael Score Michael Score: 16 Michael score 14 or < [x] PMT consult [] Wound Care consult  
 []  Specialty bed  [x] Nutrition consult Influenza Vaccine Received Flu Vaccine for Current Season (usually Sept-March): Yes Oxygen needs? [x] Room air Oxygen @  []1L    []2L    []3L   []4L    []5L   []6L via NC Chronic home O2 use? [] Yes [] No 
Perform O2 challenge test and document in progress note using [employer] [place]) Last bowel movement Last Bowel Movement Date: 02/21/20 Urinary Catheter LDAs Peripheral IV 02/20/20 Left Antecubital (Active) Site Assessment Clean, dry, & intact 2/22/2020  1:58 PM  
Phlebitis Assessment 0 2/22/2020  1:58 PM  
Infiltration Assessment 0 2/22/2020  1:58 PM  
Dressing Status Clean, dry, & intact 2/22/2020  1:58 PM  
Dressing Type Transparent;Tape 2/22/2020  1:58 PM  
Hub Color/Line Status Blue;Flushed 2/22/2020  1:58 PM  
Action Taken Blood drawn 2/20/2020  3:57 AM  
                  
  
Readmission Risk Assessment Tool Score Medium Risk 12 Total Score 3 Has Seen PCP in Last 6 Months (Yes=3, No=0)  
 4 IP Visits Last 12 Months (1-3=4, 4=9, >4=11) 9 Pt. Coverage (Medicare=5 , Medicaid, or Self-Pay=4) Criteria that do not apply:  
 . Living with Significant Other. Assisted Living. LTAC. SNF. or  
Rehab Patient Length of Stay (>5 days = 3) Charlson Comorbidity Score (Age + Comorbid Conditions) Expected Length of Stay 4d 4h Actual Length of Stay 3 Problem: Patient Education: Go to Patient Education Activity Goal: Patient/Family Education Outcome: Progressing Towards Goal 
  
Problem: Pressure Injury - Risk of 
Goal: *Prevention of pressure injury Description Document Michael Scale and appropriate interventions in the flowsheet. Outcome: Progressing Towards Goal 
Note: Pressure Injury Interventions: 
Sensory Interventions: Assess changes in LOC, Assess need for specialty bed, Avoid rigorous massage over bony prominences, Chair cushion, Check visual cues for pain, Discuss PT/OT consult with provider, Float heels, Keep linens dry and wrinkle-free, Maintain/enhance activity level, Minimize linen layers, Monitor skin under medical devices, Turn and reposition approx. every two hours (pillows and wedges if needed) Moisture Interventions: Absorbent underpads, Apply protective barrier, creams and emollients, Assess need for specialty bed, Check for incontinence Q2 hours and as needed, Internal/External urinary devices, Limit adult briefs, Maintain skin hydration (lotion/cream), Minimize layers, Moisture barrier, Offer toileting Q_hr Activity Interventions: Assess need for specialty bed, Chair cushion, Increase time out of bed, Pressure redistribution bed/mattress(bed type), PT/OT evaluation Mobility Interventions: Assess need for specialty bed, Chair cushion, Float heels, HOB 30 degrees or less, Pressure redistribution bed/mattress (bed type), PT/OT evaluation, Turn and reposition approx. every two hours(pillow and wedges) Nutrition Interventions: Document food/fluid/supplement intake, Discuss nutritional consult with provider, Offer support with meals,snacks and hydration Friction and Shear Interventions: Apply protective barrier, creams and emollients, Feet elevated on foot rest, Foam dressings/transparent film/skin sealants, HOB 30 degrees or less, Lift sheet, Lift team/patient mobility team, Minimize layers Problem: Patient Education: Go to Patient Education Activity Goal: Patient/Family Education Outcome: Progressing Towards Goal

## 2020-02-22 NOTE — PROGRESS NOTES
Problem: Patient Education: Go to Patient Education Activity Goal: Patient/Family Education Outcome: Progressing Towards Goal 
  
Problem: Falls - Risk of 
Goal: *Absence of Falls Description Document Darin Soliman Fall Risk and appropriate interventions in the flowsheet. Outcome: Progressing Towards Goal 
Note: Fall Risk Interventions: 
Mobility Interventions: Assess mobility with egress test, Bed/chair exit alarm, Patient to call before getting OOB Mentation Interventions: Adequate sleep, hydration, pain control, Bed/chair exit alarm, Increase mobility, More frequent rounding, Reorient patient Medication Interventions: Bed/chair exit alarm, Patient to call before getting OOB, Teach patient to arise slowly Elimination Interventions: Bed/chair exit alarm, Call light in reach, Patient to call for help with toileting needs Problem: Pressure Injury - Risk of 
Goal: *Prevention of pressure injury Description Document Michael Scale and appropriate interventions in the flowsheet. Outcome: Progressing Towards Goal 
Note: Pressure Injury Interventions: 
Sensory Interventions: Assess changes in LOC, Assess need for specialty bed, Float heels, Keep linens dry and wrinkle-free, Maintain/enhance activity level, Minimize linen layers, Turn and reposition approx. every two hours (pillows and wedges if needed) Moisture Interventions: Absorbent underpads, Maintain skin hydration (lotion/cream), Minimize layers Activity Interventions: Assess need for specialty bed, Increase time out of bed Mobility Interventions: Assess need for specialty bed, Float heels, HOB 30 degrees or less, Turn and reposition approx. every two hours(pillow and wedges) Nutrition Interventions: Document food/fluid/supplement intake Friction and Shear Interventions: Apply protective barrier, creams and emollients, HOB 30 degrees or less, Lift team/patient mobility team, Minimize layers

## 2020-02-22 NOTE — PROGRESS NOTES
OCCUPATIONAL THERAPY EVALUATION/DISCHARGE Patient: Daniel Ford (21 y.o. female) Date: 2/22/2020 Primary Diagnosis: NSTEMI (non-ST elevated myocardial infarction) ([facility] [I21.4] Precautions:   Fall ASSESSMENT Based on the objective data described below, the patient presents with fair activity tolerance and endurance s/p admission with NSTEMI, currently limited only by fair endurance at baseline and SBA with RW use, overall near baseline for ADLs with family and caregivers assisting daily/nightly. Limited by gout in B hands limiting functional  and pinch-issued foam  for utincels and light sponge for digit ROM. Patient feels near \"normal\", encouraged Home safety, activity modfication and endurance building through ADL participation, no other OT needs. . 
 
Current Level of Function (ADLs/self-care): mod A for LB ADL s to min A for UB ADLs, overall SBA with RW, baseline Functional Outcome Measure: The patient scored 60/100 on the barthel ADL index outcome measure which is indicative of near baseline. Other factors to consider for discharge: has 24/7 care. May need Fall Alert PLAN : 
Recommend with staff: OOB for all meals and bathroom for toileting with Rw use Recommendation for discharge: (in order for the patient to meet his/her long term goals) No skilled occupational therapy/ follow up rehabilitation needs identified at this time. This discharge recommendation: 
Has not yet been discussed the attending provider and/or case management IF patient discharges home will need the following DME: patient owns DME required for discharge SUBJECTIVE:  
Patient stated I feel I'm doing ok.  OBJECTIVE DATA SUMMARY:  
HISTORY:  
Past Medical History:  
Diagnosis Date  A-fib ([facility]  Adenocarcinoma ([facility]  Cancer ([facility] right breast CA, lung, colon  Chronic kidney disease  Gastrointestinal disorder GERD  Hypertension  Ileitis, terminal ([facility]  Other ill-defined conditions(799.89)   
 gout  Squamous cell lung cancer ([facility] Past Surgical History:  
Procedure Laterality Date [place] Right mastectomy  HX APPENDECTOMY  HX HEENT    
 right glass eye  CA COLONOSCOPY W/BIOPSY SINGLE/MULTIPLE  3/29/2013  CA COLSC FLX W/RMVL OF TUMOR POLYP LESION SNARE TQ  3/29/2013 Prior Level of Function/Environment/Context: mod I to mod A for ADLs, with caregivers assisting and RW use at home Expanded or extensive additional review of patient history:  
Home Situation Home Environment: Private residence # Steps to Enter: 0 One/Two Story Residence: One story Living Alone: No 
Support Systems: Child(indra), Family member(s), Home care staff Patient Expects to be Discharged to[de-identified] Private residence Current DME Used/Available at Home: Alveta Dm, rollator Hand dominance: Right EXAMINATION OF PERFORMANCE DEFICITS: 
Cognitive/Behavioral Status: 
Neurologic State: Alert Orientation Level: Disoriented to time;Oriented to person;Oriented to place;Oriented to situation Skin: intact Edema: moderate joints in b hands Hearing: Auditory Auditory Impairment: None Vision/Perceptual:   
    
    
    
  
    
Acuity: Able to read normal print without difficulty Range of Motion: 
Limited in B hands and shoulders, 1/4 composite grasp, limited in all joints in hands with stiffness AROM: Generally decreased, functional 
  
  
  
  
  
  
  
 
Strength: 
Limited in b hands 1/5 Strength: Generally decreased, functional 
  
  
  
  
 
Coordination: 
  
Fine Motor Skills-Upper: Left Impaired;Right Impaired(minimal digit flexion) Tone & Sensation: 
Normal 
  
  
  
  
  
  
  
  
 
Balance: 
Sitting: Intact Standing: Intact; Without support Standing - Static: Good Standing - Dynamic : Good Functional Mobility and Transfers for ADLs: 
Bed Mobility: 
  
 
Transfers: Sit to Stand: Stand-by assistance Stand to Sit: Stand-by assistance Bed to Chair: Stand-by assistance Bathroom Mobility: Stand-by assistance Toilet Transfer : Stand-by assistance ADL Assessment: 
Feeding: Independent Oral Facial Hygiene/Grooming: Setup Bathing: Minimum assistance Upper Body Dressing: Minimum assistance Lower Body Dressing: Moderate assistance Toileting: Minimum assistance Completed OT evaluation and ADLs seated EOB and standing as able with SBA and RW for balance. Educated on safety and endurance training with encouragement for full participation in ADLs while in hospital. Good understanding noted. ADL Intervention and task modifications: 
Feeding Drink to Mouth: Independent ADL retraining with foam  for utincels to improve functioanl  for eating, good understanding of use and ease with independence for self feeding Grooming Washing Face: Set-up Washing Hands: Set-up Able to ambulate around room with no LOB and Rw use, educated on home safety with RW use, erect posture with support on RW, and for balance and stability during ADLs. Toileting Bladder Hygiene: Stand-by assistance Clothing Management: Minimum assistance Therapeutic Exercise: 
Pink sponge issued for HEP for B hand AROM and gentle strengthening-patient tolerated gentle squeezing in B hands, handout issued with more specific exercises to be reviewed as needed and able. encouraged OOB and full participation with ADLs to improve strength and endurance. Patient instructed and indicated understanding the benefits of maintaining activity tolerance, functional mobility, and independence with self care tasks during acute stay  to ensure safe return home and to baseline.  Encouraged patient to increase frequency and duration OOB, be out of bed for all meals, perform daily ADLs (as approved by RN/MD regarding bathing etc), and performing functional mobility to/from bathroom. Functional Measure: 
Barthel Index: 
 
Bathin Bladder: 5 Bowels: 10 
Groomin Dressin Feeding: 10 Mobility: 10 Stairs: 0 Toilet Use: 5 Transfer (Bed to Chair and Back): 10 Total: 60/100 The Barthel ADL Index: Guidelines 1. The index should be used as a record of what a patient does, not as a record of what a patient could do. 2. The main aim is to establish degree of independence from any help, physical or verbal, however minor and for whatever reason. 3. The need for supervision renders the patient not independent. 4. A patient's performance should be established using the best available evidence. Asking the patient, friends/relatives and nurses are the usual sources, but direct observation and common sense are also important. However direct testing is not needed. 5. Usually the patient's performance over the preceding 24-48 hours is important, but occasionally longer periods will be relevant. 6. Middle categories imply that the patient supplies over 50 per cent of the effort. 7. Use of aids to be independent is allowed. Yaneli Sifuentes., Barthel, D.W. (4204). Functional evaluation: the Barthel Index. [facility] (14)2. Bigg Mcmillan, SOPHYF, Sukhi Browne.Carlita., [place], [address] [place] [address] (). Measuring the change indisability after inpatient rehabilitation; comparison of the responsiveness of the Barthel Index and Functional [place] Measure. Journal of Neurology, Neurosurgery, and Psychiatry, 66(4), 822-468. Karli Dodson N.J.NATHAN, ALFREDO Posada, & Larissa Rockwell M.A. (2004.) Assessment of post-stroke quality of life in cost-effectiveness studies: The usefulness of the Barthel Index and the EuroQoL-5D. [facility], 13, 963-45 Occupational Therapy Evaluation Charge Determination History Examination Decision-Making MEDIUM Complexity : Expanded review of history including physical, cognitive and psychosocial  history  LOW Complexity : 1-3 performance deficits relating to physical, cognitive , or psychosocial skils that result in activity limitations and / or participation restrictions  LOW Complexity : No comorbidities that affect functional and no verbal or physical assistance needed to complete eval tasks Based on the above components, the patient evaluation is determined to be of the following complexity level: LOW Pain Rating: 
None noted Activity Tolerance:  
Fair and requires rest breaks Please refer to the flowsheet for vital signs taken during this treatment. After treatment patient left in no apparent distress:   
Sitting in chair, Call bell within reach and Bed / chair alarm activated COMMUNICATION/EDUCATION:  
The patients plan of care was discussed with: Physical Therapist and Registered Nurse. Thank you for this referral. 
Janina Goss OT Time Calculation: 35 mins

## 2020-02-22 NOTE — PROGRESS NOTES
Bedside shift change report given to Shawn Duran (oncoming nurse) by Rylan Mcdaniel (offgoing nurse). Report included the following information SBAR.  
 
0722 - Administered PRN tylenol for hand soreness. 3867 - Applied zinc cream to sacrum/buttocks excoriation. 1167 - Tylenol relieved hand pain, but patient would like colchicine because she does get gout in her hands sometimes. PCT to assist with breakfast. Patient does not want to get up to the chair at this time. Will continue to turn q2h, heels elevated on pillows. 1150 - Got patient up to chair x2 assist with the walker. Sitting on a cushion, legs elevated, heels offloaded.

## 2020-02-22 NOTE — PROGRESS NOTES
1900:Bedside shift change report given to Yolette Hartmann (oncoming nurse) by [place] (offgoing nurse). Report included the following information SBAR and Kardex. 0700: 
Bedside shift change report GIVEN TO Keith Taylor RN. Report included the following information SBAR and Kardex. SIGNIFICANT CHANGES DURING SHIFT:   
 
 
CONCERNS TO ADDRESS WITH MD:   
 
 
 
 
[address] NURSING NOTE Admission Date 2/19/2020 Admission Diagnosis NSTEMI (non-ST elevated myocardial infarction) ([facility] [I21.4] Consults IP CONSULT TO CARDIOLOGY Cardiac Monitoring [x] Yes [] No  
  
Purposeful Hourly Rounding [x] Yes   
Shane Score Total Score: 3 Shane score 3 or > [x] Bed Alarm [] Avasys [] 1:1 sitter [] Patient refused (Signed refusal form in chart) Michael Score Michael Score: 17 Michael score 14 or < [x] PMT consult [] Wound Care consult  
 []  Specialty bed  [] Nutrition consult Influenza Vaccine Received Flu Vaccine for Current Season (usually Sept-March): Yes Oxygen needs? [x] Room air Oxygen @  []1L    []2L    []3L   []4L    []5L   []6L via NC Chronic home O2 use? [] Yes [] No 
Perform O2 challenge test and document in progress note using smartphKingmakere (.Homeoxygen) Last bowel movement Last Bowel Movement Date: 02/19/20 Urinary Catheter LDAs Peripheral IV 02/20/20 Left Antecubital (Active) Site Assessment Clean, dry, & intact 2/22/2020  4:01 AM  
Phlebitis Assessment 0 2/22/2020  4:01 AM  
Infiltration Assessment 0 2/22/2020  4:01 AM  
Dressing Status Clean, dry, & intact 2/22/2020  4:01 AM  
Dressing Type Tape;Transparent 2/22/2020  4:01 AM  
Hub Color/Line Status Blue;Flushed 2/22/2020  4:01 AM  
Action Taken Blood drawn 2/20/2020  3:57 AM  
                  
  
Readmission Risk Assessment Tool Score Medium Risk 12 Total Score 3 Has Seen PCP in Last 6 Months (Yes=3, No=0)  
 4 IP Visits Last 12 Months (1-3=4, 4=9, >4=11) 9 Pt. Coverage (Medicare=5 , Medicaid, or Self-Pay=4) Criteria that do not apply:  
 . Living with Significant Other. Assisted Living. LTAC. SNF. or  
Rehab Patient Length of Stay (>5 days = 3) Charlson Comorbidity Score (Age + Comorbid Conditions) Expected Length of Stay 4d 4h Actual Length of Stay 3

## 2020-02-22 NOTE — PROGRESS NOTES
Cardiology Progress Note 2/22/2020 3:07 PM 
 
Admit Date: 2/19/2020 Admit Diagnosis: NSTEMI (non-ST elevated myocardial infarction) ([facility] [I21.4] Subjective:  
 
Kay Puri is feeling well. Remains in afib, rate controlled. Visit Vitals /70 (BP 1 Location: Left arm, BP Patient Position: Sitting) Pulse 78 Temp 97.6 °F (36.4 °C) Resp 20 Ht 5' 2\" (1.575 m) Wt 154 lb 3.2 oz (69.9 kg) SpO2 95% BMI 28.20 kg/m² Current Facility-Administered Medications Medication Dose Route Frequency  potassium bicarb-citric acid (EFFER-K) tablet 40 mEq  40 mEq Oral DAILY  sodium chloride (NS) flush 5-40 mL  5-40 mL IntraVENous Q8H  
 sodium chloride (NS) flush 5-40 mL  5-40 mL IntraVENous PRN  
 acetaminophen (TYLENOL) tablet 650 mg  650 mg Oral Q6H PRN  
 ondansetron (ZOFRAN) injection 4 mg  4 mg IntraVENous Q6H PRN  
 bisacodyL (DULCOLAX) tablet 5 mg  5 mg Oral DAILY PRN  
 hydrALAZINE (APRESOLINE) tablet 10 mg  10 mg Oral DAILY  colchicine tablet 0.6 mg  0.6 mg Oral DAILY PRN  
 apixaban (ELIQUIS) tablet 2.5 mg  2.5 mg Oral BID  metOLazone (ZAROXOLYN) tablet 2.5 mg  2.5 mg Oral Q MON, WED & FRI  furosemide (LASIX) injection 60 mg  60 mg IntraVENous BID  albuterol (PROVENTIL VENTOLIN) nebulizer solution 2.5 mg  2.5 mg Nebulization Q4H PRN  
 isosorbide mononitrate ER (IMDUR) tablet 30 mg  30 mg Oral DAILY Objective:  
  
Physical Exam: 
Visit Vitals /70 (BP 1 Location: Left arm, BP Patient Position: Sitting) Pulse 78 Temp 97.6 °F (36.4 °C) Resp 20 Ht 5' 2\" (1.575 m) Wt 154 lb 3.2 oz (69.9 kg) SpO2 95% BMI 28.20 kg/m² General Appearance:  Well developed, well nourished,alert and oriented x 3, and individual in no acute distress. Ears/Nose/Mouth/Throat:   Hearing grossly normal. 
  
    Neck: Supple. Chest:   Lungs clear to auscultation bilaterally. Cardiovascular:  Regular rate and rhythm, S1, S2 normal, no murmur. Abdomen:   Soft, non-tender, bowel sounds are active. Extremities:  1+ edema bilaterally. Skin: Warm and dry. Data Review:  
Labs:   
Recent Results (from the past 24 hour(s)) CBC W/O DIFF Collection Time: 02/22/20  1:26 AM  
Result Value Ref Range WBC 4.4 3.6 - 11.0 K/uL  
 RBC 4.99 3.80 - 5.20 M/uL  
 HGB 14.1 11.5 - 16.0 g/dL HCT 43.4 35.0 - 47.0 % MCV 87.0 80.0 - 99.0 FL  
 MCH 28.3 26.0 - 34.0 PG  
 MCHC 32.5 30.0 - 36.5 g/dL  
 RDW 19.3 (H) 11.5 - 14.5 % PLATELET 568 296 - 676 K/uL MPV 11.2 8.9 - 12.9 FL  
 NRBC 0.0 0  WBC ABSOLUTE NRBC 0.00 0.00 - 0.01 K/uL METABOLIC PANEL, BASIC Collection Time: 02/22/20  1:26 AM  
Result Value Ref Range Sodium 142 136 - 145 mmol/L Potassium 3.5 3.5 - 5.1 mmol/L Chloride 102 97 - 108 mmol/L  
 CO2 35 (H) 21 - 32 mmol/L Anion gap 5 5 - 15 mmol/L Glucose 111 (H) 65 - 100 mg/dL BUN 23 (H) 6 - 20 MG/DL Creatinine 1.64 (H) 0.55 - 1.02 MG/DL  
 BUN/Creatinine ratio 14 12 - 20 GFR est AA 35 (L) >60 ml/min/1.73m2 GFR est non-AA 29 (L) >60 ml/min/1.73m2 Calcium 9.6 8.5 - 10.1 MG/DL MAGNESIUM Collection Time: 02/22/20  1:26 AM  
Result Value Ref Range Magnesium 2.6 (H) 1.6 - 2.4 mg/dL Telemetry: AFIB Assessment:  
 
Active Problems: 
  CKD (chronic kidney disease) stage 3, GFR 30-59 ml/min ([facility] (5/29/2018) Chronic a-fib (5/29/2018) Diastolic CHF, acute on chronic ([facility] (5/29/2018) Elevated troponin (2/20/2020) Plan:  
 
Creatinine slowly raising. May need to hold back diuretic. Home in 1-2 days.  
 
Debbie Hwang MD

## 2020-02-22 NOTE — PROGRESS NOTES
Bedside shift change report GIVEN TO Alonzo Hernandez RN. Report included the following information SBAR. SIGNIFICANT CHANGES DURING SHIFT:  patient needs assistance of 2 to get up. She uses the walker. Pt evaluated her. Still has 1 plus edema and diminished lung sounds. CONCERNS TO ADDRESS WITH MD:  Monitor weight and intake and output. Mobility, fall precautions [facility] NURSING NOTE Admission Date 2/19/2020 Admission Diagnosis NSTEMI (non-ST elevated myocardial infarction) ([address] [I21.4] Consults IP CONSULT TO CARDIOLOGY Cardiac Monitoring [] Yes [] No  
  
Purposeful Hourly Rounding [] Yes   
Shane Score Total Score: 3 Shane score 3 or > [] Bed Alarm [] Avasys [] 1:1 sitter [] Patient refused (Signed refusal form in chart) Michael Score Michael Score: 17 Michael score 14 or < [] PMT consult [] Wound Care consult  
 []  Specialty bed  [] Nutrition consult Influenza Vaccine Received Flu Vaccine for Current Season (usually Sept-March): Yes Oxygen needs? [] Room air Oxygen @  []1L    []2L    []3L   []4L    []5L   []6L via NC Chronic home O2 use? [] Yes [] No 
Perform O2 challenge test and document in progress note using smartEnerkeme (.Homeoxygen) Last bowel movement Last Bowel Movement Date: 02/19/20 Urinary Catheter LDAs Peripheral IV 02/20/20 Left Antecubital (Active) Site Assessment Clean, dry, & intact 2/21/2020  7:30 PM  
Phlebitis Assessment 0 2/21/2020  7:30 PM  
Infiltration Assessment 0 2/21/2020  7:30 PM  
Dressing Status Clean, dry, & intact 2/21/2020  7:30 PM  
Dressing Type Tape;Transparent 2/21/2020  7:30 PM  
Hub Color/Line Status Blue;Flushed 2/21/2020  7:30 PM  
Action Taken Blood drawn 2/20/2020  3:57 AM  
                  
  
Readmission Risk Assessment Tool Score Medium Risk 12 Total Score 3 Has Seen PCP in Last 6 Months (Yes=3, No=0)  
 4 IP Visits Last 12 Months (1-3=4, 4=9, >4=11) 9 Pt. Coverage (Medicare=5 , Medicaid, or Self-Pay=4) Criteria that do not apply:  
 . Living with Significant Other. Assisted Living. LTAC. SNF. or  
Rehab Patient Length of Stay (>5 days = 3) Charlson Comorbidity Score (Age + Comorbid Conditions) Expected Length of Stay 4d 4h Actual Length of Stay 2

## 2020-02-23 NOTE — PROGRESS NOTES
Bedside shift change report given to Yelitza Lopez (oncoming nurse) by Maxi Goldstein (offgoing nurse). Report included the following information SBAR.  
 
1736 - Weighed patient on standing scale. Got up to recliner x2 assist with the walker. 1714 - BP is 104/63, will notify Dr. Oleg Villanueva before administering lasix. 1726 - Given orders to hold lasix tonight.

## 2020-02-23 NOTE — PROGRESS NOTES
Cardiology Progress Note 2/23/2020 2:44 PM 
 
Admit Date: 2/19/2020 Admit Diagnosis: NSTEMI (non-ST elevated myocardial infarction) ([facility] [I21.4] Subjective:  
 
Carissa Gomez has no complaints. Lasix increased today. Kidney function stable. Visit Vitals /63 (BP 1 Location: Left arm, BP Patient Position: At rest) Pulse 73 Temp 97.1 °F (36.2 °C) Resp 16 Ht 5' 2\" (1.575 m) Wt 142 lb 14.4 oz (64.8 kg) SpO2 99% BMI 26.14 kg/m² Current Facility-Administered Medications Medication Dose Route Frequency  furosemide (LASIX) injection 80 mg  80 mg IntraVENous BID  potassium bicarb-citric acid (EFFER-K) tablet 40 mEq  40 mEq Oral DAILY  sodium chloride (NS) flush 5-40 mL  5-40 mL IntraVENous Q8H  
 sodium chloride (NS) flush 5-40 mL  5-40 mL IntraVENous PRN  
 acetaminophen (TYLENOL) tablet 650 mg  650 mg Oral Q6H PRN  
 ondansetron (ZOFRAN) injection 4 mg  4 mg IntraVENous Q6H PRN  
 bisacodyL (DULCOLAX) tablet 5 mg  5 mg Oral DAILY PRN  
 hydrALAZINE (APRESOLINE) tablet 10 mg  10 mg Oral DAILY  colchicine tablet 0.6 mg  0.6 mg Oral DAILY PRN  
 apixaban (ELIQUIS) tablet 2.5 mg  2.5 mg Oral BID  metOLazone (ZAROXOLYN) tablet 2.5 mg  2.5 mg Oral Q MON, WED & FRI  albuterol (PROVENTIL VENTOLIN) nebulizer solution 2.5 mg  2.5 mg Nebulization Q4H PRN  
 isosorbide mononitrate ER (IMDUR) tablet 30 mg  30 mg Oral DAILY Objective:  
  
Physical Exam: 
Visit Vitals /56 (BP 1 Location: Left arm, BP Patient Position: Sitting) Pulse 82 Temp 98 °F (36.7 °C) Resp 16 Ht 5' 2\" (1.575 m) Wt 142 lb 14.4 oz (64.8 kg) SpO2 97% BMI 26.14 kg/m² General Appearance:  Well developed, well nourished,alert and oriented x 3, and individual in no acute distress. Ears/Nose/Mouth/Throat:   Hearing grossly normal. 
  
    Neck: Supple. Chest:   Mild wheezing bilaterally. Cardiovascular:  Regular rate and rhythm, S1, S2 normal, no murmur. Abdomen:   Soft, non-tender, bowel sounds are active. Extremities: 1+ edema bilaterally. Skin: Warm and dry. Data Review:  
Labs:   
Recent Results (from the past 24 hour(s)) METABOLIC PANEL, BASIC Collection Time: 02/23/20  2:07 AM  
Result Value Ref Range Sodium 141 136 - 145 mmol/L Potassium 3.9 3.5 - 5.1 mmol/L Chloride 100 97 - 108 mmol/L  
 CO2 37 (H) 21 - 32 mmol/L Anion gap 4 (L) 5 - 15 mmol/L Glucose 100 65 - 100 mg/dL BUN 23 (H) 6 - 20 MG/DL Creatinine 1.56 (H) 0.55 - 1.02 MG/DL  
 BUN/Creatinine ratio 15 12 - 20 GFR est AA 37 (L) >60 ml/min/1.73m2 GFR est non-AA 30 (L) >60 ml/min/1.73m2 Calcium 10.0 8.5 - 10.1 MG/DL  
CBC WITH AUTOMATED DIFF Collection Time: 02/23/20  2:07 AM  
Result Value Ref Range WBC 4.3 3.6 - 11.0 K/uL  
 RBC 4.92 3.80 - 5.20 M/uL  
 HGB 13.7 11.5 - 16.0 g/dL HCT 43.0 35.0 - 47.0 % MCV 87.4 80.0 - 99.0 FL  
 MCH 27.8 26.0 - 34.0 PG  
 MCHC 31.9 30.0 - 36.5 g/dL  
 RDW 18.7 (H) 11.5 - 14.5 % PLATELET 435 405 - 841 K/uL MPV 10.4 8.9 - 12.9 FL  
 NRBC 0.0 0  WBC ABSOLUTE NRBC 0.00 0.00 - 0.01 K/uL NEUTROPHILS 75 32 - 75 % LYMPHOCYTES 12 12 - 49 % MONOCYTES 12 5 - 13 % EOSINOPHILS 1 0 - 7 % BASOPHILS 1 0 - 1 % IMMATURE GRANULOCYTES 0 0.0 - 0.5 % ABS. NEUTROPHILS 3.2 1.8 - 8.0 K/UL  
 ABS. LYMPHOCYTES 0.5 (L) 0.8 - 3.5 K/UL  
 ABS. MONOCYTES 0.5 0.0 - 1.0 K/UL  
 ABS. EOSINOPHILS 0.0 0.0 - 0.4 K/UL  
 ABS. BASOPHILS 0.0 0.0 - 0.1 K/UL  
 ABS. IMM. GRANS. 0.0 0.00 - 0.04 K/UL  
 DF AUTOMATED Telemetry: AFIB Assessment:  
 
Active Problems: 
  CKD (chronic kidney disease) stage 3, GFR 30-59 ml/min ([facility] (5/29/2018) Chronic a-fib (5/29/2018) Diastolic CHF, acute on chronic ([facility] (5/29/2018) Elevated troponin (2/20/2020) Plan:  
 
Watch renal function with increased diuretics.  
 
Marco Antonio Siegel MD

## 2020-02-23 NOTE — PROGRESS NOTES
1900:Bedside shift change report given to Chuck Cui (oncoming nurse) by Shawn Duran (offgoing nurse). Report included the following information SBAR and Kardex. 0700: 
Bedside shift change report GIVEN TO Shawn Duran, RN. Report included the following information SBAR and Kardex. SIGNIFICANT CHANGES DURING SHIFT:   
 
 
CONCERNS TO ADDRESS WITH MD:   
 
 
 
 
[facility] NURSING NOTE Admission Date 2/19/2020 Admission Diagnosis NSTEMI (non-ST elevated myocardial infarction) ([facility] [I21.4] Consults IP CONSULT TO CARDIOLOGY Cardiac Monitoring [x] Yes [] No  
  
Purposeful Hourly Rounding [x] Yes   
Shane Score Total Score: 4 Shane score 3 or > [x] Bed Alarm [] Avasys [] 1:1 sitter [] Patient refused (Signed refusal form in chart) Michael Score Michael Score: 17 Michael score 14 or < [x] PMT consult [] Wound Care consult  
 []  Specialty bed  [] Nutrition consult Influenza Vaccine Received Flu Vaccine for Current Season (usually Sept-March): Yes Oxygen needs? [x] Room air Oxygen @  []1L    []2L    []3L   []4L    []5L   []6L via NC Chronic home O2 use? [] Yes [] No 
Perform O2 challenge test and document in progress note using smartActiveGifte (.Homeoxygen) Last bowel movement Last Bowel Movement Date: 02/21/20 Urinary Catheter LDAs Peripheral IV 02/20/20 Left Antecubital (Active) Site Assessment Clean, dry, & intact 2/23/2020  3:00 AM  
Phlebitis Assessment 0 2/23/2020  3:00 AM  
Infiltration Assessment 0 2/23/2020  3:00 AM  
Dressing Status Clean, dry, & intact 2/23/2020  3:00 AM  
Dressing Type Tape;Transparent 2/23/2020  3:00 AM  
Hub Color/Line Status Blue;Flushed 2/23/2020  3:00 AM  
Action Taken Blood drawn 2/20/2020  3:57 AM  
                  
  
Readmission Risk Assessment Tool Score Medium Risk 12 Total Score 3 Has Seen PCP in Last 6 Months (Yes=3, No=0)  
 4 IP Visits Last 12 Months (1-3=4, 4=9, >4=11) 9 Pt. Coverage (Medicare=5 , Medicaid, or Self-Pay=4) Criteria that do not apply:  
 . Living with Significant Other. Assisted Living. LTAC. SNF. or  
Rehab Patient Length of Stay (>5 days = 3) Charlson Comorbidity Score (Age + Comorbid Conditions) Expected Length of Stay 4d 4h Actual Length of Stay 4

## 2020-02-23 NOTE — PROGRESS NOTES
Hospitalist Progress Note NAME: Ling Trujillo :  1918 MRN:  [id number] Admit date: 2020 Today's date: 20 PCP: MD Holger Fried M.D. Cell [phone] Assessment / Plan: 
Acute diastolic CHF POA LVEF 65-19% Severe pulmonary HTN POA PA pressure 84 Permanent atrial fib POA Hypokalemia Increasing weakness, WOOTEN, increasing LE edema IV lasix, increase dose, metolazone 3x per week ASA TTE LVEF 55 to 60%, normal RV function, PA pressure 84, moderate MR Hydralazine Cardiology following Continue Eliquis PO KCL Edema improving, plan for discharge in [place] Chronic kidney disease POA stage 4 Renal fuction close to baseline Monitoring with diuresis History of lung cancer left POA Chronic volume loss left lung POA present going back to at least 2018 No indication for intervention Overweight POA Body mass index is 28.2 kg/m². Code Status: Full Surrogate Decision Maker: 
  
DVT Prophylaxis: Eliquis GI Prophylaxis: not indicated 
  
Body mass index is 28.2 kg/m². Subjective: Chief Complaint / Reason for Physician Visit f/u CHF \"no problems\". Discussed with RN events overnight. Improving eema, generalized weakness No Cp or SOB at rest 
 
Review of Systems: 
Symptom Y/N Comments  Symptom Y/N Comments Fever/Chills n   Chest Pain n   
Poor Appetite    Edema y Cough n   Abdominal Pain n   
Sputum    Joint Pain SOB/WOOTEN n   Headache Nausea/vomit n   Tolerating PT/OT Diarrhea    Tolerating Diet y Constipation    Other Could NOT obtain due to:   
 
Objective: VITALS:  
Last 24hrs VS reviewed since prior progress note. Most recent are: 
Patient Vitals for the past 24 hrs: 
 Temp Pulse Resp BP SpO2  
20 1103 97.1 °F (36.2 °C) 73 16 113/63 99 % 20 0710 97.5 °F (36.4 °C) 82 16 126/68 95 % 20 0328 97.5 °F (36.4 °C) 74 18 117/65 97 % 02/23/20 0001 97.9 °F (36.6 °C) 76 18 121/70 95 % 02/22/20 1956 97.7 °F (36.5 °C) 76 20 107/78 96 % 02/22/20 1753  79  108/57   
02/22/20 1435 97.6 °F (36.4 °C) 78 20 123/70 95 % Intake/Output Summary (Last 24 hours) at 2/23/2020 1136 Last data filed at 2/23/2020 5552 Gross per 24 hour Intake 820 ml Output 1300 ml Net -480 ml Wt Readings from Last 12 Encounters:  
02/22/20 69.9 kg (154 lb 3.2 oz) 01/29/20 70.5 kg (155 lb 8 oz)  
11/15/19 68.5 kg (151 lb)  
11/29/19 68.5 kg (151 lb)  
11/23/19 68 kg (150 lb)  
11/22/19 68.5 kg (151 lb)  
11/18/19 68 kg (150 lb)  
11/15/19 69.4 kg (153 lb)  
11/11/19 68.9 kg (152 lb) 11/08/19 69.4 kg (153 lb) 11/05/19 67.6 kg (149 lb) 10/29/19 68 kg (150 lb) PHYSICAL EXAM: 
General: WD, WN. Alert, cooperative, no acute distress EENT:  PERRL. Anicteric sclerae. MMM Neck:  No meningismus, no thyromegaly Resp:  Decreased left lung fields, no wheezing or rales. No accessory muscle use CV:  Regular  rhythm, 2+  edema GI:  Soft, Non distended, Non tender. +Bowel sounds, no rebound Neurologic:  Alert, normal speech, non focal motor exam 
Psych:   Not anxious nor agitated Skin:  No rashes. No jaundice Reviewed most current lab test results and cultures  YES Reviewed most current radiology test results   YES Review and summation of old records today    NO Reviewed patient's current orders and MAR    YES 
PMH/SH reviewed - no change compared to H&P 
________________________________________________________________________ Care Plan discussed with: 
  Comments Patient x Family RN x Care Manager Consultant Multidiciplinary team rounds were held today with , nursing, pharmacist and clinical coordinator. Patient's plan of care was discussed; medications were reviewed and discharge planning was addressed. ________________________________________________________________________ Comments >50% of visit spent in counseling and coordination of care    
________________________________________________________________________ Shai Barros MD  
 
Procedures: see electronic medical records for all procedures/Xrays and details which were not copied into this note but were reviewed prior to creation of Plan. LABS: 
I reviewed today's most current labs and imaging studies. Pertinent labs include: 
Recent Labs  
  02/23/20 
0207 02/22/20 
0126 02/21/20 
0407 WBC 4.3 4.4 4.0 HGB 13.7 14.1 13.5 HCT 43.0 43.4 42.1  219 221 Recent Labs  
  02/23/20 
0207 02/22/20 
0126 02/21/20 
0407  142 144  
K 3.9 3.5 3.3*  
 102 105 CO2 37* 35* 33*  111* 89 BUN 23* 23* 23* CREA 1.56* 1.64* 1.35* CA 10.0 9.6 9.6 MG  --  2.6* 2.7*

## 2020-02-23 NOTE — PROGRESS NOTES
Bedside shift change report GIVEN TO Krupa Merino and Jade Kim RN. Report included the following information SBAR. SIGNIFICANT CHANGES DURING SHIFT:  Held evening dose of lasix for /63 CONCERNS TO ADDRESS WITH MD:  
 
 
 
 
[facility] NURSING NOTE Admission Date 2/19/2020 Admission Diagnosis NSTEMI (non-ST elevated myocardial infarction) ([address] [I21.4] Consults IP CONSULT TO CARDIOLOGY Cardiac Monitoring [x] Yes [] No  
  
Purposeful Hourly Rounding [x] Yes   
Shane Score Total Score: 4 Shane score 3 or > [x] Bed Alarm [] Avasys [] 1:1 sitter [] Patient refused (Signed refusal form in chart) Michael Score Michael Score: 16 Michael score 14 or < [x] PMT consult [] Wound Care consult  
 []  Specialty bed  [] Nutrition consult Influenza Vaccine Received Flu Vaccine for Current Season (usually Sept-March): Yes Oxygen needs? [x] Room air Oxygen @  []1L    []2L    []3L   []4L    []5L   []6L via NC Chronic home O2 use? [] Yes [] No 
Perform O2 challenge test and document in progress note using [employer] (.[employer]) Last bowel movement Last Bowel Movement Date: 02/21/20 Urinary Catheter External Female Catheter 02/22/20-Urine Output (mL): 200 ml LDAs Peripheral IV 02/20/20 Left Antecubital (Active) Site Assessment Clean, dry, & intact 2/23/2020  3:04 PM  
Phlebitis Assessment 0 2/23/2020  3:04 PM  
Infiltration Assessment 0 2/23/2020  3:04 PM  
Dressing Status Clean, dry, & intact 2/23/2020  3:04 PM  
Dressing Type Transparent;Tape 2/23/2020  3:04 PM  
Hub Color/Line Status Blue;Flushed 2/23/2020  3:04 PM  
Action Taken Blood drawn 2/20/2020  3:57 AM  
      
External Female Catheter 02/22/20 (Active) Site Assessment Clean, dry, & intact 2/23/2020  3:04 PM  
Repositioned Yes 2/23/2020  3:04 PM  
Perineal Care Yes 2/23/2020  3:04 PM  
Wick Changed No 2/23/2020  3:04 PM  
Suction Canister/Tubing Changed No 2/23/2020  3:04 PM  
 Urine Output (mL) 200 ml 2/23/2020  6:08 PM  
            
  
Readmission Risk Assessment Tool Score Medium Risk 12 Total Score 3 Has Seen PCP in Last 6 Months (Yes=3, No=0)  
 4 IP Visits Last 12 Months (1-3=4, 4=9, >4=11) 9 Pt. Coverage (Medicare=5 , Medicaid, or Self-Pay=4) Criteria that do not apply:  
 . Living with Significant Other. Assisted Living. LTAC. SNF. or  
Rehab Patient Length of Stay (>5 days = 3) Charlson Comorbidity Score (Age + Comorbid Conditions) Expected Length of Stay 4d 4h Actual Length of Stay 4 Problem: Patient Education: Go to Patient Education Activity Goal: Patient/Family Education Outcome: Progressing Towards Goal 
  
Problem: Patient Education: Go to Patient Education Activity Goal: Patient/Family Education Outcome: Progressing Towards Goal 
  
Problem: Falls - Risk of 
Goal: *Absence of Falls Description Document Dirk Deal Fall Risk and appropriate interventions in the flowsheet. Outcome: Progressing Towards Goal 
Note: Fall Risk Interventions: 
Mobility Interventions: Assess mobility with egress test, Bed/chair exit alarm, Communicate number of staff needed for ambulation/transfer, OT consult for ADLs, Patient to call before getting OOB, PT Consult for mobility concerns, Utilize walker, cane, or other assistive device Mentation Interventions: Adequate sleep, hydration, pain control, Bed/chair exit alarm, Door open when patient unattended, Update white board, Toileting rounds, Room close to nurse's station, Reorient patient, More frequent rounding, Increase mobility, Family/sitter at bedside, Evaluate medications/consider consulting pharmacy Medication Interventions: Assess postural VS orthostatic hypotension, Bed/chair exit alarm, Evaluate medications/consider consulting pharmacy, Patient to call before getting OOB, Teach patient to arise slowly, Utilize gait belt for transfers/ambulation Elimination Interventions: Bed/chair exit alarm, Call light in reach, Patient to call for help with toileting needs, Stay With Me (per policy), Toilet paper/wipes in reach, Toileting schedule/hourly rounds Problem: Patient Education: Go to Patient Education Activity Goal: Patient/Family Education Outcome: Progressing Towards Goal 
  
Problem: Pressure Injury - Risk of 
Goal: *Prevention of pressure injury Description Document Michael Scale and appropriate interventions in the flowsheet. Outcome: Progressing Towards Goal 
Note: Pressure Injury Interventions: 
Sensory Interventions: Assess changes in LOC, Assess need for specialty bed, Avoid rigorous massage over bony prominences, Check visual cues for pain, Float heels, Discuss PT/OT consult with provider, Keep linens dry and wrinkle-free, Maintain/enhance activity level, Minimize linen layers, Monitor skin under medical devices, Turn and reposition approx. every two hours (pillows and wedges if needed) Moisture Interventions: Absorbent underpads, Apply protective barrier, creams and emollients, Assess need for specialty bed, Check for incontinence Q2 hours and as needed, Internal/External urinary devices, Limit adult briefs, Maintain skin hydration (lotion/cream), Minimize layers, Moisture barrier, Offer toileting Q_hr Activity Interventions: Assess need for specialty bed, Chair cushion, Increase time out of bed, Pressure redistribution bed/mattress(bed type), PT/OT evaluation Mobility Interventions: Assess need for specialty bed, Chair cushion, Float heels, HOB 30 degrees or less, Pressure redistribution bed/mattress (bed type), PT/OT evaluation, Turn and reposition approx. every two hours(pillow and wedges) Nutrition Interventions: Document food/fluid/supplement intake, Discuss nutritional consult with provider, Offer support with meals,snacks and hydration Friction and Shear Interventions: Apply protective barrier, creams and emollients, Feet elevated on foot rest, Foam dressings/transparent film/skin sealants, HOB 30 degrees or less, Lift sheet, Lift team/patient mobility team 
 
  
 
 
 
  
Problem: Patient Education: Go to Patient Education Activity Goal: Patient/Family Education Outcome: Progressing Towards Goal 
  
Problem: Patient Education: Go to Patient Education Activity Goal: Patient/Family Education Outcome: Progressing Towards Goal

## 2020-02-23 NOTE — PROGRESS NOTES
Problem: Falls - Risk of 
Goal: *Absence of Falls Description Document Ezra Trevizo Fall Risk and appropriate interventions in the flowsheet. Outcome: Progressing Towards Goal 
Note: Fall Risk Interventions: 
Mobility Interventions: Bed/chair exit alarm, Patient to call before getting OOB Mentation Interventions: Adequate sleep, hydration, pain control, Bed/chair exit alarm, Increase mobility, More frequent rounding Medication Interventions: Patient to call before getting OOB, Teach patient to arise slowly Elimination Interventions: Bed/chair exit alarm, Call light in reach Problem: Pressure Injury - Risk of 
Goal: *Prevention of pressure injury Description Document Michael Scale and appropriate interventions in the flowsheet. Outcome: Progressing Towards Goal 
Note: Pressure Injury Interventions: 
Sensory Interventions: Assess changes in LOC, Float heels, Keep linens dry and wrinkle-free, Maintain/enhance activity level, Minimize linen layers Moisture Interventions: Absorbent underpads, Minimize layers, Limit adult briefs Activity Interventions: Assess need for specialty bed Mobility Interventions: Float heels, HOB 30 degrees or less, Turn and reposition approx. every two hours(pillow and wedges) Nutrition Interventions: Document food/fluid/supplement intake Friction and Shear Interventions: Minimize layers

## 2020-02-24 NOTE — PROGRESS NOTES
Hospitalist Progress Note NAME: Madelin Lester :  1918 MRN:  [id number] Admit date: 2020 Today's date: 20 PCP: MD Holger Fried M.D. Cell [phone] Assessment / Plan: 
Acute on chronic diastolic CHF POA LVEF 83-91% Severe pulmonary HTN POA PA pressure 84 Permanent atrial fib POA Hypokalemia Increasing weakness, WOOTEN, increasing LE edema IV lasix, increase dose, metolazone 3x per week Weight down 10 lbs, less edema Renal function stable with the diuresis ASA TTE LVEF 55 to 60%, normal RV function, PA pressure 84, moderate MR Hydralazine, added imdur Cardiology following Continue Eliquis PO KCL Increase home lasix to 80 mg Po BID Chronic kidney disease POA stage 4 Renal fuction close to baseline Stable with diuresis History of lung cancer left POA Chronic volume loss left lung POA present going back to at least  No indication for intervention Overweight POA Body mass index is 26.12 kg/m². Code Status: Full Surrogate Decision Maker: 
  
DVT Prophylaxis: Eliquis GI Prophylaxis: not indicated 
  
Body mass index is 26.12 kg/m². Subjective: Chief Complaint / Reason for Physician Visit f/u CHF \"no problems\". Discussed with RN events overnight. Improving edema, generalized weakness, weight down 10 lbs No Cp or SOB at rest 
 
Review of Systems: 
Symptom Y/N Comments  Symptom Y/N Comments Fever/Chills n   Chest Pain n   
Poor Appetite    Edema less Cough n   Abdominal Pain n   
Sputum    Joint Pain SOB/WOOTEN n   Headache Nausea/vomit n   Tolerating PT/OT Diarrhea    Tolerating Diet y Constipation    Other Could NOT obtain due to:   
 
Objective: VITALS:  
Last 24hrs VS reviewed since prior progress note. Most recent are: 
Patient Vitals for the past 24 hrs: 
 Temp Pulse Resp BP SpO2  
20 0802 98.4 °F (36.9 °C) 78 17 117/60 98 % 02/24/20 0405 97.6 °F (36.4 °C) 84 18 112/60 95 % 02/23/20 2325 97.6 °F (36.4 °C) 73 18 116/76 95 % 02/23/20 1952 97.9 °F (36.6 °C) 76 18 97/74 98 % 02/23/20 1712    104/63   
02/23/20 1452 98 °F (36.7 °C) 82 16 104/56 97 % 02/23/20 1103 97.1 °F (36.2 °C) 73 16 113/63 99 % Intake/Output Summary (Last 24 hours) at 2/24/2020 2584 Last data filed at 2/24/2020 7613 Gross per 24 hour Intake 540 ml Output 1600 ml Net -1060 ml Wt Readings from Last 12 Encounters:  
02/24/20 64.8 kg (142 lb 12.8 oz) 01/29/20 70.5 kg (155 lb 8 oz)  
11/15/19 68.5 kg (151 lb)  
11/29/19 68.5 kg (151 lb)  
11/23/19 68 kg (150 lb)  
11/22/19 68.5 kg (151 lb)  
11/18/19 68 kg (150 lb)  
11/15/19 69.4 kg (153 lb)  
11/11/19 68.9 kg (152 lb) 11/08/19 69.4 kg (153 lb) 11/05/19 67.6 kg (149 lb) 10/29/19 68 kg (150 lb) PHYSICAL EXAM: 
General: WD, WN. Alert, cooperative, no acute distress Neck:  No meningismus, no thyromegaly Resp:  Decreased left lung fields, no wheezing or rales. No accessory muscle use CV:  Regular  rhythm, 1+  Edema, definitely improved GI:  Soft, Non distended, Non tender. +Bowel sounds, no rebound Neurologic:  Alert, normal speech, non focal motor exam 
Psych:   Not anxious nor agitated Skin:  No rashes. No jaundice Reviewed most current lab test results and cultures  YES Reviewed most current radiology test results   YES Review and summation of old records today    NO Reviewed patient's current orders and MAR    YES 
PMH/SH reviewed - no change compared to H&P 
________________________________________________________________________ Care Plan discussed with: 
  Comments Patient x Family RN x Care Manager Consultant Multidiciplinary team rounds were held today with , nursing, pharmacist and clinical coordinator.   Patient's plan of care was discussed; medications were reviewed and discharge planning was addressed. ________________________________________________________________________ Comments >50% of visit spent in counseling and coordination of care    
________________________________________________________________________ Dalia Kapoor MD  
 
Procedures: see electronic medical records for all procedures/Xrays and details which were not copied into this note but were reviewed prior to creation of Plan. LABS: 
I reviewed today's most current labs and imaging studies. Pertinent labs include: 
Recent Labs  
  02/23/20 
0207 02/22/20 
0126 WBC 4.3 4.4 HGB 13.7 14.1 HCT 43.0 43.4  219 Recent Labs  
  02/24/20 
0048 02/23/20 0207 02/22/20 0126  141 142  
K 3.5 3.9 3.5 CL 97 100 102 CO2 38* 37* 35* GLU 92 100 111* BUN 27* 23* 23* CREA 1.45* 1.56* 1.64* CA 9.8 10.0 9.6 MG  --   --  2.6*

## 2020-02-24 NOTE — CARDIO/PULMONARY
Cardiac Rehab: Chart reviewed and pt visited. Pt received the CHF teaching packet at a prior admission. The admission medication recon sheet was provided. Reviewed the low sodium diet, checking labels for sodium in canned, packaged and processed foods, CHF S&Ss, when to call the doctor and the benefits of daily weights. Pt cleared for discharge today with home health services. Pt verbalized understanding.

## 2020-02-24 NOTE — PROGRESS NOTES
Problem: Patient Education: Go to Patient Education Activity Goal: Patient/Family Education Outcome: Progressing Towards Goal 
  
Problem: Unstable angina/NSTEMI: Day of Admission/Day 1 Goal: Off Pathway (Use only if patient is Off Pathway) Outcome: Progressing Towards Goal 
Goal: Activity/Safety Outcome: Progressing Towards Goal 
Goal: Consults, if ordered Outcome: Progressing Towards Goal 
Goal: Diagnostic Test/Procedures Outcome: Progressing Towards Goal 
Goal: Nutrition/Diet Outcome: Progressing Towards Goal 
Goal: Discharge Planning Outcome: Progressing Towards Goal 
Goal: Medications Outcome: Progressing Towards Goal 
Goal: Respiratory Outcome: Progressing Towards Goal 
Goal: Treatments/Interventions/Procedures Outcome: Progressing Towards Goal 
Goal: Psychosocial 
Outcome: Progressing Towards Goal 
Goal: *Hemodynamically stable Outcome: Progressing Towards Goal 
Goal: *Optimal pain control at patient's stated goal 
Outcome: Progressing Towards Goal 
Goal: *Lungs clear or at baseline Outcome: Progressing Towards Goal 
  
Problem: Unstable angina/NSTEMI: Day 2 Goal: Off Pathway (Use only if patient is Off Pathway) Outcome: Progressing Towards Goal 
Goal: Activity/Safety Outcome: Progressing Towards Goal 
Goal: Consults, if ordered Outcome: Progressing Towards Goal 
Goal: Diagnostic Test/Procedures Outcome: Progressing Towards Goal 
Goal: Nutrition/Diet Outcome: Progressing Towards Goal 
Goal: Discharge Planning Outcome: Progressing Towards Goal 
Goal: Medications Outcome: Progressing Towards Goal 
Goal: Respiratory Outcome: Progressing Towards Goal 
Goal: Treatments/Interventions/Procedures Outcome: Progressing Towards Goal 
Goal: Psychosocial 
Outcome: Progressing Towards Goal 
Goal: *Hemodynamically stable Outcome: Progressing Towards Goal 
Goal: *Optimal pain control at patient's stated goal 
Outcome: Progressing Towards Goal 
Goal: *Lungs clear or at baseline Outcome: Progressing Towards Goal 
  
Problem: Unstable Angina/NSTEMI: Discharge Outcomes Goal: *Hemodynamically stable Outcome: Progressing Towards Goal 
Goal: *Stable cardiac rhythm Outcome: Progressing Towards Goal 
Goal: *Lungs clear or at baseline Outcome: Progressing Towards Goal 
Goal: *Optimal pain control at patient's stated goal 
Outcome: Progressing Towards Goal 
Goal: *Identifies cardiac risk factors Outcome: Progressing Towards Goal 
Goal: *Verbalizes home exercise program, activity guidelines, cardiac precautions Outcome: Progressing Towards Goal 
Goal: *Verbalizes understanding and describes prescribed diet Outcome: Progressing Towards Goal 
Goal: *Verbalizes name, dosage, time, side effects, and number of days to continue medications Outcome: Progressing Towards Goal 
Goal: *Anxiety reduced or absent Outcome: Progressing Towards Goal 
Goal: *Understands and describes signs and symptoms to report to providers(Stroke Metric) Outcome: Progressing Towards Goal 
Goal: *Describes follow-up/return visits to physicians Outcome: Progressing Towards Goal 
Goal: *Describes available resources and support systems Outcome: Progressing Towards Goal 
Goal: *Influenza immunization Outcome: Progressing Towards Goal 
Goal: *Pneumococcal immunization Outcome: Progressing Towards Goal 
Goal: *Describes smoking cessation resources Outcome: Progressing Towards Goal 
  
Problem: Patient Education: Go to Patient Education Activity Goal: Patient/Family Education Outcome: Progressing Towards Goal 
  
Problem: Heart Failure: Day 1 Goal: Off Pathway (Use only if patient is Off Pathway) Outcome: Progressing Towards Goal 
Goal: Activity/Safety Outcome: Progressing Towards Goal 
Goal: Consults, if ordered Outcome: Progressing Towards Goal 
Goal: Diagnostic Test/Procedures Outcome: Progressing Towards Goal 
Goal: Nutrition/Diet Outcome: Progressing Towards Goal 
Goal: Discharge Planning Outcome: Progressing Towards Goal 
Goal: Medications Outcome: Progressing Towards Goal 
Goal: Respiratory Outcome: Progressing Towards Goal 
Goal: Treatments/Interventions/Procedures Outcome: Progressing Towards Goal 
Goal: Psychosocial 
Outcome: Progressing Towards Goal 
Goal: *Oxygen saturation within defined limits Outcome: Progressing Towards Goal 
Goal: *Hemodynamically stable Outcome: Progressing Towards Goal 
Goal: *Optimal pain control at patient's stated goal 
Outcome: Progressing Towards Goal 
Goal: *Anxiety reduced or absent Outcome: Progressing Towards Goal 
  
Problem: Heart Failure: Day 2 Goal: Off Pathway (Use only if patient is Off Pathway) Outcome: Progressing Towards Goal 
Goal: Activity/Safety Outcome: Progressing Towards Goal 
Goal: Consults, if ordered Outcome: Progressing Towards Goal 
Goal: Diagnostic Test/Procedures Outcome: Progressing Towards Goal 
Goal: Nutrition/Diet Outcome: Progressing Towards Goal 
Goal: Discharge Planning Outcome: Progressing Towards Goal 
Goal: Medications Outcome: Progressing Towards Goal 
Goal: Respiratory Outcome: Progressing Towards Goal 
Goal: Treatments/Interventions/Procedures Outcome: Progressing Towards Goal 
Goal: Psychosocial 
Outcome: Progressing Towards Goal 
Goal: *Oxygen saturation within defined limits Outcome: Progressing Towards Goal 
Goal: *Hemodynamically stable Outcome: Progressing Towards Goal 
Goal: *Optimal pain control at patient's stated goal 
Outcome: Progressing Towards Goal 
Goal: *Anxiety reduced or absent Outcome: Progressing Towards Goal 
Goal: *Demonstrates progressive activity Outcome: Progressing Towards Goal 
  
Problem: Heart Failure: Day 3 Goal: Off Pathway (Use only if patient is Off Pathway) Outcome: Progressing Towards Goal 
Goal: Activity/Safety Outcome: Progressing Towards Goal 
Goal: Diagnostic Test/Procedures Outcome: Progressing Towards Goal 
Goal: Nutrition/Diet Outcome: Progressing Towards Goal 
 Goal: Discharge Planning Outcome: Progressing Towards Goal 
Goal: Medications Outcome: Progressing Towards Goal 
Goal: Respiratory Outcome: Progressing Towards Goal 
Goal: Treatments/Interventions/Procedures Outcome: Progressing Towards Goal 
Goal: Psychosocial 
Outcome: Progressing Towards Goal 
Goal: *Oxygen saturation within defined limits Outcome: Progressing Towards Goal 
Goal: *Hemodynamically stable Outcome: Progressing Towards Goal 
Goal: *Optimal pain control at patient's stated goal 
Outcome: Progressing Towards Goal 
Goal: *Anxiety reduced or absent Outcome: Progressing Towards Goal 
Goal: *Demonstrates progressive activity Outcome: Progressing Towards Goal 
  
Problem: Heart Failure: Day 4 Goal: Off Pathway (Use only if patient is Off Pathway) Outcome: Progressing Towards Goal 
Goal: Activity/Safety Outcome: Progressing Towards Goal 
Goal: Diagnostic Test/Procedures Outcome: Progressing Towards Goal 
Goal: Nutrition/Diet Outcome: Progressing Towards Goal 
Goal: Discharge Planning Outcome: Progressing Towards Goal 
Goal: Medications Outcome: Progressing Towards Goal 
Goal: Respiratory Outcome: Progressing Towards Goal 
Goal: Treatments/Interventions/Procedures Outcome: Progressing Towards Goal 
Goal: Psychosocial 
Outcome: Progressing Towards Goal 
Goal: *Oxygen saturation within defined limits Outcome: Progressing Towards Goal 
Goal: *Hemodynamically stable Outcome: Progressing Towards Goal 
Goal: *Optimal pain control at patient's stated goal 
Outcome: Progressing Towards Goal 
Goal: *Anxiety reduced or absent Outcome: Progressing Towards Goal 
Goal: *Demonstrates progressive activity Outcome: Progressing Towards Goal 
  
Problem: Heart Failure: Day 5 Goal: Off Pathway (Use only if patient is Off Pathway) Outcome: Progressing Towards Goal 
Goal: Activity/Safety Outcome: Progressing Towards Goal 
Goal: Diagnostic Test/Procedures Outcome: Progressing Towards Goal 
 Goal: Nutrition/Diet Outcome: Progressing Towards Goal 
Goal: Discharge Planning Outcome: Progressing Towards Goal 
Goal: Medications Outcome: Progressing Towards Goal 
Goal: Respiratory Outcome: Progressing Towards Goal 
Goal: Treatments/Interventions/Procedures Outcome: Progressing Towards Goal 
Goal: Psychosocial 
Outcome: Progressing Towards Goal 
  
Problem: Heart Failure: Discharge Outcomes Goal: *Demonstrates ability to perform prescribed activity without shortness of breath or discomfort Outcome: Progressing Towards Goal 
Goal: *Left ventricular function assessment completed prior to or during stay, or planned for post-discharge Outcome: Progressing Towards Goal 
Goal: *ACEI prescribed if LVEF less than 40% and no contraindications or ARB prescribed Outcome: Progressing Towards Goal 
Goal: *Verbalizes understanding and describes prescribed diet Outcome: Progressing Towards Goal 
Goal: *Verbalizes understanding/describes prescribed medications Outcome: Progressing Towards Goal 
Goal: *Describes available resources and support systems Description 
(eg: Home Health, Palliative Care, Advanced Medical Directive) Outcome: Progressing Towards Goal 
Goal: *Describes smoking cessation resources Outcome: Progressing Towards Goal 
Goal: *Understands and describes signs and symptoms to report to providers(Stroke Metric) Outcome: Progressing Towards Goal 
Goal: *Describes/verbalizes understanding of follow-up/return appt Description 
(eg: to physicians, diabetes treatment coordinator, and other resources Outcome: Progressing Towards Goal 
Goal: *Describes importance of continuing daily weights and changes to report to physician Outcome: Progressing Towards Goal

## 2020-02-24 NOTE — PROGRESS NOTES
Bedside shift change report GIVEN TO MC Mckeon. Report included the following information SBAR. SIGNIFICANT CHANGES DURING SHIFT:  NA 
 
 
CONCERNS TO ADDRESS WITH MD:  WALDEMAR 
 
 
 
 
[id number] NURSING NOTE Admission Date 2/19/2020 Admission Diagnosis NSTEMI (non-ST elevated myocardial infarction) ([facility] [address] [I21.4] Consults IP CONSULT TO CARDIOLOGY Cardiac Monitoring [x] Yes [] No  
  
Purposeful Hourly Rounding [x] Yes   
Shane Score Total Score: 4 Shane score 3 or > [x] Bed Alarm [] Avasys [] 1:1 sitter [] Patient refused (Signed refusal form in chart) Michael Score Michael Score: 16 Michael score 14 or < [] PMT consult [] Wound Care consult  
 []  Specialty bed  [] Nutrition consult Influenza Vaccine Received Flu Vaccine for Current Season (usually Sept-March): Yes Oxygen needs? [x] Room air Oxygen @  []1L    []2L    []3L   []4L    []5L   []6L via NC Chronic home O2 use? [] Yes [x] No 
Perform O2 challenge test and document in progress note using smartphrase (.Homeoxygen) Last bowel movement Last Bowel Movement Date: 02/21/20 Urinary Catheter External Female Catheter 02/22/20-Urine Output (mL): 350 ml LDAs Peripheral IV 02/20/20 Left Antecubital (Active) Site Assessment Clean, dry, & intact 2/23/2020  8:56 PM  
Phlebitis Assessment 0 2/23/2020  8:56 PM  
Infiltration Assessment 0 2/23/2020  8:56 PM  
Dressing Status Clean, dry, & intact 2/23/2020  8:56 PM  
Dressing Type Transparent 2/23/2020  8:56 PM  
Hub Color/Line Status Blue 2/23/2020  8:56 PM  
Action Taken Blood drawn 2/20/2020  3:57 AM  
      
External Female Catheter 02/22/20 (Active) Site Assessment Clean, dry, & intact 2/23/2020  7:52 PM  
Repositioned Yes 2/23/2020  7:52 PM  
Perineal Care Yes 2/23/2020  7:52 PM  
Wick Changed Yes 2/23/2020  7:52 PM  
Suction Canister/Tubing Changed No 2/23/2020  7:52 PM  
Urine Output (mL) 350 ml 2/24/2020  1:24 AM  
            
  
 Readmission Risk Assessment Tool Score Medium Risk 12 Total Score 3 Has Seen PCP in Last 6 Months (Yes=3, No=0)  
 4 IP Visits Last 12 Months (1-3=4, 4=9, >4=11) 9 Pt. Coverage (Medicare=5 , Medicaid, or Self-Pay=4) Criteria that do not apply:  
 . Living with Significant Other. Assisted Living. LTAC. SNF. or  
Rehab Patient Length of Stay (>5 days = 3) Charlson Comorbidity Score (Age + Comorbid Conditions) Expected Length of Stay 4d 4h Actual Length of Stay 5

## 2020-02-24 NOTE — PROGRESS NOTES
Transitional Care Team: [facility] Discharge Note Date of Assessment: 02/24/20 Time of Assessment:  1:22 PM 
 
 
Nadia Barakat is a 80 y.o. female inpatient at [facility] with heart failure on  2/19. Assessment & Plan Pt A+O. Sitting in bedside chair - denies chest pain, dyspnea. Being discharged to home today, has accepted [facility]. Aware of follow up appointments. Current Code Status:  full Medication Reconciliation:  Await AVS 
 
Can patient afford medications:  yes Who manages medications at home family Emergency Contact toy Swanson ( [phone]) Chart reviewed by me and HUG (Healthy Understanding of Goals) program introduced to patient/family. The Transitional Care Team bridges the gaps in care and education surrounding discharge from the acute care facility. The objective is to empower the patient and family in taking a proactive role in the task of preventing readmission within the first thirty days after discharge from the acute care setting, The team is also involved in the efforts to reduce readmission to the acute care setting after stabilization and discharge from the acute care environment either to skilled nursing facilities or community. Discharge With The Following Arrangements in place: 
 
Future Appointments Date Time Provider Thee Cabrera 3/11/2020 10:30 AM Shila Cantu MD [facility] SCHED  
7/28/2020 11:15 AM Kaela To MD [facility] [facility] follow up appointment(s):monday 3/2 [facility] call information given to: lives outside service area Patient / Family was instructed on specific signs/symptoms to look for with regards to worsening of their medical conditions. Learning was assessed using teach back. The patient / family have added upcoming appointment dates to their [facility] Calendar prior to discharge. Patient education focused on readmission zones as described as: The Red Zone: High risk for readmission, days 1-21 The Yellow Zone: Moderate  risk for readmission, days 22-29 The Green Zone: Lower risk for readmission, days 30 and after The [facility] Team will follow patient from a distance while inpatient as well as be available for further transition disposition as needed. The HA TEAM will continue to offer support during the 30- 90 day discharge from acute care setting. Notified Ambulatory {Blank Single Select Template:20061[de-identified] ,HA RN. Signed By: Mike Schulz RN February 24, 2020

## 2020-02-24 NOTE — PROGRESS NOTES
HA PLAN Plan is home today with [facility]. [id number] [facility] office can accept. FOC on bedside chart for [facility] and [facility] [address] per DTR TPC. Son, Mitali Huddleston is coming to transport Pt home in car around 11-12 today Second IM given to Pt. Signed copy on bedside chart. Information reviewed with DTR over the phone as well. CM is working on follow up appt for HUG and CHF pt.  
 
10:34 AM 
CM called [facility] and talked to John Sky and she confirmed they could accept for [facility] services. No further CM needs. 10 AM 
CM talked to Pt and plan to DC today. DC order is written. Medicare pt has received, reviewed, and signed 2nd IM letter informing them of their right to appeal the discharge. Signed copy has been placed on pt bedside chart. CM called Doris Rollins: [phone] her DTR and she indicated that her brother, Mitali Huddleston will be transporting Pt home in car. CM offered FOC to DTR since Pt defered to her for those decisions. Doris Rollins indicated that she has used [facility] in the past and they were happy with them. FOC completed for [facility] office or [facility] [address] if they are unable to staff. FOC on bedside chart. Referral sent to [facility] office via CC to see if they can accept. CM working on appt and will place on AVS.   
PCP appt was scheduled within two days. Cardiology appt was scheduled CM waiting to hear back from [address] [place] [address] to see if they can accept. Care Management Interventions PCP Verified by CM: Yes 
Palliative Care Criteria Met (RRAT>21 & CHF Dx)?: No 
Mode of Transport at Discharge: Other (see comment) Transition of Care Consult (CM Consult): Discharge Planning, Home Health [facility] - INPATIENT: Yes MyChart Signup: No 
Discharge Durable Medical Equipment: No 
Physical Therapy Consult: No 
Occupational Therapy Consult: No 
Speech Therapy Consult: No 
Current Support Network: Lives with Spouse Confirm Follow Up Transport: Family The Plan for Transition of Care is Related to the Following Treatment Goals : [address] The Patient and/or Patient Representative was Provided with a Choice of Provider and Agrees with the Discharge Plan?: Yes Name of the Patient Representative Who was Provided with a Choice of Provider and Agrees with the Discharge Plan: Pt Joaquina VILLAGOMEZ Freedom of Choice List was Provided with Basic Dialogue that Supports the Patient's Individualized Plan of Care/Goals, Treatment Preferences and Shares the Quality Data Associated with the Providers?: Yes Discharge Location Discharge Placement: Home with home health Thiago Murphy CM Ext [phone]

## 2020-02-24 NOTE — DISCHARGE INSTRUCTIONS
Patient Discharge Instructions    Carissa Gomez / [id number] : 1918    Admitted 2020 Discharged: 2020         DISCHARGE DIAGNOSIS:   Active Problems:    CKD (chronic kidney disease) stage 3, GFR 30-59 ml/min ([facility]) (2018)      Chronic a-fib (8457)      Diastolic CHF, acute on chronic ([facility]) (2018)      Elevated troponin (2020)           What to do at Home    1. Recommended diet: {diet:40860}    2. Recommended activity: {discharge activity:30008}    3. If you experience any of the following symptoms then please call your primary care physician or return to the emergency room if you cannot get hold of your doctor:   Fevers > 100.5, chills   Nausea or vomiting, persistent diarrhea > 24 hours   Blood in stool or black stools   Chest pain or SOB      Follow-up Information     Follow up With Specialties Details Why Contact Info    Tito Snider MD Family Practice Schedule an appointment as soon as possible for a visit  [facility] [place]  [phone]      Amarilys Rose MD Cardiology, [address] [facility] Vascular Surgery, Internal Medicine Schedule an appointment as soon as possible for a visit in 2 weeks  [address]      Tito Snider MD Family Practice Schedule an appointment as soon as possible for a visit in 1 week  [address] [facility]  [phone]          CHF specific discharge instructions    Weight:  · Daily weights, Notify your Doctor if Wt gain of 3 lb in a day or 5 lb in a week       Diet :  · Low salt cardiac diet   · Limit Sodium to 2500 mg per day           Information obtained by :  I understand that if any problems occur once I am at home I am to contact my physician. I understand and acknowledge receipt of the instructions indicated above. Physician's or R.N.'s Signature                                                                  Date/Time                                                                                                                                              Patient or Representative Signature                                                          Date/Time

## 2020-02-24 NOTE — PROGRESS NOTES
Pharmacist Discharge Medication Reconciliation Significant PMH:  
Past Medical History:  
Diagnosis Date A-fib ([facility] [address] [facility] right breast CA, lung, colon Chronic kidney disease Gastrointestinal disorder GERD Hypertension Ileitis, terminal ([facility] [address] Other ill-defined conditions(799.89)   
 gout Squamous cell lung cancer ([facility] Chief Complaint for this Admission: Chief Complaint Patient presents with Shortness of Breath SOB and BLE swelling up to thighs. patient reports SOB even at rest  
 Leg Swelling Allergies: Penicillins Discharge Medications:  
Current Discharge Medication List  
  
 
START taking these medications Details  
isosorbide mononitrate ER (IMDUR) 30 mg tablet Take 1 Tab by mouth daily. Qty: 30 Tab, Refills: 3 CONTINUE these medications which have CHANGED Details  
furosemide (LASIX) 40 mg tablet Take 2 Tabs by mouth two (2) times a day. Qty: 360 Tab, Refills: 3 CONTINUE these medications which have NOT CHANGED Details  
docusate sodium (COLACE) 100 mg capsule Take 100 mg by mouth daily as needed for Constipation. hydrALAZINE (APRESOLINE) 10 mg tablet Take 10 mg by mouth daily. Refills: 0  
  
nystatin (MYCOSTATIN) topical cream Apply 1 g to affected area two (2) times daily as needed for Skin Irritation (buttocks). Use either cream or powder. acetaminophen (TYLENOL 8 HOUR) 650 mg TbER Take 1,300 mg by mouth every eight (8) hours as needed for Pain. nystatin (MYCOSTATIN) powder Apply 1 Each to affected area two (2) times daily as needed for Other (buttock irritation). use either powder or cream  
  
metOLazone (ZAROXOLYN) 2.5 mg tablet Take 1 Tab by mouth every Monday, Wednesday, Friday. Indications: take per cardiology Qty: 90 Tab, Refills: 1  
  
albuterol (PROVENTIL HFA) 90 mcg/actuation inhaler Take 2 Puffs by inhalation every four (4) hours as needed for Wheezing or Shortness of Breath. albuterol-ipratropium (DUO-NEB) 2.5 mg-0.5 mg/3 ml nebu Take 3 mL by inhalation every four (4) hours as needed (shortness of breath). apixaban (ELIQUIS) 2.5 mg tablet Take 1 Tab by mouth two (2) times a day. Qty: 60 Tab, Refills: 11 mv-mn/folic acid/calcium/vit K (ONE-A-DAY WOMEN'S 50 PLUS PO) Take 1 Tab by mouth daily. dextran 70/hypromellose/PF (NATURAL TEARS, PF, OP) Administer 1 Drop to right eye daily as needed (dry eye). mometasone (NASONEX) 50 mcg/actuation nasal spray 2 Sprays by Both Nostrils route daily as needed (congestion). potassium chloride (KLOR-CON) 20 mEq packet Take 20 mEq by mouth daily. colchicine (COLCRYS) 0.6 mg tablet Take 0.6 mg by mouth daily as needed (gout flare up). STOP taking these medications  
  
 traMADol (ULTRAM) 50 mg tablet Comments:  
Reason for Stopping:   
   
 senna-docusate (SENNA WITH DOCUSATE SODIUM) 8.6-50 mg per tablet Comments:  
Reason for Stopping:   
   
  
 
 
The patient's chart, MAR and AVS were reviewed by Blane Joy, PAUL. Discharging Provider: Dr. Kary Rodney Thank You, Blane Joy, ABBYD

## 2020-02-24 NOTE — DISCHARGE SUMMARY
Hospitalist Discharge Note NAME: Domitila Bullock :  1918 MRN:  [id number] Admit date: 2020 Discharge date: 20 PCP: Puja Frias MD 
 
Discharge Diagnoses: 
 
Acute on chronic diastolic CHF POA LVEF 35-31% Severe pulmonary HTN POA PA pressure 84 Permanent atrial fib POA Hypokalemia Chronic kidney disease POA stage 4 History of lung cancer left POA Chronic volume loss left lung POA present going back to at least 2018 Overweight POA Body mass index is 26.12 kg/m². Code Status: signed durable DNR on record Discharge Medications: 
Current Discharge Medication List  
  
START taking these medications Details  
isosorbide mononitrate ER (IMDUR) 30 mg tablet Take 1 Tab by mouth daily. Qty: 30 Tab, Refills: 3 CONTINUE these medications which have CHANGED Details  
furosemide (LASIX) 40 mg tablet Take 2 Tabs by mouth two (2) times a day. Qty: 360 Tab, Refills: 3 CONTINUE these medications which have NOT CHANGED Details  
docusate sodium (COLACE) 100 mg capsule Take 100 mg by mouth daily as needed for Constipation. hydrALAZINE (APRESOLINE) 10 mg tablet Take 10 mg by mouth daily. Refills: 0  
  
nystatin (MYCOSTATIN) topical cream Apply 1 g to affected area two (2) times daily as needed for Skin Irritation (buttocks). Use either cream or powder. acetaminophen (TYLENOL 8 HOUR) 650 mg TbER Take 1,300 mg by mouth every eight (8) hours as needed for Pain. nystatin (MYCOSTATIN) powder Apply 1 Each to affected area two (2) times daily as needed for Other (buttock irritation). use either powder or cream  
  
metOLazone (ZAROXOLYN) 2.5 mg tablet Take 1 Tab by mouth every Monday, Wednesday, Friday. Indications: take per cardiology Qty: 90 Tab, Refills: 1  
  
albuterol (PROVENTIL HFA) 90 mcg/actuation inhaler Take 2 Puffs by inhalation every four (4) hours as needed for Wheezing or Shortness of Breath. albuterol-ipratropium (DUO-NEB) 2.5 mg-0.5 mg/3 ml nebu Take 3 mL by inhalation every four (4) hours as needed (shortness of breath). apixaban (ELIQUIS) 2.5 mg tablet Take 1 Tab by mouth two (2) times a day. Qty: 60 Tab, Refills: 11 mv-mn/folic acid/calcium/vit K (ONE-A-DAY WOMEN'S 50 PLUS PO) Take 1 Tab by mouth daily. dextran 70/hypromellose/PF (NATURAL TEARS, PF, OP) Administer 1 Drop to right eye daily as needed (dry eye). mometasone (NASONEX) 50 mcg/actuation nasal spray 2 Sprays by Both Nostrils route daily as needed (congestion). potassium chloride (KLOR-CON) 20 mEq packet Take 20 mEq by mouth daily. colchicine (COLCRYS) 0.6 mg tablet Take 0.6 mg by mouth daily as needed (gout flare up). STOP taking these medications  
  
 traMADol (ULTRAM) 50 mg tablet Comments:  
Reason for Stopping:   
   
 senna-docusate (SENNA WITH DOCUSATE SODIUM) 8.6-50 mg per tablet Comments:  
Reason for Stopping: Follow-up Information Follow up With Specialties Details Why Contact Info Guillaume Lomas MD Family Practice Schedule an appointment as soon as possible for a visit  [id number] [facility] [id number] [phone] Sarah Burgos MD Cardiology, [address] [place] [address] Vascular Surgery, Internal Medicine Schedule an appointment as soon as possible for a visit in 2 weeks  [address] 
[phone] Guillaume Lomas MD Family Practice Schedule an appointment as soon as possible for a visit on 3/2/2020  [address] [facility] [address] [phone] Time spent on discharge:   I spent greater than 30 minutes on discharge, seeing and examining the patient, reconciling home meds and new meds, coordinating care with case management, doing the discharge papers and the D/C summary Discharge disposition: home with home health Discharge Condition: Stable Summary of admission H+P(copied from Dr Saira Quiroz Note): CHIEF COMPLAINT: SOB  
  
HISTORY OF PRESENT ILLNESS:    
8 years old female from home with past medical history significant for diastolic CHF, hypertension, atrial fibrillation, chronic kidney disease, GERD presented to the hospital for evaluation of generalized weakness associated with worsening bilateral leg swelling associated with shortness of breath associated with chest pressure sensation denies any cough denies any fever any chills, blood work in ED was significant for mild elevated troponin  0.08. 
  
We were asked to admit for work up and evaluation of the above problems.  
  
    
Past Medical History:  
Diagnosis Date  [facility]    
 Adenocarcinoma ([facility]    
 Cancer ([facility]    
  right breast CA, lung, colon  Chronic kidney disease    
 Gastrointestinal disorder    
  GERD  Hypertension    
 Ileitis, terminal ([facility]    
 Other ill-defined conditions(799.89)    
  gout  Squamous cell lung cancer ([facility]    
  
pCXR read by radiology FINDINGS: 
There is chronic volume loss in the left hemithorax. There is no acute 
infiltrate. There is no pulmonary edema. Heart size is obscured. IMPRESSION: No acute finding or significant change. Echo TTE read by radiology results: 
Left Ventricle Normal cavity size, wall thickness and systolic function (ejection fraction normal). The estimated ejection fraction is 55 - 60%. Visually measured ejection fraction. Atrial fibrillation observed. Left Atrium Moderately dilated left atrium. Right Ventricle Normal cavity size and global systolic function. Right Atrium Severely dilated right atrium. Interatrial Septum Interatrial septal bowing left to right. No interatrial septal aneurysm present. Duyen Gearing Aortic Valve Normal valve structure, trileaflet valve structure and no stenosis. Mild aortic valve regurgitation. Mitral Valve No stenosis. Mitral valve thickening.  There is anterior leaflet thickening. Moderate regurgitation. Tricuspid Valve Normal valve structure and no stenosis. Severe regurgitation. Pulmonary arterial systolic pressure is 39.0 mmHg. Severe pulmonary hypertension. Pulmonic Valve Pulmonic valve not well visualized. Normal valve structure and no stenosis. Trace regurgitation. IVC/Hepatic Veins Inferior vena cava not well visualized. Pericardium Normal pericardium and no evidence of pericardial effusion. There is a large left pleural effusion. Hospital course:  
 
Acute on chronic diastolic CHF POA LVEF 07-87% Severe pulmonary HTN POA PA pressure 84 Permanent atrial fib POA Hypokalemia Increasing weakness, WOOTEN, increasing LE edema IV lasix, increase dose, metolazone 3x per week Weight down 10 lbs, less edema Renal function stable with the diuresis ASA TTE LVEF 55 to 60%, normal RV function, PA pressure 84, moderate MR Hydralazine, added imdur Cardiology following Continue Eliquis PO KCL Increase home lasix to 80 mg Po BID, D/C to home Chronic kidney disease POA stage 4 Renal fuction close to baseline Stable with diuresis History of lung cancer left POA Chronic volume loss left lung POA present going back to at least 2018 No indication for intervention Given age and chronicity, prefer to avoid thoracentesis, d/w family Overweight POA Body mass index is 26.12 kg/m². Surrogate Decision Maker: 
  
DVT Prophylaxis: Eliquis GI Prophylaxis: not indicated 
  
Body mass index is 26.12 kg/m². Subjective: Chief Complaint / Reason for Physician Visit f/u CHF \"no problems\". Discussed with RN events overnight. Improving edema, generalized weakness, weight down 10 lbs No Cp or SOB at rest 
 
Review of Systems: 
Symptom Y/N Comments  Symptom Y/N Comments Fever/Chills n   Chest Pain n   
Poor Appetite    Edema less Cough n   Abdominal Pain n   
Sputum    Joint Pain SOB/WOOTEN n   Headache Nausea/vomit n   Tolerating PT/OT Diarrhea    Tolerating Diet y Constipation    Other Could NOT obtain due to:   
 
Objective: VITALS:  
Last 24hrs VS reviewed since prior progress note. Most recent are: 
Patient Vitals for the past 24 hrs: 
 Temp Pulse Resp BP SpO2  
02/24/20 0802 98.4 °F (36.9 °C) 78 17 117/60 98 % 02/24/20 0405 97.6 °F (36.4 °C) 84 18 112/60 95 % 02/23/20 2325 97.6 °F (36.4 °C) 73 18 116/76 95 % 02/23/20 1952 97.9 °F (36.6 °C) 76 18 97/74 98 % 02/23/20 1712    104/63   
02/23/20 1452 98 °F (36.7 °C) 82 16 104/56 97 % 02/23/20 1103 97.1 °F (36.2 °C) 73 16 113/63 99 % Intake/Output Summary (Last 24 hours) at 2/24/2020 1006 Last data filed at 2/24/2020 0264 Gross per 24 hour Intake 320 ml Output 1600 ml Net -1280 ml Wt Readings from Last 12 Encounters:  
02/24/20 64.8 kg (142 lb 12.8 oz) 01/29/20 70.5 kg (155 lb 8 oz)  
11/15/19 68.5 kg (151 lb)  
11/29/19 68.5 kg (151 lb)  
11/23/19 68 kg (150 lb)  
11/22/19 68.5 kg (151 lb)  
11/18/19 68 kg (150 lb)  
11/15/19 69.4 kg (153 lb)  
11/11/19 68.9 kg (152 lb) 11/08/19 69.4 kg (153 lb) 11/05/19 67.6 kg (149 lb) 10/29/19 68 kg (150 lb) PHYSICAL EXAM: 
General: WD, WN. Alert, cooperative, no acute distress Neck:  No meningismus, no thyromegaly Resp:  Decreased left lung fields, no wheezing or rales. No accessory muscle use CV:  Regular  rhythm, 1+  Edema, definitely improved GI:  Soft, Non distended, Non tender. +Bowel sounds, no rebound Neurologic:  Alert, normal speech, non focal motor exam 
Psych:   Not anxious nor agitated Skin:  No rashes. No jaundice Reviewed most current lab test results and cultures  YES Reviewed most current radiology test results   YES Review and summation of old records today    NO Reviewed patient's current orders and MAR    YES 
PMH/SH reviewed - no change compared to H&P 
 ________________________________________________________________________ Care Plan discussed with: 
  Comments Patient x Family RN x Care Manager Consultant Multidiciplinary team rounds were held today with , nursing, pharmacist and clinical coordinator. Patient's plan of care was discussed; medications were reviewed and discharge planning was addressed. ________________________________________________________________________ Comments >50% of visit spent in counseling and coordination of care    
________________________________________________________________________ Burnice Carrel, MD  
 
Procedures: see electronic medical records for all procedures/Xrays and details which were not copied into this note but were reviewed prior to creation of Plan. LABS: 
I reviewed today's most current labs and imaging studies. Pertinent labs include: 
Recent Labs  
  02/23/20 
0207 02/22/20 
0126 WBC 4.3 4.4 HGB 13.7 14.1 HCT 43.0 43.4  219 Recent Labs  
  02/24/20 
0048 02/23/20 
0207 02/22/20 
0126  141 142  
K 3.5 3.9 3.5 CL 97 100 102 CO2 38* 37* 35* GLU 92 100 111* BUN 27* 23* 23* CREA 1.45* 1.56* 1.64* CA 9.8 10.0 9.6 MG  --   --  2.6*

## 2020-02-25 NOTE — PROGRESS NOTES
Hospital Discharge Follow-Up Date/Time:  2020 4:32 PM 
Rhiannon Farmer, MSN, RN, [facility] Care Transitions Nurse [phone] Patient was admitted to [facility] on 2020 and discharged on 2020 for Elevated Troponin. The physician discharge summary was not available at the time of outreach. Patient was contacted within 1 business days of discharge. Top Challenges reviewed with the provider CHF- EF 55-60% - PCP f/u  
- Dr. Shamika Langley 3/11 
- hypokalemia - K+ 3.5 at d/c - f/u BMP?  
- monitor renal function - lasix increased to BID 
- BUN 27 and Cr. 1.45 at d/c  
- patient is keeping daily weight log Advance Care Planning:  
Does patient have an Advance Directive:  reviewed and current Method of communication with provider :chart routing, phone Was this a readmission? no  
Patient stated reason for the readmission: n/a Care Transition Nurse (CTN) contacted the patient by telephone to perform post hospital discharge assessment. Verified name and  with patient as identifiers. Provided introduction to self, and explanation of the CTN role. Patient received hospital discharge instructions. CTN reviewed discharge instructions and red flags with patient who verbalized understanding. Patient given an opportunity to ask questions and does not have any further questions or concerns at this time. The patient agrees to contact the PCP office for questions related to their healthcare. CTN provided contact information for future reference. Disease Specific:   CHF Heart Failure Note Do you have a Scale:    yes How often do you weigh:  daily Daily Weight (document daily weights in flowsheets): weight reported as 139 lbs today Provider Notified:   No  
 
Zone:(Pt Reported)  green EF: 55-60% Type of HF:   HFpEF Patients top risk factors for readmission:  lack of knowledge about disease, multi health system providers Home Health orders at discharge: PT,  Home Health company: [facility] Date of initial visit: 2/25/2020 Durable Medical Equipment ordered at discharge: none Pt has the following DME: hospital bed, rollator, wc, cane and shower chair. Medication(s):  
New Medications at Discharge: isosorbide mononitrate ER 30 mg tablet (IMDUR) Changed Medications at Discharge: furosemide 40 mg tablet (LASIX) Discontinued Medications at Discharge: Senna with Docusate Sodium 8.6-50 mg per tablet 
traMADol 50 mg tablet (ULTRAM) Medication reconciliation was performed with patient, who verbalizes understanding of administration of home medications. There were no barriers to obtaining medications identified at this time. Referral to Pharm D needed: no  
 
Current Outpatient Medications Medication Sig  
 isosorbide mononitrate ER (IMDUR) 30 mg tablet Take 1 Tab by mouth daily.  furosemide (LASIX) 40 mg tablet Take 2 Tabs by mouth two (2) times a day.  docusate sodium (COLACE) 100 mg capsule Take 100 mg by mouth daily as needed for Constipation.  hydrALAZINE (APRESOLINE) 10 mg tablet Take 10 mg by mouth daily.  nystatin (MYCOSTATIN) topical cream Apply 1 g to affected area two (2) times daily as needed for Skin Irritation (buttocks). Use either cream or powder.  acetaminophen (TYLENOL 8 HOUR) 650 mg TbER Take 1,300 mg by mouth every eight (8) hours as needed for Pain.  nystatin (MYCOSTATIN) powder Apply 1 Each to affected area two (2) times daily as needed for Other (buttock irritation). use either powder or cream  
 metOLazone (ZAROXOLYN) 2.5 mg tablet Take 1 Tab by mouth every Monday, Wednesday, Friday. Indications: take per cardiology  albuterol (PROVENTIL HFA) 90 mcg/actuation inhaler Take 2 Puffs by inhalation every four (4) hours as needed for Wheezing or Shortness of Breath.   
 albuterol-ipratropium (DUO-NEB) 2.5 mg-0.5 mg/3 ml nebu Take 3 mL by inhalation every four (4) hours as needed (shortness of breath).  apixaban (ELIQUIS) 2.5 mg tablet Take 1 Tab by mouth two (2) times a day.  mv-mn/folic acid/calcium/vit K (ONE-A-DAY WOMEN'S 50 PLUS PO) Take 1 Tab by mouth daily.  dextran 70/hypromellose/PF (NATURAL TEARS, PF, OP) Administer 1 Drop to right eye daily as needed (dry eye).  mometasone (NASONEX) 50 mcg/actuation nasal spray 2 Sprays by Both Nostrils route daily as needed (congestion).  potassium chloride (KLOR-CON) 20 mEq packet Take 20 mEq by mouth daily.  colchicine (COLCRYS) 0.6 mg tablet Take 0.6 mg by mouth daily as needed (gout flare up). No current facility-administered medications for this visit. There are no discontinued medications. [facility] follow up appointment(s):  
Future Appointments Date Time Provider Thee Cabrera 2/26/2020 To Be Determined Rosie Cox [address]  
3/11/2020 10:30 AM Zaid Cantu MD [facility] FREIDA SCHED  
7/28/2020 11:15 AM Zaid Cantu MD [address] [facility] follow up appointment(s): Dr. Jerzy Guadalupe 2/26/2020 [facility]:  offered and patient declined Goals  Patient verbalizes understanding of self-management goals of living with Congestive Heart Failure 2/26/2020 - Spoke to patient and her family today. - Patient open to [facility]  
- Confirmed medications - Reviewed low sodium diet of less then 2500mg in a day - Patient is to weight daily, keep log and notify MD/CTN of gain greater then 3 lbs in a day or 5 lbs in a week. - weight reported as 139 lbs today. - reviewed green, yellow and red zones with patient. Patient will contact MD/CTN if in yellow or red zone. - patient lives with daughter who is her primary care giver - patient also has paid caregivers 9-4.  
- Dr. Chadwick Walker f/u 3/11 
- patient attended PCP f/u on 2/26/2020 - Reviewed red flag s/s with patient, nausea, vomiting, inability to pass urine/stool, SOB, mental status change, chest pain, fever, swelling, weight gain. Patient will contact Md/CTN if red flag s/s arise. CTN to f/u ~ 7-10 days.  AR

## 2020-02-28 NOTE — FACE TO FACE
Home Health Care Discharge Planning: [facility] Face to Face Encounter NAME: Manuel Frank :  1918 MRN:  [id number] Primary Diagnosis: 
 
Diastolic CHF, acute on chronic ([facility] [address] (2018) Elevated troponin (2020) Date of Face to Face:  2020 08:09 AM        
                        
Face to Face Encounter findings are related to primary reason for home care:   YES 
 
1. I certify that the patient needs intermittent skilled nursing care, physical therapy and/or speech therapy. I will not be following this patient in the Community and Dr. Saritha Kulkarni MD will be responsible for signing the [address] of Care. 2. Initial Orders for Care: Skilled Nursing and Physical Therapy 3. I certify that this patient is homebound because of illness, need the aid of supportive devices such as walker and the assistance of another person in order to leave their place of residence. There exists a normal inability to leave home and leaving home requires a considerable and taxing effort. 4. I certify that this patient is under my care and that I had a Face-to-Face Encounter that meets the physician Face-to-Face Encounter requirements. Pt admitted to Hospital  From 2020 to 2020, I saw the patient on the day of discharge. Seen by Physical therapy and occupational therapy in house and recommended that home therapy be set up. Document the physical findings from the Face-to-Face Encounter that support the need for skilled services: Has new medications that requires skilled nursing teaching and monitoring for understanding and compliance  and Has new finding of weakness and altered mobility that requires skilled physical therapy services for evaluation and interventions. Shannan Winters MD 
Discharging Physician Office:  [phone] Fax:    [phone]

## 2020-03-10 NOTE — TELEPHONE ENCOUNTER
Care Giver would like to speak with you regarding the patient's general care, medication. The appointment scheduled for 3/11/2020 was cancelled due to the patient being weak and afraid of catching an illness. Please call.     Thanks

## 2020-03-11 NOTE — PROGRESS NOTES
Goals  Patient verbalizes understanding of self-management goals of living with Congestive Heart Failure 2/26/2020 - Spoke to patient and her family today. - Patient open to [facility]  
- Confirmed medications - Reviewed low sodium diet of less then 2500mg in a day - Patient is to weight daily, keep log and notify MD/CTN of gain greater then 3 lbs in a day or 5 lbs in a week. - weight reported as 139 lbs today. - reviewed green, yellow and red zones with patient. Patient will contact MD/CTN if in yellow or red zone. - patient lives with daughter who is her primary care giver - patient also has paid caregivers 9-4.  
- Dr. Grisel Delgado f/u 3/11 
- patient attended PCP f/u on 2/26/2020 
- Reviewed red flag s/s with patient, nausea, vomiting, inability to pass urine/stool, SOB, mental status change, chest pain, fever, swelling, weight gain. Patient will contact Md/CTN if red flag s/s arise. CTN to f/u ~ 7-10 days. AR   
 
03/11/20 Spoke to patient daughter, Beatriz Chris, verified on hipaa. She reports patient is doing well, patient has good days and bad days however Beatriz Chris states that was patients baseline prior to hospitalization. Weight reported as 139 lbs today. She reports it fluctuates between 138-140 lbs. Reminded yenifer to contact provider/CTN for gain greater then 3 lbs in a day or 5 lbs in a week. Patient canceled appointment with Dr. Grisel Delgado due to not wanting to be exposed to other sicknesses. Currently rescheduled for July however CTN encouraged family to make appointment sooner then that. Patients family expressed that they would like to get a shower chair. Patient is next scheduled with Ziggy Chase PTA on Friday. CTN routed chart to Ziggy Chase PTA asking if she could place the order on Friday and submit to patients PCP office as patients PCP is out of [facility] and CTN does not have access to their notes.  Family also advised to remind home health staff regarding shower chair order. Patients family will report any barriers/road blocks to CTN regarding shower chair. Reviewed red flag s/s that warrant f/u. Patient will contact Md/CTN if red flag s/s arise. CTN to f/u ~ 7-10 days. AR   
  
  
 
s

## 2020-03-11 NOTE — Clinical Note
Good Afternoon, I am the care transition nurse following Ms. Augustin Nava, I see you are scheduled to see her Friday, the family would like order for shower chair, are you able to place that order at your next visit on Friday and send to her PCP's office?  Thank you

## 2020-03-16 NOTE — PROGRESS NOTES
Goals  Patient verbalizes understanding of self-management goals of living with Congestive Heart Failure 2/26/2020 - Spoke to patient and her family today. - Patient open to [facility]  
- Confirmed medications - Reviewed low sodium diet of less then 2500mg in a day - Patient is to weight daily, keep log and notify MD/CTN of gain greater then 3 lbs in a day or 5 lbs in a week. - weight reported as 139 lbs today. - reviewed green, yellow and red zones with patient. Patient will contact MD/CTN if in yellow or red zone. - patient lives with daughter who is her primary care giver - patient also has paid caregivers 9-4.  
- Dr. Serina Cho f/u 3/11 
- patient attended PCP f/u on 2/26/2020 
- Reviewed red flag s/s with patient, nausea, vomiting, inability to pass urine/stool, SOB, mental status change, chest pain, fever, swelling, weight gain. Patient will contact Md/CTN if red flag s/s arise. CTN to f/u ~ 7-10 days. AR   
 
03/11/20 Spoke to patient daughter, Jason Rodriguez, verified on hipaa. She reports patient is doing well, patient has good days and bad days however Jason Rodriguez states that was patients baseline prior to hospitalization. Weight reported as 139 lbs today. She reports it fluctuates between 138-140 lbs. Reminded yenifer to contact provider/CTN for gain greater then 3 lbs in a day or 5 lbs in a week. Patient canceled appointment with Dr. Serina Cho due to not wanting to be exposed to other sicknesses. Currently rescheduled for July however CTN encouraged family to make appointment sooner then that. Patients family expressed that they would like to get a shower chair. Patient is next scheduled with Emily Garcia PTA on Friday. CTN routed chart to Emily Garcia PTA asking if she could place the order on Friday and submit to patients PCP office as patients PCP is out of [place] and CTN does not have access to their notes.  Family also advised to remind home health staff regarding shower chair order. Patients family will report any barriers/road blocks to CTN regarding shower chair. Reviewed red flag s/s that warrant f/u. Patient will contact Md/CTN if red flag s/s arise. CTN to f/u ~ 7-10 days. AR   
 
3/13/2020 
- Inbound message from WINSTON Austin PTA, stating that she needs order for bedside commode. - 1513: spoke to Jose L Toro at [facility] and left verbal message requesting order for bedside commode to be placed by PCP and faxed to [address]  [phone].  
- 8416: inbound call from Angelica Freitas at [facility] stating that she was faxing order to [facility]. Angelica Freitas stated that patient also wanted a lift chair and was unsure where to order it fom. At time of call CTN unaware of local retailers that supply lift chair. Rafi Chisholm CTN would connect with colleagues and contact her if retail location was found. 03/16/20  
- Outbound message to WINSTON Marquez PTA notifying her that order should of been faxed over on Friday and asked if she knew of local retailer where order for lift chair could be sent.  AR

## 2020-03-18 NOTE — TELEPHONE ENCOUNTER
Verified patient with two identifiers. Spoke with [employer] on HIPAA, pt will reschedule an appt with  at a later time when threat of virus is gone. Advised her to have  pt to continue taking meds, metolazone for swelling increase as prescribed at last office visit. She verbalized understanding.

## 2020-04-08 PROBLEM — N17.9 AKI (ACUTE KIDNEY INJURY) (HCC): Status: ACTIVE | Noted: 2020-01-01

## 2020-04-08 NOTE — PROGRESS NOTES
Nurse Clarification of the Prior to Admission Medication Regimen The patient was interviewed regarding clarification of the prior to admission medication regimen. Patient was unsure of medications; spoke with pharmacist with pharmacy listed as well as patient's son vis cellphone. The individual(s) listed above were questioned regarding the patient's use of any other inhalers, topical products, over the counter medications, herbal medications, vitamin products or ophthalmic/nasal/otic medication use. Information Obtained From: Son and Pharmacist 
 
Recommendations/Findings: The following amendments were made to the [id number]:  
 
1) Additions:  
? None 2) Removals:  
? None 3) Changes: 
? None PTA medication list was corrected to the following:  
 
Prior to Admission Medications Prescriptions Last Dose Informant Taking? OXYGEN-AIR DELIVERY SYSTEMS 4/8/2020 at Unknown time  Yes Sig: Take 2 L by inhalation as needed for Other (shortness of breath). acetaminophen (TYLENOL 8 HOUR) 650 mg TbER 4/8/2020 at Unknown time  Yes Sig: Take 1,300 mg by mouth every eight (8) hours as needed for Pain. albuterol (PROVENTIL HFA) 90 mcg/actuation inhaler 4/1/2020 at Unknown time  Yes Sig: Take 2 Puffs by inhalation every four (4) hours as needed for Wheezing or Shortness of Breath. albuterol-ipratropium (DUO-NEB) 2.5 mg-0.5 mg/3 ml nebu 4/1/2020 at Unknown time  Yes Sig: Take 3 mL by inhalation every four (4) hours as needed (shortness of breath). apixaban (ELIQUIS) 2.5 mg tablet 4/8/2020 at Unknown time  Yes Sig: Take 1 Tab by mouth two (2) times a day. colchicine (COLCRYS) 0.6 mg tablet 4/8/2020 at Unknown time  Yes Sig: Take 0.6 mg by mouth daily as needed (gout flare up). dextran 70/hypromellose/PF (NATURAL TEARS, PF, OP) 4/7/2020 at Unknown time  Yes Sig: Administer 1 Drop to right eye daily as needed (dry eye). docusate sodium (COLACE) 100 mg capsule Unknown at Unknown time  No  
Sig: Take 100 mg by mouth daily as needed for Constipation. furosemide (LASIX) 40 mg tablet 2020 at Unknown time Family Member Yes Sig: Take 2 Tabs by mouth two (2) times a day. furosemide (LASIX) 40 mg tablet Unknown at Unknown time  No  
Sig: Take 80 mg by mouth daily (with dinner). pt takes 80 mg in the morning and 40mg in the afternoon. hydrALAZINE (APRESOLINE) 10 mg tablet 2020 at Unknown time  Yes Sig: Take 10 mg by mouth daily. isosorbide mononitrate ER (IMDUR) 30 mg tablet 2020 at Unknown time  Yes Sig: Take 1 Tab by mouth daily. metOLazone (ZAROXOLYN) 2.5 mg tablet 2020 at Unknown time  Yes Sig: Take 1 Tab by mouth every Monday, Wednesday, Friday. Indications: take per cardiology  
mometasone (NASONEX) 50 mcg/actuation nasal spray 2020 at Unknown time  Yes Si Sprays by Both Nostrils route daily as needed (congestion). mv-mn/folic acid/calcium/vit K (ONE-A-DAY WOMEN'S 50 PLUS PO) 2020 at Unknown time  Yes Sig: Take 1 Tab by mouth daily. nystatin (MYCOSTATIN) powder 2020 at Unknown time  Yes Sig: Apply 1 Each to affected area two (2) times daily as needed for Other (buttock irritation). use either powder or cream  
nystatin (MYCOSTATIN) topical cream Unknown at Unknown time  No  
Sig: Apply 1 g to affected area two (2) times daily as needed for Skin Irritation (buttocks). Use either cream or powder. potassium chloride (KLOR-CON) 20 mEq packet 2020 at Unknown time  Yes Sig: Take 20 mEq by mouth daily. Facility-Administered Medications: None Thank you, Army Kris RN

## 2020-04-08 NOTE — ED PROVIDER NOTES
EMERGENCY DEPARTMENT HISTORY AND PHYSICAL EXAM 
 
 
Date: 4/8/2020 Patient Name: Stuart Nyhan History of Presenting Illness Chief Complaint Patient presents with  Leg Swelling Bilateral leg swelling for 1 week  Shortness of Breath SOB and cough for 1 week, denies any fever. son reports increased weakness since last hospitalization 3 months ago; son reports pt has been wearing oxygen at home this week History Provided By: Patient and granddaughter HPI: Stuart Nyhan, 80 y.o. female with history of atrial fibrillation coagulated on Eliquis, squamous cell lung cancer, hypertension, CKD, prior right breast cancer, lung, colon cancer CAD, diastolic CHF with preserved EF presents to the ED with cc of leg swelling and shortness of breath. Patient brought in by granddaughter who she lives with for worsening bilateral leg pitting edema over the past week. She has had gradually worsening shortness of breath and is getting weak to the point that she is unable to get up better ambulate on her own. Denies any fevers or sick contacts at home but does endorse nonproductive cough. Patient has had very poor p.o. intake. Patient denies being in any pain or discomfort. There are no other complaints, changes, or physical findings at this time. PCP: Danny Poon MD 
 
No current facility-administered medications on file prior to encounter. Current Outpatient Medications on File Prior to Encounter Medication Sig Dispense Refill  OXYGEN-AIR DELIVERY SYSTEMS Take 2 L by inhalation as needed for Other (shortness of breath).  isosorbide mononitrate ER (IMDUR) 30 mg tablet Take 1 Tab by mouth daily. 30 Tab 3  furosemide (LASIX) 40 mg tablet Take 2 Tabs by mouth two (2) times a day. 360 Tab 3  
 hydrALAZINE (APRESOLINE) 10 mg tablet Take 10 mg by mouth daily.   0  
 acetaminophen (TYLENOL 8 HOUR) 650 mg TbER Take 1,300 mg by mouth every eight (8) hours as needed for Pain.  nystatin (MYCOSTATIN) powder Apply 1 Each to affected area two (2) times daily as needed for Other (buttock irritation). use either powder or cream    
 metOLazone (ZAROXOLYN) 2.5 mg tablet Take 1 Tab by mouth every Monday, Wednesday, Friday. Indications: take per cardiology 90 Tab 1  
 albuterol (PROVENTIL HFA) 90 mcg/actuation inhaler Take 2 Puffs by inhalation every four (4) hours as needed for Wheezing or Shortness of Breath.  albuterol-ipratropium (DUO-NEB) 2.5 mg-0.5 mg/3 ml nebu Take 3 mL by inhalation every four (4) hours as needed (shortness of breath).  apixaban (ELIQUIS) 2.5 mg tablet Take 1 Tab by mouth two (2) times a day. 60 Tab 11  
 mv-mn/folic acid/calcium/vit K (ONE-A-DAY WOMEN'S 50 PLUS PO) Take 1 Tab by mouth daily.  dextran 70/hypromellose/PF (NATURAL TEARS, PF, OP) Administer 1 Drop to right eye daily as needed (dry eye).  mometasone (NASONEX) 50 mcg/actuation nasal spray 2 Sprays by Both Nostrils route daily as needed (congestion).  potassium chloride (KLOR-CON) 20 mEq packet Take 20 mEq by mouth daily.  colchicine (COLCRYS) 0.6 mg tablet Take 0.6 mg by mouth daily as needed (gout flare up).  furosemide (LASIX) 40 mg tablet Take 80 mg by mouth daily (with dinner). pt takes 80 mg in the morning and 40mg in the afternoon.  docusate sodium (COLACE) 100 mg capsule Take 100 mg by mouth daily as needed for Constipation.  nystatin (MYCOSTATIN) topical cream Apply 1 g to affected area two (2) times daily as needed for Skin Irritation (buttocks). Use either cream or powder. Past History Past Medical History: 
Past Medical History:  
Diagnosis Date  [facility]  Aden[facility] ([facility]  Cancer ([facility] right breast CA, lung, colon  Chronic kidney disease  Gastrointestinal disorder GERD  Hypertension  Ileitis, terminal ([facility]  Other ill-defined conditions(799.89)   
 gout  Squamous cell lung cancer ([facility] Past Surgical History: 
Past Surgical History:  
[facility] Right mastectomy  HX APPENDECTOMY  HX HEENT    
 right glass eye  MA COLONOSCOPY W/BIOPSY SINGLE/MULTIPLE  3/29/2013  MA COLSC FLX W/RMVL OF TUMOR POLYP LESION SNARE TQ  3/29/2013 Family History: 
Family History Problem Relation Age of Onset  Colon Cancer Other  Hypertension Other Social History: 
Social History Tobacco Use  Smoking status: Former Smoker Types: Cigarettes Last attempt to quit: 1967 Years since quittin.3  Smokeless tobacco: Never Used Substance Use Topics  Alcohol use: No  
 Drug use: No  
 
 
Allergies: Allergies Allergen Reactions  Penicillins Hives Review of Systems Review of Systems Constitutional: Positive for activity change, appetite change and fatigue. Negative for chills and fever. HENT: Negative. Eyes: Negative for visual disturbance. Respiratory: Positive for cough. Negative for shortness of breath. Cardiovascular: Positive for leg swelling. Negative for chest pain. Gastrointestinal: Negative for abdominal pain, diarrhea, nausea and vomiting. Musculoskeletal: Negative for back pain and gait problem. Skin: Negative for color change and rash. Neurological: Negative for weakness and headaches. All other systems reviewed and are negative. Physical Exam  
Physical Exam 
Vitals signs and nursing note reviewed. Constitutional:   
   General: She is not in acute distress. Appearance: Normal appearance. She is not ill-appearing or toxic-appearing. HENT:  
   Head: Normocephalic and atraumatic. Nose: Nose normal.  
   Mouth/Throat:  
   Mouth: Mucous membranes are moist.  
Neck: Musculoskeletal: Normal range of motion and neck supple. Cardiovascular: Rate and Rhythm: Normal rate. Rhythm irregular. Heart sounds: No murmur. Pulmonary:  
   Effort: Tachypnea present. Breath sounds: Normal breath sounds. No wheezing. Comments: Increased work of breathing Abdominal:  
   General: There is no distension. Palpations: Abdomen is soft. Tenderness: There is no abdominal tenderness. There is no guarding or rebound. Musculoskeletal: Normal range of motion. General: No swelling or tenderness. Right lower leg: Edema present. Left lower leg: Edema present. Comments: 3+ pitting edema bilateral lower extremities Skin: 
   General: Skin is warm and dry. Coloration: Skin is not pale. Findings: No erythema. Neurological:  
   General: No focal deficit present. Mental Status: She is alert and oriented to person, place, and time. Diagnostic Study Results Labs - Recent Results (from the past 12 hour(s)) EKG, 12 LEAD, INITIAL Collection Time: 04/08/20  2:31 PM  
Result Value Ref Range Ventricular Rate 88 BPM  
 Atrial Rate 258 BPM  
 QRS Duration 82 ms Q-T Interval 354 ms QTC Calculation (Bezet) 428 ms Calculated R Axis -8 degrees Calculated T Axis 22 degrees Diagnosis Atrial fibrillation Poor R-wave Progression (consider lead placement or loss of anterior forces) Confirmed by Gerry Martinez ([id number]) on 4/8/2020 7:15:08 PM 
  
CBC WITH AUTOMATED DIFF Collection Time: 04/08/20  3:13 PM  
Result Value Ref Range WBC 17.3 (H) 3.6 - 11.0 K/uL  
 RBC 5.45 (H) 3.80 - 5.20 M/uL  
 HGB 15.4 11.5 - 16.0 g/dL HCT 45.4 35.0 - 47.0 % MCV 83.3 80.0 - 99.0 FL  
 MCH 28.3 26.0 - 34.0 PG  
 MCHC 33.9 30.0 - 36.5 g/dL  
 RDW 19.1 (H) 11.5 - 14.5 % PLATELET 893 177 - 749 K/uL MPV 11.6 8.9 - 12.9 FL  
 NRBC 0.2 (H) 0  WBC ABSOLUTE NRBC 0.03 (H) 0.00 - 0.01 K/uL NEUTROPHILS 87 (H) 32 - 75 % LYMPHOCYTES 2 (L) 12 - 49 % MONOCYTES 10 5 - 13 % EOSINOPHILS 0 0 - 7 % BASOPHILS 0 0 - 1 % IMMATURE GRANULOCYTES 1 (H) 0.0 - 0.5 % ABS. NEUTROPHILS 15.1 (H) 1.8 - 8.0 K/UL  
 ABS. LYMPHOCYTES 0.3 (L) 0.8 - 3.5 K/UL  
 ABS. MONOCYTES 1.7 (H) 0.0 - 1.0 K/UL  
 ABS. EOSINOPHILS 0.0 0.0 - 0.4 K/UL  
 ABS. BASOPHILS 0.0 0.0 - 0.1 K/UL  
 ABS. IMM. GRANS. 0.2 (H) 0.00 - 0.04 K/UL  
 DF AUTOMATED    
 RBC COMMENTS ANISOCYTOSIS 1+ 
    
 RBC COMMENTS SHEY CELLS 
PRESENT 
    
METABOLIC PANEL, COMPREHENSIVE Collection Time: 04/08/20  3:13 PM  
Result Value Ref Range Sodium 130 (L) 136 - 145 mmol/L Potassium 4.9 3.5 - 5.1 mmol/L Chloride 90 (L) 97 - 108 mmol/L  
 CO2 34 (H) 21 - 32 mmol/L Anion gap 6 5 - 15 mmol/L Glucose 92 65 - 100 mg/dL BUN 82 (H) 6 - 20 MG/DL Creatinine 2.78 (H) 0.55 - 1.02 MG/DL  
 BUN/Creatinine ratio 29 (H) 12 - 20 GFR est AA 19 (L) >60 ml/min/1.73m2 GFR est non-AA 16 (L) >60 ml/min/1.73m2 Calcium 10.7 (H) 8.5 - 10.1 MG/DL Bilirubin, total 1.0 0.2 - 1.0 MG/DL  
 ALT (SGPT) 22 12 - 78 U/L  
 AST (SGOT) 21 15 - 37 U/L Alk. phosphatase 167 (H) 45 - 117 U/L Protein, total 7.9 6.4 - 8.2 g/dL Albumin 2.7 (L) 3.5 - 5.0 g/dL Globulin 5.2 (H) 2.0 - 4.0 g/dL A-G Ratio 0.5 (L) 1.1 - 2.2 CK W/ REFLX CKMB Collection Time: 04/08/20  3:13 PM  
Result Value Ref Range CK 30 26 - 192 U/L  
TROPONIN I Collection Time: 04/08/20  3:13 PM  
Result Value Ref Range Troponin-I, Qt. 0.11 (H) <0.05 ng/mL NT-PRO BNP Collection Time: 04/08/20  3:13 PM  
Result Value Ref Range NT pro-BNP 11,546 (H) <450 PG/ML  
PROCALCITONIN Collection Time: 04/08/20  6:48 PM  
Result Value Ref Range Procalcitonin 3.87 ng/mL URINALYSIS W/ REFLEX CULTURE Collection Time: 04/08/20  9:23 PM  
Result Value Ref Range Color YELLOW/STRAW Appearance CLEAR CLEAR Specific gravity 1.021 1.003 - 1.030    
 pH (UA) 5.0 5.0 - 8.0 Protein TRACE (A) NEG mg/dL Glucose Negative NEG mg/dL Ketone Negative NEG mg/dL Blood Negative NEG Urobilinogen 0.2 0.2 - 1.0 EU/dL Nitrites Negative NEG Leukocyte Esterase TRACE (A) NEG    
 WBC 0-4 0 - 4 /hpf  
 RBC 0-5 0 - 5 /hpf Epithelial cells FEW FEW /lpf Bacteria Negative NEG /hpf  
 UA:UC IF INDICATED CULTURE NOT INDICATED BY UA RESULT CNI Hyaline cast 0-2 0 - 5 /lpf  
BILIRUBIN, CONFIRM Collection Time: 04/08/20  9:23 PM  
Result Value Ref Range Bilirubin UA, confirm Negative NEG Radiologic Studies -  
XR CHEST PORT Final Result IMPRESSION:   
1. Unchanged small to moderate chronic left pleural effusion with chronic  
atelectasis/volume loss of the left lung. Likely trace right pleural effusion. 2. Unchanged cardiomegaly. Pulmonary vascular congestion without overt pulmonary  
edema. CT Results  (Last 48 hours) None CXR Results  (Last 48 hours) 04/08/20 1533  XR CHEST PORT Final result Impression:  IMPRESSION:   
1. Unchanged small to moderate chronic left pleural effusion with chronic  
atelectasis/volume loss of the left lung. Likely trace right pleural effusion. 2. Unchanged cardiomegaly. Pulmonary vascular congestion without overt pulmonary  
edema. Narrative:  EXAM:  XR CHEST PORT INDICATION:   Shortness of breath COMPARISON: Chest radiograph 2/19/2020, CT chest 9/30/2019. FINDINGS: AP radiograph of the chest was obtained. No significant change and chronic small to moderate left pleural effusion with  
chronic atelectasis/volume loss of the left lung. A trace right pleural effusion  
is likely present. There is no pneumothorax. There is unchanged cardiomegaly. Pulmonary vascular congestion without overt pulmonary edema. No acute osseous  
abnormality. Medical Decision Making I am the first provider for this patient.  
 
I reviewed the vital signs, available nursing notes, past medical history, past surgical history, family history and social history. Vital Signs-Reviewed the patient's vital signs. Patient Vitals for the past 12 hrs: 
 Temp Pulse Resp BP SpO2  
04/08/20 2021 97.4 °F (36.3 °C) 94 24 106/77 97 % 04/08/20 1800  95 30 95/49 96 % 04/08/20 1745  92 23 99/52 95 % 04/08/20 1715  88 (!) 31 112/74 95 % 04/08/20 1615  83 28 115/73 95 % 04/08/20 1445 98.3 °F (36.8 °C) 85 26 103/57 96 % 04/08/20 1428    109/65   
04/08/20 1414 98.4 °F (36.9 °C) 74 16 105/74 95 % Records Reviewed: Nursing Notes Provider Notes (Medical Decision Making): This is a 8-year-old female here with failure to thrive, respiratory failure with tachypnea but no hypoxia, and worsening leg swelling consistent with CHF exacerbation likely from intrinsic lung disease and cor pulmonale. She was noted to have leukocytosis however she is afebrile and chest x-ray does not show any new acute process or infiltration. Urinalysis also negative and I will hold on antibiotics at this time. She has much worsening renal function with creatinine 2.78 and BUN 82. She has mild troponin elevation 0.11 but EKG is nonischemic. NT proBNP is 11,500. I discussed with Dr. Joellen Javier, cardiology, who recommends holding on patient's diuretics at this time given chest x-ray that appears dry and he will evaluate her during admission. I had long discussion with patient's family and discussed poor prognosis. Decision was made to have patient's CODE STATUS changed to DNR/DNI. Family is welcoming palliative discussion at this time but they have not yet decided on hospice. ED Course:  
Initial assessment performed. The patients presenting problems have been discussed, and they are in agreement with the care plan formulated and outlined with them. I have encouraged them to ask questions as they arise throughout their visit. ED Course as of Apr 08 2247 Wed Apr 08, 2020 1448 EKG per my interpretation undetermined rhythm, or minute, possible atrial fibrillation versus junctional rhythm, normal axis, no acute ischemic changes. Zofia Gutierrez ED Course User Index Yolande Kent, Rosa Palacio MD  
 
 
Admission Note: 
Patient is being admitted to the hospital by Dr. Omayra Oropeza, Service: Hospitalist.  The results of their tests and reasons for their admission have been discussed with them and available family. They convey agreement and understanding for the need to be admitted and for their admission diagnosis. Disposition: 
admit Diagnosis Clinical Impression: 1. Acute on chronic respiratory failure, unspecified whether with hypoxia or hypercapnia ([address] 2. Cor pulmonale ([address] 3. Acute on chronic right-sided congestive heart failure ([address] Attestations: 
 
Luane Mcburney, MD 
 
Please note that this dictation was completed with [employer], the computer voice recognition software. Quite often unanticipated grammatical, syntax, homophones, and other interpretive errors are inadvertently transcribed by the computer software. Please disregard these errors. Please excuse any errors that have escaped final proofreading. Thank you.

## 2020-04-08 NOTE — H&P
Hospitalist Admission Note NAME: Noah Krueger :  1918 MRN:  [id number] Date/Time:  2020 5:53 PM 
 
Patient PCP: Saud Villavicencio MD 
______________________________________________________________________ Given the patient's current clinical presentation, I have a high level of concern for decompensation if discharged from the emergency department. Complex decision making was performed, which includes reviewing the patient's available past medical records, laboratory results, and x-ray films. My assessment of this patient's clinical condition and my plan of care is as follows. Assessment / Plan: 
 
Acute on chronic diastolic CHF POA LVEF 74-67% BNP >16808+ trop Severe pulmonary HTN POA PA pressure 84 Permanent atrial fib POA Hyponatremia RUMA on CKD 
xrays Increasing 1. Unchanged small to moderate chronic left pleural effusion with chronic 
atelectasis/volume loss of the left lung. Likely trace right pleural effusion. 2. Unchanged cardiomegaly. Pulmonary vascular congestion without overt pulmonary 
edema. 
 weakness, WOOTEN, increasing LE edema IV lasix, increase dose, metolazone 3x per week Monitor Renal function stable with diuresis TTE LVEF 55 to 60%, normal RV function, PA pressure 84, moderate MR Hydralazine, iimdur Cardiology consulted by er 
Linda Levine Increase home lasix to 80 mg Po BID, 
  
Leukocytosis x-rays clear no fevers per reports I have ordered a urine if it is positive will start antibiotics check a procalcitonin level  
  
History of lung cancer left POA Chronic volume loss left lung POA present going back to at least 2018 No indication for intervention Given age and chronicity, prefer to avoid thoracentesis, d/w family Sacral decubitus stage I will have wound care see the patient Overweight POA Body mass index is 26.12 kg/m². 
  
Surrogate Decision Maker: son/dtr 
  
DVT Prophylaxis: Eliquis  
 GI Prophylaxis: not indicated 
  
Body mass index is 26.12 kg/m². Code Status: DNR Baseline: Bed ridding Subjective: CHIEF COMPLAINT: Leg swelling shortness of breath past week HISTORY OF PRESENT ILLNESS:    
Cristiana Rosas is a 80 y.o. black female with a history of atrial fibrillation on Eliquis squamosal long cancer, hypertension, chronic kidney disease history of right breast cancer, colon cancer coronary artery disease diastolic CHF who presented to the emergency room complaining with increased leg swelling and shortness of breath over the past week patient is with family who provided most of the information. Patient is cleared by granddaughter saw on a daily basis. Apparently they contacted cardiology earlier in the week who increased her diuretics but unfortunately she is still not responding. Her eating and p.o. intake is reasonable for her. She is most the time breathing but she can get out of the bed and ambulate once in a while. Denies fever chills any sick contacts with corona patient chronic cough. No chest pain no abdominal pain no nausea no vomiting denies any UTI symptoms. No back pain. We were asked to admit for work up and evaluation of the above problems. Past Medical History:  
Diagnosis Date  A-fib ([facility]  Adenocarcinoma ([facility]  Cancer ([facility] right breast CA, lung, colon  Chronic kidney disease  Gastrointestinal disorder GERD  Hypertension  Ileitis, terminal ([facility]  Other ill-defined conditions(799.89)   
 gout  Squamous cell lung cancer ([facility] Past Surgical History:  
Procedure Laterality Date [address] [place] Right mastectomy  HX APPENDECTOMY  HX HEENT    
 right glass eye  MT COLONOSCOPY W/BIOPSY SINGLE/MULTIPLE  3/29/2013  MT COLSC FLX W/RMVL OF TUMOR POLYP LESION SNARE TQ  3/29/2013 Social History Tobacco Use  Smoking status: Former Smoker Types: Cigarettes Last attempt to quit: 1967 Years since quittin.3  Smokeless tobacco: Never Used Substance Use Topics  Alcohol use: No  
  
 
Family History Problem Relation Age of Onset  Colon Cancer Other  Hypertension Other Allergies Allergen Reactions  Penicillins Hives Prior to Admission medications Medication Sig Start Date End Date Taking? Authorizing Provider OXYGEN-AIR DELIVERY SYSTEMS Take 2 L by inhalation as needed for Other (shortness of breath). 20  Yes Provider, Historical  
isosorbide mononitrate ER (IMDUR) 30 mg tablet Take 1 Tab by mouth daily. 20  Yes Florin Marino MD  
furosemide (LASIX) 40 mg tablet Take 2 Tabs by mouth two (2) times a day. 20  Yes Florin Marino MD  
hydrALAZINE (APRESOLINE) 10 mg tablet Take 10 mg by mouth daily. 11/15/19  Yes Provider, Historical  
acetaminophen (TYLENOL 8 HOUR) 650 mg TbER Take 1,300 mg by mouth every eight (8) hours as needed for Pain. 10/6/19  Yes Saud Villavicencio MD  
nystatin (MYCOSTATIN) powder Apply 1 Each to affected area two (2) times daily as needed for Other (buttock irritation). use either powder or cream 10/6/19  Yes Saud Villavicencio MD  
metOLazone (ZAROXOLYN) 2.5 mg tablet Take 1 Tab by mouth every Monday, Wednesday, Friday. Indications: take per cardiology 10/4/19  Yes Jesus Rowell NP  
albuterol (PROVENTIL HFA) 90 mcg/actuation inhaler Take 2 Puffs by inhalation every four (4) hours as needed for Wheezing or Shortness of Breath. 18  Yes Saud Villavicencio MD  
albuterol-ipratropium (DUO-NEB) 2.5 mg-0.5 mg/3 ml nebu Take 3 mL by inhalation every four (4) hours as needed (shortness of breath). Yes Provider, Historical  
apixaban (ELIQUIS) 2.5 mg tablet Take 1 Tab by mouth two (2) times a day. 18  Yes Dana Roque NP  
mv-mn/folic acid/calcium/vit K (ONE-A-DAY WOMEN'S 50 PLUS PO) Take 1 Tab by mouth daily.    Yes Anjelica, MD Marky  
 dextran 70/hypromellose/PF (NATURAL TEARS, PF, OP) Administer 1 Drop to right eye daily as needed (dry eye). Yes Other, MD Marky  
mometasone (NASONEX) 50 mcg/actuation nasal spray 2 Sprays by Both Nostrils route daily as needed (congestion). Yes Other, MD Marky  
potassium chloride (KLOR-CON) 20 mEq packet Take 20 mEq by mouth daily. Yes Other, MD Marky  
colchicine (COLCRYS) 0.6 mg tablet Take 0.6 mg by mouth daily as needed (gout flare up). Yes Other, MD Marky  
furosemide (LASIX) 40 mg tablet Take 80 mg by mouth daily (with dinner). pt takes 80 mg in the morning and 40mg in the afternoon. 3/10/20   Flaca Delgado MD  
docusate sodium (COLACE) 100 mg capsule Take 100 mg by mouth daily as needed for Constipation. Provider, Paulo  
nystatin (MYCOSTATIN) topical cream Apply 1 g to affected area two (2) times daily as needed for Skin Irritation (buttocks). Use either cream or powder. 10/6/19   Flaca Delgado MD  
 
 
REVIEW OF SYSTEMS:    
I am not able to complete the review of systems because: The patient is intubated and sedated  
x The patient has altered mental status due to his acute medical problems The patient has baseline aphasia from prior stroke(s)  
x The patient has baseline dementia and is not reliable historian The patient is in acute medical distress and unable to provide information Objective: VITALS:   
Visit Vitals /74 Pulse 88 Temp 98.3 °F (36.8 °C) Resp (!) 31 Ht 5' 2\" (1.575 m) Wt 62.6 kg (138 lb) SpO2 95% BMI 25.24 kg/m² PHYSICAL EXAM: 
_______General:          WD, WN. Alert, cooperative, no acute distress   
Neck:               No meningismus, no thyromegaly Resp:               Decreased left lung fields, no wheezing or rales. No accessory muscle use CV:                  Irregular  rhythm, 3+  Edema,espinoza GI:                   Soft, Non distended, Non tender.  +Bowel sounds, no rebound Neurologic:       Alert, normal speech, non focal motor exam 
Psych:   Not anxious nor agitated Skin:                No rashes. No jaundice 
 ________________________________________________________________ Care Plan discussed with: 
  Comments Patient x Family  x   
RN x Care Manager Consultant:  x   
_______________________________________________________________________ Expected  Disposition:  
Home with Family x HH/PT/OT/RN   
SNF/LTC   
LASHONDA   
________________________________________________________________________ TOTAL TIME:  60 Minutes Critical Care Provided     Minutes non procedure based Comments  
 x Reviewed previous records  
>50% of visit spent in counseling and coordination of care x Discussion with patient and/or family and questions answered 
  
 
________________________________________________________________________ Signed: Gina Ni MD 
 
Procedures: see electronic medical records for all procedures/Xrays and details which were not copied into this note but were reviewed prior to creation of Plan. LAB DATA REVIEWED:   
Recent Results (from the past 24 hour(s)) EKG, 12 LEAD, INITIAL Collection Time: 04/08/20  2:31 PM  
Result Value Ref Range Ventricular Rate 88 BPM  
 Atrial Rate 258 BPM  
 QRS Duration 82 ms Q-T Interval 354 ms QTC Calculation (Bezet) 428 ms Calculated R Axis -8 degrees Calculated T Axis 22 degrees Diagnosis Atrial fibrillation Septal infarct (cited on or before 19-FEB-2020) When compared with ECG of 19-FEB-2020 16:02, No significant change was found CBC WITH AUTOMATED DIFF Collection Time: 04/08/20  3:13 PM  
Result Value Ref Range WBC 17.3 (H) 3.6 - 11.0 K/uL  
 RBC 5.45 (H) 3.80 - 5.20 M/uL  
 HGB 15.4 11.5 - 16.0 g/dL HCT 45.4 35.0 - 47.0 % MCV 83.3 80.0 - 99.0 FL  
 MCH 28.3 26.0 - 34.0 PG  
 MCHC 33.9 30.0 - 36.5 g/dL  
 RDW 19.1 (H) 11.5 - 14.5 % PLATELET 943 183 - 756 K/uL MPV 11.6 8.9 - 12.9 FL  
 NRBC 0.2 (H) 0  WBC ABSOLUTE NRBC 0.03 (H) 0.00 - 0.01 K/uL NEUTROPHILS 87 (H) 32 - 75 % LYMPHOCYTES 2 (L) 12 - 49 % MONOCYTES 10 5 - 13 % EOSINOPHILS 0 0 - 7 % BASOPHILS 0 0 - 1 % IMMATURE GRANULOCYTES 1 (H) 0.0 - 0.5 % ABS. NEUTROPHILS 15.1 (H) 1.8 - 8.0 K/UL  
 ABS. LYMPHOCYTES 0.3 (L) 0.8 - 3.5 K/UL  
 ABS. MONOCYTES 1.7 (H) 0.0 - 1.0 K/UL  
 ABS. EOSINOPHILS 0.0 0.0 - 0.4 K/UL  
 ABS. BASOPHILS 0.0 0.0 - 0.1 K/UL  
 ABS. IMM. GRANS. 0.2 (H) 0.00 - 0.04 K/UL  
 DF AUTOMATED    
 RBC COMMENTS ANISOCYTOSIS 1+ 
    
 RBC COMMENTS SHEY CELLS 
PRESENT 
    
METABOLIC PANEL, COMPREHENSIVE Collection Time: 04/08/20  3:13 PM  
Result Value Ref Range Sodium 130 (L) 136 - 145 mmol/L Potassium 4.9 3.5 - 5.1 mmol/L Chloride 90 (L) 97 - 108 mmol/L  
 CO2 34 (H) 21 - 32 mmol/L Anion gap 6 5 - 15 mmol/L Glucose 92 65 - 100 mg/dL BUN 82 (H) 6 - 20 MG/DL Creatinine 2.78 (H) 0.55 - 1.02 MG/DL  
 BUN/Creatinine ratio 29 (H) 12 - 20 GFR est AA 19 (L) >60 ml/min/1.73m2 GFR est non-AA 16 (L) >60 ml/min/1.73m2 Calcium 10.7 (H) 8.5 - 10.1 MG/DL Bilirubin, total 1.0 0.2 - 1.0 MG/DL  
 ALT (SGPT) 22 12 - 78 U/L  
 AST (SGOT) 21 15 - 37 U/L Alk. phosphatase 167 (H) 45 - 117 U/L Protein, total 7.9 6.4 - 8.2 g/dL Albumin 2.7 (L) 3.5 - 5.0 g/dL Globulin 5.2 (H) 2.0 - 4.0 g/dL A-G Ratio 0.5 (L) 1.1 - 2.2 CK W/ REFLX CKMB Collection Time: 04/08/20  3:13 PM  
Result Value Ref Range CK 30 26 - 192 U/L  
TROPONIN I Collection Time: 04/08/20  3:13 PM  
Result Value Ref Range Troponin-I, Qt. 0.11 (H) <0.05 ng/mL NT-PRO BNP Collection Time: 04/08/20  3:13 PM  
Result Value Ref Range  NT pro-BNP 11,546 (H) <450 PG/ML

## 2020-04-09 NOTE — CARDIO/PULMONARY
Cardiac Rehab Note: chart review Pt is a 81 yo admitted with acute on chronic diastolic CHF. Consult has been acknowledge CHF BUNDLE   
 
EF 60%  on 2/20/20 per echo. Current inpatient diet: DIET REGULAR 
DIET NUTRITIONAL SUPPLEMENTS 
DIET NUTRITIONAL SUPPLEMENTS \"Living with Heart Failure\" book with daily weight calendar and s/s of heart failure magnet provided to Ree Tucker on admission 10/2019 with additional educational visit during 2/2020 admission. Due to COVID 19 visitor restrictions no family members present at bedside to provide teaching reinforcement to. Per chart review pt is checked on and cared for by family members. CR will contact family for questions regarding recommended CHF home care instructions. RN spoke with pts son, Kennon Brittle via phone who reports pt eats small meals without added table salt. His sister does the cooking and they avoided sodium laden foods. Pt takes her meds as filled in her pillbox by family. They have been checking her weight on a daily basis. Pt has limited mobility, she does walk inside the home. Advised to resume home care on pts discharge.

## 2020-04-09 NOTE — PROGRESS NOTES
Problem: Mobility Impaired (Adult and Pediatric) Goal: *Acute Goals and Plan of Care (Insert Text) Description: FUNCTIONAL STATUS PRIOR TO ADMISSION: Patient was modified independent using a rollator for functional mobility. She denies falls. Fair historian. No family present. HOME SUPPORT PRIOR TO ADMISSION: The patient lived with her daughter and has home health aides come in 5 days per week. Assists with bathing and dressing. States she sometimes needs help with feeding. Physical Therapy Goals Initiated 4/9/2020 1. Patient will move from supine to sit and sit to supine, scoot up and down and roll side to side in bed with minimal assistance within 7 day(s). 2.  Patient will transfer from bed to chair and chair to bed with minimal assistance using the least restrictive device within 7 day(s). 3.  Patient will perform sit to stand with minimal assistance/contact guard assist within 7 day(s). 4.  Patient will ambulate with minimal assistance for 30 feet with the least restrictive device within 7 day(s). 5.  Patient will ascend/descend 1 stairs with right handrail(s) with minimal assistance within 7 day(s). 6. Patient will participate in a 6 MWT for her diagnosis of heart failure within 48 hours of discharge. Outcome: Progressing Towards Goal 
 PHYSICAL THERAPY EVALUATION Patient: Darrian Pan (89 y.o. female) Date: 4/9/2020 Primary Diagnosis: CHF (congestive heart failure) ([facility] [I50.9] RUMA (acute kidney injury) ([facility] [N17.9] Precautions:   Fall, Bed Alarm, DNR 
 
 
ASSESSMENT Based on the objective data described below, the patient presents with impaired balance, increased pedal edema, generalized weakness, decreased ROM, and decreased endurance following admission for CHF exacerbation. Patient overall mod-max Ax1 for OOB to chair this date.  Was able to manage a couple steps to the chair but noticeably short of breath and with difficulty managing RW. Patient would require 24 hr physical assist upon d/c home but per chart review she has a very supportive family. Also note palliative may become involved in her care. Highly agree with palliative consult. She may benefit from a w/c at d/c if family does not have this but await outcomes of palliative meeting for goals of care clarification prior to ordering equipment. Current Level of Function Impacting Discharge (mobility/balance): mod-max Ax1 Functional Outcome Measure: The patient scored 1/28 on the Tinetti outcome measure which is indicative of high fall risk. Other factors to consider for discharge: CHF Patient will benefit from skilled therapy intervention to address the above noted impairments. PLAN : 
Recommendations and Planned Interventions: bed mobility training, transfer training, gait training, therapeutic exercises, neuromuscular re-education, edema management/control, patient and family training/education, and therapeutic activities Frequency/Duration: Patient will be followed by physical therapy:  5 times a week to address goals. Recommendation for discharge: (in order for the patient to meet his/her long term goals) Physical therapy at least 2 days/week in the home AND ensure assist and/or supervision for safety with all mobility  (if family can provide) SNF if family cannot assist 
 
This discharge recommendation: 
Has been made in collaboration with the attending provider and/or case management IF patient discharges home will need the following DME: wheelchair, potentially more equipment pending progress and outcomes of palliative meeting SUBJECTIVE:  
Patient stated I live with my daughter.  OBJECTIVE DATA SUMMARY:  
HISTORY:   
Past Medical History:  
Diagnosis Date [facility] Adenocarcinoma ([facility] Cancer ([facility] right breast CA, lung, colon Chronic kidney disease Gastrointestinal disorder  GERD  
 Hypertension Ileitis, terminal ([facility] Other ill-defined conditions(799.89)   
 gout Squamous cell lung cancer ([facility] Past Surgical History:  
Procedure Laterality Date [address] Right mastectomy HX APPENDECTOMY HX HEENT    
 right glass eye [place] COLONOSCOPY W/BIOPSY SINGLE/MULTIPLE  3/29/2013 [place] COLSC FLX W/RMVL OF TUMOR POLYP LESION SNARE TQ  3/29/2013 Personal factors and/or comorbidities impacting plan of care: PMH Home Situation Home Environment: Private residence # Steps to Enter: 1 One/Two Story Residence: Two story, live on 1st floor Living Alone: No 
Support Systems: Child(indra), Home care staff Patient Expects to be Discharged to[de-identified] Private residence Current DME Used/Available at Home: juanjose Barnes EXAMINATION/PRESENTATION/DECISION MAKING:  
Critical Behavior: 
Neurologic State: Alert Orientation Level: Oriented X4 Cognition: Follows commands Hearing: Auditory Auditory Impairment: Hard of hearing, bilateral 
Edema: +3 pedal edema Range Of Motion: 
AROM: Generally decreased, functional(greatly decreased shoulders (flexion <45 degrees)) Strength:   
Strength: Generally decreased, functional 
Tone & Sensation:  
Tone: Normal 
Sensation: Intact Coordination: 
Coordination: Generally decreased, functional 
Functional Mobility: 
Bed Mobility: 
Supine to Sit: Moderate assistance Scooting: Maximum assistance Transfers: 
Sit to Stand: Moderate assistance Stand to Sit: Moderate assistance Stand Pivot Transfers: Moderate assistance; Additional time; Adaptive equipment Balance:  
Sitting: Impaired; Without support Sitting - Static: Fair (occasional); Good (unsupported) Sitting - Dynamic: Poor (constant support) Standing: Impaired; With support Standing - Static: Fair Standing - Dynamic : Fair Functional Measure: 
Tinetti test: 
 
Sitting Balance: 1 Arises: 0 Attempts to Rise: 0 Immediate Standing Balance: 0 Standing Balance: 0 Nudged: 0 Eyes Closed: 0 Turn 360 Degrees - Continuous/Discontinuous: 0 Turn 360 Degrees - Steady/Unsteady: 0 Sitting Down: 0 Balance Score: 1 Balance total score Indication of Gait: 0 
R Step Length/Height: 0 
L Step Length/Height: 0 
R Foot Clearance: 0 
L Foot Clearance: 0 Step Symmetry: 0 Step Continuity: 0 Path: 0 Trunk: 0 Walking Time: 0 Gait Score: 0 Gait total score Total Score: 1/28 Overall total score Tinetti Tool Score Risk of Falls 
<19 = High Fall Risk 19-24 = Moderate Fall Risk 25-28 = Low Fall Risk Tinetti ME. Performance-Oriented Assessment of Mobility Problems in Elderly Patients. [facility]; [id number]. (Scoring Description: PT Bulletin Feb. 10, 1993) Older adults: Cherri Wallis et al, 2009; n = [address] elderly evaluated with ABC, TASHA, ADL, and IADL) · Mean TASHA score for males aged 69-68 years = 26.21(3.40) · Mean TASHA score for females age 69-68 years = 25.16(4.30) · Mean TASHA score for males over 80 years = 23.29(6.02) · Mean TASHA score for females over 80 years = 17.20(8.32) Physical Therapy Evaluation Charge Determination History Examination Presentation Decision-Making HIGH Complexity :3+ comorbidities / personal factors will impact the outcome/ POC  HIGH Complexity : 4+ Standardized tests and measures addressing body structure, function, activity limitation and / or participation in recreation  LOW Complexity : Stable, uncomplicated  Other outcome measures Tinetti 1/28  HIGH Based on the above components, the patient evaluation is determined to be of the following complexity level: LOW Activity Tolerance:  
Good, SpO2 stable on RA, and requires rest breaks Please refer to the flowsheet for vital signs taken during this treatment. After treatment patient left in no apparent distress:  
Sitting in chair, Call bell within reach, and Bed / chair alarm activated COMMUNICATION/EDUCATION:  
 The patients plan of care was discussed with: Registered nurse. Fall prevention education was provided and the patient/caregiver indicated understanding., Patient/family have participated as able in goal setting and plan of care. , and Patient/family agree to work toward stated goals and plan of care. Thank you for this referral. 
Cash Vitale, PT, DPT Time Calculation: 33 mins

## 2020-04-09 NOTE — CONSULTS
[address] [facility] Date of  Admission: 4/8/2020  2:18 PM  
 
Admission type:Emergency Consult for: Heart Failure Consult by: Dr. Warden Smith Subjective:  
Radha Rdoriguez is a 80 y.o. female with PMH significant for Chronic Atrial Fibrillation, HFpEF, CKD, HTN, GERD, CAD, Lung CA with radiation, Chronic Left pleural effusion who was admitted for acute on chronic diastolic CHF. Per ED provider note Radha Rodriguez presented to the ED with c/o SOB and worsening leg swelling for the past week. She was brought in by her granddaughter, who she has been living with. Her SOB was getting to a point she was no longer able to ambulate on her own. The patient denied fevers/or close proximity to any ill persons. She endorses nonproductive cough and poor P.O. intake. Per chart review, Dr. Driss Luis (Thoracic surgery) saw her in 2018 and stated her left pleural effusion is chronic since 2017 with unclear etiology. He stated, \"It is probably multifactorial related to her heart failure as well as recurrent pneumonias. There is the small chance that this effusion is related to her previous left sided lung cancer. 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 \" Based on her presentation and condition at that time, he did not think that a full pulmonary decortication would significantly improve her condition or more importantly her quality of life and the risk outweighed the benefit of the procedure. At that time he recommended conservative management. On assessment, Radha Rodriguez is feeling a lot better. She denies current SOB, CP, or palpitations. She is on RA and not in any acute distress. She is a poor historian, but answers questions appropriately. She is receiving metolazone and furosemide 60 mg IV with little result in urine output.  She reports decreased activity tolerance over the past week with increasing leg swelling and SOB. She has a chronic cough, but denies any recent fever, chills, diarrhea, vomitting, or contact with ill persons.  
  
Gus   follows with Dr. Siri Chin for cardiology. Last ECHO 2/20/2020 with EF 55-60%, severely dilated RA, mod. MR, severe TR; severe pHTN.   
  
Cardiac risk factors: smoking/ tobacco exposure, obesity, sedentary life style, hypertension, post-menopausal, age. Patient Active Problem List  
 Diagnosis Date Noted  RUMA (acute kidney injury) ([facility] 04/08/2020  Elevated troponin 02/20/2020  
 NSTEMI (non-ST elevated myocardial infarction) ([facility] 02/19/2020  Acute kidney injury ([address] 09/30/2019  CHF (congestive heart failure) ([facility] 03/02/2019  Pleural effusion, left 07/17/2018  CHF, acute ([facility] 07/17/2018  CKD (chronic kidney disease) stage 3, GFR 30-59 ml/min ([facility]) 05/29/2018  Chronic a-fib 05/29/2018  Diastolic CHF, acute on chronic ([address] 05/29/2018  Pleural effusion 05/29/2018  Pneumonia 04/18/2017  Sepsis(995.91) 04/18/2017  Dehydration 04/18/2017  Acute CHF ([address] 01/23/2015  SOB (shortness of breath) 01/21/2015  Pedal edema 01/21/2015  Squamous cell lung cancer ([facility] 01/21/2015  Adenocarcinoma ([facility] 04/05/2013  S/P right colectomy 04/05/2013  Malignant essential hypertension 04/01/2013  Colon cancer ([address] 03/30/2013  Ileitis, terminal ([address] 03/27/2013  A-fib ([facility] 03/27/2013  
 HTN (hypertension) 03/27/2013 Aron Alicea MD 
Past Medical History:  
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   
 gout  Squamous cell lung cancer ([facility] Social History Socioeconomic History  Marital status:  Spouse name: Not on file  Number of children: Not on file  Years of education: Not on file  Highest education level: Not on file Tobacco Use  Smoking status: Former Smoker Types: Cigarettes Last attempt to quit: 1967 Years since quittin.3  Smokeless tobacco: Never Used Substance and Sexual Activity  Alcohol use: No  
 Drug use: No  
 Sexual activity: Not Currently Allergies Allergen Reactions  Penicillins Hives Family History Problem Relation Age of Onset  Colon Cancer Other  Hypertension Other Current Facility-Administered Medications Medication Dose Route Frequency  sodium chloride (NS) flush 5-40 mL  5-40 mL IntraVENous Q8H  
 sodium chloride (NS) flush 5-40 mL  5-40 mL IntraVENous PRN  
 ondansetron (ZOFRAN) injection 4 mg  4 mg IntraVENous Q6H PRN  
 acetaminophen (TYLENOL) tablet 650 mg  650 mg Oral Q6H PRN  
 sodium chloride (NS) flush 5-40 mL  5-40 mL IntraVENous Q8H  
 sodium chloride (NS) flush 5-40 mL  5-40 mL IntraVENous PRN  
 albuterol (PROVENTIL HFA, VENTOLIN HFA, PROAIR HFA) inhaler 2 Puff  2 Puff Inhalation Q4H PRN  
 apixaban (ELIQUIS) tablet 2.5 mg  2.5 mg Oral BID  colchicine tablet 0.6 mg  0.6 mg Oral DAILY PRN  
 docusate sodium (COLACE) capsule 100 mg  100 mg Oral DAILY PRN  
 isosorbide mononitrate ER (IMDUR) tablet 30 mg  30 mg Oral DAILY  hydrALAZINE (APRESOLINE) tablet 10 mg  10 mg Oral DAILY  metOLazone (ZAROXOLYN) tablet 2.5 mg  2.5 mg Oral Q MON, WED & FRI  nystatin (MYCOSTATIN) 100,000 unit/gram powder   Topical BID PRN  potassium bicarb-citric acid (EFFER-K) tablet 20 mEq  20 mEq Oral DAILY  furosemide (LASIX) injection 60 mg  60 mg IntraVENous BID  multivitamin, tx-iron-ca-min (THERA-M w/ IRON) tablet 1 Tab  1 Tab Oral DAILY  [START ON 4/10/2020] levoFLOXacin (LEVAQUIN) 500 mg in D5W IVPB  500 mg IntraVENous Q48H Review of Symptoms:  
11 systems reviewed, negative other than as stated in the HPI 
 Objective:  
  
Visit Vitals /71 (BP 1 Location: Left arm, BP Patient Position: At rest) Pulse 70 Temp 97.8 °F (36.6 °C) Resp 20 Ht 5' 2\" (1.575 m) Wt 62.6 kg (138 lb) SpO2 98% BMI 25.24 kg/m² Physical:  
General: frail, elderly AA woman in NAD laying in bed Heart: irregular, no m/S3/JVD, no carotid bruits Lungs: diminished throughout, RA, productive cough with clear/white phlegm Abdomen: Soft, +BS, NTND, obese : purewick in place, U.O. minimal, genny, some incontinence Extremities: LE espinoza +DP/PT, 3-4+ pitting edema to right leg,ankle,and foot. 3+ pitting edema to left leg, ankle, foot. Some blistering on right shin. Neurologic: Alert and oriented to person and place, overall weakness, AGOSTO Data Review:  
Recent Labs 04/09/20 
0256 04/08/20 
1513 WBC 15.6* 17.3* HGB 14.2 15.4 HCT 41.2 45.4  208 Recent Labs 04/09/20 
0256 04/08/20 
1513 * 130*  
K 4.8 4.9 CL 90* 90* CO2 31 34* GLU 66 92 BUN 87* 82* CREA 2.64* 2.78* CA 10.1 10.7* MG 3.4*  --   
ALB 2.3* 2.7* TBILI 0.9 1.0  
SGOT 22 21 ALT 19 22 Recent Labs 04/08/20 
1513 TROIQ 0.11* Intake/Output Summary (Last 24 hours) at 4/9/2020 5260 Last data filed at 4/8/2020 2126 Gross per 24 hour Intake  Output 100 ml Net -100 ml Cardiographics Telemetry: Atrial Fibrillation, rate controlled 60-80's ECG: Atrial Fibrillation, rate controlled Echocardiogram:  
· 2/20/2020:Normal cavity size, wall thickness and systolic function (ejection fraction normal). Estimated left ventricular ejection fraction is 55 - 60%. Visually measured ejection fraction. · Moderately dilated left atrium. · Severely dilated right atrium. · Mitral valve thickening. Moderate mitral valve regurgitation is present. · Severe tricuspid valve regurgitation is present. · Severe pulmonary hypertension. · There is a large left pleural effusion.  
CXRAY: 
 1. Unchanged small to moderate chronic left pleural effusion with chronic 
 atelectasis/volume loss of the left lung. Likely trace right pleural effusion. 2. Unchanged cardiomegaly. Pulmonary vascular congestion without overt pulmonary 
 edema. Assessment:  
  
 Principal Problem: 
  Diastolic [facility], [facility] chronic ([facility] (5/29/2018) Active Problems: 
  HTN (hypertension) (3/27/2013) SOB (shortness of breath) (1/21/2015) CKD (chronic kidney disease) stage 3, GFR 30-59 ml/min ([facility]) (5/29/2018) Chronic a-fib (5/29/2018) Elevated troponin (2/20/2020) RUMA (acute kidney injury) ([facility] (4/8/2020) Plan:  
Sara Min is a [age] y.o. female who presented to the ED with c/o SOB and swelling. CXR showed pulmonary vasculature congestion. Creatinine 2.64 (baseline 1.18-1.22);  proBNP 10,770 (initial was 11,546). Large amount of peripheral edema. Receiving high dose IV lasix with little result. Troponin 0.11. WBC 15.6. TSH 0.71 
· 2/20/2020 ECHO shows EF of 55-60%, with no significant changes from prior · Although CXR clear, likely component of acute on chronic diastolic heart failure, severe PHTN. Low albumin (2.3) may be contributing some to swelling. · Continue diuresis with IV lasix and metolazone. Watch BMP daily for e- and Cr, minimal response thus far to diuresis. Continue to monitor telemetry. · Mild, flat troponin may be from heart failure. No chest pain. On small dose of Imdur. · No invasive ischemic work up planned. Not adding ASA due to eliquis. Not adding statin due to age. BB previously discontinued due to lung disease. · Eliquis 2.5 mg for chronic a fib. CHADVASC=3  Rate controlled · HTN controlled on current agents. · CKD/RUMA improving with diuresis · Poor PO intake/decreased mobility tolerance/frequent readmissions-agree with Palliative consult Thank you for the opportunity to partner in the care of this patient. Travon Garcia NP  
MSN,RN,AG-ACNP-BC Patient seen and examined by me with the above nurse practitioner. I personally performed all components of the history, physical, and medical decision making and agree with the assessment and plan with minor modifications as noted. Extremely present 8-year-old female with acute on chronic diastolic heart failure and acute renal failure. She is symptomatically much improved with initial diuresis. Continue with diuresis, watching electrolytes and creatinine closely. Poor long-term prognosis as she has diastolic heart failure with acute renal failure. Respect DNR status, agree with palliative consult. Thanks for the consult. We will follow with you.

## 2020-04-09 NOTE — PROGRESS NOTES
PALLIATIVE MEDICINE Pt seen, note to follow. Basically met with pt, will call family in am to discuss goals in detail. Thank you for including Palliative Medicine in this patient's care.   
GEMINI Gaffney

## 2020-04-09 NOTE — PROGRESS NOTES
Hospitalist Progress Note NAME: Radha Rodriguez :  1918 MRN:  [id number] Assessment / Plan: 
Acute on chronic diastolic heart failure presentation BNP was noted to be 1110+ troponins which has been improving. Continue current regimen diuresis beta-blocker. Patient is on IV Lasix and metolazone x-ray showing pulmonary change but no overt pulmonary edema Acute kidney injury likely secondary to heart failure. Patient has some edema will diuresis overnight and consider discharge tomorrow Chronic kidney disease stage III we will continue diuretics Severe pulmonary hypertension. With PA pressure of 84. Will continue therapy as outpatient. Cardiorenal syndrome secondary) kidney disease Sacral pressure ulcer stage I poa continue current regimen wound care. 
 
 
 / Body mass index is 25.24 kg/m². Code status: DNR Prophylaxis: Continue Eliquis Recommended Disposition: Return to facility Subjective: Chief Complaint / Reason for Physician Visit Discussed with RN events overnight. Patient seen and examined at bedside comfortable no events noted overnight. Review of Systems: 
Symptom Y/N Comments  Symptom Y/N Comments Fever/Chills n   Chest Pain Poor Appetite n   Edema Cough n   Abdominal Pain Sputum n   Joint Pain SOB/WOOTEN n   Pruritis/Rash Nausea/vomit n   Tolerating PT/OT Diarrhea    Tolerating Diet Constipation    Other Could NOT obtain due to:   
 
Objective: VITALS:  
Last 24hrs VS reviewed since prior progress note. Most recent are: 
Patient Vitals for the past 24 hrs: 
 Temp Pulse Resp BP SpO2  
20 1126 97.5 °F (36.4 °C) 84 20 109/79 96 % 20 0718 97.8 °F (36.6 °C) 70 20 104/71 98 % 20 0252 97.2 °F (36.2 °C) 79 20 110/74 93 % 20 2329 98.9 °F (37.2 °C) 78 20 117/83 97 % 20 2021 97.4 °F (36.3 °C) 94 24 106/77 97 % 20 1800  95 30 95/49 96 % 20 1745  92 23 99/52 95 % 04/08/20 1715  88 (!) 31 112/74 95 % 04/08/20 1615  83 28 115/73 95 % 04/08/20 1445 98.3 °F (36.8 °C) 85 26 103/57 96 % 04/08/20 1428    109/65   
04/08/20 1414 98.4 °F (36.9 °C) 74 16 105/74 95 % Intake/Output Summary (Last 24 hours) at 4/9/2020 1155 Last data filed at 4/9/2020 0930 Gross per 24 hour Intake 520 ml Output 250 ml Net 270 ml PHYSICAL EXAM: 
General: WD, WN. Alert, cooperative, no acute distress   
EENT:  EOMI. Anicteric sclerae. MMM Resp:  CTA bilaterally, no wheezing or rales. No accessory muscle use CV:  Regular  rhythm,  No edema GI:  Soft, Non distended, Non tender.  +Bowel sounds Skin:  Bilateral lower extremity edema about 2+ Reviewed most current lab test results and cultures  YES Reviewed most current radiology test results   YES Review and summation of old records today    NO Reviewed patient's current orders and MAR    YES 
PMH/ reviewed - no change compared to H&P 
________________________________________________________________________ Comments Patient Family RN Care Manager Consultant Multidiciplinary team rounds were held today with , nursing, pharmacist and clinical coordinator. Patient's plan of care was discussed; medications were reviewed and discharge planning was addressed. _________________________________________________________________ Comments >50% of visit spent in counseling and coordination of care    
________________________________________________________________________ Fallon Duong MD  
 
Procedures: see electronic medical records for all procedures/Xrays and details which were not copied into this note but were reviewed prior to creation of Plan. LABS: 
I reviewed today's most current labs and imaging studies. Pertinent labs include: 
Recent Labs 04/09/20 
0256 04/08/20 
1513 WBC 15.6* 17.3* HGB 14.2 15.4 HCT 41.2 45.4  208 Recent Labs 04/09/20 
0256 04/08/20 
1513 * 130*  
K 4.8 4.9 CL 90* 90* CO2 31 34* GLU 66 92 BUN 87* 82* CREA 2.64* 2.78* CA 10.1 10.7* MG 3.4*  --   
ALB 2.3* 2.7* TBILI 0.9 1.0  
SGOT 22 21 ALT 19 22 Signed: Cristobal Foote MD

## 2020-04-09 NOTE — PROGRESS NOTES
Initial Nutrition Assessment: 
 
INTERVENTIONS/RECOMMENDATIONS:  
· Regular/No added salt diet · Try mighty shake and ensure pudding daily ASSESSMENT:  
Patient medically noted for CHF and RUMA on CKD. PMH HTN, AFIB, NSTEMI, lung cancer. Patient reports an okay appetite but didn't eat well with breakfast this morning. Poor historian; unsure of intake at home or if she's had weight loss. Chart review shows an 8.6% weight loss x 5 months. When asked if she drank supplements/protein shakes, she listed off water, soda, and tea as items she drinks. Will trial ensure pudding and mighty shakes. Will liberalize diet given poor PO and advanced age. Noted K+ at high end of normal and receiving K+ supplementation. Diet Order: Cardiac 
% Eaten:   
Patient Vitals for the past 72 hrs: 
 % Diet Eaten 04/09/20 0930 15 % Pertinent Medications: [x]Reviewed []Other: Lasix, Hydralazine, MVI, Effer-K Pertinent Labs: [x]Reviewed []Other: BG 66, 92, K+ 4.8, Na 131 Food Allergies: [x]None []Yes:   
Last BM: 4/8   [x]Active     []Hyperactive  []Hypoactive       [] Absent BS Skin:    [x] Intact   [] Incision  [] Breakdown: [x] Edema: 3+ Bilateral lower extremities []Other: Anthropometrics:  
Height: 5' 2\" (157.5 cm) Weight: 62.6 kg (138 lb) IBW (%IBW):   ( ) UBW (%UBW):   (  %) Last Weight Metrics: 
Weight Loss Metrics 4/8/2020 3/12/2020 3/10/2020 3/5/2020 3/3/2020 2/28/2020 2/25/2020 Today's Wt 138 lb 121 lb 2 oz 138 lb 141 lb 138 lb 138 lb 139 lb BMI 25.24 kg/m2 22.15 kg/m2 25.24 kg/m2 25.79 kg/m2 25.24 kg/m2 25.24 kg/m2 25.42 kg/m2 BMI: Body mass index is 25.24 kg/m². This BMI is indicative of: 
 []Underweight    []Normal    [x]Overweight    [] Obesity   [] Extreme Obesity (BMI>40) Estimated Nutrition Needs (Based on):  
3979 Kcals/day(BMR (948) x 1. 3AF) , 69 g(-82g (1.1-1.3 g/kg bw)) Protein Carbohydrate: At Least 130 g/day  Fluids: 1250 mL/day (1ml/kcal) Pt expected to meet estimated nutrient needs: [x]Yes []No 
 
NUTRITION DIAGNOSES:  
Problem:  Inadequate protein-energy intake Etiology: related to advanced age, decreased appetite Signs/Symptoms: as evidenced by PO <25% of breakfast   
 
NUTRITION INTERVENTIONS: 
Meals/Snacks: General/healthful diet   Supplements: Commercial supplement GOAL:  
PO intake at least 50% of meals/supplements next 3-5 days LEARNING NEEDS (Diet, Food/Nutrient-Drug Interaction):  
 [x] None Identified 
 [] Identified and Education Provided/Documented 
 [] Identified and Pt declined/was not appropriate Cultural, Scientology, OR Ethnic Dietary Needs:  
 [x] None Identified 
 [] Identified and Addressed 
 
 [x] Interdisciplinary Care Plan Reviewed/Documented  
 [x] Discharge Planning: Regular diet MONITORING /EVALUATION:  
Food/Nutrient Intake Outcomes: Total energy intake Physical Signs/Symptoms Outcomes: Weight/weight change, Electrolyte and renal profile, Glucose profile NUTRITION RISK:  
 [x] Patient At Nutritional Risk              [] Patient Not at Nutritional Risk PT SEEN FOR:  
 [x]  MD Consult: []Calorie Count []Diabetic Diet Education []Diet Education []Electrolyte Management 
   [x]General Nutrition Management and Supplements []Management of Tube Feeding []TPN Recommendations []  RN Referral:  []MST score >=2 
   []Enteral/Parenteral Nutrition PTA []Pregnant: Gestational DM or Multigestation 
   []Pressure Ulcer/Wound Care needs 
     
[]  Low BMI 
[]  ROBERTO Martin [id number] Pager [phone] Weekend Pager [phone]

## 2020-04-09 NOTE — ROUTINE PROCESS
Bedside and Verbal shift change report given to Ambrose (oncoming nurse) by Della Mayes (offgoing nurse). Report included the following information [facility]. [facility] Phone:   [phone] Significant changes during shift:  Little output but bladder scan was not over 400. Patient worked with therapy and got up to the chair. Patient had a small bowel movement. Patient Information Angelina Benavidez 80 y.o. 
4/8/2020  2:18 PM by Kathy Chamberlain MD. Angelina Benavidez was admitted from Home 
 
Problem List 
 
Patient Active Problem List  
 Diagnosis Date Noted  RUMA (acute kidney injury) ([address] 04/08/2020  Elevated troponin 02/20/2020  
 NSTEMI (non-ST elevated myocardial infarction) ([address] 02/19/2020  Acute kidney injury ([address] 09/30/2019  CHF (congestive heart failure) ([address] 03/02/2019  Pleural effusion, left 07/17/2018  CHF, acute ([address] 07/17/2018  CKD (chronic kidney disease) stage 3, GFR 30-59 ml/min ([facility]) 05/29/2018  Chronic a-fib 05/29/2018  Diastolic CHF, acute on chronic ([address] 05/29/2018  Pleural effusion 05/29/2018  Pneumonia 04/18/2017  Sepsis(995.91) 04/18/2017  Dehydration 04/18/2017  Acute CHF ([address] 01/23/2015  SOB (shortness of breath) 01/21/2015  Pedal edema 01/21/2015  Squamous cell lung cancer ([address] 01/21/2015  Adenocarcinoma ([address] 04/05/2013  S/P right colectomy 04/05/2013  Malignant essential hypertension 04/01/2013  Colon cancer ([address] 03/30/2013  Ileitis, terminal ([address] 03/27/2013  A-fib ([address] 03/27/2013  
 HTN (hypertension) 03/27/2013 Past Medical History:  
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   
 gout  [facility] Core Measures: CVA: No No 
CHF:Yes Yes PNA:No No 
 
N/A 
 
 
 Activity Status: 
 
X2 assistance Supplemental O2: (If Applicable) N/A 
 
LINES AND DRAINS: 
 
PIV 
 
DVT prophylaxis: 
 
eliquis Wounds: (If Applicable) Gluteal fold Patient Safety: 
 
Falls Score Total Score: 2 Safety Level_______ Bed Alarm On? Yes Sitter? Yes 
 
Plan for upcoming shift: monitor I&O's 
 
Discharge Plan: ongoing Active Consults: 
IP CONSULT TO CARDIOLOGY 
IP CONSULT TO CARDIOLOGY 
IP CONSULT TO HOSPITALIST 
IP CONSULT TO PALLIATIVE CARE - PROVIDER 
IP CONSULT TO PALLIATIVE CARE - PROVIDER

## 2020-04-09 NOTE — ROUTINE PROCESS
Bedside and Verbal shift change report given to Michelle (oncoming nurse) by Brigette(offgoing nurse). Report included the following information [facility]. [facility] Phone:   [phone] Significant changes during shift:  New admission. Only 100cc urine output via straight during my shift. Bladder scanned with 141cc at ~0300 despite administering diuretics during shift. Patient Information Dora Carvalho 80 y.o. 
4/8/2020  2:18 PM by Maylin Cordero MD. Dora Carvalho was admitted from Home 
 
Problem List 
 
Patient Active Problem List  
 Diagnosis Date Noted  RUMA (acute kidney injury) ([address] 04/08/2020  Elevated troponin 02/20/2020  
 NSTEMI (non-ST elevated myocardial infarction) ([address] 02/19/2020  Acute kidney injury ([address] 09/30/2019  CHF (congestive heart failure) ([address] 03/02/2019  Pleural effusion, left 07/17/2018  CHF, acute ([address] 07/17/2018  CKD (chronic kidney disease) stage 3, GFR 30-59 ml/min ([facility]) 05/29/2018  Chronic a-fib 05/29/2018  Diastolic CHF, acute on chronic ([address] 05/29/2018  Pleural effusion 05/29/2018  Pneumonia 04/18/2017  Sepsis(995.91) 04/18/2017  Dehydration 04/18/2017  Acute CHF ([address] 01/23/2015  SOB (shortness of breath) 01/21/2015  Pedal edema 01/21/2015  Squamous cell lung cancer ([address] 01/21/2015  Adenocarcinoma ([address] 04/05/2013  S/P right colectomy 04/05/2013  Malignant essential hypertension 04/01/2013  Colon cancer ([address] 03/30/2013  Ileitis, terminal ([address] 03/27/2013  A-fib ([address] 03/27/2013  
 HTN (hypertension) 03/27/2013 Past Medical History:  
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   
 gout  Squamous cell [facility] ([facility] Core Measures: CVA: No No 
CHF:Yes Yes PNA:No No 
 
N/A Activity Status: 
 
Bed Rest 
 
Supplemental O2: (If Applicable) N/A 
 
 
LINES AND DRAINS: 
 
PIV 
 
DVT prophylaxis: 
 
eliquis Wounds: (If Applicable) Gluteal fold Patient Safety: 
 
Falls Score Total Score: 3 Safety Level_______ Bed Alarm On? Yes Sitter? Yes 
 
Plan for upcoming shift: monitor I&O's, Discharge Plan: ongoing Active Consults: 
IP CONSULT TO CARDIOLOGY 
IP CONSULT TO CARDIOLOGY 
IP CONSULT TO HOSPITALIST 
IP CONSULT TO PALLIATIVE CARE - PROVIDER 
IP CONSULT TO PALLIATIVE CARE - PROVIDER

## 2020-04-09 NOTE — PROGRESS NOTES
04/09/20 Patient currently admitted for CHF. Last admission 2/19-2/24/2020. Current HA episode resolved.  AR

## 2020-04-09 NOTE — ROUTINE PROCESS
TRANSFER - IN REPORT: 
 
Verbal report received from [facility]) on Angelina Benavidez  being received from ER (unit) for routine progression of care Report consisted of patients Situation, Background, Assessment and  
Recommendations(SBAR). Information from the following report(s) SBAR was reviewed with the receiving nurse. Opportunity for questions and clarification was provided. Assessment completed upon patients arrival to unit and care assumed.

## 2020-04-09 NOTE — PROGRESS NOTES
Problem: Pressure Injury - Risk of 
Goal: *Prevention of pressure injury Description: Document Michael Scale and appropriate interventions in the flowsheet. Outcome: Progressing Towards Goal 
Note: Pressure Injury Interventions: 
Sensory Interventions: Assess changes in LOC, Discuss PT/OT consult with provider Moisture Interventions: Absorbent underpads, Check for incontinence Q2 hours and as needed, Internal/External urinary devices Activity Interventions: Chair cushion, Increase time out of bed, PT/OT evaluation Mobility Interventions: HOB 30 degrees or less, Turn and reposition approx. every two hours(pillow and wedges) Nutrition Interventions: Document food/fluid/supplement intake Problem: Heart Failure: Day 1 Goal: Off Pathway (Use only if patient is Off Pathway) Outcome: Progressing Towards Goal 
Goal: Activity/Safety Outcome: Progressing Towards Goal 
Goal: Consults, if ordered Outcome: Progressing Towards Goal 
Goal: Diagnostic Test/Procedures Outcome: Progressing Towards Goal 
Goal: Nutrition/Diet Outcome: Progressing Towards Goal 
Goal: Discharge Planning Outcome: Progressing Towards Goal 
Goal: Medications Outcome: Progressing Towards Goal 
Goal: Respiratory Outcome: Progressing Towards Goal 
Goal: Treatments/Interventions/Procedures Outcome: Progressing Towards Goal 
Goal: Psychosocial 
Outcome: Progressing Towards Goal 
Goal: *Oxygen saturation within defined limits Outcome: Progressing Towards Goal 
Goal: *Hemodynamically stable Outcome: Progressing Towards Goal 
Goal: *Optimal pain control at patient's stated goal 
Outcome: Progressing Towards Goal 
Goal: *Anxiety reduced or absent Outcome: Progressing Towards Goal

## 2020-04-09 NOTE — PROGRESS NOTES
Pharmacy Automatic Renal Dosing Protocol - Antimicrobials Indication for Antimicrobials: UTI Current Regimen of Each Antimicrobial: 
Levofloxacin 500 mg q24  (Start Date ; Day # 1) Previous Antimicrobial Therapy: 
 
Significant Cultures:  
 
 
Radiology / Imaging results: (X-ray, CT scan or MRI):  
 
Paralysis, amputations, malnutrition:  
 
Labs: 
Recent Labs 20 
1513 CREA 2.78* BUN 82* WBC 17.3* Temp (24hrs), Av °F (36.7 °C), Min:97.4 °F (36.3 °C), Max:98.4 °F (36.9 °C) Creatinine Clearance (mL/min) or Dialysis: 9.1 Impression/Plan:  
· Levofloxacin dose renally adjusted to 750 mg x 1, then 500 mg every 48 hours · Antimicrobial stop date 5 days Pharmacy will follow daily and adjust medications as appropriate for renal function and/or serum levels. Thank you, Samir Benoit, PHARMD

## 2020-04-09 NOTE — PROGRESS NOTES
HA: home with daughter assist and resumption of Medicaid private CG  
1) If [place] services are needed upon d/c daughter requesting bshc 2) Patient may need [facility] transport at time of d/c  
3) patient will need 2nd IM letter prior to d/c Reason for Admission:  CHF and RUMA  
            
RUR Score: 29% PCP: First and Last name: Dr. Ivania Diop Name of Practice: unable to recall Are you a current patient: Yes/No: yes Approximate date of last visit: 3/2/2020 Speciality MD: 
 
Cardiologist Dr. Carlos Maldonado. Per chart review patient was last seen on 1/29/2020 with an upcoming appointment scheduled for 7/28/2020 Resources/supports as identified by patient/family:  Family support, daughter primary caregiver, patient also has Medicaid paid CG's. Per daughter, the times that the CGs are at the home varies, but patient has 24/7 care between daughter and Medicaid CG. Daughter unable to recall the CG agency names at this time. Top Challenges facing patient (as identified by patient/family and CM): Finances/Medication cost?  Patient uses [address] [place] [address] in [place], no difficulties affording/accessing medications Transportation? Patient relies on daughter for all transportation needs Support system or lack thereof? Family support, daughter primary caregiver, patient also has Medicaid paid CG's. Per daughter, the times that the CGs are at the home varies, but patient has 24/7 care between daughter and Medicaid CG. Daughter unable to  recall CG agency name at this time. Living arrangements? Patient resides in her single level home with 2 small [place] A. Patient's daughter Stacey Urrutia [phone], primary caregiver, [place] of state to live with her mother \"until patient is better or passes away\". Per daughter, Medicaid is in the process of installing a ramp to patient's home. Self-care/ADLs/Cognition? Per daughter, patient's mobility has dramatically decreased within the last two weeks. Prior to 2 weeks ago, patient was able to walk to bathroom and throughout home with rollator. Within the last two weeks patient unable to stand up on her own and would require someone pushing the rollator for patient while standing behind her. Eventually, right before patient coming to hospital, patient was unable to walk at all and very weak. Patient can normally feed herself but was experiencing increasing difficulty during those two weeks prior to admission. Daughter reported, patient was having trouble getting food from plate to mouth and most of the food would end up in patient's lab. For the last week patient was receiving assistance eating from daughter or paid caregivers. Patient wears pull ups every night but has been wearing them during the day for the last few days prior to admission as patient was unable to make it to the bathroom due to mobility limitation. Per daughter, \"for a long time now\" patient has required full assistance with bathing and dressing. Current Advanced Directive/Advance Care Plan:  Patient is a DDNR (on file) and has an ACP on file listing her daughter Corine Cedeno [phone] as primary MPOA and son Harish Richards [phone] as secondary MPOA Plan for utilizing home health: If recommended, daughter would like to use [facility] Transition of Care Plan:               
 
 Patient is a 79 y/o female who was admitted to [facility] on 4/8/2020 for CHF and RUMA. CM contacted patient's daughter/MPOA/caregiver, Corine Cedeno [phone] who confirmed demographics, insurance, and emergency contact on file. Per daughter, patient has DME including hospital bed (patient does not currently use it because she does not like it), rollator, wheelchair, cane, and bedside commode.  Additionally patient has a shower chair but is unable to use it as she cannot get into shower tub. Per daughter Medicaid is in the process of installing a walk in shower for patient. Patient has used [facility] in the past and has never been to a SNF or IPR. Per daughter, the plan for d/c is for patient to return home with daughter and paid caregiver support. Daughter stated, \"under no circumstance will my mother ever go to a facility to live or obtain rehab. \" Daughter agreeable to [facility] services if recommended and would like to use [facility] if needed. Care Management Interventions PCP Verified by CM: Yes Last Visit to PCP: 03/02/20 Mode of Transport at Discharge: Other (see comment)(daughter, however may need [facility] transport, pending patient progress ) Transition of Care Consult (CM Consult): Discharge Planning Discharge Durable Medical Equipment: No 
Physical Therapy Consult: Yes Occupational Therapy Consult: No 
Speech Therapy Consult: No 
Current Support Network: Has Personal Caregivers, Family Lives [place], Own Home, Lives with Caregiver(Patient and daughter reside in patient's single level [facility] with 2 small [facility]. Patient also has private caregivers through [facility]) Confirm Follow Up Transport: Family Discharge Location Discharge Placement: Home with home health Taryn Venegas, [address]

## 2020-04-09 NOTE — PROGRESS NOTES
Problem: Pressure Injury - Risk of 
Goal: *Prevention of pressure injury Description: Document Michael Scale and appropriate interventions in the flowsheet. Outcome: Progressing Towards Goal 
Note: Pressure Injury Interventions: 
Sensory Interventions: Assess changes in LOC, Chair cushion, Assess need for specialty bed, Avoid rigorous massage over bony prominences, Discuss PT/OT consult with provider, Float heels, Keep linens dry and wrinkle-free, Maintain/enhance activity level, Minimize linen layers, Turn and reposition approx. every two hours (pillows and wedges if needed) Moisture Interventions: Absorbent underpads, Apply protective barrier, creams and emollients, Assess need for specialty bed, Check for incontinence Q2 hours and as needed, Limit adult briefs, Internal/External urinary devices, Maintain skin hydration (lotion/cream) Activity Interventions: Assess need for specialty bed, Increase time out of bed, PT/OT evaluation Mobility Interventions: Assess need for specialty bed, Float heels, HOB 30 degrees or less, Chair cushion, PT/OT evaluation, Turn and reposition approx. every two hours(pillow and wedges) Nutrition Interventions: Document food/fluid/supplement intake Friction and Shear Interventions: Apply protective barrier, creams and emollients, Feet elevated on foot rest, Foam dressings/transparent film/skin sealants, HOB 30 degrees or less, Lift sheet, Lift team/patient mobility team, Minimize layers Problem: Patient Education: Go to Patient Education Activity Goal: Patient/Family Education Outcome: Progressing Towards Goal 
  
Problem: Patient Education: Go to Patient Education Activity Goal: Patient/Family Education Outcome: Progressing Towards Goal 
  
Problem: Heart Failure: Day 1 Goal: Off Pathway (Use only if patient is Off Pathway) Outcome: Progressing Towards Goal 
Goal: Activity/Safety Outcome: Progressing Towards Goal 
Goal: Consults, if ordered Outcome: Progressing Towards Goal 
Goal: Diagnostic Test/Procedures Outcome: Progressing Towards Goal 
Goal: Nutrition/Diet Outcome: Progressing Towards Goal 
Goal: Discharge Planning Outcome: Progressing Towards Goal 
Goal: Medications Outcome: Progressing Towards Goal 
Goal: Respiratory Outcome: Progressing Towards Goal 
Goal: Treatments/Interventions/Procedures Outcome: Progressing Towards Goal 
Goal: Psychosocial 
Outcome: Progressing Towards Goal 
Goal: *Oxygen saturation within defined limits Outcome: Progressing Towards Goal 
Goal: *Hemodynamically stable Outcome: Progressing Towards Goal 
Goal: *Optimal pain control at patient's stated goal 
Outcome: Progressing Towards Goal 
Goal: *Anxiety reduced or absent Outcome: Progressing Towards Goal 
  
Problem: Heart Failure: Day 2 Goal: Off Pathway (Use only if patient is Off Pathway) Outcome: Progressing Towards Goal 
Goal: Activity/Safety Outcome: Progressing Towards Goal 
Goal: Consults, if ordered Outcome: Progressing Towards Goal 
Goal: Diagnostic Test/Procedures Outcome: Progressing Towards Goal 
Goal: Nutrition/Diet Outcome: Progressing Towards Goal 
Goal: Discharge Planning Outcome: Progressing Towards Goal 
Goal: Medications Outcome: Progressing Towards Goal 
Goal: Respiratory Outcome: Progressing Towards Goal 
Goal: Treatments/Interventions/Procedures Outcome: Progressing Towards Goal 
Goal: Psychosocial 
Outcome: Progressing Towards Goal 
Goal: *Oxygen saturation within defined limits Outcome: Progressing Towards Goal 
Goal: *Hemodynamically stable Outcome: Progressing Towards Goal 
Goal: *Optimal pain control at patient's stated goal 
Outcome: Progressing Towards Goal 
Goal: *Anxiety reduced or absent Outcome: Progressing Towards Goal 
Goal: *Demonstrates progressive activity Outcome: Progressing Towards Goal 
  
Problem: Heart Failure: Day 3 Goal: Off Pathway (Use only if patient is Off Pathway) Outcome: Progressing Towards Goal 
Goal: Activity/Safety Outcome: Progressing Towards Goal 
Goal: Diagnostic Test/Procedures Outcome: Progressing Towards Goal 
Goal: Nutrition/Diet Outcome: Progressing Towards Goal 
Goal: Discharge Planning Outcome: Progressing Towards Goal 
Goal: Medications Outcome: Progressing Towards Goal 
Goal: Respiratory Outcome: Progressing Towards Goal 
Goal: Treatments/Interventions/Procedures Outcome: Progressing Towards Goal 
Goal: Psychosocial 
Outcome: Progressing Towards Goal 
Goal: *Oxygen saturation within defined limits Outcome: Progressing Towards Goal 
Goal: *Hemodynamically stable Outcome: Progressing Towards Goal 
Goal: *Optimal pain control at patient's stated goal 
Outcome: Progressing Towards Goal 
Goal: *Anxiety reduced or absent Outcome: Progressing Towards Goal 
Goal: *Demonstrates progressive activity Outcome: Progressing Towards Goal 
  
Problem: Heart Failure: Day 4 Goal: Off Pathway (Use only if patient is Off Pathway) Outcome: Progressing Towards Goal 
Goal: Activity/Safety Outcome: Progressing Towards Goal 
Goal: Diagnostic Test/Procedures Outcome: Progressing Towards Goal 
Goal: Nutrition/Diet Outcome: Progressing Towards Goal 
Goal: Discharge Planning Outcome: Progressing Towards Goal 
Goal: Medications Outcome: Progressing Towards Goal 
Goal: Respiratory Outcome: Progressing Towards Goal 
Goal: Treatments/Interventions/Procedures Outcome: Progressing Towards Goal 
Goal: Psychosocial 
Outcome: Progressing Towards Goal 
Goal: *Oxygen saturation within defined limits Outcome: Progressing Towards Goal 
Goal: *Hemodynamically stable Outcome: Progressing Towards Goal 
Goal: *Optimal pain control at patient's stated goal 
Outcome: Progressing Towards Goal 
Goal: *Anxiety reduced or absent Outcome: Progressing Towards Goal 
Goal: *Demonstrates progressive activity Outcome: Progressing Towards Goal 
  
Problem: Heart Failure: Day 5 
 Goal: Off Pathway (Use only if patient is Off Pathway) Outcome: Progressing Towards Goal 
Goal: Activity/Safety Outcome: Progressing Towards Goal 
Goal: Diagnostic Test/Procedures Outcome: Progressing Towards Goal 
Goal: Nutrition/Diet Outcome: Progressing Towards Goal 
Goal: Discharge Planning Outcome: Progressing Towards Goal 
Goal: Medications Outcome: Progressing Towards Goal 
Goal: Respiratory Outcome: Progressing Towards Goal 
Goal: Treatments/Interventions/Procedures Outcome: Progressing Towards Goal 
Goal: Psychosocial 
Outcome: Progressing Towards Goal 
  
Problem: Heart Failure: Discharge Outcomes Goal: *Demonstrates ability to perform prescribed activity without shortness of breath or discomfort Outcome: Progressing Towards Goal 
Goal: *Left ventricular function assessment completed prior to or during stay, or planned for post-discharge Outcome: Progressing Towards Goal 
Goal: *ACEI prescribed if LVEF less than 40% and no contraindications or ARB prescribed Outcome: Progressing Towards Goal 
Goal: *Verbalizes understanding and describes prescribed diet Outcome: Progressing Towards Goal 
Goal: *Verbalizes understanding/describes prescribed medications Outcome: Progressing Towards Goal 
Goal: *Describes available resources and support systems Description: (eg: Home Health, Palliative Care, Advanced Medical Directive) Outcome: Progressing Towards Goal 
Goal: *Describes smoking cessation resources Outcome: Progressing Towards Goal 
Goal: *Understands and describes signs and symptoms to report to providers(Stroke Metric) Outcome: Progressing Towards Goal 
Goal: *Describes/verbalizes understanding of follow-up/return appt Description: (eg: to physicians, diabetes treatment coordinator, and other resources Outcome: Progressing Towards Goal 
Goal: *Describes importance of continuing daily weights and changes to report to physician Outcome: Progressing Towards Goal

## 2020-04-09 NOTE — PROGRESS NOTES
Spiritual Care Assessment/Progress Note [address] 
 
 
NAME: Damián Wright      MRN: [id number] AGE: 80 y.o. SEX: female Jainism Affiliation: [facility]  
Language: [place] 4/9/2020     Total Time (in minutes): 11 Spiritual Assessment begun in [facility] NEUROSCIENCE TELEMETRY through conversation with: 
  
    [x]Patient        [] Family    [] Friend(s) Reason for Consult: Initial/Spiritual assessment, patient floor Spiritual beliefs: (Please include comment if needed) [x] Identifies with a gregorio tradition:     
   [x] Supported by a gregorio community:        
   [] Claims no spiritual orientation:       
   [] Seeking spiritual identity:            
   [] Adheres to an individual form of spirituality:       
   [] Not able to assess:                   
 
    
Identified resources for coping:  
   [x] Prayer                           
   [] Music                  [] Guided Imagery [x] Family/friends                 [] Pet visits [] Devotional reading                         [] Unknown 
   [] Other:                                         
 
 
Interventions offered during this visit: (See comments for more details) Patient Interventions: Prayer (actual), Affirmation of gregorio, Normalization of emotional/spiritual concerns, Iconic (affirming the presence of God/Higher Power) Plan of Care: 
 
 [] Support spiritual and/or cultural needs  
 [] Support AMD and/or advance care planning process    
 [] Support grieving process 
 [] Coordinate Rites and/or Rituals  
 [] Coordination with community clergy [] No spiritual needs identified at this time 
 [] Detailed Plan of Care below (See Comments)  [] Make referral to Music Therapy 
[] Make referral to Pet Therapy    
[] Make referral to Addiction services 
[] Make referral to [facility] 
[] Make referral to Spiritual Care Partner 
[] No future visits requested       
[x] Follow up visits as needed Comments:  visited pt, Miss Jovana Humphreys at the hospitals [address] [place] [address] unit for palliative care/initial spiritual assessment. Miss Jovana Humphreys was in bed and was comfortable.  facilitated story telling to provide spiritual support. Miss Fara Lemus shared some family and gregorio stories. She stated that she has several children, grandchildren, great grand children and great great grand children as well. Alida Hauser appeared in good spirit talking about her family.  offered active listening and supportive presence. Miss Jovana Humphreys also indicated that gregorio was an important part of her life.  thus offered to say a prayer which she gladly welcomed, and expressed appreciation for visit and prayer. Spiritual care will continue to follow up as needed. Visited by: Epi Pickens. To nuvia fox: [phone])

## 2020-04-10 PROBLEM — Z71.89 COUNSELING REGARDING ADVANCE CARE PLANNING AND GOALS OF CARE: Status: ACTIVE | Noted: 2020-01-01

## 2020-04-10 NOTE — ROUTINE PROCESS
Bedside and Verbal shift change report given to Ambrose (oncoming nurse) by Chasity Rice (offgoing nurse). Report included the following information [facility]. [facility] Phone:   [phone] Significant changes during shift:   
Patient put out 100 ml of urine. She said it burned and felt hot. Patient Information Damián Wright 80 y.o. 
4/8/2020  2:18 PM by Amol Kulkarni MD. Damián Wright was admitted from Home 
 
Problem List 
 
Patient Active Problem List  
 Diagnosis Date Noted  RUMA (acute kidney injury) ([address] 04/08/2020  Elevated troponin 02/20/2020  
 NSTEMI (non-ST elevated myocardial infarction) ([address] 02/19/2020  Acute kidney injury ([address] 09/30/2019  CHF (congestive heart failure) ([address] 03/02/2019  Pleural effusion, left 07/17/2018  CHF, acute ([address] 07/17/2018  CKD (chronic kidney disease) stage 3, GFR 30-59 ml/min ([facility]) 05/29/2018  Chronic a-fib 05/29/2018  Diastolic CHF, acute on chronic ([address] 05/29/2018  Pleural effusion 05/29/2018  Pneumonia 04/18/2017  Sepsis(995.91) 04/18/2017  Dehydration 04/18/2017  Acute CHF ([address] 01/23/2015  SOB (shortness of breath) 01/21/2015  Pedal edema 01/21/2015  Squamous cell lung cancer ([address] 01/21/2015  Adenocarcinoma ([address] 04/05/2013  S/P right colectomy 04/05/2013  Malignant essential hypertension 04/01/2013  Colon cancer ([address] 03/30/2013  Ileitis, terminal ([address] 03/27/2013  A-fib ([address] 03/27/2013  
 HTN (hypertension) 03/27/2013 Past Medical History:  
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   
 gout  Squamous cell lung cancer ([facility] Core Measures: CVA: No No 
CHF:Yes Yes PNA:No No 
 
N/A Activity Status: 
 
X2 assistance Supplemental O2: (If Applicable) N/A 
 
 LINES AND DRAINS: 
 
PIV 
 
DVT prophylaxis: 
 
eliquis Wounds: (If Applicable) Gluteal fold Patient Safety: 
 
Falls Score Total Score: 2 Safety Level_______ Bed Alarm On? Yes Sitter? Yes 
 
Plan for upcoming shift: monitor I&O's 
 
Discharge Plan: ongoing Active Consults: 
IP CONSULT TO CARDIOLOGY 
IP CONSULT TO CARDIOLOGY 
IP CONSULT TO HOSPITALIST 
IP CONSULT TO PALLIATIVE CARE - PROVIDER 
IP CONSULT TO PALLIATIVE CARE - PROVIDER

## 2020-04-10 NOTE — PROGRESS NOTES
Problem: Mobility Impaired (Adult and Pediatric) Goal: *Acute Goals and Plan of Care (Insert Text) Description: FUNCTIONAL STATUS PRIOR TO ADMISSION: Patient was modified independent using a rollator for functional mobility. She denies falls. Fair historian. No family present. HOME SUPPORT PRIOR TO ADMISSION: The patient lived with her daughter and has home health aides come in 5 days per week. Assists with bathing and dressing. States she sometimes needs help with feeding. Physical Therapy Goals Initiated 4/9/2020 1. Patient will move from supine to sit and sit to supine, scoot up and down and roll side to side in bed with minimal assistance within 7 day(s). 2.  Patient will transfer from bed to chair and chair to bed with minimal assistance using the least restrictive device within 7 day(s). 3.  Patient will perform sit to stand with minimal assistance/contact guard assist within 7 day(s). 4.  Patient will ambulate with minimal assistance for 30 feet with the least restrictive device within 7 day(s). 5.  Patient will ascend/descend 1 stairs with right handrail(s) with minimal assistance within 7 day(s). 6. Patient will participate in a 6 MWT for her diagnosis of heart failure within 48 hours of discharge. Outcome: Not Progressing Towards Goal 
 PHYSICAL THERAPY TREATMENT Patient: Praful Alberto (89 y.o. female) Date: 4/10/2020 Diagnosis: CHF (congestive heart failure) ([facility] [I50.9] RUMA (acute kidney injury) ([facility] [N17.9] Diastolic CHF, acute on chronic (HCC) Precautions: Fall, Bed Alarm, DNR Chart, physical therapy assessment, plan of care and goals were reviewed. ASSESSMENT Patient continues with skilled PT services and is not progressing towards goals. Pt continues to present with confusion, impaired balance, increased pedal edema, generalized weakness, decreased ROM, and decreased endurance. Pt now reporting buttock pain.   Patient continues to require overall mod-max Ax1 for OOB to chair SPT this visit. Pt having difficulty taking steps. No SOB noted this visit. Pt may progress better in her home environment and will require 24 hr physical assist upon d/c home. Recommending HHPT to follow pt in her home environment. Current Level of Function Impacting Discharge (mobility/balance): mod-max A x 1 Other factors to consider for discharge: pt with very supportive family and necessary DME at home PLAN : 
Patient continues to benefit from skilled intervention to address the above impairments. Continue treatment per established plan of care. to address goals. Recommendation for discharge: (in order for the patient to meet his/her long term goals) Physical therapy at least 2 days/week in the home AND ensure assist and/or supervision for safety This discharge recommendation: 
Has been made in collaboration with the attending provider and/or case management IF patient discharges home will need the following DME: none SUBJECTIVE:  
Patient stated I want to get out of here.  OBJECTIVE DATA SUMMARY:  
Critical Behavior: 
Neurologic State: Alert Orientation Level: Oriented X4 Cognition: Follows commands Functional Mobility Training: 
Bed Mobility: 
  
Supine to Sit: Moderate assistance Scooting: Maximum assistance Transfers: 
Sit to Stand: Moderate assistance;Maximum assistance Stand to Sit: Moderate assistance Bed to Chair: Moderate assistance;Maximum assistance Level of Assistance: Moderate assistance;Maximum assistance Balance: 
Sitting: Impaired; Without support Sitting - Static: Good (unsupported) Sitting - Dynamic: Fair (occasional) Standing: Impaired Standing - Static: Fair Standing - Dynamic : Poor Activity Tolerance:  
Fair Please refer to the flowsheet for vital signs taken during this treatment. After treatment patient left in no apparent distress: Sitting in chair, Call bell within reach, and Bed / chair alarm activated COMMUNICATION/COLLABORATION:  
The patients plan of care was discussed with: Registered nurse. Zhane Uribe, PT Time Calculation: 23 mins

## 2020-04-10 NOTE — PROGRESS NOTES
Spoke with Luis Eduardo Retana RN regarding sepsis bundle. Leah Matos, RN [address] [facility] [phone]

## 2020-04-10 NOTE — PROGRESS NOTES
[address] [facility], [address] [facility]  [phone] Cardiology Progress Note 4/10/2020 10:07 AM 
 
Admit Date: 4/8/2020 Admit Diagnosis:  
CH[facility] (congestive heart failure) [facility] [I50.9] RUMA (acute kidney injury) ([facility] [N17.9] Subjective:  
 
Jojo Bolanos has no cardiac complaints this am. Remains on RA Sat's % no complaints of SOB. Complaining that her sacrum hurts and kept repeating, \"Tell Lizandro to get on in here to get me out of here\". When asked who Anneliese oM is, she said \"my son\". VSS. Cr trending up 2.87 today. I/O, +440. Incomplete I/O patiet has purewick, but episodes of incontinence. Visit Vitals /74 Pulse (!) 101 Temp 98.1 °F (36.7 °C) Resp 18 Ht 5' 2\" (1.575 m) Wt 67.8 kg (149 lb 8 oz) SpO2 96% BMI 27.34 kg/m² Current Facility-Administered Medications Medication Dose Route Frequency  sodium chloride (NS) flush 5-40 mL  5-40 mL IntraVENous Q8H  
 sodium chloride (NS) flush 5-40 mL  5-40 mL IntraVENous PRN  
 ondansetron (ZOFRAN) injection 4 mg  4 mg IntraVENous Q6H PRN  
 acetaminophen (TYLENOL) tablet 650 mg  650 mg Oral Q6H PRN  
 sodium chloride (NS) flush 5-40 mL  5-40 mL IntraVENous Q8H  
 sodium chloride (NS) flush 5-40 mL  5-40 mL IntraVENous PRN  
 albuterol (PROVENTIL HFA, VENTOLIN HFA, PROAIR HFA) inhaler 2 Puff  2 Puff Inhalation Q4H PRN  
 apixaban (ELIQUIS) tablet 2.5 mg  2.5 mg Oral BID  colchicine tablet 0.6 mg  0.6 mg Oral DAILY PRN  
 docusate sodium (COLACE) capsule 100 mg  100 mg Oral DAILY PRN  
 isosorbide mononitrate ER (IMDUR) tablet 30 mg  30 mg Oral DAILY  hydrALAZINE (APRESOLINE) tablet 10 mg  10 mg Oral DAILY  metOLazone (ZAROXOLYN) tablet 2.5 mg  2.5 mg Oral Q MON, WED & FRI  nystatin (MYCOSTATIN) 100,000 unit/gram powder   Topical BID PRN  potassium bicarb-citric acid (EFFER-K) tablet 20 mEq  20 mEq Oral DAILY  furosemide (LASIX) injection 60 mg  60 mg IntraVENous BID  
  multivitamin, tx-iron-ca-min (THERA-M w/ IRON) tablet 1 Tab  1 Tab Oral DAILY  levoFLOXacin (LEVAQUIN) 500 mg in D5W IVPB  500 mg IntraVENous Q48H Objective:  
  
Physical Exam: 
General: frail, elderly AA woman in NAD laying in bed Heart: irregular, no m/S3/JVD, no carotid bruits Lungs: diminished throughout, RA, productive cough with clear/white phlegm Abdomen: Soft, +BS, NTND, obese : purewick in place, U.O. minimal, genny, some incontinence Extremities: LE espinoza +DP/PT, 3-4+ pitting edema to right leg,ankle,and foot. 3+ pitting edema to left leg, ankle, foot. Some blistering on right shin. Neurologic: Alert and oriented to person and place, overall weakness, AGOSTO Data Review:  
Recent Labs 04/10/20 
7948 04/09/20 
0256 04/08/20 
1513 WBC 16.4* 15.6* 17.3* HGB 14.3 14.2 15.4 HCT 42.1 41.2 45.4  193 208 Recent Labs 04/10/20 
3812 04/09/20 
0256 04/08/20 
1513 * 131* 130*  
K 4.9 4.8 4.9 CL 91* 90* 90* CO2 30 31 34* GLU 90 66 92 BUN 94* 87* 82* CREA 2.87* 2.64* 2.78* CA 9.9 10.1 10.7* MG  --  3.4*  --   
ALB  --  2.3* 2.7* TBILI  --  0.9 1.0  
SGOT  --  22 21 ALT  --  19 22 Recent Labs 04/08/20 
1513 TROIQ 0.11* Intake/Output Summary (Last 24 hours) at 4/10/2020 1007 Last data filed at 4/9/2020 2347 Gross per 24 hour Intake 220 ml Output 150 ml Net 70 ml Cardiographics 
  Telemetry: Atrial Fibrillation, rate controlled 60-80's ECG: Atrial Fibrillation, rate controlled Echocardiogram:  
· 2/20/2020:Normal cavity size, wall thickness and systolic function (ejection fraction normal). Estimated left ventricular ejection fraction is 55 - 60%. Visually measured ejection fraction. · Moderately dilated left atrium. · Severely dilated right atrium. · Mitral valve thickening. Moderate mitral valve regurgitation is present. · Severe tricuspid valve regurgitation is present. · Severe pulmonary hypertension. · There is a large left pleural effusion. CXRAY: 
            1. Unchanged small to moderate chronic left pleural effusion with chronic 
            atelectasis/volume loss of the left lung. Likely trace right pleural effusion. 2. Unchanged cardiomegaly. Pulmonary vascular congestion without overt pulmonary 
            edema. Assessment:  
 
Principal Problem: 
  Diastolic CHF, acute on chronic ([facility] (5/29/2018) Active Problems: 
  HTN (hypertension) (3/27/2013) SOB (shortness of breath) (1/21/2015) CKD (chronic kidney disease) stage 3, GFR 30-59 ml/min ([facility]) (5/29/2018) Chronic a-fib (5/29/2018) Elevated troponin (2/20/2020) RUMA (acute kidney injury) ([facility] (4/8/2020) Plan:  
J Carlos Lee a 80 y. o. female who presented to the ED with c/o SOB and swelling yesterday.  CXR showed pulmonary vasculature congestion.  Creatinine 2.87 (baseline 1.18-1.22);  proBNP 10,770 (initial was 11,546).  Receiving high dose IV lasix with little result.  Troponin 0.11. WBC 16.4. TSH 0.71 
· 2/20/2020 ECHO shows EF of 55-60%, with no significant changes from prior · Although CXR clear, likely component of acute on chronic diastolic heart failure, severe PHTN.  Low albumin (2.3) may be contributing some to swelling.   
· Continue diuresis with IV lasix and metolazone. Watch BMP daily for e- and Cr, minimal response thus far to diuresis. Continue to monitor telemetry. · Mild, flat troponin may be from heart failure.  No chest pain. On small dose of Imdur. · No invasive ischemic work up planned.  Not adding ASA due to eliquis.  Not adding statin due to age.  BB previously discontinued due to lung disease. · Eliquis 2.5 mg for chronic a fib. CHADVASC=3  Rate controlled · HTN controlled on current agents. · CKD/RUMA- continue to monitor with diuresis · Chronic bilateral lower extremity edema · Poor PO intake/decreased mobility tolerance/frequent readmissions-agree with Palliative consult, respect DNR status.  
  
Sujey Castillo NP  
MSN,RN,AG-ACNP-BC Patient seen and examined by me with nurse practitioner. I personally performed all components of the history, physical, and medical decision making and agree with the assessment and plan as noted. Will follow as needed.   
 
George Caba MD

## 2020-04-10 NOTE — PROGRESS NOTES
Problem: Pressure Injury - Risk of 
Goal: *Prevention of pressure injury Description: Document Michael Scale and appropriate interventions in the flowsheet. Outcome: Progressing Towards Goal 
Note: Pressure Injury Interventions: 
Sensory Interventions: Assess changes in LOC, Avoid rigorous massage over bony prominences, Chair cushion, Check visual cues for pain, Assess need for specialty bed, Discuss PT/OT consult with provider, Float heels, Keep linens dry and wrinkle-free, Maintain/enhance activity level, Minimize linen layers, Turn and reposition approx. every two hours (pillows and wedges if needed) Moisture Interventions: Absorbent underpads, Apply protective barrier, creams and emollients, Assess need for specialty bed, Check for incontinence Q2 hours and as needed, Limit adult briefs, Maintain skin hydration (lotion/cream), Minimize layers, Moisture barrier Activity Interventions: Assess need for specialty bed, Increase time out of bed, PT/OT evaluation Mobility Interventions: Assess need for specialty bed, Chair cushion, Float heels, HOB 30 degrees or less, PT/OT evaluation, Turn and reposition approx. every two hours(pillow and wedges) Nutrition Interventions: Document food/fluid/supplement intake Friction and Shear Interventions: Foam dressings/transparent film/skin sealants, HOB 30 degrees or less, Apply protective barrier, creams and emollients, Feet elevated on foot rest, Lift sheet, Lift team/patient mobility team, Minimize layers Problem: Patient Education: Go to Patient Education Activity Goal: Patient/Family Education Outcome: Progressing Towards Goal 
  
Problem: Patient Education: Go to Patient Education Activity Goal: Patient/Family Education Outcome: Progressing Towards Goal 
  
Problem: Heart Failure: Day 1 Goal: Off Pathway (Use only if patient is Off Pathway) Outcome: Progressing Towards Goal 
Goal: Activity/Safety Outcome: Progressing Towards Goal 
 Goal: Consults, if ordered Outcome: Progressing Towards Goal 
Goal: Diagnostic Test/Procedures Outcome: Progressing Towards Goal 
Goal: Nutrition/Diet Outcome: Progressing Towards Goal 
Goal: Discharge Planning Outcome: Progressing Towards Goal 
Goal: Medications Outcome: Progressing Towards Goal 
Goal: Respiratory Outcome: Progressing Towards Goal 
Goal: Treatments/Interventions/Procedures Outcome: Progressing Towards Goal 
Goal: Psychosocial 
Outcome: Progressing Towards Goal 
Goal: *Oxygen saturation within defined limits Outcome: Progressing Towards Goal 
Goal: *Hemodynamically stable Outcome: Progressing Towards Goal 
Goal: *Optimal pain control at patient's stated goal 
Outcome: Progressing Towards Goal 
Goal: *Anxiety reduced or absent Outcome: Progressing Towards Goal 
  
Problem: Heart Failure: Day 2 Goal: Off Pathway (Use only if patient is Off Pathway) Outcome: Progressing Towards Goal 
Goal: Activity/Safety Outcome: Progressing Towards Goal 
Goal: Consults, if ordered Outcome: Progressing Towards Goal 
Goal: Diagnostic Test/Procedures Outcome: Progressing Towards Goal 
Goal: Nutrition/Diet Outcome: Progressing Towards Goal 
Goal: Discharge Planning Outcome: Progressing Towards Goal 
Goal: Medications Outcome: Progressing Towards Goal 
Goal: Respiratory Outcome: Progressing Towards Goal 
Goal: Treatments/Interventions/Procedures Outcome: Progressing Towards Goal 
Goal: Psychosocial 
Outcome: Progressing Towards Goal 
Goal: *Oxygen saturation within defined limits Outcome: Progressing Towards Goal 
Goal: *Hemodynamically stable Outcome: Progressing Towards Goal 
Goal: *Optimal pain control at patient's stated goal 
Outcome: Progressing Towards Goal 
Goal: *Anxiety reduced or absent Outcome: Progressing Towards Goal 
Goal: *Demonstrates progressive activity Outcome: Progressing Towards Goal 
  
Problem: Heart Failure: Day 3 Goal: Off Pathway (Use only if patient is Off Pathway) Outcome: Progressing Towards Goal 
Goal: Activity/Safety Outcome: Progressing Towards Goal 
Goal: Diagnostic Test/Procedures Outcome: Progressing Towards Goal 
Goal: Nutrition/Diet Outcome: Progressing Towards Goal 
Goal: Discharge Planning Outcome: Progressing Towards Goal 
Goal: Medications Outcome: Progressing Towards Goal 
Goal: Respiratory Outcome: Progressing Towards Goal 
Goal: Treatments/Interventions/Procedures Outcome: Progressing Towards Goal 
Goal: Psychosocial 
Outcome: Progressing Towards Goal 
Goal: *Oxygen saturation within defined limits Outcome: Progressing Towards Goal 
Goal: *Hemodynamically stable Outcome: Progressing Towards Goal 
Goal: *Optimal pain control at patient's stated goal 
Outcome: Progressing Towards Goal 
Goal: *Anxiety reduced or absent Outcome: Progressing Towards Goal 
Goal: *Demonstrates progressive activity Outcome: Progressing Towards Goal 
  
Problem: Heart Failure: Day 4 Goal: Off Pathway (Use only if patient is Off Pathway) Outcome: Progressing Towards Goal 
Goal: Activity/Safety Outcome: Progressing Towards Goal 
Goal: Diagnostic Test/Procedures Outcome: Progressing Towards Goal 
Goal: Nutrition/Diet Outcome: Progressing Towards Goal 
Goal: Discharge Planning Outcome: Progressing Towards Goal 
Goal: Medications Outcome: Progressing Towards Goal 
Goal: Respiratory Outcome: Progressing Towards Goal 
Goal: Treatments/Interventions/Procedures Outcome: Progressing Towards Goal 
Goal: Psychosocial 
Outcome: Progressing Towards Goal 
Goal: *Oxygen saturation within defined limits Outcome: Progressing Towards Goal 
Goal: *Hemodynamically stable Outcome: Progressing Towards Goal 
Goal: *Optimal pain control at patient's stated goal 
Outcome: Progressing Towards Goal 
Goal: *Anxiety reduced or absent Outcome: Progressing Towards Goal 
Goal: *Demonstrates progressive activity Outcome: Progressing Towards Goal 
  
Problem: Heart Failure: Day 5 
 Goal: Off Pathway (Use only if patient is Off Pathway) Outcome: Progressing Towards Goal 
Goal: Activity/Safety Outcome: Progressing Towards Goal 
Goal: Diagnostic Test/Procedures Outcome: Progressing Towards Goal 
Goal: Nutrition/Diet Outcome: Progressing Towards Goal 
Goal: Discharge Planning Outcome: Progressing Towards Goal 
Goal: Medications Outcome: Progressing Towards Goal 
Goal: Respiratory Outcome: Progressing Towards Goal 
Goal: Treatments/Interventions/Procedures Outcome: Progressing Towards Goal 
Goal: Psychosocial 
Outcome: Progressing Towards Goal 
  
Problem: Heart Failure: Discharge Outcomes Goal: *Demonstrates ability to perform prescribed activity without shortness of breath or discomfort Outcome: Progressing Towards Goal 
Goal: *Left ventricular function assessment completed prior to or during stay, or planned for post-discharge Outcome: Progressing Towards Goal 
Goal: *ACEI prescribed if LVEF less than 40% and no contraindications or ARB prescribed Outcome: Progressing Towards Goal 
Goal: *Verbalizes understanding and describes prescribed diet Outcome: Progressing Towards Goal 
Goal: *Verbalizes understanding/describes prescribed medications Outcome: Progressing Towards Goal 
Goal: *Describes available resources and support systems Description: (eg: Home Health, Palliative Care, [employer] Directive) Outcome: Progressing Towards Goal 
Goal: *Describes smoking cessation resources Outcome: Progressing Towards Goal 
Goal: *Understands and describes signs and symptoms to report to providers(Stroke Metric) Outcome: Progressing Towards Goal 
Goal: *Describes/verbalizes understanding of follow-up/return appt Description: (eg: to physicians, diabetes treatment coordinator, and other resources Outcome: Progressing Towards Goal 
Goal: *Describes importance of continuing daily weights and changes to report to physician Outcome: Progressing Towards Goal

## 2020-04-10 NOTE — PROGRESS NOTES
Problem: Pressure Injury - Risk of 
Goal: *Prevention of pressure injury Description: Document Michael Scale and appropriate interventions in the flowsheet. 4/10/2020 1612 by Ezekiel Gaitan Outcome: Progressing Towards Goal 
Note: Pressure Injury Interventions: 
Sensory Interventions: Assess changes in LOC, Avoid rigorous massage over bony prominences, Chair cushion, Check visual cues for pain, Assess need for specialty bed, Discuss PT/OT consult with provider, Float heels, Keep linens dry and wrinkle-free, Maintain/enhance activity level, Minimize linen layers, Turn and reposition approx. every two hours (pillows and wedges if needed) Moisture Interventions: Absorbent underpads, Apply protective barrier, creams and emollients, Assess need for specialty bed, Check for incontinence Q2 hours and as needed, Limit adult briefs, Maintain skin hydration (lotion/cream), Minimize layers, Moisture barrier Activity Interventions: Assess need for specialty bed, Increase time out of bed, PT/OT evaluation Mobility Interventions: Assess need for specialty bed, Chair cushion, Float heels, HOB 30 degrees or less, PT/OT evaluation, Turn and reposition approx. every two hours(pillow and wedges) Nutrition Interventions: Document food/fluid/supplement intake Friction and Shear Interventions: Foam dressings/transparent film/skin sealants, HOB 30 degrees or less, Apply protective barrier, creams and emollients, Feet elevated on foot rest, Lift sheet, Lift team/patient mobility team, Minimize layers 4/10/2020 1521 by Ezekiel Gaitan Outcome: Progressing Towards Goal 
Note: Pressure Injury Interventions: 
Sensory Interventions: Assess changes in LOC, Avoid rigorous massage over bony prominences, Chair cushion, Check visual cues for pain, Assess need for specialty bed, Discuss PT/OT consult with provider, Float heels, Keep linens dry and wrinkle-free, Maintain/enhance activity level, Minimize linen layers, Turn and reposition approx. every two hours (pillows and wedges if needed) Moisture Interventions: Absorbent underpads, Apply protective barrier, creams and emollients, Assess need for specialty bed, Check for incontinence Q2 hours and as needed, Limit adult briefs, Maintain skin hydration (lotion/cream), Minimize layers, Moisture barrier Activity Interventions: Assess need for specialty bed, Increase time out of bed, PT/OT evaluation Mobility Interventions: Assess need for specialty bed, Chair cushion, Float heels, HOB 30 degrees or less, PT/OT evaluation, Turn and reposition approx. every two hours(pillow and wedges) Nutrition Interventions: Document food/fluid/supplement intake Friction and Shear Interventions: Foam dressings/transparent film/skin sealants, HOB 30 degrees or less, Apply protective barrier, creams and emollients, Feet elevated on foot rest, Lift sheet, Lift team/patient mobility team, Minimize layers Problem: Patient Education: Go to Patient Education Activity Goal: Patient/Family Education 4/10/2020 1612 by Rosalio Sanchez Outcome: Progressing Towards Goal 
4/10/2020 1521 by Rosalio Sanchez Outcome: Progressing Towards Goal 
  
Problem: Patient Education: Go to Patient Education Activity Goal: Patient/Family Education 4/10/2020 1612 by Rosalio Sanchez Outcome: Progressing Towards Goal 
4/10/2020 1521 by Rosalio Sanchez Outcome: Progressing Towards Goal 
  
Problem: Heart Failure: Day 1 Goal: Off Pathway (Use only if patient is Off Pathway) 4/10/2020 1612 by Rosalio Sanchez Outcome: Progressing Towards Goal 
4/10/2020 1521 by Rosalio Sanchez Outcome: Progressing Towards Goal 
Goal: Activity/Safety 4/10/2020 1612 by Rosalio Sanchez Outcome: Progressing Towards Goal 
4/10/2020 1521 by Rosalio Sanchez Outcome: Progressing Towards Goal 
Goal: Consults, if ordered 4/10/2020 1612 by Rosalio Sanchez Outcome: Progressing Towards Goal 
 4/10/2020 1521 by Annae Lec Outcome: Progressing Towards Goal 
Goal: Diagnostic Test/Procedures 4/10/2020 1612 by Annae Lec Outcome: Progressing Towards Goal 
4/10/2020 1521 by Denyce Lecher Outcome: Progressing Towards Goal 
Goal: Nutrition/Diet 4/10/2020 1612 by Denyce Lecher Outcome: Progressing Towards Goal 
4/10/2020 1521 by Denyce Lecher Outcome: Progressing Towards Goal 
Goal: Discharge Planning 4/10/2020 1612 by Annae Lecher Outcome: Progressing Towards Goal 
4/10/2020 1521 by Denyce Lecher Outcome: Progressing Towards Goal 
Goal: Medications 4/10/2020 1612 by Yonathanyce Lecher Outcome: Progressing Towards Goal 
4/10/2020 1521 by Denyce Lecher Outcome: Progressing Towards Goal 
Goal: Respiratory 4/10/2020 1612 by Yonathanyce Lecher Outcome: Progressing Towards Goal 
4/10/2020 1521 by Denyce Lecher Outcome: Progressing Towards Goal 
Goal: Treatments/Interventions/Procedures 4/10/2020 1612 by Annae Lecher Outcome: Progressing Towards Goal 
4/10/2020 1521 by Denyce Lecher Outcome: Progressing Towards Goal 
Goal: Psychosocial 
4/10/2020 1612 by Yonathanyce Lecher Outcome: Progressing Towards Goal 
4/10/2020 1521 by Denyce Lecher Outcome: Progressing Towards Goal 
Goal: *Oxygen saturation within defined limits 4/10/2020 1612 by Yonathanyce Lecher Outcome: Progressing Towards Goal 
4/10/2020 1521 by Denyce Lecher Outcome: Progressing Towards Goal 
Goal: *Hemodynamically stable 4/10/2020 1612 by Annae Lecher Outcome: Progressing Towards Goal 
4/10/2020 1521 by Denyce Lecher Outcome: Progressing Towards Goal 
Goal: *Optimal pain control at patient's stated goal 
4/10/2020 1612 by Yonathanyce Lecher Outcome: Progressing Towards Goal 
4/10/2020 1521 by Denyce Lecher Outcome: Progressing Towards Goal 
Goal: *Anxiety reduced or absent 4/10/2020 1612 by Yonathanyce Lecher Outcome: Progressing Towards Goal 
4/10/2020 1521 by Denyce Lecher Outcome: Progressing Towards Goal 
  
 Problem: Heart Failure: Day 2 Goal: Off Pathway (Use only if patient is Off Pathway) 4/10/2020 1612 by Billie Masker Outcome: Progressing Towards Goal 
4/10/2020 1521 by Billie Masker Outcome: Progressing Towards Goal 
Goal: Activity/Safety 4/10/2020 1612 by Billie Masker Outcome: Progressing Towards Goal 
4/10/2020 1521 by Billie Masker Outcome: Progressing Towards Goal 
Goal: Consults, if ordered 4/10/2020 1612 by Billie Masker Outcome: Progressing Towards Goal 
4/10/2020 1521 by Billie Masker Outcome: Progressing Towards Goal 
Goal: Diagnostic Test/Procedures 4/10/2020 1612 by Billie Masker Outcome: Progressing Towards Goal 
4/10/2020 1521 by Billie Masker Outcome: Progressing Towards Goal 
Goal: Nutrition/Diet 4/10/2020 1612 by Billie Masker Outcome: Progressing Towards Goal 
4/10/2020 1521 by Billie Masker Outcome: Progressing Towards Goal 
Goal: Discharge Planning 4/10/2020 1612 by Billie Masker Outcome: Progressing Towards Goal 
4/10/2020 1521 by Billie Masker Outcome: Progressing Towards Goal 
Goal: Medications 4/10/2020 1612 by Billie Masker Outcome: Progressing Towards Goal 
4/10/2020 1521 by Billie Masker Outcome: Progressing Towards Goal 
Goal: Respiratory 4/10/2020 1612 by Billie Masker Outcome: Progressing Towards Goal 
4/10/2020 1521 by Billie Masker Outcome: Progressing Towards Goal 
Goal: Treatments/Interventions/Procedures 4/10/2020 1612 by Billie Masker Outcome: Progressing Towards Goal 
4/10/2020 1521 by Billie Masker Outcome: Progressing Towards Goal 
Goal: Psychosocial 
4/10/2020 1612 by Billie Masker Outcome: Progressing Towards Goal 
4/10/2020 1521 by Billie Masker Outcome: Progressing Towards Goal 
Goal: *Oxygen saturation within defined limits 4/10/2020 1612 by Billie Masker Outcome: Progressing Towards Goal 
4/10/2020 1521 by Billie Masker Outcome: Progressing Towards Goal 
Goal: *Hemodynamically stable 4/10/2020 1612 by Forrestine Pallas Outcome: Progressing Towards Goal 
4/10/2020 1521 by Forrestine Pallas Outcome: Progressing Towards Goal 
Goal: *Optimal pain control at patient's stated goal 
4/10/2020 1612 by Forrestine Pallas Outcome: Progressing Towards Goal 
4/10/2020 1521 by Forrestine Pallas Outcome: Progressing Towards Goal 
Goal: *Anxiety reduced or absent 4/10/2020 1612 by Forrestine Pallas Outcome: Progressing Towards Goal 
4/10/2020 1521 by Forrestine Pallas Outcome: Progressing Towards Goal 
Goal: *Demonstrates progressive activity 4/10/2020 1612 by Forrestine Pallas Outcome: Progressing Towards Goal 
4/10/2020 1521 by Forrestine Pallas Outcome: Progressing Towards Goal 
  
Problem: Heart Failure: Day 3 Goal: Off Pathway (Use only if patient is Off Pathway) 4/10/2020 1612 by Forrestine Pallas Outcome: Progressing Towards Goal 
4/10/2020 1521 by Forrestine Pallas Outcome: Progressing Towards Goal 
Goal: Activity/Safety 4/10/2020 1612 by Forrestine Pallas Outcome: Progressing Towards Goal 
4/10/2020 1521 by Forrestine Pallas Outcome: Progressing Towards Goal 
Goal: Diagnostic Test/Procedures 4/10/2020 1612 by Forrestine Pallas Outcome: Progressing Towards Goal 
4/10/2020 1521 by Forrestine Pallas Outcome: Progressing Towards Goal 
Goal: Nutrition/Diet 4/10/2020 1612 by Forrestine Pallas Outcome: Progressing Towards Goal 
4/10/2020 1521 by Forrestine Pallas Outcome: Progressing Towards Goal 
Goal: Discharge Planning 4/10/2020 1612 by Forrestine Pallas Outcome: Progressing Towards Goal 
4/10/2020 1521 by Forrestine Pallas Outcome: Progressing Towards Goal 
Goal: Medications 4/10/2020 1612 by Forrestine Pallas Outcome: Progressing Towards Goal 
4/10/2020 1521 by Forrestine Pallas Outcome: Progressing Towards Goal 
Goal: Respiratory 4/10/2020 1612 by Forrestine Pallas Outcome: Progressing Towards Goal 
4/10/2020 1521 by Forrestine Pallas Outcome: Progressing Towards Goal 
Goal: Treatments/Interventions/Procedures 4/10/2020 1612 by Kyaw Oviedo Outcome: Progressing Towards Goal 
4/10/2020 1521 by Kyaw Oviedo Outcome: Progressing Towards Goal 
Goal: Psychosocial 
4/10/2020 1612 by Kyaw Oviedo Outcome: Progressing Towards Goal 
4/10/2020 1521 by Kyaw Oviedo Outcome: Progressing Towards Goal 
Goal: *Oxygen saturation within defined limits 4/10/2020 1612 by Kyaw Oviedo Outcome: Progressing Towards Goal 
4/10/2020 1521 by Kyaw Oviedo Outcome: Progressing Towards Goal 
Goal: *Hemodynamically stable 4/10/2020 1612 by Kyaw Oviedo Outcome: Progressing Towards Goal 
4/10/2020 1521 by Kyaw Oviedo Outcome: Progressing Towards Goal 
Goal: *Optimal pain control at patient's stated goal 
4/10/2020 1612 by Kyaw Oviedo Outcome: Progressing Towards Goal 
4/10/2020 1521 by Kyaw Oviedo Outcome: Progressing Towards Goal 
Goal: *Anxiety reduced or absent 4/10/2020 1612 by Kyaw Oviedo Outcome: Progressing Towards Goal 
4/10/2020 1521 by Kyaw Oviedo Outcome: Progressing Towards Goal 
Goal: *Demonstrates progressive activity 4/10/2020 1612 by Kyaw Oviedo Outcome: Progressing Towards Goal 
4/10/2020 1521 by Kyaw Oviedo Outcome: Progressing Towards Goal 
  
Problem: Heart Failure: Day 4 Goal: Off Pathway (Use only if patient is Off Pathway) 4/10/2020 1612 by Kyaw Oviedo Outcome: Progressing Towards Goal 
4/10/2020 1521 by Kyaw Oviedo Outcome: Progressing Towards Goal 
Goal: Activity/Safety 4/10/2020 1612 by Kyaw Oviedo Outcome: Progressing Towards Goal 
4/10/2020 1521 by Kyaw Oviedo Outcome: Progressing Towards Goal 
Goal: Diagnostic Test/Procedures 4/10/2020 1612 by Kyaw Oviedo Outcome: Progressing Towards Goal 
4/10/2020 1521 by Kyaw Oviedo Outcome: Progressing Towards Goal 
Goal: Nutrition/Diet 4/10/2020 1612 by Kyaw Oviedo Outcome: Progressing Towards Goal 
4/10/2020 1521 by Kyaw Oviedo Outcome: Progressing Towards Goal 
Goal: Discharge Planning 4/10/2020 1612 by Satira Tio Outcome: Progressing Towards Goal 
4/10/2020 1521 by Satira Tio Outcome: Progressing Towards Goal 
Goal: Medications 4/10/2020 1612 by Satira Semora Outcome: Progressing Towards Goal 
4/10/2020 1521 by Satira Tio Outcome: Progressing Towards Goal 
Goal: Respiratory 4/10/2020 1612 by Satira Semora Outcome: Progressing Towards Goal 
4/10/2020 1521 by Satira Semora Outcome: Progressing Towards Goal 
Goal: Treatments/Interventions/Procedures 4/10/2020 1612 by Satira Tio Outcome: Progressing Towards Goal 
4/10/2020 1521 by Satira Semora Outcome: Progressing Towards Goal 
Goal: Psychosocial 
4/10/2020 1612 by Satira Semora Outcome: Progressing Towards Goal 
4/10/2020 1521 by Satira Tio Outcome: Progressing Towards Goal 
Goal: *Oxygen saturation within defined limits 4/10/2020 1612 by Satira Tio Outcome: Progressing Towards Goal 
4/10/2020 1521 by Satira Tio Outcome: Progressing Towards Goal 
Goal: *Hemodynamically stable 4/10/2020 1612 by Satira Semora Outcome: Progressing Towards Goal 
4/10/2020 1521 by Satira Tio Outcome: Progressing Towards Goal 
Goal: *Optimal pain control at patient's stated goal 
4/10/2020 1612 by Satira Tio Outcome: Progressing Towards Goal 
4/10/2020 1521 by Satira Semora Outcome: Progressing Towards Goal 
Goal: *Anxiety reduced or absent 4/10/2020 1612 by Satira Tio Outcome: Progressing Towards Goal 
4/10/2020 1521 by Satira Tio Outcome: Progressing Towards Goal 
Goal: *Demonstrates progressive activity 4/10/2020 1612 by Satira Tio Outcome: Progressing Towards Goal 
4/10/2020 1521 by Satira Tio Outcome: Progressing Towards Goal 
  
Problem: Heart Failure: Day 5 Goal: Off Pathway (Use only if patient is Off Pathway) 4/10/2020 1612 by Satira Semora Outcome: Progressing Towards Goal 
4/10/2020 1521 by Satira Tio Outcome: Progressing Towards Goal 
Goal: Activity/Safety 4/10/2020 1612 by Mariella Burgos Outcome: Progressing Towards Goal 
4/10/2020 1521 by Mariella Burgos Outcome: Progressing Towards Goal 
Goal: Diagnostic Test/Procedures 4/10/2020 1612 by Mariella Burgos Outcome: Progressing Towards Goal 
4/10/2020 1521 by Mariella Burgos Outcome: Progressing Towards Goal 
Goal: Nutrition/Diet 4/10/2020 1612 by Mariella Burgos Outcome: Progressing Towards Goal 
4/10/2020 1521 by Mariella Burgos Outcome: Progressing Towards Goal 
Goal: Discharge Planning 4/10/2020 1612 by Mariella Burgos Outcome: Progressing Towards Goal 
4/10/2020 1521 by Mariella Burgos Outcome: Progressing Towards Goal 
Goal: Medications 4/10/2020 1612 by Mariella Burgos Outcome: Progressing Towards Goal 
4/10/2020 1521 by Mariella Burgos Outcome: Progressing Towards Goal 
Goal: Respiratory 4/10/2020 1612 by Mariella Burgos Outcome: Progressing Towards Goal 
4/10/2020 1521 by Mariella Burgos Outcome: Progressing Towards Goal 
Goal: Treatments/Interventions/Procedures 4/10/2020 1612 by Mariella Burgos Outcome: Progressing Towards Goal 
4/10/2020 1521 by Mariella Burgos Outcome: Progressing Towards Goal 
Goal: Psychosocial 
4/10/2020 1612 by Mariella Burgos Outcome: Progressing Towards Goal 
4/10/2020 1521 by Mariella Burgos Outcome: Progressing Towards Goal 
  
Problem: Heart Failure: Discharge Outcomes Goal: *Demonstrates ability to perform prescribed activity without shortness of breath or discomfort 4/10/2020 1612 by Mariella Burgos Outcome: Progressing Towards Goal 
4/10/2020 1521 by Mariella Burgos Outcome: Progressing Towards Goal 
Goal: *Left ventricular function assessment completed prior to or during stay, or planned for post-discharge 4/10/2020 1612 by Mariella Burgos Outcome: Progressing Towards Goal 
4/10/2020 1521 by Mariella Burgos Outcome: Progressing Towards Goal 
Goal: *ACEI prescribed if LVEF less than 40% and no contraindications or ARB prescribed 4/10/2020 1612 by Mariella Burgos 
 Outcome: Progressing Towards Goal 
4/10/2020 1521 by Miguel Garcia Outcome: Progressing Towards Goal 
Goal: *Verbalizes understanding and describes prescribed diet 4/10/2020 1612 by Miguel Garcia Outcome: Progressing Towards Goal 
4/10/2020 1521 by Miguel Garcia Outcome: Progressing Towards Goal 
Goal: *Verbalizes understanding/describes prescribed medications 4/10/2020 1612 by Miguel Garcia Outcome: Progressing Towards Goal 
4/10/2020 1521 by Miguel Garcia Outcome: Progressing Towards Goal 
Goal: *Describes available resources and support systems Description: (eg: Home Health, Palliative Care, [employer]) 4/10/2020 1612 by Migule Garcia Outcome: Progressing Towards Goal 
4/10/2020 1521 by Miguel Garcia Outcome: Progressing Towards Goal 
Goal: *Describes smoking cessation resources 4/10/2020 1612 by Miguel Garcai Outcome: Progressing Towards Goal 
4/10/2020 1521 by Miguel Garcia Outcome: Progressing Towards Goal 
Goal: *Understands and describes signs and symptoms to report to providers(Stroke Metric) 4/10/2020 1612 by Miguel Garcia Outcome: Progressing Towards Goal 
4/10/2020 1521 by Miguel Garcia Outcome: Progressing Towards Goal 
Goal: *Describes/verbalizes understanding of follow-up/return appt Description: (eg: to physicians, diabetes treatment coordinator, and other resources 4/10/2020 1612 by Miguel Garcia Outcome: Progressing Towards Goal 
4/10/2020 1521 by Miguel Garcia Outcome: Progressing Towards Goal 
Goal: *Describes importance of continuing daily weights and changes to report to physician 4/10/2020 1612 by Miguel Garcia Outcome: Progressing Towards Goal 
4/10/2020 1521 by Miguel Garcia Outcome: Progressing Towards Goal

## 2020-04-10 NOTE — CONSULTS
Palliative Medicine Consult Rosalio: [phone]) Patient Name: Lyly Purvis YOB: 1918 Date of Initial Consult: 4/10/2020 Reason for Consult: end stage disease Requesting Provider: Zac Brady MD 
Primary Care Physician: Amadeo Pearl MD 
 
 SUMMARY:  
Lyly Purvis is a 80 y.o. with a past history of afib on Eliquis, squamous cell lung cancer, hypertension, CKD, prior right breast cancer, lung, colon cancer CAD, diastolic CHF with preserved EF, who was admitted on 4/8/2020 from home with a diagnosis of acute on chronic right-sided CHF, acute on chronic respiratory failure, cor pulmonale. Current medical issues leading to Palliative Medicine involvement include: end stage disease. On admission, afib, wbc 17.3, Na 130, bun/cr 82/2.78, pro-BNP 11,546. 
79 y/o with FTT, respiratory failure, CHF. Psychosocial: 79 y/o who's daughter lives with her and is her caregiver, along with medicaid caregivers. Pt was able to ambulate with walker a few weeks prior to admission, then gradually winding down. PALLIATIVE DIAGNOSES:  
1. BLE edema 2. Poor appetite 3. SOB 4. Generalized weakness 5. Care decisions PLAN:  
1. Prior to visit, I completed an extensive review of patient's medical records, including medical documentation, vital signs, MARs, and results of various labs and other diagnostics. I also spoke with patient's nurse, [place] RN and attending Dr. Melissa Plummer. 2. Spoke with dtr Sven Mehta:  introduced Palliative Medicine as a support for pt and families dealing with life limiting disease, to help with symptom management, education and understanding disease process and treatment options, and to discuss goals of care, what pt wants in terms of treatment as well as code status.   Talked about pt's advanced age, various comorbidities related to her advanced age, that she is at end of life, how aggressive to treat, that we can tune her up and send her home, however, she will eventually return with same symptoms. Daughter agrees that she would rather have pt home then to die alone in the hospital, but would like to see her a bit stronger before she comes home, also mentioned RN told her pt had an infection that should be treated first.  Counseled daughter that pt might get a little better but will not get much better or stronger even with treatment. She will talk with her brothers August Callahan, Manju Lewis and Maureen pruitt and call me tomorrow to discuss if they are interested in taking pt home with Hospice support. 3. Initial consult note routed to primary continuity provider and/or primary health care team members 4. Communicated plan of care with: Palliative IDT, [facility] Team 
 
 GOALS OF CARE / TREATMENT PREFERENCES:  
 
GOALS OF CARE: 
Patient/Health Care Proxy Stated Goals: Prolong life TREATMENT PREFERENCES:  
Code Status: DNR Advance Care Planning: 
[x] [facility] Interdisciplinary Team has updated the ACP Navigator with [place] and Patient Capacity Primary Decision Maker (Active): [place] - Daughter - [phone] Advance Care Planning 4/10/2020 Patient's Healthcare Decision Maker is: Named in scanned ACP document Confirm Advance Directive Yes, on file Patient Would Like to Complete Advance Directive Unable Does the patient have other document types Do Not Resuscitate Medical Interventions: Limited additional interventions Other Instructions: Other: As far as possible, the palliative care team has discussed with patient / health care proxy about goals of care / treatment preferences for patient. HISTORY:  
 
History obtained from: daughter, chart CHIEF COMPLAINT: weakness HPI/SUBJECTIVE: The patient is:  
[] Verbal and participatory [x] Non-participatory due to: confused 4/8: presented to ED by granddaughter who lives with pt with c/o worsening BLLE edema, worsening SOB, and generalized weakness to the point that she is unable to get up and ambulate on her own. Also very poor oral intake. Clinical Pain Assessment (nonverbal scale for severity on nonverbal patients):  
Clinical Pain Assessment Severity: 0 Activity (Movement): Lying quietly, normal position Duration: for how long has pt been experiencing pain (e.g., 2 days, 1 month, years) Frequency: how often pain is an issue (e.g., several times per day, once every few days, constant) FUNCTIONAL ASSESSMENT:  
 
Palliative Performance Scale (PPS): PPS: 20 
 
 
 PSYCHOSOCIAL/SPIRITUAL SCREENING:  
 
Palliative IDT has assessed this patient for cultural preferences / practices and a referral made as appropriate to needs (Cultural Services, Patient Advocacy, Ethics, etc.) Any spiritual / Protestant concerns: 
[] Yes /  [x] No 
 
Caregiver Burnout: 
[] Yes /  [x] No /  [] No Caregiver Present Anticipatory grief assessment:  
[x] Normal  / [] Maladaptive ESAS Anxiety: Anxiety: 4 ESAS Depression:   unable to assess due to pt factors REVIEW OF SYSTEMS:  
 
Positive and pertinent negative findings in ROS are noted above in HPI. The following systems were [x] reviewed / [] unable to be reviewed as noted in HPI Other findings are noted below. Systems: constitutional, ears/nose/mouth/throat, respiratory, gastrointestinal, genitourinary, musculoskeletal, integumentary, neurologic, psychiatric, endocrine. Positive findings noted below. Modified ESAS Completed by: provider Fatigue: 6 Drowsiness: 2 Pain: 0 Anxiety: 4 Nausea: 0 Anorexia: 8 Dyspnea: 0 Constipation: No  
     
 
 
 PHYSICAL EXAM:  
 
From RN flowsheet: 
Wt Readings from Last 3 Encounters:  
04/10/20 153 lb 4.8 oz (69.5 kg) 03/12/20 121 lb 2 oz (54.9 kg) 03/10/20 138 lb (62.6 kg) Blood pressure (!) 112/95, pulse 100, temperature 99.2 °F (37.3 °C), resp. rate 18, height 5' 2\" (1.575 m), weight 153 lb 4.8 oz (69.5 kg), SpO2 96 %. Pain Scale 1: Numeric (0 - 10) Pain Intensity 1: 0 Last bowel movement, if known:  
 
Constitutional: thin, ill appearing elderly and frail,  Asking for \"Breanna, get me out of here. \" Eyes: pupils equal, anicteric ENMT: no nasal discharge, moist mucous membranes Cardiovascular: tachy,  regular rhythm, distal pulses intact Respiratory: breathing not labored, symmetric Gastrointestinal: soft non-tender, +bowel sounds Musculoskeletal: no deformity, no tenderness to palpation Skin: warm, dry, BLE edema Neurologic: following commands, moving all extremities Psychiatric: full affect, no hallucinations HISTORY:  
 
Principal Problem: 
  Diastolic CHF, acute on chronic ([facility] (5/29/2018) Active Problems: 
  HTN (hypertension) (3/27/2013) SOB (shortness of breath) (1/21/2015) CKD (chronic kidney disease) stage 3, GFR 30-59 ml/min (HCC) (5/29/2018) Chronic a-fib (5/29/2018) Elevated troponin (2/20/2020) RUMA (acute kidney injury) ([facility] (4/8/2020) Past Medical History:  
Diagnosis Date  A-fib ([facility]  Adenocarcinoma ([address]  Cancer ([address] right breast CA, lung, colon  Chronic kidney disease  Gastrointestinal disorder GERD  Hypertension  Ileitis, terminal ([address]  Other ill-defined conditions(799.89)   
 gout  Squamous cell lung cancer ([address] Past Surgical History:  
Procedure Laterality Date [facility] Right mastectomy  HX APPENDECTOMY  HX HEENT    
 right glass eye  PA COLONOSCOPY W/BIOPSY SINGLE/MULTIPLE  3/29/2013  PA COLSC FLX W/RMVL OF TUMOR POLYP LESION SNARE TQ  3/29/2013 Family History Problem Relation Age of Onset  Colon Cancer Other  Hypertension Other History reviewed, no pertinent family history. Social History Tobacco Use  
  Smoking status: Former Smoker Types: Cigarettes Last attempt to quit: 1967 Years since quittin.3  Smokeless tobacco: Never Used Substance Use Topics  Alcohol use: No  
 
Allergies Allergen Reactions  Penicillins Hives Current Facility-Administered Medications Medication Dose Route Frequency  sodium chloride (NS) flush 5-40 mL  5-40 mL IntraVENous Q8H  
 sodium chloride (NS) flush 5-40 mL  5-40 mL IntraVENous PRN  
 ondansetron (ZOFRAN) injection 4 mg  4 mg IntraVENous Q6H PRN  
 acetaminophen (TYLENOL) tablet 650 mg  650 mg Oral Q6H PRN  
 sodium chloride (NS) flush 5-40 mL  5-40 mL IntraVENous Q8H  
 sodium chloride (NS) flush 5-40 mL  5-40 mL IntraVENous PRN  
 albuterol (PROVENTIL HFA, VENTOLIN HFA, PROAIR HFA) inhaler 2 Puff  2 Puff Inhalation Q4H PRN  
 apixaban (ELIQUIS) tablet 2.5 mg  2.5 mg Oral BID  colchicine tablet 0.6 mg  0.6 mg Oral DAILY PRN  
 docusate sodium (COLACE) capsule 100 mg  100 mg Oral DAILY PRN  
 isosorbide mononitrate ER (IMDUR) tablet 30 mg  30 mg Oral DAILY  hydrALAZINE (APRESOLINE) tablet 10 mg  10 mg Oral DAILY  metOLazone (ZAROXOLYN) tablet 2.5 mg  2.5 mg Oral Q MON, WED & FRI  nystatin (MYCOSTATIN) 100,000 unit/gram powder   Topical BID PRN  potassium bicarb-citric acid (EFFER-K) tablet 20 mEq  20 mEq Oral DAILY  furosemide (LASIX) injection 60 mg  60 mg IntraVENous BID  multivitamin, tx-iron-ca-min (THERA-M w/ IRON) tablet 1 Tab  1 Tab Oral DAILY  levoFLOXacin (LEVAQUIN) 500 mg in D5W IVPB  500 mg IntraVENous Q48H  
 
 
 
 LAB AND IMAGING FINDINGS:  
 
Lab Results Component Value Date/Time WBC 16.4 (H) 04/10/2020 04:19 AM  
 HGB 14.3 04/10/2020 04:19 AM  
 PLATELET 917  04:19 AM  
 
Lab Results Component Value Date/Time  Sodium 128 (L) 04/10/2020 04:19 AM  
 Potassium 4.9 04/10/2020 04:19 AM  
 Chloride 91 (L) 04/10/2020 04:19 AM  
 CO2 30 04/10/2020 04:19 AM  
 BUN 94 (H) 04/10/2020 04:19 AM  
 Creatinine 2.87 (H) 04/10/2020 04:19 AM  
 Calcium 9.9 04/10/2020 04:19 AM  
 Magnesium 3.4 (H) 04/09/2020 02:56 AM  
  
Lab Results Component Value Date/Time AST (SGOT) 22 04/09/2020 02:56 AM  
 Alk. phosphatase 166 (H) 04/09/2020 02:56 AM  
 Protein, total 7.1 04/09/2020 02:56 AM  
 Albumin 2.3 (L) 04/09/2020 02:56 AM  
 Globulin 4.8 (H) 04/09/2020 02:56 AM  
 
Lab Results Component Value Date/Time INR 1.2 (H) 07/01/2018 12:05 PM  
 Prothrombin time 12.1 (H) 07/01/2018 12:05 PM  
 aPTT 52.5 (H) 01/21/2015 11:40 AM  
  
No results found for: IRON, FE, TIBC, IBCT, PSAT, FERR Lab Results Component Value Date/Time pH 7.43 04/02/2013 11:22 AM  
 PCO2 39 04/02/2013 11:22 AM  
 PO2 77 (L) 04/02/2013 11:22 AM  
 
No components found for: [place] Lab Results Component Value Date/Time CK 56 02/19/2020 05:19 PM  
 CK - MB 1.4 03/02/2019 04:30 PM  
  
 
 
   
 
Total time: 75 
Counseling / coordination time, spent as noted above: 65 
> 50% counseling / coordination?: y 
 
Prolonged service was provided for  []30 min   []75 min in face to face time in the presence of the patient, spent as noted above. Time Start:  
Time End:  
Note: this can only be billed with 57958 (initial) or 90418 (follow up). If multiple start / stop times, list each separately.

## 2020-04-10 NOTE — PROGRESS NOTES
Patient only output 100ml overnight. WBC are increasing, Sepsis bundle alerted. Alerted tele-hospitalist regarding redrawing lactic on patient. Waiting for further instruction.

## 2020-04-10 NOTE — PROGRESS NOTES
Hospitalist Progress Note NAME: Adeola Marrero :  1918 MRN:  [id number] Assessment / Plan: 
Acute on chronic diastolic heart failure presentation BNP was noted to be 1110+ troponins which has been improving. Continue current regimen diuresis beta-blocker. Patient is on IV Lasix and metolazone x-ray showing pulmonary change but no overt pulmonary edema 
  
Acute kidney injury likely secondary to heart failure. Patient has some edema will diuresis overnight and consider discharge tomorrow 
  
Chronic kidney disease stage III we will continue diuretics Severe pulmonary hypertension. With PA pressure of 84. Will continue therapy as outpatient. 
  
Cardiorenal syndrome secondary) kidney disease 
  prognosis poor Sacral pressure ulcer stage I poa continue current regimen wound car Overall prognosis poor patient seen by hospice waiting for family input. Patient definitely a candidate for hospice. With advanced age and the multiple medical problems. I did speak with hospice team today calling pts daughter . Elevated lactic acid levels, no evidence of infection, will monitor no need for abx,urine unremarkable cxr unremarkable . I called patient's daughter discussed plan of care with her he is leaning more towards hospice and possibly can get patient home  Hospice. Anticipated by Monday. Body mass index is 27.34 kg/m². Code status: DNR Prophylaxis: lovenox Recommended Disposition: nursing home Subjective: Chief Complaint / Reason for Physician Visit Discussed with RN events overnight. Patient seen and examined at bedside patient seen and examined at bedside comfortable. Bilateral LE edema. Review of Systems: 
Symptom Y/N Comments  Symptom Y/N Comments Fever/Chills n   Chest Pain n   
Poor Appetite n   Edema n   
Cough n   Abdominal Pain n   
Sputum n   Joint Pain SOB/WOOTEN n   Pruritis/Rash Nausea/vomit n   Tolerating PT/OT Diarrhea n   Tolerating Diet Constipation    Other Could NOT obtain due to:   
 
Objective: VITALS:  
Last 24hrs VS reviewed since prior progress note. Most recent are: 
Patient Vitals for the past 24 hrs: 
 Temp Pulse Resp BP SpO2  
04/10/20 1141 98.7 °F (37.1 °C) (!) 107 18 108/55 97 % 04/10/20 0750 98.1 °F (36.7 °C) (!) 101 18 121/74 96 % 04/10/20 0249 97.5 °F (36.4 °C) 89 18 124/79 94 % 04/09/20 2228 98 °F (36.7 °C) 76 20 115/72 97 % 04/09/20 1919 97.7 °F (36.5 °C) 94 20 105/73 100 % 04/09/20 1509 97.8 °F (36.6 °C) 92 20 110/87 95 % Intake/Output Summary (Last 24 hours) at 4/10/2020 1445 Last data filed at 4/10/2020 0900 Gross per 24 hour Intake 220 ml Output 100 ml Net 120 ml PHYSICAL EXAM: 
General: WD, WN. Alert, cooperative, no acute distress   
EENT:  EOMI. Anicteric sclerae. MMM Resp:  CTA bilaterally, no wheezing or rales. No accessory muscle use CV:  Regular  rhythm,  No edema GI:  Soft, Non distended, Non tender.  +Bowel sounds Neurologic:  Confused, poorly responsive Psych:   Confused poorly responsive Skin:  Bilateral LE edema Reviewed most current lab test results and cultures  YES Reviewed most current radiology test results   YES Review and summation of old records today    NO Reviewed patient's current orders and MAR    YES 
PMH/ reviewed - no change compared to H&P 
________________________________________________________________________ Care Plan discussed with: 
  Comments Patient Family RN Care Manager Consultant Multidiciplinary team rounds were held today with , nursing, pharmacist and clinical coordinator. Patient's plan of care was discussed; medications were reviewed and discharge planning was addressed. ________________________________________________________________________ Comments >50% of visit spent in counseling and coordination of care ________________________________________________________________________ Meghann Marks MD  
 
Procedures: see electronic medical records for all procedures/Xrays and details which were not copied into this note but were reviewed prior to creation of Plan. LABS: 
I reviewed today's most current labs and imaging studies. Pertinent labs include: 
Recent Labs 04/10/20 
8629 04/09/20 
0256 04/08/20 
1513 WBC 16.4* 15.6* 17.3* HGB 14.3 14.2 15.4 HCT 42.1 41.2 45.4  193 208 Recent Labs 04/10/20 
4477 04/09/20 
0256 04/08/20 
1513 * 131* 130*  
K 4.9 4.8 4.9 CL 91* 90* 90* CO2 30 31 34* GLU 90 66 92 BUN 94* 87* 82* CREA 2.87* 2.64* 2.78* CA 9.9 10.1 10.7* MG  --  3.4*  --   
ALB  --  2.3* 2.7* TBILI  --  0.9 1.0  
SGOT  --  22 21 ALT  --  19 22 Signed: Meghann Marks MD

## 2020-04-10 NOTE — ROUTINE PROCESS
Bedside and Verbal shift change report given to Maxine Castillo (oncoming nurse) by Scott Viramontes (offgoing nurse). Report included the following information [facility]. [facility] Phone:   [phone] Significant changes during shift:   
Patient sat up in the chair for the majority of the day Patient Information Stuart Nyhan 80 y.o. 
4/8/2020  2:18 PM by Yesica Kelsey MD. Stuart Nyhan was admitted from Home 
 
Problem List 
 
Patient Active Problem List  
 Diagnosis Date Noted  RUMA (acute kidney injury) ([address] 04/08/2020  Elevated troponin 02/20/2020  
 NSTEMI (non-ST elevated myocardial infarction) ([address] 02/19/2020  Acute kidney injury ([address] 09/30/2019  CHF (congestive heart failure) ([address] 03/02/2019  Pleural effusion, left 07/17/2018  CHF, acute ([address] 07/17/2018  CKD (chronic kidney disease) stage 3, GFR 30-59 ml/min ([facility] 05/29/2018  Chronic a-fib 05/29/2018  Diastolic CHF, acute on chronic ([address] 05/29/2018  Pleural effusion 05/29/2018  Pneumonia 04/18/2017  Sepsis(995.91) 04/18/2017  Dehydration 04/18/2017  Acute CHF ([address] 01/23/2015  SOB (shortness of breath) 01/21/2015  Pedal edema 01/21/2015  Squamous cell lung cancer ([address] 01/21/2015  Adenocarcinoma ([address] 04/05/2013  S/P right colectomy 04/05/2013  Malignant essential hypertension 04/01/2013  Colon cancer ([address] 03/30/2013  Ileitis, terminal ([address] 03/27/2013  A-fib ([address] 03/27/2013  
 HTN (hypertension) 03/27/2013 Past Medical History:  
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   
 gout  Squamous cell lung cancer ([facility] Core Measures: CVA: No No 
CHF:Yes Yes PNA:No No 
 
N/A Activity Status: 
 
X2 assistance Supplemental O2: (If Applicable) N/A 
 
 LINES AND DRAINS: 
 
PIV 
 
DVT prophylaxis: 
 
eliquis Wounds: (If Applicable) Gluteal fold Patient Safety: 
 
Falls Score Total Score: 2 Safety Level_______ Bed Alarm On? Yes Sitter? Yes 
 
Plan for upcoming shift: monitor I&O's 
 
Discharge Plan: ongoing Active Consults: 
IP CONSULT TO CARDIOLOGY 
IP CONSULT TO CARDIOLOGY 
IP CONSULT TO HOSPITALIST 
IP CONSULT TO PALLIATIVE CARE - PROVIDER 
IP CONSULT TO PALLIATIVE CARE - PROVIDER

## 2020-04-10 NOTE — PROGRESS NOTES
Problem: Pressure Injury - Risk of 
Goal: *Prevention of pressure injury Description: Document Michael Scale and appropriate interventions in the flowsheet. Outcome: Progressing Towards Goal 
Note: Pressure Injury Interventions: 
Sensory Interventions: Assess changes in LOC, Assess need for specialty bed, Avoid rigorous massage over bony prominences, Chair cushion, Check visual cues for pain, Discuss PT/OT consult with provider, Float heels, Keep linens dry and wrinkle-free, Maintain/enhance activity level, Minimize linen layers, Monitor skin under medical devices, Pad between skin to skin, Pressure redistribution bed/mattress (bed type), Turn and reposition approx. every two hours (pillows and wedges if needed) Moisture Interventions: Absorbent underpads, Apply protective barrier, creams and emollients, Assess need for specialty bed, Check for incontinence Q2 hours and as needed, Internal/External urinary devices, Minimize layers, Moisture barrier, Offer toileting Q_hr Activity Interventions: Assess need for specialty bed, Increase time out of bed, Pressure redistribution bed/mattress(bed type), PT/OT evaluation Mobility Interventions: Assess need for specialty bed, Float heels, Pressure redistribution bed/mattress (bed type), HOB 30 degrees or less, PT/OT evaluation, Turn and reposition approx. every two hours(pillow and wedges) Nutrition Interventions: Document food/fluid/supplement intake, Offer support with meals,snacks and hydration Friction and Shear Interventions: Apply protective barrier, creams and emollients, Feet elevated on foot rest, Foam dressings/transparent film/skin sealants, HOB 30 degrees or less, Minimize layers, Lift team/patient mobility team 
 
  
 
 
 
  
Problem: Patient Education: Go to Patient Education Activity Goal: Patient/Family Education Outcome: Progressing Towards Goal 
  
Problem: Patient Education: Go to Patient Education Activity Goal: Patient/Family Education Outcome: Progressing Towards Goal 
  
Problem: Heart Failure: Day 2 Goal: Off Pathway (Use only if patient is Off Pathway) Outcome: Progressing Towards Goal 
Goal: Activity/Safety Outcome: Progressing Towards Goal 
Goal: Consults, if ordered Outcome: Progressing Towards Goal 
Goal: Diagnostic Test/Procedures Outcome: Progressing Towards Goal 
Goal: Nutrition/Diet Outcome: Progressing Towards Goal 
Goal: Discharge Planning Outcome: Progressing Towards Goal 
Goal: Medications Outcome: Progressing Towards Goal 
Goal: Respiratory Outcome: Progressing Towards Goal 
Goal: Treatments/Interventions/Procedures Outcome: Progressing Towards Goal 
Goal: Psychosocial 
Outcome: Progressing Towards Goal 
Goal: *Oxygen saturation within defined limits Outcome: Progressing Towards Goal 
Goal: *Hemodynamically stable Outcome: Progressing Towards Goal 
Goal: *Optimal pain control at patient's stated goal 
Outcome: Progressing Towards Goal 
Goal: *Anxiety reduced or absent Outcome: Progressing Towards Goal 
Goal: *Demonstrates progressive activity Outcome: Progressing Towards Goal

## 2020-04-11 NOTE — PROGRESS NOTES
Hospitalist Progress Note NAME: Sara Min :  1918 MRN:  [id number] I reviewed pertinent labs and imaging, and discussed /agreed on the plan of care with Dr. Milli Townsend. Assessment / Plan: 
Acute on Chronic heart failure elevated BNP on admission 11.546, 10.770 1. Continue IV lasix 60 mg BID 2. Metalazone 2.5 mg 3 x week 3. Chest x-ray IMPRESSION:  
1. Unchanged small to moderate chronic left pleural effusion with chronic 
atelectasis/volume loss of the left lung. Likely trace right pleural effusion. 2. Unchanged cardiomegaly. Pulmonary vascular congestion without overt pulmonary Edema. Acute Kidney Injury/ on Chronic Stage III 1. R/T CHF 2. Elevated Creatinine 2.87 this am increasing due to diuresis 3. Monitor Renal Function 4. Patient is pending possible Hospice, waiting on Family decision 5. Avoid Nephrotoxic medications Cardiorenal syndrome secondary to CKD stage III/CHF 1. Poor prognosis waiting family decision concerning Hospice Care 2. Continue current IV lasix 60 mg BID & metalazone 3 x week 3. Monitor Renal Function Stage I sacral Pressure ulcer 1. Turn/reposition every 2 hours 2. Apply barrier creams 3. Monitor Nutritional Status AFIB 1. Continue Eliquis 2.5 mg BID 2. Rate controlled at this time 3. Continue Telemetry monitoring 4. EKG 20 shows: Atrial fibrillation Leukocytosis 1. Continue IV Levaquin Sepsis 1. Elevated WBC's at 16.4 2. Elevated heart rate 3. Continue IV Levaquin 4. Urine culture 5. Blood cultures pending 6. Procalcitonin Pending 25.0 - 29.9 Overweight / Body mass index is 28.04 kg/m². Code status: DNR Prophylaxis: Eliquis Recommended Disposition: SNF/LTC Subjective: Chief Complaint / Reason for Physician Visit Patient is minimal verbal but responds to verbal stimuli. No signs of distress noted. Bilateral 3+ pedal edema. She is weak and requires assistance with movement. No new labs this am.  Discussed with RN events overnight. Review of Systems: 
Symptom Y/N Comments  Symptom Y/N Comments Fever/Chills    Chest Pain Poor Appetite    Edema Cough    Abdominal Pain Sputum    Joint Pain SOB/WOOTEN    Pruritis/Rash Nausea/vomit    Tolerating PT/OT Diarrhea    Tolerating Diet Constipation    Other Could NOT obtain due to: Patient is a poor historian. Objective: VITALS:  
Last 24hrs VS reviewed since prior progress note. Most recent are: 
Patient Vitals for the past 24 hrs: 
 Temp Pulse Resp BP SpO2  
04/11/20 0805  86 20 110/72 93 % 04/11/20 0401 97.6 °F (36.4 °C) (!) 102 20 104/77 94 % 04/10/20 2342 98.4 °F (36.9 °C) 90 22 114/61 94 % 04/10/20 2004 97.7 °F (36.5 °C) (!) 108 18 105/75 94 % 04/10/20 1516 99.2 °F (37.3 °C) 100 18 (!) 112/95 96 % 04/10/20 1141 98.7 °F (37.1 °C) (!) 107 18 108/55 97 % No intake or output data in the 24 hours ending 04/11/20 1127 PHYSICAL EXAM: 
General:           Acutely ill llooking. Alert, cooperative, no acute distress   
EENT:   Anicteric sclerae. normocephalic Resp:   Diminished bilateral bases, no wheezing or rales. No accessory muscle use CV:  Irregular rhythm, no murmur,or rub noted, 3+ bilateral lower extremity edema GI:  Soft, Non distended, Non tender.  +Bowel sounds Neurologic:  Alert and oriented X 3, normal speech, Psych:    Not anxious nor agitated, sleepy Skin:  No rashes. No jaundice Reviewed most current lab test results and cultures  YES Reviewed most current radiology test results   YES Review and summation of old records today     Yes Reviewed patient's current orders and MAR    YES 
PMH/SH reviewed - no change compared to H&P 
________________________________________________________________________ Care Plan discussed with: 
  Comments Patient Family  Y Spoke with Ashia Lee patient 's daughter, she states they have decided against Hospice at this time but would like to have Roderick Flores at discharge. RN Diana Turcios Care Manager Consultant Multidiciplinary team rounds were held today with , nursing, pharmacist and clinical coordinator. Patient's plan of care was discussed; medications were reviewed and discharge planning was addressed. ________________________________________________________________________ Jenn Santos NP Procedures: see electronic medical records for all procedures/Xrays and details which were not copied into this note but were reviewed prior to creation of Plan. LABS: 
I reviewed today's most current labs and imaging studies. Pertinent labs include: 
Recent Labs 04/10/20 
1829 04/09/20 
0256 04/08/20 
1513 WBC 16.4* 15.6* 17.3* HGB 14.3 14.2 15.4 HCT 42.1 41.2 45.4  193 208 Recent Labs 04/10/20 
8289 04/09/20 
0256 04/08/20 
1513 * 131* 130*  
K 4.9 4.8 4.9 CL 91* 90* 90* CO2 30 31 34* GLU 90 66 92 BUN 94* 87* 82* CREA 2.87* 2.64* 2.78* CA 9.9 10.1 10.7* MG  --  3.4*  --   
ALB  --  2.3* 2.7* TBILI  --  0.9 1.0  
SGOT  --  22 21 ALT  --  19 22 Signed: Jenn Santos NP

## 2020-04-11 NOTE — PROGRESS NOTES
Bedside shift change report given to Giovany RN (oncoming nurse) [place] Fail RN (offgoing nurse). Report included the following information SBAR, Kardex, ED Summary, Intake/Output, MAR and Recent Results

## 2020-04-11 NOTE — ROUTINE PROCESS
Bedside and Verbal shift change report given to yaw (oncoming nurse) by Gatito Gil (offgoing nurse). Report included the following information [facility]. [facility] Phone:   [phone] Significant changes during shift:   
None Patient Information Radha Rodriguez 80 y.o. 
4/8/2020  2:18 PM by Indiana Dougherty MD. Radha Rodriguez was admitted from Home 
 
Problem List 
 
Patient Active Problem List  
 Diagnosis Date Noted  Counseling regarding advance care planning and goals of care 04/10/2020  RUMA (acute kidney injury) ([address] 04/08/2020  Elevated troponin 02/20/2020  
 NSTEMI (non-ST elevated myocardial infarction) ([address] 02/19/2020  Acute kidney injury ([address] 09/30/2019  CHF (congestive heart failure) ([address] 03/02/2019  Pleural effusion, left 07/17/2018  CHF, acute ([address] 07/17/2018  CKD (chronic kidney disease) stage 3, GFR 30-59 ml/min ([facility]) 05/29/2018  Chronic a-fib 05/29/2018  Diastolic CHF, acute on chronic ([address] 05/29/2018  Pleural effusion 05/29/2018  Pneumonia 04/18/2017  Sepsis(995.91) 04/18/2017  Dehydration 04/18/2017  Acute CHF ([address] 01/23/2015  SOB (shortness of breath) 01/21/2015  Pedal edema 01/21/2015  Squamous cell lung cancer ([address] 01/21/2015  Adenocarcinoma ([address] 04/05/2013  S/P right colectomy 04/05/2013  Malignant essential hypertension 04/01/2013  Colon cancer ([address] 03/30/2013  Ileitis, terminal ([address] 03/27/2013  A-fib ([address] 03/27/2013  
 HTN (hypertension) 03/27/2013 Past Medical History:  
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   
 gout  Squamous cell lung cancer ([facility] Core Measures: CVA: No No 
CHF:Yes Yes PNA:No No 
 
N/A Activity Status: 
 
X2 assistance Supplemental O2: (If Applicable) N/A 
 
LINES AND DRAINS: 
 
PIV 
 
DVT prophylaxis: 
 
eliquis Wounds: (If Applicable) Gluteal fold Patient Safety: 
 
Falls Score Total Score: 2 Safety Level_______ Bed Alarm On? Yes Sitter? Yes 
 
Plan for upcoming shift: monitor I&O's 
 
Discharge Plan: ongoing Active Consults: 
IP CONSULT TO CARDIOLOGY 
IP CONSULT TO CARDIOLOGY 
IP CONSULT TO HOSPITALIST 
IP CONSULT TO PALLIATIVE CARE - PROVIDER 
IP CONSULT TO PALLIATIVE CARE - PROVIDER

## 2020-04-12 NOTE — PROGRESS NOTES
Hospitalist Progress Note NAME: Lyly Purvis :  1918 MRN:  [id number] I reviewed pertinent labs and imaging, and discussed /agreed on the plan of care with Dr. Melissa Plummer. Assessment / Plan: 
Acute on Chronic heart failure elevated BNP on admission 11.546, 10.770 1. Continue IV lasix 40 mg BID 2. Metalazone 2.5 mg 3 x week 3. Chest x-ray IMPRESSION:  
1. Unchanged small to moderate chronic left pleural effusion with chronic 
atelectasis/volume loss of the left lung. Likely trace right pleural effusion. 2. Unchanged cardiomegaly. Pulmonary vascular congestion without overt pulmonary Edema. Acute Kidney Injury/ on Chronic Stage III 1. R/T CHF 2. Elevated Creatinine 3.51  this am increasing due to diuresis 3. Monitor Renal Function 4. Patient is pending possible Hospice, waiting on Family decision 5. Avoid Nephrotoxic medications 6. Decreased lasix to 40 mg BID from 60 mg 
Cardiorenal syndrome secondary to CKD stage III/CHF 1. Poor prognosis waiting family decision concerning Hospice Care 2. Continue current IV lasix 60 mg BID & metalazone 3 x week 3. Monitor Renal Function Stage I sacral Pressure ulcer 1. Turn/reposition every 2 hours 2. Apply barrier creams 3. Monitor Nutritional Status AFIB 1. Continue Eliquis 2.5 mg BID 2. Rate controlled at this time 3. Continue Telemetry monitoring 4. EKG 20 shows: Atrial fibrillation Leukocytosis 1. Continue IV Levaquin 2. Blood cultures negative x 14 hours 3. Repeat urine culture pending 4. Repeat chest x-ray in the am 
Sepsis 1. Elevated WBC's at 17.2 2. Elevated heart rate 3. Continue IV Levaquin 4. Urine culture 5. Blood cultures negative x 14 hours 6.  Procalcitonin 4.71 
 
 
 25.0 - 29.9 Overweight / Body mass index is 28.04 kg/m². Code status: DNR Prophylaxis: Eliquis Recommended Disposition:  PT, OT, RN Subjective: Chief Complaint / Reason for Physician Visit The patient is a frail, elderly, female with a history of AFIB, squamous cell lung cancer, HTN, CKD stage 4 who presented to the ER with increased leg swelling and shortness of breath. She is alert to self. She is currently on 1 liter of oxygen with oxygen saturation level at 94% Spoke with patient's daughter Tammi Scheuermann with updated plan of care. All Questions answered at this time. Her blood cultures are negative at this time. Procalcitonin elevated at 4.71 this am.  Discussed with RN events overnight. Review of Systems: 
Symptom Y/N Comments  Symptom Y/N Comments Fever/Chills    Chest Pain Poor Appetite    Edema Cough    Abdominal Pain Sputum    Joint Pain SOB/WOOTEN    Pruritis/Rash Nausea/vomit    Tolerating PT/OT Diarrhea    Tolerating Diet Constipation    Other Could NOT obtain due to: Patient is a poor historian. Objective: VITALS:  
Last 24hrs VS reviewed since prior progress note. Most recent are: 
Patient Vitals for the past 24 hrs: 
 Temp Pulse Resp BP SpO2  
04/12/20 1521 97.6 °F (36.4 °C) 92 18 108/70 94 % 04/12/20 1156 97.8 °F (36.6 °C) 72 16 137/49 99 % 04/12/20 1133 97.6 °F (36.4 °C) 75 20 132/42 90 % 04/12/20 0743 97.8 °F (36.6 °C) 85 18 107/67 92 % 04/12/20 0436 97.6 °F (36.4 °C) 77 18 103/58 98 % 04/11/20 2306 97.4 °F (36.3 °C) 84 18 113/60 99 % 04/11/20 2003 97.5 °F (36.4 °C) 87 16 108/75 97 % Intake/Output Summary (Last 24 hours) at 4/12/2020 1631 Last data filed at 4/12/2020 1156 Gross per 24 hour Intake 300 ml Output  Net 300 ml PHYSICAL EXAM: 
General:          Alert to self, cooperative, no acute distress   
EENT:  Anicteric sclerae. normocephalic Resp: CTA bilaterally, no wheezing or rales. No accessory muscle use CV:  irregular  rhythm,  3+ bilateral pedal  edema GI:  Soft, Non distended, Non tender.  +Bowel sounds Neurologic:  Alert to self, normal speech, Psych:   Poor insight, Dementia,. Not anxious nor agitated Skin:  No rashes. No jaundice Reviewed most current lab test results and cultures  YES Reviewed most current radiology test results   YES Review and summation of old records today    NO Reviewed patient's current orders and MAR    YES 
PMH/SH reviewed - no change compared to H&P 
________________________________________________________________________ Care Plan discussed with: 
  Comments Patient Family  y   
RN Care Manager Consultant Multidiciplinary team rounds were held today with , nursing, pharmacist and clinical coordinator. Patient's plan of care was discussed; medications were reviewed and discharge planning was addressed. ________________________________________________________________________ Total NON critical care TIME: 26 Minutes Total CRITICAL CARE TIME Spent:   Minutes non procedure based Comments >50% of visit spent in counseling and coordination of care    
________________________________________________________________________ Zabrina Murphy NP Procedures: see electronic medical records for all procedures/Xrays and details which were not copied into this note but were reviewed prior to creation of Plan. LABS: 
I reviewed today's most current labs and imaging studies. Pertinent labs include: 
Recent Labs 04/12/20 
0100 04/10/20 
5753 WBC 17.2* 16.4* HGB 14.2 14.3 HCT 43.4 42.1  188 Recent Labs 04/12/20 
7169 04/10/20 
5604 * 128*  
K 5.7* 4.9  
CL 91* 91* CO2 36* 30  
GLU 58* 90 * 94* CREA 3.51* 2.87* CA 10.2* 9.9 Signed: Zabrina Murphy NP

## 2020-04-12 NOTE — PROGRESS NOTES
Problem: Pressure Injury - Risk of 
Goal: *Prevention of pressure injury Description: Document Michael Scale and appropriate interventions in the flowsheet. Outcome: Progressing Towards Goal 
Note: Pressure Injury Interventions: 
Sensory Interventions: Assess changes in LOC Moisture Interventions: Assess need for specialty bed Activity Interventions: Assess need for specialty bed Mobility Interventions: Assess need for specialty bed Nutrition Interventions: Document food/fluid/supplement intake Friction and Shear Interventions: Apply protective barrier, creams and emollients

## 2020-04-12 NOTE — PROGRESS NOTES
Bedside shift change report given to Ana RN (oncoming nurse) by Fior Navarrete RN (offgoing nurse). Report included the following information SBAR, Kardex, ED Summary, Intake/Output, MAR and Recent Results

## 2020-04-12 NOTE — PROGRESS NOTES
Problem: Pressure Injury - Risk of 
Goal: *Prevention of pressure injury Description: Document Michael Scale and appropriate interventions in the flowsheet. Outcome: Progressing Towards Goal 
Note: Pressure Injury Interventions: 
Sensory Interventions: Assess changes in LOC Moisture Interventions: Assess need for specialty bed Activity Interventions: Assess need for specialty bed Mobility Interventions: Assess need for specialty bed Nutrition Interventions: Document food/fluid/supplement intake Friction and Shear Interventions: Feet elevated on foot rest, HOB 30 degrees or less, Lift team/patient mobility team 
 
  
 
 
 
  
Problem: Patient Education: Go to Patient Education Activity Goal: Patient/Family Education Outcome: Progressing Towards Goal 
  
Problem: Patient Education: Go to Patient Education Activity Goal: Patient/Family Education Outcome: Progressing Towards Goal 
  
Problem: Heart Failure: Day 1 Goal: Off Pathway (Use only if patient is Off Pathway) Outcome: Progressing Towards Goal 
Goal: Activity/Safety Outcome: Progressing Towards Goal 
Goal: Consults, if ordered Outcome: Progressing Towards Goal 
Goal: Diagnostic Test/Procedures Outcome: Progressing Towards Goal 
Goal: Nutrition/Diet Outcome: Progressing Towards Goal 
Goal: Discharge Planning Outcome: Progressing Towards Goal 
Goal: Medications Outcome: Progressing Towards Goal 
Goal: Respiratory Outcome: Progressing Towards Goal 
Goal: Treatments/Interventions/Procedures Outcome: Progressing Towards Goal 
Goal: Psychosocial 
Outcome: Progressing Towards Goal 
Goal: *Oxygen saturation within defined limits Outcome: Progressing Towards Goal 
Goal: *Hemodynamically stable Outcome: Progressing Towards Goal 
Goal: *Optimal pain control at patient's stated goal 
Outcome: Progressing Towards Goal 
Goal: *Anxiety reduced or absent Outcome: Progressing Towards Goal 
  
Problem: Heart Failure: Day 2 
 Goal: Off Pathway (Use only if patient is Off Pathway) Outcome: Progressing Towards Goal 
Goal: Activity/Safety Outcome: Progressing Towards Goal 
Goal: Consults, if ordered Outcome: Progressing Towards Goal 
Goal: Diagnostic Test/Procedures Outcome: Progressing Towards Goal 
Goal: Nutrition/Diet Outcome: Progressing Towards Goal 
Goal: Discharge Planning Outcome: Progressing Towards Goal 
Goal: Medications Outcome: Progressing Towards Goal 
Goal: Respiratory Outcome: Progressing Towards Goal 
Goal: Treatments/Interventions/Procedures Outcome: Progressing Towards Goal 
Goal: Psychosocial 
Outcome: Progressing Towards Goal 
Goal: *Oxygen saturation within defined limits Outcome: Progressing Towards Goal 
Goal: *Hemodynamically stable Outcome: Progressing Towards Goal 
Goal: *Optimal pain control at patient's stated goal 
Outcome: Progressing Towards Goal 
Goal: *Anxiety reduced or absent Outcome: Progressing Towards Goal 
Goal: *Demonstrates progressive activity Outcome: Progressing Towards Goal 
  
Problem: Heart Failure: Day 3 Goal: Off Pathway (Use only if patient is Off Pathway) Outcome: Progressing Towards Goal 
Goal: Activity/Safety Outcome: Progressing Towards Goal 
Goal: Diagnostic Test/Procedures Outcome: Progressing Towards Goal 
Goal: Nutrition/Diet Outcome: Progressing Towards Goal 
Goal: Discharge Planning Outcome: Progressing Towards Goal 
Goal: Medications Outcome: Progressing Towards Goal 
Goal: Respiratory Outcome: Progressing Towards Goal 
Goal: Treatments/Interventions/Procedures Outcome: Progressing Towards Goal 
Goal: Psychosocial 
Outcome: Progressing Towards Goal 
Goal: *Oxygen saturation within defined limits Outcome: Progressing Towards Goal 
Goal: *Hemodynamically stable Outcome: Progressing Towards Goal 
Goal: *Optimal pain control at patient's stated goal 
Outcome: Progressing Towards Goal 
Goal: *Anxiety reduced or absent Outcome: Progressing Towards Goal 
 Goal: *Demonstrates progressive activity Outcome: Progressing Towards Goal 
  
Problem: Heart Failure: Day 4 Goal: Off Pathway (Use only if patient is Off Pathway) Outcome: Progressing Towards Goal 
Goal: Activity/Safety Outcome: Progressing Towards Goal 
Goal: Diagnostic Test/Procedures Outcome: Progressing Towards Goal 
Goal: Nutrition/Diet Outcome: Progressing Towards Goal 
Goal: Discharge Planning Outcome: Progressing Towards Goal 
Goal: Medications Outcome: Progressing Towards Goal 
Goal: Respiratory Outcome: Progressing Towards Goal 
Goal: Treatments/Interventions/Procedures Outcome: Progressing Towards Goal 
Goal: Psychosocial 
Outcome: Progressing Towards Goal 
Goal: *Oxygen saturation within defined limits Outcome: Progressing Towards Goal 
Goal: *Hemodynamically stable Outcome: Progressing Towards Goal 
Goal: *Optimal pain control at patient's stated goal 
Outcome: Progressing Towards Goal 
Goal: *Anxiety reduced or absent Outcome: Progressing Towards Goal 
Goal: *Demonstrates progressive activity Outcome: Progressing Towards Goal 
  
Problem: Heart Failure: Day 5 Goal: Off Pathway (Use only if patient is Off Pathway) Outcome: Progressing Towards Goal 
Goal: Activity/Safety Outcome: Progressing Towards Goal 
Goal: Diagnostic Test/Procedures Outcome: Progressing Towards Goal 
Goal: Nutrition/Diet Outcome: Progressing Towards Goal 
Goal: Discharge Planning Outcome: Progressing Towards Goal 
Goal: Medications Outcome: Progressing Towards Goal 
Goal: Respiratory Outcome: Progressing Towards Goal 
Goal: Treatments/Interventions/Procedures Outcome: Progressing Towards Goal 
Goal: Psychosocial 
Outcome: Progressing Towards Goal 
  
Problem: Heart Failure: Discharge Outcomes Goal: *Demonstrates ability to perform prescribed activity without shortness of breath or discomfort Outcome: Progressing Towards Goal 
Goal: *Left ventricular function assessment completed prior to or during stay, or planned for post-discharge Outcome: Progressing Towards Goal 
Goal: *ACEI prescribed if LVEF less than 40% and no contraindications or ARB prescribed Outcome: Progressing Towards Goal 
Goal: *Verbalizes understanding and describes prescribed diet Outcome: Progressing Towards Goal 
Goal: *Verbalizes understanding/describes prescribed medications Outcome: Progressing Towards Goal 
Goal: *Describes available resources and support systems Description: (eg: Home Health, Palliative Care, Advanced Medical Directive) Outcome: Progressing Towards Goal 
Goal: *Describes smoking cessation resources Outcome: Progressing Towards Goal 
Goal: *Understands and describes signs and symptoms to report to providers(Stroke Metric) Outcome: Progressing Towards Goal 
Goal: *Describes/verbalizes understanding of follow-up/return appt Description: (eg: to physicians, diabetes treatment coordinator, and other resources Outcome: Progressing Towards Goal 
Goal: *Describes importance of continuing daily weights and changes to report to physician Outcome: Progressing Towards Goal

## 2020-04-13 NOTE — PROGRESS NOTES
* No surgery found * 
* No surgery found * Bedside and Verbal shift change report given to Marta Lerner RN (oncoming nurse) by Bee Mcneil RN (offgoing nurse). Report included the following information SBAR, Kardex, MAR and Recent Results. Zone Phone:   [phone] Significant changes during shift:  
C/o feeling like something stuck in throat. [address] and oriented Cannot swallow. See note. PCXR done and Speech therapy reconsulted. Dr Shobha Brown spoke with family. Informed at him at 1500 that family called and is waiting for another phone call for update on plan. Seen by Wound care Patient Information Flores Matthew 80 y.o. 
4/8/2020  2:18 PM by Emily Hannah MD. Flores Matthew was admitted from Home 
 
Problem List 
 
Patient Active Problem List  
 Diagnosis Date Noted  Counseling regarding advance care planning and goals of care 04/10/2020  RUMA (acute kidney injury) ([facility] 04/08/2020  Elevated troponin 02/20/2020  
 NSTEMI (non-ST elevated myocardial infarction) ([facility] 02/19/2020  Acute kidney injury ([address] 09/30/2019  CHF (congestive heart failure) ([facility] 03/02/2019  Pleural effusion, left 07/17/2018  CHF, acute ([address] 07/17/2018  CKD (chronic kidney disease) stage 3, GFR 30-59 ml/min ([facility]) 05/29/2018  Chronic a-fib 05/29/2018  Diastolic CHF, acute on chronic ([address] 05/29/2018  Pleural effusion 05/29/2018  Pneumonia 04/18/2017  Sepsis(995.91) 04/18/2017  Dehydration 04/18/2017  Acute CHF ([address] 01/23/2015  SOB (shortness of breath) 01/21/2015  Pedal edema 01/21/2015  Squamous cell lung cancer ([address] 01/21/2015  Adenocarcinoma ([address] 04/05/2013  S/P right colectomy 04/05/2013  Malignant essential hypertension 04/01/2013  Colon cancer ([address] 03/30/2013  Ileitis, terminal ([address] 03/27/2013  A-fib ([facility] 03/27/2013  
 HTN (hypertension) 03/27/2013 Past Medical History:  
Diagnosis Date  A-fib ([facility]  Adenocarcinoma ([place] [address]  Cancer ([facility] right breast CA, lung, colon  Chronic kidney disease  Gastrointestinal disorder GERD  Hypertension  Ileitis, terminal ([facility]  Other ill-defined conditions(799.89)   
 gout  Squamous cell lung cancer ([facility] Core Measures: CVA: No No 
CHF:Yes Yes PNA:No Not applicable Activity Status: OOB to Chair No 
Ambulated this shift No  
Bed Rest No 
 
Supplemental O2: (If Applicable) NC Yes NRB No 
Venti-mask No 
On 2 Liters/min LINES AND DRAINS: 
 
PIV Urinary Catheter? External female catheter for urine sample DVT prophylaxis: DVT prophylaxis Med- Yes DVT prophylaxis SCD or LINDA- No  
 
Wounds: (If Applicable) Wounds- Yes Location stage II/possible fissure sacrum, TASHI Patient Safety: 
 
Falls Score Total Score: 3 Safety Level_______ Bed Alarm On? Yes Sitter? No 
 
Plan for upcoming shift: safety, repositioning . Apply specialty mattress. Trun every two hours wound care q 8hours Discharge Plan: Yes CM following Active Consults: 
IP CONSULT TO CARDIOLOGY 
IP CONSULT TO CARDIOLOGY 
IP CONSULT TO HOSPITALIST 
IP CONSULT TO PALLIATIVE CARE - PROVIDER 
IP CONSULT TO PALLIATIVE CARE - PROVIDER

## 2020-04-13 NOTE — PROGRESS NOTES
Dr Norah Arita notifed BP in low 162'R systolic earlier so I had not given IV lasix and it is now 113/62. He stated to hold dose of lasix. Informed him that patient still making that sound of clearing throat and still states she feels like something is stuck in throat. Informed him that speech saw patient and recommended NPO but had no other orders. He stated he would call speech and speak with them.

## 2020-04-13 NOTE — PROGRESS NOTES
[facility] [place]: [phone] ([phone]) [facility]: [phone] ([phone]) Telephone call made to daughter Moon Prasad, to touch base with her to see what goals are for the patient at this time. Cleopatra Vela notes that she and all her siblings have talked to each other, they have received information about patient's declines this morning. Cleopatra Vela indicates that \"we would like our mother to have treatment, if that can help\". They are not ready to focus on comfort care only yet. LCSW explained to Cleopatra Vela that a CXR and speech evaluation are noted in the chart. Cleopatra Vela noted that she and her siblings would like to wait to see what the outcome is of both these evaluations before they make any decisions. LCSW explained hospice services briefly and shared the difference between a focus on comfort vs aggressive medical management. Also shared that if patient's body is slowing down and she is approaching EOL, that a focus on comfort would be most appropriate. We will reconnect with family tomorrow.

## 2020-04-13 NOTE — PROGRESS NOTES
Patient at change of shift very lethargic but alert and oriented. Follow occ commands. At 0900 when change nurse went in to feed her she stated patient was much more alert of Friday and was swallowing pills whole on Friday and is now not swallowing anything. Patient still alert and khalida td x 3 but making sound like she is clearing throat. When asked if she feels like she has something in throat she stated yes. DR Ana Xiao in to see patient and notified of above. He examined patient and ordered STAT PCXR and speech therapy.

## 2020-04-13 NOTE — PROGRESS NOTES
Problem: Dysphagia (Adult) Goal: *Acute Goals and Plan of Care (Insert Text) Description: 4/13/2020 Speech path 1. Pt will participate with reeval of swallowing tomorrow. Outcome: Not Met SPEECH LANGUAGE PATHOLOGY BEDSIDE SWALLOW EVALUATION Patient: Flores Matthew (89 y.o. female) Date: 4/13/2020 Primary Diagnosis: CHF (congestive heart failure) ([facility] [I50.9] RUMA (acute kidney injury) ([facility] [N17.9] Precautions:   Fall, Bed Alarm, DNR 
 
ASSESSMENT : 
Based on the objective data described below, the patient presents with severe oropharyngeal dysphagia. She needed verbal cues to close her lips on the spoon and verbal cues to masticate the ice chips. Slow posterior propulsion. Weak swallow and then she started to clear her throat. She eventually spit out clear phlegm. She continued to weakly clear her throat. She did not swallow water at all and held it in her mouth. Patient will benefit from skilled intervention to address the above impairments. Patients rehabilitation potential is considered to be Guarded PLAN : 
Recommendations and Planned Interventions: 
Recommend NPO for now. Pt seems to have a severe oropharyngeal dysphagia Frequency/Duration: Patient will be followed by speech-language pathology 3 times a week to address goals. Discharge Recommendations: None SUBJECTIVE:  
Patient was clearing her throat but only after being given ice. OBJECTIVE:  
 
Past Medical History:  
Diagnosis Date  A-fib ([facility]  Adenocarcinoma ([facility]  Cancer ([facility] right breast CA, lung, colon  Chronic kidney disease  Gastrointestinal disorder GERD  Hypertension  Ileitis, terminal ([facility]  Other ill-defined conditions(799.89)   
 gout  Squamous cell lung cancer ([facility] Past Surgical History:  
Procedure Laterality Date [facility] Right mastectomy  HX APPENDECTOMY  HX HEENT    
 right glass eye  AR COLONOSCOPY W/BIOPSY SINGLE/MULTIPLE  3/29/2013  AR COLSC FLX W/RMVL OF TUMOR POLYP LESION SNARE TQ  3/29/2013 Prior Level of Function/Home Situation:  
Home Situation Home Environment: Private residence # Steps to Enter: 1 One/Two Story Residence: Two story, live on 1st floor Living Alone: No 
Support Systems: Child(indra), Home care staff Patient Expects to be Discharged to[de-identified] Private residence Current DME Used/Available at Home: Chiquita Pepper, rollator Diet prior to admission: 
Current Diet:    
Cognitive and Communication Status: 
Neurologic State: Alert Orientation Level: Oriented to person Cognition: Follows commands Perception: Appears intact Perseveration: No perseveration noted Oral Assessment: 
Oral Assessment Labial: No impairment Lingual: (question reduced min R facial weakness) Velum: Other (comment) Mandible: No impairment P.O. Trials: 
  
Vocal quality prior to P.O.: No impairment Consistency Presented: Ice chips How Presented: Spoon;SLP-fed/presented Bolus Acceptance: Impaired Bolus Formation/Control: Impaired Type of Impairment: Delayed Propulsion: Delayed (# of seconds) Initiation of Swallow: Delayed (# of seconds) Laryngeal Elevation: Decreased Oral Phase Severity: Moderate Pharyngeal Phase Severity : Moderate-severe NOMS:  
The NOMS functional outcome measure was used to quantify this patient's level of swallowing impairment. Based on the NOMS, the patient was determined to be at level 2 for swallow function NOMS Swallowing Levels: 
Level 1 (CN): NPO Level 2 (CM): NPO but takes consistency in therapy Level 3 (CL): Takes less than 50% of nutrition p.o. and continues with nonoral feedings; and/or safe with mod cues; and/or max diet restriction Level 4 (CK): Safe swallow but needs mod cues; and/or mod diet restriction; and/or still requires some nonoral feeding/supplements Level 5 (CJ): Safe swallow with min diet restriction; and/or needs min cues Level 6 (CI): Independent with p.o.; rare cues; usually self cues; may need to avoid some foods or needs extra time Level 7 (CH): Independent for all p.o. HORACIO. (2003). National Outcomes Measurement System (NOMS): Adult Speech-Language Pathology User's Guide. Pain: 
Pain Scale 1: Numeric (0 - 10) Pain Intensity 1: 0 After treatment:  
Call bell within reach COMMUNICATION/EDUCATION:  
 
The patient's plan of care including recommendations, planned interventions, and recommended diet changes were discussed with: Registered nurse. Patient/family agree to work toward stated goals and plan of care. Thank you for this referral. 
DEBORAH Dick Time Calculation: 10 mins

## 2020-04-13 NOTE — PROGRESS NOTES
* No surgery found * 
* No surgery found * Bedside and Verbal shift change report given to Abelino Harper RN (oncoming nurse) by Raúl Jaramillo RN (offgoing nurse). Report included the following information SBAR, Kardex, MAR and Recent Results. Zone Phone:   [phone] Significant changes during shift:  
1 external catheter for urine collection Patient Information Gus Chin 80 y.o. 
4/8/2020  2:18 PM by Laura Florez MD. Gus Chin was admitted from Home 
 
Problem List 
 
Patient Active Problem List  
 Diagnosis Date Noted  Counseling regarding advance care planning and goals of care 04/10/2020  RUMA (acute kidney injury) ([address] 04/08/2020  Elevated troponin 02/20/2020  
 NSTEMI (non-ST elevated myocardial infarction) ([address] 02/19/2020  Acute kidney injury ([address] 09/30/2019  CHF (congestive heart failure) ([address] 03/02/2019  Pleural effusion, left 07/17/2018  CHF, acute ([address] 07/17/2018  CKD (chronic kidney disease) stage 3, GFR 30-59 ml/min ([facility]) 05/29/2018  Chronic a-fib 05/29/2018  Diastolic CHF, acute on chronic ([address] 05/29/2018  Pleural effusion 05/29/2018  Pneumonia 04/18/2017  Sepsis(995.91) 04/18/2017  Dehydration 04/18/2017  Acute CHF ([address] 01/23/2015  SOB (shortness of breath) 01/21/2015  Pedal edema 01/21/2015  Squamous cell lung cancer ([address] 01/21/2015  Adenocarcinoma ([address] 04/05/2013  S/P right colectomy 04/05/2013  Malignant essential hypertension 04/01/2013  Colon cancer ([address] 03/30/2013  Ileitis, terminal ([address] 03/27/2013  A-fib ([address] 03/27/2013  
 HTN (hypertension) 03/27/2013 Past Medical History:  
Diagnosis Date  A-fib ([address]  Adenocarcinoma ([address]  Cancer ([address] right breast CA, lung, colon  Chronic kidney disease  Gastrointestinal disorder GERD  Hypertension  [facility], [facility] ([facility]  Other ill-defined conditions(799.89)   
 gout  Squamous cell lung cancer ([facility] Core Measures: CVA: No No 
CHF:Yes Yes PNA:No Not applicable Activity Status: OOB to Chair No 
Ambulated this shift No  
Bed Rest No 
 
Supplemental O2: (If Applicable) NC Yes NRB No 
Venti-mask No 
On 2 Liters/min LINES AND DRAINS: 
 
PIV Urinary Catheter? External female catheter for urine sample DVT prophylaxis: DVT prophylaxis Med- Yes DVT prophylaxis SCD or LINDA- No  
 
Wounds: (If Applicable) Wounds- Yes Location stage II/possible fissure sacrum, TASHI Patient Safety: 
 
Falls Score Total Score: 3 Safety Level_______ Bed Alarm On? Yes Sitter? No 
 
Plan for upcoming shift: safety, repositioning Discharge Plan: Yes CM following Active Consults: 
IP CONSULT TO CARDIOLOGY 
IP CONSULT TO CARDIOLOGY 
IP CONSULT TO HOSPITALIST 
IP CONSULT TO PALLIATIVE CARE - PROVIDER 
IP CONSULT TO PALLIATIVE CARE - PROVIDER

## 2020-04-13 NOTE — PROGRESS NOTES
Problem: Mobility Impaired (Adult and Pediatric) Goal: *Acute Goals and Plan of Care (Insert Text) Description: FUNCTIONAL STATUS PRIOR TO ADMISSION: Patient was modified independent using a rollator for functional mobility. She denies falls. Fair historian. No family present. HOME SUPPORT PRIOR TO ADMISSION: The patient lived with her daughter and has home health aides come in 5 days per week. Assists with bathing and dressing. States she sometimes needs help with feeding. Physical Therapy Goals Initiated 4/9/2020 1. Patient will move from supine to sit and sit to supine, scoot up and down and roll side to side in bed with minimal assistance within 7 day(s). 2.  Patient will transfer from bed to chair and chair to bed with minimal assistance using the least restrictive device within 7 day(s). 3.  Patient will perform sit to stand with minimal assistance/contact guard assist within 7 day(s). 4.  Patient will ambulate with minimal assistance for 30 feet with the least restrictive device within 7 day(s). 5.  Patient will ascend/descend 1 stairs with right handrail(s) with minimal assistance within 7 day(s). 6. Patient will participate in a 6 MWT for her diagnosis of heart failure within 48 hours of discharge. Outcome: Not Met PHYSICAL THERAPY TREATMENT Patient: Selwyn Gross (2 y.o. female) Date: 4/13/2020 Diagnosis: CHF (congestive heart failure) ([facility] [I50.9] RUAM (acute kidney injury) ([facility] [N17.9] Diastolic CHF, acute on chronic (HCC) Precautions: Fall, Bed Alarm, DNR Chart, physical therapy assessment, plan of care and goals were reviewed. ASSESSMENT Patient continues with skilled PT services and is not progressing towards goals. Patient received in bed, SLP also in room, assisted with holding patient's head forward to attempt swallow.  Patient is intermittently making a throat clearing sound and complains that something is stuck, denies any pain. Patient required mod assist x 1 to come to edge of bed and was able to sit unsupported and performed LAQ through half range x 5 reps. Patient appears to weak to stand to day so returned to supine with max assist.  Patient is lethargic and answers questions only with simple answers, poor tolerance of activity and appears appropriate for hospice. Current Level of Function Impacting Discharge (mobility/balance): mod to max Other factors to consider for discharge: decline in function, lethargic and very weak PLAN : 
Patient continues to benefit from skilled intervention to address the above impairments. Continue treatment per established plan of care. to address goals. Recommendation for discharge: (in order for the patient to meet his/her long term goals) To be determined: possible hospice consult This discharge recommendation: 
Has been made in collaboration with the attending provider and/or case management IF patient discharges home will need the following DME: patient owns DME required for discharge SUBJECTIVE:  
Patient stated yes.  OBJECTIVE DATA SUMMARY:  
Critical Behavior: 
Neurologic State: Alert Orientation Level: Oriented to person Cognition: Follows commands Functional Mobility Training: 
Bed Mobility: 
  
Supine to Sit: Moderate assistance Scooting: Maximum assistance Transfers: 
  
  
     
  
     
  
  
  
  
Balance: 
Sitting: Impaired Sitting - Static: Good (unsupported) Sitting - Dynamic: Fair (occasional) Ambulation/Gait Training: 
  
  
  
  
  
  
  
  
  
  
  
  
  
  
  
  
  
  
Stairs: Therapeutic Exercises: LAQ x5 Pain Rating: 
 
 
Activity Tolerance:  
Poor Please refer to the flowsheet for vital signs taken during this treatment.  
 
After treatment patient left in no apparent distress:  
Supine in bed, Call bell within reach, Bed / chair alarm activated, and Side rails x 3 
 
 COMMUNICATION/COLLABORATION:  
The patients plan of care was discussed with: Speech therapist and Registered nurse. Amadeo Pace PT Time Calculation: 20 mins

## 2020-04-13 NOTE — PROGRESS NOTES
Hospitalist Progress Note NAME: Adeola Marrero :  1918 MRN:  [id number] I reviewed pertinent labs and imaging, and discussed /agreed on the plan of care with Dr. Kaylie Rai. Assessment / Plan: 
Acute on Chronic heart failure elevated BNP on admission 11.546, 10.770 1. Continue IV lasix 40 mg BID 2. Metalazone 2.5 mg 3 x week 3. Chest x-ray IMPRESSION:  
1. Unchanged small to moderate chronic left pleural effusion with chronic 
atelectasis/volume loss of the left lung. Likely trace right pleural effusion. 2. Unchanged cardiomegaly. Pulmonary vascular congestion without overt pulmonary Edema. Acute Kidney Injury/ on Chronic Stage III 1. R/T CHF 2. Elevated Creatinine 3.51  this am increasing due to diuresis 3. Monitor Renal Function 4. Patient is pending possible Hospice, waiting on Family decision 5. Avoid Nephrotoxic medications 6. Decreased lasix to 40 mg BID from 60 mg 
Cardiorenal syndrome secondary to CKD stage III/CHF 1. Poor prognosis waiting family decision concerning Hospice Care 2. Continue current IV lasix 60 mg BID & metalazone 3 x week 3. Monitor Renal Function Stage I sacral Pressure ulcer 1. Turn/reposition every 2 hours 2. Apply barrier creams 3. Monitor Nutritional Status AFIB 1. Continue Eliquis 2.5 mg BID 2. Rate controlled at this time 3. Continue Telemetry monitoring 4. EKG 20 shows: Atrial fibrillation Leukocytosis 1. Continue IV Levaquin 2. Blood cultures negative x 14 hours 3. Repeat urine culture pending 4. Repeat chest x-ray in the am 
Sepsis 1. Elevated WBC's at 17.2 2. Elevated heart rate 3. Continue IV Levaquin 4. Urine culture 5. Blood cultures negative x 14 hours 6.  Procalcitonin 4.71 
 Acute change in mentation: keep NPO, speech advised possible modified barium CXR does not show aspiration, discussed with family regarding plan of care 25.0 - 29.9 Overweight / Body mass index is 27.22 kg/m². Code status: DNR Prophylaxis: Eliquis Recommended Disposition:  PT, OT, RN Subjective: Chief Complaint / Reason for Physician Visit None Discussed with RN events overnight. Review of Systems: 
Symptom Y/N Comments  Symptom Y/N Comments Fever/Chills    Chest Pain Poor Appetite    Edema Cough    Abdominal Pain Sputum    Joint Pain SOB/WOOTEN    Pruritis/Rash Nausea/vomit    Tolerating PT/OT Diarrhea    Tolerating Diet Constipation    Other Could NOT obtain due to: Altered mental status . Objective: VITALS:  
Last 24hrs VS reviewed since prior progress note. Most recent are: 
Patient Vitals for the past 24 hrs: 
 Temp Pulse Resp BP SpO2  
04/13/20 1522 97.2 °F (36.2 °C) 87 22 112/71 100 % 04/13/20 1154 97.5 °F (36.4 °C) 86 28 113/62 100 % 04/13/20 0923  80  107/80 100 % 04/13/20 0726 97.6 °F (36.4 °C) 80 22 115/78 99 % 04/13/20 0229 97.6 °F (36.4 °C) 84 18 104/69 96 % 04/12/20 2358 97.6 °F (36.4 °C) 83 20 108/71 92 % 04/12/20 1933 97.8 °F (36.6 °C) 88 20 113/68 98 % No intake or output data in the 24 hours ending 04/13/20 1616 PHYSICAL EXAM: 
General:          Alert to self, cooperative, no acute distress   
EENT:  Anicteric sclerae. normocephalic Resp:  CTA bilaterally, no wheezing or rales. No accessory muscle use CV:  irregular  rhythm,  3+ bilateral pedal  edema GI:  Soft, Non distended, Non tender.  +Bowel sounds Neurologic:  Alert to self, normal speech, Psych:   Poor insight, Dementia,. Not anxious nor agitated Skin:  No rashes. No jaundice Reviewed most current lab test results and cultures  YES Reviewed most current radiology test results   YES Review and summation of old records today    NO 
 Reviewed patient's current orders and MAR    YES 
PMH/SH reviewed - no change compared to H&P 
________________________________________________________________________ Care Plan discussed with: 
  Comments Patient Family  y   
RN Care Manager Consultant Multidiciplinary team rounds were held today with , nursing, pharmacist and clinical coordinator. Patient's plan of care was discussed; medications were reviewed and discharge planning was addressed. ________________________________________________________________________ Total NON critical care TIME: 26 Minutes Total CRITICAL CARE TIME Spent:   Minutes non procedure based Comments >50% of visit spent in counseling and coordination of care    
________________________________________________________________________ Catalina Martinez MD  
 
Procedures: see electronic medical records for all procedures/Xrays and details which were not copied into this note but were reviewed prior to creation of Plan. LABS: 
I reviewed today's most current labs and imaging studies. Pertinent labs include: 
Recent Labs 04/13/20 
4990 04/12/20 
8895 WBC 16.7* 17.2* HGB 14.6 14.2 HCT 42.8 43.4  179 Recent Labs 04/13/20 
5499 04/12/20 
0348 * 132*  
K 5.2* 5.7* CL 90* 91* CO2 37* 36* * 58* * 108* CREA 3.49* 3.51* CA 10.4* 10.2* Signed: Catalina Martinez MD

## 2020-04-13 NOTE — WOUND CARE
Wound care nurse consult for POA \" wound POA has opened into fissure- possible \"crack in the crack\" with scant sanguinous seepage\". Patient is a 81 y/o AAF admitted for CHF. Past Medical History:  
Diagnosis Date  A-fib ([facility]  Adenocarcinoma ([facility]  Cancer ([facility] right breast CA, lung, colon  Chronic kidney disease  Gastrointestinal disorder GERD  Hypertension  Ileitis, terminal ([facility]  Other ill-defined conditions(799.89)   
 gout  Squamous cell lung cancer ([facility] Patient is confused and lethargic, was able to turn patient by myself to visualize the sacral coccyx and patient found to have a POA Deep Tissue Pressure Injury (DTPI) measuring aprox 1.5x1.5 cm's right over coccyx. It is opening to reveal non-blanching tissue at base. WOUND POA CONDITIONS Wound Small area of pinkish-white colored skin breakdown, about the size of a quarter (Active) Dressing Type Zinc based paste 4/13/2020  1:04 PM  
Non-staged Wound Description Partial thickness 4/12/2020  8:29 PM  
Pressure Injury Deep Tissue Injury 4/13/2020  1:04 PM  
Wound Length (cm) 1.5 cm 4/13/2020  1:04 PM  
Wound Width (cm) 1.5 cm 4/13/2020  1:04 PM  
Wound Depth (cm) 0.1 cm 4/13/2020  1:04 PM  
Wound Surface Area (cm^2) 2.25 cm^2 4/13/2020  1:04 PM  
Wound Volume (cm^3) 0.22 cm^3 4/13/2020  1:04 PM  
Change in Wound Size % -800 4/13/2020  1:04 PM  
Condition of Base Purple 4/13/2020  1:04 PM  
Condition of Edges Open 4/13/2020  1:04 PM  
Tissue Type Percent Maroon/Purple 100 % 4/13/2020  1:04 PM  
Drainage Amount Scant 4/13/2020  1:04 PM  
Drainage Color Serosanguinous 4/13/2020  1:04 PM  
Samantha-wound Assessment Intact 4/13/2020  1:04 PM  
Cleansing and Cleansing Agents  Soap and water 4/12/2020  8:29 PM  
Dressing Changed Other (Comment) 4/12/2020  8:29 PM  
Dressing Type Applied Zinc based paste 4/12/2020  8:29 PM  
Procedure Tolerated Fair 4/13/2020  1:04 PM  
Number of days: 5 [REMOVED] Wound Leg Anterior; Lower (Removed) Number of days: 10  
   
[REMOVED] Wound Sacrum Posterior Stage 2 pressure ulcer 09/30/19 (Removed) Number of days: 46  
   
[REMOVED] Wound Buttocks (Removed) Number of days: 19 Recommend: 
 
Sacral/coccyx DTPI: cleanse with soap and water when soiled and apply Venelex ointment tid. Patient's Michael=10 and needs turn and reposition q-2hours and specialty bed. Plan:  nurse to follow up weekly and per consult if needed.  
 
David Mcmahan RN, [employer]

## 2020-04-14 NOTE — PROGRESS NOTES
Palliative Medicine [facility]: [phone] ([phone] [facility]: [phone] ([phone]) Palliative medicine called primary mPOA, Toney Dominguez, to discuss patient's medical condition, inability to swallow, NPO status and goals of care. Larisa Samaniego has a lot of \"why\" questions, \"why patient cannot swallow, where did her infection come from\" etc. Explained to Larisa Samaniego that we may never know some of these answers, but it may be best for patient to be comfort and come home where she can be with her family and familiar surroundings. Explained hospice services at length to Larisa Samaniego and how they could be a support to patient and allow her to be home and be comfortable. Larisa Samaniego was adamant that she did not want hospice services for patient, Antionette Smith can take care of her without hospice, what can they do that we cannot do? \" Again explained benefits of hospice. Larisa Samaniego shared how she did not have a positive view of hospice when her sister  with hospice services in the past \"they gave her morphine and we did not want her to have morphine\". Discussed with Larisa Samaniego that it would be best for patient to be home at this time and she agreed to have patient at home. She wanted patient to still be able to see her  PCP and not have hospice. Shared this information with Destini Frausto CM. Received a call from secondary mPOA, Ángela Franco, who steadfastly noted that his mother wanted him to be primary mPOA, not his sister and he indicated that he and his siblings want to have patient home with hospice. He indicated that he has spoken to his sister, that his mother / patient will be returning to her own home with support from family and that they do want hospice at home. Explained the different messages we had been getting from his sister/mpoa. He is insisting that patient have hospice at discharge. I encouraged him to talk to his sister and noted that we would make arrangements to have hospice support at home for patient.  He was very elfego and noted that they want patient home. Contacted Ramez Hawk again. Informed her of above. Patient lives in the [address] She will need a hospice agency that serves that area. As both son Chrissie Hillman and daughter Pamela Xiong are both mPOA, goals and conversations for discharge can occur with both of them. At this time, it appears that Brian Neumann is more realistic and accepting of patient's diagnosis and is receptive to the option of comfort at home, with hospice, which seems to be in line with patient's goals. In her AMD, patient had noted that she would not want treatments to prolong her life, if she is terminal or at the end stage of her life.

## 2020-04-14 NOTE — PROGRESS NOTES
Problem: Falls - Risk of 
Goal: *Absence of Falls Description: Document Ricardo Roque Fall Risk and appropriate interventions in the flowsheet. Note: Fall Risk Interventions: 
Mobility Interventions: Bed/chair exit alarm, Patient to call before getting OOB Mentation Interventions: Adequate sleep, hydration, pain control, Bed/chair exit alarm Medication Interventions: Bed/chair exit alarm Elimination Interventions: Call light in reach, Toileting schedule/hourly rounds

## 2020-04-14 NOTE — PROGRESS NOTES
Physical Therapy Chart reviewed, patient is pending possible hospice so will hold PT today and continue to follow. Kenzie Gardner

## 2020-04-14 NOTE — PROGRESS NOTES
Nutrition Assessment: 
 
INTERVENTIONS/RECOMMENDATIONS:  
Diet advancement per SLP Consider holding/discontinuing K+ supplement ASSESSMENT:  
Chart reviewed; patient made NPO due to dysphagia. SLP following. Palliative care on board. K+ 5.2 yesterday; no repeat labs today. Will monitor progress and plan of care. Diet Order: NPO 
% Eaten:   
Patient Vitals for the past 72 hrs: 
 % Diet Eaten 04/12/20 1156 20 % Pertinent Medications: [x] Reviewed []Other: Lasix, Hydralazine, MVI, Effer-K Pertinent Labs: [x]Reviewed  []Other: Na 132, K+ 5.2, -58-90 Food Allergies: [x]None []Yes:    
Last BM: 4/9  []Active     []Hyperactive  []Hypoactive       [] Absent  BS Skin:    [] Intact   [] Incision  [] Breakdown   []Edema   [x]Other: DTI Sacrum Anthropometrics: Height: 5' 2\" (157.5 cm) Weight: 67.5 kg (148 lb 12.8 oz) IBW (%IBW):   ( ) UBW (%UBW):   (  %) BMI: Body mass index is 27.22 kg/m². This BMI is indicative of: 
[]Underweight   []Normal   [x]Overweight   [] Obesity   [] Extreme Obesity (BMI>40) Last Weight Metrics: 
Weight Loss Metrics 4/12/2020 3/12/2020 3/10/2020 3/5/2020 3/3/2020 2/28/2020 2/25/2020 Today's Wt 148 lb 12.8 oz 121 lb 2 oz 138 lb 141 lb 138 lb 138 lb 139 lb BMI 27.22 kg/m2 22.15 kg/m2 25.24 kg/m2 25.79 kg/m2 25.24 kg/m2 25.24 kg/m2 25.42 kg/m2 Estimated Nutrition Needs (Based on): 4239 Kcals/day(BMR (988) x 1. 3AF) , 67 g(1.0 g/kg bw) Protein Carbohydrate: At Least 130 g/day  Fluids: 1300 mL/day Pt expected to meet estimated nutrient needs: []Yes [x]No 
 
NUTRITION DIAGNOSES:  
Problem:  Inadequate protein-energy intake Etiology: related to advanced age, decreased appetite, dysphagia Signs/Symptoms: as evidenced by NPO status, Previously eating <25% of meals NUTRITION INTERVENTIONS: 
Meals/Snacks: General/healthful diet   Supplements: Commercial supplement GOAL:  
Diet advanced vs comfort measures next 1-3 days NUTRITION MONITORING AND EVALUATION Food/Nutrient Intake Outcomes: Total energy intake Physical Signs/Symptoms Outcomes: Weight/weight change, Electrolyte and renal profile Previous Goal Met: 
 [] Met              [] Progressing Towards Goal              [x] Not Progressing Towards Goal  
Previous Recommendations: 
 [x] Implemented          [] Not Implemented          [] Not Applicable LEARNING NEEDS (Diet, Food/Nutrient-Drug Interaction):  
 [x] None Identified 
 [] Identified and Education Provided/Documented 
 [] Identified and Pt declined/was not appropriate Cultural, Baptist, OR Ethnic Dietary Needs:  
 [x] None Identified 
 [] Identified and Addressed 
 
 [x] Interdisciplinary Care Plan Reviewed/Documented  
 [x] Discharge Planning: TBD [] Participated in Interdisciplinary Rounds NUTRITION RISK:  
 [x] Patient At Nutritional Risk             [] Patient Not At Nutritional Risk Maye Cerna Ext [phone] Pager [phone] Weekend Pager [phone]

## 2020-04-14 NOTE — PROGRESS NOTES
* No surgery found * 
* No surgery found * Bedside and Verbal shift change report given to [address] [facility] (oncoming nurse) by Mary Low RN (offgoing nurse). Report included the following information SBAR, Kardex, MAR and Recent Results. Zone Phone:   [phone] Significant changes during shift:  
C/o feeling like something stuck in throat. [address] and oriented Cannot swallow. See note. PCXR done and Speech therapy reconsulted. Dr Gretchen Garcia spoke with family. Informed at him at 1500 that family called and is waiting for another phone call for update on plan. Seen by Wound care Patient Information Praful Alberto 80 y.o. 
4/8/2020  2:18 PM by Andrea Bush MD. Praful Alberto was admitted from Home 
 
Problem List 
 
Patient Active Problem List  
 Diagnosis Date Noted  Counseling regarding advance care planning and goals of care 04/10/2020  RUMA (acute kidney injury) ([address] 04/08/2020  Elevated troponin 02/20/2020  
 NSTEMI (non-ST elevated myocardial infarction) ([address] 02/19/2020  Acute kidney injury ([address] 09/30/2019  CHF (congestive heart failure) ([address] 03/02/2019  Pleural effusion, left 07/17/2018  CHF, acute ([address] 07/17/2018  CKD (chronic kidney disease) stage 3, GFR 30-59 ml/min ([facility]) 05/29/2018  Chronic a-fib 05/29/2018  Diastolic CHF, acute on chronic ([address] 05/29/2018  Pleural effusion 05/29/2018  Pneumonia 04/18/2017  Sepsis(995.91) 04/18/2017  Dehydration 04/18/2017  Acute CHF ([address] 01/23/2015  SOB (shortness of breath) 01/21/2015  Pedal edema 01/21/2015  Squamous cell lung cancer ([address] 01/21/2015  Adenocarcinoma ([address] 04/05/2013  S/P right colectomy 04/05/2013  Malignant essential hypertension 04/01/2013  Colon cancer ([address] 03/30/2013  Ileitis, terminal ([address] 03/27/2013  A-fib ([facility] 03/27/2013  
 HTN (hypertension) 03/27/2013 Past Medical History:  
Diagnosis Date  A-fib ([facility]  Adenocarcinoma ([facility]  Cancer ([facility] right breast CA, lung, colon  Chronic kidney disease  Gastrointestinal disorder GERD  Hypertension  Ileitis, terminal ([facility]  Other ill-defined conditions(799.89)   
 gout  Squamous cell lung cancer ([facility] Core Measures: CVA: No No 
CHF:Yes Yes PNA:No Not applicable Activity Status: OOB to Chair No 
Ambulated this shift No  
Bed Rest No 
 
Supplemental O2: (If Applicable) NC Yes NRB No 
Venti-mask No 
On 2 Liters/min LINES AND DRAINS: 
 
PIV Urinary Catheter? External female catheter for urine sample DVT prophylaxis: DVT prophylaxis Med- Yes DVT prophylaxis SCD or LINDA- No  
 
Wounds: (If Applicable) Wounds- Yes Location stage II/possible fissure sacrum, TASHI Patient Safety: 
 
Falls Score Total Score: 3 Safety Level_______ Bed Alarm On? Yes Sitter? No 
 
Plan for upcoming shift: safety, repositioning . Apply specialty mattress. Trun every two hours wound care q 8hours Discharge Plan: Yes CM following Active Consults: 
IP CONSULT TO CARDIOLOGY 
IP CONSULT TO CARDIOLOGY 
IP CONSULT TO HOSPITALIST 
IP CONSULT TO PALLIATIVE CARE - PROVIDER 
IP CONSULT TO PALLIATIVE CARE - PROVIDER

## 2020-04-14 NOTE — PROGRESS NOTES
Problem: Dysphagia (Adult) Goal: *Acute Goals and Plan of Care (Insert Text) Description: 4/13/2020 Speech path 1. Pt will participate with reeval of swallowing tomorrow. Continue for 7 days Outcome: Progressing Towards Goal 
  
SPEECH LANGUAGE PATHOLOGY DYSPHAGIA TREATMENT Patient: Gus Chin (89 y.o. female) Date: 4/14/2020 Diagnosis: CHF (congestive heart failure) ([facility] [I50.9] RUMA (acute kidney injury) ([facility] [N17.9] Diastolic CHF, acute on chronic (HCC) Precautions: swallow Fall, Bed Alarm, DNR 
 
ASSESSMENT: 
Patient with continued severe oropharyngeal dysphagia. Patient with impaired bolus acceptance with decreased lip closure around spoon. When patient finally did accept the bolus, she had absent manipulation and posterior propulsion, and bolus was removed from oral cavity. Recommend NPO at this time secondary to severity of dysphagia. Given patient with absent swallow initiation at this time, she would not be appropriate for MBS as it would not give new information. Guarded prognosis for recovery given unknown etiology of dysphagia, severity of deficits, and advanced age. PLAN: 
Recommendations and Planned Interventions: 
-Continue to recommend NPO 
-Agree with palliative consult for goals of care Patient continues to benefit from skilled intervention to address the above impairments. Continue treatment per established plan of care. Discharge Recommendations: To Be Determined SUBJECTIVE:  
Patient stated her name, which was her only verbalization. OBJECTIVE:  
Cognitive and Communication Status: 
Neurologic State: Alert, Drowsy Orientation Level: Oriented to person Cognition: Decreased command following, Decreased attention/concentration Perception: Appears intact Perseveration: No perseveration noted Dysphagia Treatment: P.O. Trials: 
Patient Position: upright in bed Vocal quality prior to P.O.: Low volume Consistency Presented: Ice chips;Puree How Presented: SLP-fed/presented;Spoon Bolus Acceptance: Impaired Bolus Formation/Control: Impaired Type of Impairment: Other (comment)(no manipulation) Propulsion: Absent Oral Residue: Greater than 50% of bolus; Other (comment)(bolus removed from oral cavity) Initiation of Swallow: Absent Pain: 
Pain Scale 1: Adult Nonverbal Pain Scale After treatment:  
Patient left in no apparent distress in bed, Call bell within reach, and Nursing notified COMMUNICATION/EDUCATION:  
The patient's plan of care including recommendations, planned interventions, and recommended diet changes were discussed with: Registered nurse. DEBORAH Hook Time Calculation: 10 mins

## 2020-04-14 NOTE — PROGRESS NOTES
HA:  
1) Home with hospice 2) Pt will need 2ND IM letter at time of discharge 3) Pt will need [facility] transportation at time of discharge 5:00 PM- CM spoke with attending and family regarding d/c tomorrow. Family is willing and [facility] transportation will be set up for 10:30 AM. [facility] to [facility] delivering equipment. CM will follow up in the morning to confirm plans. 1:58 PM- CM received a phone call from pt's son Vanna Samuel requesting hospice services be made. CM noted that was decided during palliatives meeting today. Pt's son has no preference of hospice agency- [facility] limited due to service area. CM sending referral to [place] [address] to see when they could accommodate. SETH Werner, CM [facility] [phone]

## 2020-04-14 NOTE — PROGRESS NOTES
Hospitalist Progress Note NAME: Michelle Hunt :  1918 MRN:  [id number] I reviewed pertinent labs and imaging, and discussed /agreed on the plan of care with Dr. Mili Corley. Assessment / Plan: 
Acute on Chronic heart failure elevated BNP on admission 11.546, 10.770 1. Continue IV lasix 40 mg BID 2. Metalazone 2.5 mg 3 x week 3. Chest x-ray IMPRESSION:  
1. Unchanged small to moderate chronic left pleural effusion with chronic 
atelectasis/volume loss of the left lung. Likely trace right pleural effusion. 2. Unchanged cardiomegaly. Pulmonary vascular congestion without overt pulmonary Edema. Acute Kidney Injury/ on Chronic Stage III 1. R/T CHF 2. Elevated Creatinine 3.51  this am increasing due to diuresis 3. Monitor Renal Function 4. Patient is pending possible Hospice, waiting on Family decision 5. Avoid Nephrotoxic medications 6. Decreased lasix to 40 mg BID from 60 mg 
Cardiorenal syndrome secondary to CKD stage III/CHF 1. Poor prognosis waiting family decision concerning Hospice Care 2. Continue current IV lasix 60 mg BID & metalazone 3 x week 3. Monitor Renal Function Stage I sacral Pressure ulcer 1. Turn/reposition every 2 hours 2. Apply barrier creams 3. Monitor Nutritional Status AFIB 1. Continue Eliquis 2.5 mg BID 2. Rate controlled at this time 3. Continue Telemetry monitoring 4. EKG 20 shows: Atrial fibrillation Leukocytosis 1. Continue IV Levaquin 2. Blood cultures negative x 14 hours 3. Repeat urine culture pending 4. Repeat chest x-ray in the am 
Sepsis 1. Elevated WBC's at 17.2 2. Elevated heart rate 3. Continue IV Levaquin 4. Urine culture 5. Blood cultures negative x 14 hours 6.  Procalcitonin 4.71 
 
 Acute change in mentation, SLP evaluated, recommend NPO. Palliative consulted to help with GOC at discharge. She is a candidate for hospice at discharge 25.0 - 29.9 Overweight / Body mass index is 26.9 kg/m². Code status: DNR Prophylaxis: Eliquis Recommended Disposition:  PT, OT, RN Subjective: Chief Complaint / Reason for Physician Visit Pt seen. NAD. Not following commands. Discussed with RN events overnight. Review of Systems: 
Symptom Y/N Comments  Symptom Y/N Comments Fever/Chills    Chest Pain Poor Appetite    Edema Cough    Abdominal Pain Sputum    Joint Pain SOB/WOOTEN    Pruritis/Rash Nausea/vomit    Tolerating PT/OT Diarrhea    Tolerating Diet Constipation    Other Could NOT obtain due to: Altered mental status . Objective: VITALS:  
Last 24hrs VS reviewed since prior progress note. Most recent are: 
Patient Vitals for the past 24 hrs: 
 Temp Pulse Resp BP SpO2  
04/14/20 1134 97.3 °F (36.3 °C) 91 16 (!) 113/96 100 % 04/14/20 0720 97.6 °F (36.4 °C) 82 16 (!) 121/97 100 % 04/14/20 0448 97.3 °F (36.3 °C) 86 18 113/64 100 % 04/13/20 2249 97.4 °F (36.3 °C) 73 20 103/59 100 % 04/13/20 1912 97.3 °F (36.3 °C) 84 22 114/76 100 % 04/13/20 1522 97.2 °F (36.2 °C) 87 22 112/71 100 % Intake/Output Summary (Last 24 hours) at 4/14/2020 1337 Last data filed at 4/13/2020 2140 Gross per 24 hour Intake  Output 425 ml Net -425 ml PHYSICAL EXAM: 
General:          Female in bed, ill appearing EENT:  Anicteric sclerae. normocephalic Resp:  CTA bilaterally, no wheezing or rales. No accessory muscle use CV:  irregular  rhythm,  3+ b/l pedal  edema GI:  Soft, ND, NT.  +BS Neurologic:  Alert to self, normal speech Psych:   No insight, Dementia. Not anxious nor agitated Skin:  No rashes. No jaundice Reviewed most current lab test results and cultures  YES Reviewed most current radiology test results   YES 
 Review and summation of old records today    NO Reviewed patient's current orders and MAR    YES 
PMH/SH reviewed - no change compared to H&P 
________________________________________________________________________ Care Plan discussed with: 
  Comments Patient x Family RN x Care Manager Consultant     
                 x Multidiciplinary team rounds were held today with , nursing, pharmacist and clinical coordinator. Patient's plan of care was discussed; medications were reviewed and discharge planning was addressed. ________________________________________________________________________ Total NON critical care TIME:  35 Minutes Total CRITICAL CARE TIME Spent:   Minutes non procedure based Comments >50% of visit spent in counseling and coordination of care    
________________________________________________________________________ Frank Parra MD  
 
Procedures: see electronic medical records for all procedures/Xrays and details which were not copied into this note but were reviewed prior to creation of Plan. LABS: 
I reviewed today's most current labs and imaging studies. Pertinent labs include: 
Recent Labs 04/14/20 
1151 04/13/20 
0218 04/12/20 
0308 WBC 24.9* 16.7* 17.2* HGB 14.9 14.6 14.2 HCT 43.0 42.8 43.4  192 179 Recent Labs 04/14/20 
1151 04/13/20 
0218 04/12/20 
2894 * 132* 132* K 6.0* 5.2* 5.7* CL 93* 90* 91* CO2 32 37* 36* GLU 70 102* 58* * 116* 108* CREA 3.27* 3.49* 3.51* CA 10.4* 10.4* 10.2* Signed: Frank Parra MD

## 2020-04-14 NOTE — PROGRESS NOTES
No surgery found * 
* No surgery found * Bedside and Verbal shift change report given to Edward 112 (oncoming nurse) by Artemio Akhtar RN (offgoing nurse). Report included the following information SBAR, Kardex, MAR and Recent Results. 
  
Zone Phone:   [phone] 
  
  
Significant changes during shift: tolerated po meds this am,pt was alert when I gave the meds with small amount of apple sauce. 
 
  
  
Patient Information 
  
Jojo Bolanos 80 y.o. 
4/8/2020  2:18 PM by Gina Ni MD. Jojo Bolanos was admitted from Home 
  
Problem List 
  
    
Patient Active Problem List  
  Diagnosis Date Noted  Counseling regarding advance care planning and goals of care 04/10/2020  RUMA (acute kidney injury) ([facility] 04/08/2020  Elevated troponin 02/20/2020  
 NSTEMI (non-ST elevated myocardial infarction) ([address] 02/19/2020  Acute kidney injury ([facility] 09/30/2019  CHF (congestive heart failure) ([facility] 03/02/2019  Pleural effusion, left 07/17/2018  CHF, acute ([address] 07/17/2018  CKD (chronic kidney disease) stage 3, GFR 30-59 ml/min ([facility]) 05/29/2018  Chronic a-fib 05/29/2018  Diastolic CHF, acute on chronic ([address] 05/29/2018  Pleural effusion 05/29/2018  Pneumonia 04/18/2017  Sepsis(995.91) 04/18/2017  Dehydration 04/18/2017  Acute CHF ([address] 01/23/2015  SOB (shortness of breath) 01/21/2015  Pedal edema 01/21/2015  Squamous cell lung cancer ([address] 01/21/2015  Adenocarcinoma ([facility] 04/05/2013  S/P right colectomy 04/05/2013  Malignant essential hypertension 04/01/2013  Colon cancer ([address] 03/30/2013  Ileitis, terminal ([address] 03/27/2013  A-fib ([facility] 03/27/2013  
 HTN (hypertension) 03/27/2013  
  
    
Past Medical History:  
Diagnosis Date  A-fib ([address]    
 Adenocarcinoma ([address]    
 Cancer ([facility]    
  right breast CA, lung, colon  Chronic kidney disease    
 Gastrointestinal disorder    
  GERD  Hypertension    
 Ileitis, terminal ([facility]    
  Other ill-defined conditions(799.89)    
  gout  Squamous cell lung cancer (HCC)    
  
  
  
Core Measures: 
  
CVA: No No 
CHF:Yes Yes PNA:No Not applicable 
  
  
Activity Status: 
  
OOB to Chair No 
Ambulated this shift No  
Bed Rest No 
  
Supplemental O2: (If Applicable) 
  
NC Yes NRB No 
Venti-mask No 
On 2 LiLINES AND DRAINS: 
  
PIV Urinary Catheter? External female catheter for urine sample 
  
DVT prophylaxis: 
  
DVT prophylaxis Med- Yes DVT prophylaxis SCD or LINDA- No  
  
Wounds: (If Applicable) 
  
Wounds- Yes 
  
Location stage II/possible fissure sacrum, TASHI 
  
Patient Safety: 
  
Falls Score Total Score: 3 Safety Level_______ Bed Alarm On? Yes Sitter?  No 
  
Plan for upcoming shift: safety, repositioning  
  
  
  
Discharge Plan: Yes CM following 
  
Active Consults: 
IP CONSULT TO CARDIOLOGY 
IP CONSULT TO CARDIOLOGY 
IP CONSULT TO HOSPITALIST 
IP CONSULT TO PALLIATIVE CARE - PROVIDER 
IP CONSULT TO PALLIATIVE CARE - PROVIDER

## 2020-04-14 NOTE — PROGRESS NOTES
Patient has been resting quietly throughout my shift. Her lower dentures was seen on the patient's bed throughout my shift. This information was passed on to the oncoming Nurse, Hayden Monaco. Routine rounding and all safety precautions were adhered to.

## 2020-04-15 NOTE — PROGRESS NOTES
Problem: Pressure Injury - Risk of 
Goal: *Prevention of pressure injury Description: Document Michael Scale and appropriate interventions in the flowsheet. Outcome: Progressing Towards Goal 
Note: Pressure Injury Interventions: 
Sensory Interventions: Assess changes in LOC, Assess need for specialty bed, Avoid rigorous massage over bony prominences, Discuss PT/OT consult with provider, Check visual cues for pain, Float heels, Maintain/enhance activity level, Minimize linen layers, Monitor skin under medical devices, Pad between skin to skin, Pressure redistribution bed/mattress (bed type), Sit a 90-degree angle/use footstool if needed, Turn and reposition approx. every two hours (pillows and wedges if needed) Moisture Interventions: Absorbent underpads, Apply protective barrier, creams and emollients, Assess need for specialty bed, Internal/External urinary devices, Limit adult briefs, Minimize layers, Moisture barrier, Offer toileting Q_hr Activity Interventions: Assess need for specialty bed, Increase time out of bed, Pressure redistribution bed/mattress(bed type), PT/OT evaluation Mobility Interventions: Assess need for specialty bed, Float heels, PT/OT evaluation, Pressure redistribution bed/mattress (bed type), Turn and reposition approx. every two hours(pillow and wedges) Nutrition Interventions: Document food/fluid/supplement intake Friction and Shear Interventions: Apply protective barrier, creams and emollients, Feet elevated on foot rest, Foam dressings/transparent film/skin sealants, Lift sheet, Lift team/patient mobility team, Minimize layers, Transfer aides:transfer board/Hortencia lift/ceiling lift, Transferring/repositioning devices, Trapeze to reposition Problem: Patient Education: Go to Patient Education Activity Goal: Patient/Family Education Outcome: Progressing Towards Goal 
  
Problem: Patient Education: Go to Patient Education Activity Goal: Patient/Family Education Outcome: Progressing Towards Goal 
  
Problem: Heart Failure: Day 1 Goal: Off Pathway (Use only if patient is Off Pathway) Outcome: Progressing Towards Goal 
Goal: Activity/Safety Outcome: Progressing Towards Goal 
Goal: Consults, if ordered Outcome: Progressing Towards Goal 
Goal: Diagnostic Test/Procedures Outcome: Progressing Towards Goal 
Goal: Nutrition/Diet Outcome: Progressing Towards Goal 
Goal: Discharge Planning Outcome: Progressing Towards Goal 
Goal: Medications Outcome: Progressing Towards Goal 
Goal: Respiratory Outcome: Progressing Towards Goal 
Goal: Treatments/Interventions/Procedures Outcome: Progressing Towards Goal 
Goal: Psychosocial 
Outcome: Progressing Towards Goal 
Goal: *Oxygen saturation within defined limits Outcome: Progressing Towards Goal 
Goal: *Hemodynamically stable Outcome: Progressing Towards Goal 
Goal: *Optimal pain control at patient's stated goal 
Outcome: Progressing Towards Goal 
Goal: *Anxiety reduced or absent Outcome: Progressing Towards Goal 
  
Problem: Heart Failure: Day 2 Goal: Off Pathway (Use only if patient is Off Pathway) Outcome: Progressing Towards Goal 
Goal: Activity/Safety Outcome: Progressing Towards Goal 
Goal: Consults, if ordered Outcome: Progressing Towards Goal 
Goal: Diagnostic Test/Procedures Outcome: Progressing Towards Goal 
Goal: Nutrition/Diet Outcome: Progressing Towards Goal 
Goal: Discharge Planning Outcome: Progressing Towards Goal 
Goal: Medications Outcome: Progressing Towards Goal 
Goal: Respiratory Outcome: Progressing Towards Goal 
Goal: Treatments/Interventions/Procedures Outcome: Progressing Towards Goal 
Goal: Psychosocial 
Outcome: Progressing Towards Goal 
Goal: *Oxygen saturation within defined limits Outcome: Progressing Towards Goal 
Goal: *Hemodynamically stable Outcome: Progressing Towards Goal 
Goal: *Optimal pain control at patient's stated goal 
 Outcome: Progressing Towards Goal 
Goal: *Anxiety reduced or absent Outcome: Progressing Towards Goal 
Goal: *Demonstrates progressive activity Outcome: Progressing Towards Goal 
  
Problem: Heart Failure: Day 3 Goal: Off Pathway (Use only if patient is Off Pathway) Outcome: Progressing Towards Goal 
Goal: Activity/Safety Outcome: Progressing Towards Goal 
Goal: Diagnostic Test/Procedures Outcome: Progressing Towards Goal 
Goal: Nutrition/Diet Outcome: Progressing Towards Goal 
Goal: Discharge Planning Outcome: Progressing Towards Goal 
Goal: Medications Outcome: Progressing Towards Goal 
Goal: Respiratory Outcome: Progressing Towards Goal 
Goal: Treatments/Interventions/Procedures Outcome: Progressing Towards Goal 
Goal: Psychosocial 
Outcome: Progressing Towards Goal 
Goal: *Oxygen saturation within defined limits Outcome: Progressing Towards Goal 
Goal: *Hemodynamically stable Outcome: Progressing Towards Goal 
Goal: *Optimal pain control at patient's stated goal 
Outcome: Progressing Towards Goal 
Goal: *Anxiety reduced or absent Outcome: Progressing Towards Goal 
Goal: *Demonstrates progressive activity Outcome: Progressing Towards Goal 
  
Problem: Heart Failure: Day 4 Goal: Off Pathway (Use only if patient is Off Pathway) Outcome: Progressing Towards Goal 
Goal: Activity/Safety Outcome: Progressing Towards Goal 
Goal: Diagnostic Test/Procedures Outcome: Progressing Towards Goal 
Goal: Nutrition/Diet Outcome: Progressing Towards Goal 
Goal: Discharge Planning Outcome: Progressing Towards Goal 
Goal: Medications Outcome: Progressing Towards Goal 
Goal: Respiratory Outcome: Progressing Towards Goal 
Goal: Treatments/Interventions/Procedures Outcome: Progressing Towards Goal 
Goal: Psychosocial 
Outcome: Progressing Towards Goal 
Goal: *Oxygen saturation within defined limits Outcome: Progressing Towards Goal 
Goal: *Hemodynamically stable Outcome: Progressing Towards Goal 
 Goal: *Optimal pain control at patient's stated goal 
Outcome: Progressing Towards Goal 
Goal: *Anxiety reduced or absent Outcome: Progressing Towards Goal 
Goal: *Demonstrates progressive activity Outcome: Progressing Towards Goal 
  
Problem: Heart Failure: Day 5 Goal: Off Pathway (Use only if patient is Off Pathway) Outcome: Progressing Towards Goal 
Goal: Activity/Safety Outcome: Progressing Towards Goal 
Goal: Diagnostic Test/Procedures Outcome: Progressing Towards Goal 
Goal: Nutrition/Diet Outcome: Progressing Towards Goal 
Goal: Discharge Planning Outcome: Progressing Towards Goal 
Goal: Medications Outcome: Progressing Towards Goal 
Goal: Respiratory Outcome: Progressing Towards Goal 
Goal: Treatments/Interventions/Procedures Outcome: Progressing Towards Goal 
Goal: Psychosocial 
Outcome: Progressing Towards Goal 
  
Problem: Heart Failure: Discharge Outcomes Goal: *Demonstrates ability to perform prescribed activity without shortness of breath or discomfort Outcome: Progressing Towards Goal 
Goal: *Left ventricular function assessment completed prior to or during stay, or planned for post-discharge Outcome: Progressing Towards Goal 
Goal: *ACEI prescribed if LVEF less than 40% and no contraindications or ARB prescribed Outcome: Progressing Towards Goal 
Goal: *Verbalizes understanding and describes prescribed diet Outcome: Progressing Towards Goal 
Goal: *Verbalizes understanding/describes prescribed medications Outcome: Progressing Towards Goal 
Goal: *Describes available resources and support systems Description: (eg: Home Health, Palliative Care, Advanced Medical Directive) Outcome: Progressing Towards Goal 
Goal: *Describes smoking cessation resources Outcome: Progressing Towards Goal 
Goal: *Understands and describes signs and symptoms to report to providers(Stroke Metric) Outcome: Progressing Towards Goal 
Goal: *Describes/verbalizes understanding of follow-up/return appt Description: (eg: to physicians, diabetes treatment coordinator, and other resources Outcome: Progressing Towards Goal 
Goal: *Describes importance of continuing daily weights and changes to report to physician Outcome: Progressing Towards Goal 
  
Problem: Falls - Risk of 
Goal: *Absence of Falls Description: Document Prateek Retana Fall Risk and appropriate interventions in the flowsheet. Outcome: Progressing Towards Goal 
Note: Fall Risk Interventions: 
Mobility Interventions: Bed/chair exit alarm Mentation Interventions: Door open when patient unattended, Familiar objects from home Medication Interventions: Bed/chair exit alarm Elimination Interventions: Call light in reach, Bed/chair exit alarm Problem: Patient Education: Go to Patient Education Activity Goal: Patient/Family Education Outcome: Progressing Towards Goal 
  
Problem: Patient Education: Go to Patient Education Activity Goal: Patient/Family Education Outcome: Progressing Towards Goal 
  
Problem: Pain Goal: *Control of Pain Outcome: Progressing Towards Goal 
Goal: *PALLIATIVE CARE:  Alleviation of Pain Outcome: Progressing Towards Goal 
  
Problem: Patient Education: Go to Patient Education Activity Goal: Patient/Family Education Outcome: Progressing Towards Goal

## 2020-04-15 NOTE — PROGRESS NOTES
DR Simpson Records notified of sepsis alert and of BP this am. She stated to hold all meds as pt is being discharged to home with hospice

## 2020-04-15 NOTE — PROGRESS NOTES
No surgery found * 
* No surgery found * Bedside and Verbal shift change report given to [address] (oncoming nurse) by Wendi Jones RN (offgoing nurse). Report included the following information SBAR, Kardex, MAR and Recent Results. 
  
Zone Phone:   [phone] 
  
  
Significant changes during shift: NOne  
  
Patient Information 
  
Radha Rodriguez 80 y.o. 
4/8/2020  2:18 PM by Indiana Dougherty MD. Radha Rodriguez was admitted from Home 
  
Problem List 
  
    
Patient Active Problem List  
  Diagnosis Date Noted  Counseling regarding advance care planning and goals of care 04/10/2020  RUMA (acute kidney injury) ([address] 04/08/2020  Elevated troponin 02/20/2020  
 NSTEMI (non-ST elevated myocardial infarction) ([address] 02/19/2020  Acute kidney injury ([address] 09/30/2019  CHF (congestive heart failure) ([address] 03/02/2019  Pleural effusion, left 07/17/2018  CHF, acute ([address] 07/17/2018  CKD (chronic kidney disease) stage 3, GFR 30-59 ml/min ([facility]) 05/29/2018  Chronic a-fib 05/29/2018  Diastolic CHF, acute on chronic ([address] 05/29/2018  Pleural effusion 05/29/2018  Pneumonia 04/18/2017  Sepsis(995.91) 04/18/2017  Dehydration 04/18/2017  Acute CHF ([address] 01/23/2015  SOB (shortness of breath) 01/21/2015  Pedal edema 01/21/2015  Squamous cell lung cancer ([address] 01/21/2015  Adenocarcinoma ([address] 04/05/2013  S/P right colectomy 04/05/2013  Malignant essential hypertension 04/01/2013  Colon cancer ([address] 03/30/2013  Ileitis, terminal ([address] 03/27/2013  A-fib ([address] 03/27/2013  
 HTN (hypertension) 03/27/2013  
  
    
Past Medical History:  
Diagnosis Date  A-fib ([address]    
 Adenocarcinoma ([address]    
 Cancer ([address]    
  right breast CA, lung, colon  Chronic kidney disease    
 Gastrointestinal disorder    
  GERD  Hypertension    
 Ileitis, terminal ([address]    
 Other ill-defined conditions(799.89)    
  gout  Squamous cell lung cancer (HCC)    
  
  
  
 Core Measures: 
  
CVA: No No 
CHF:Yes Yes PNA:No Not applicable 
  
  
Activity Status: 
  
OOB to Chair No 
Ambulated this shift No  
Bed Rest No 
  
Supplemental O2: (If Applicable) 
  
NC Yes NRB No 
Venti-mask No 
On 2 LiLINES AND DRAINS: 
  
PIV Urinary Catheter? External female catheter for  
  
DVT prophylaxis: 
  
DVT prophylaxis Med- Yes DVT prophylaxis SCD or LINDA- No  
  
Wounds: (If Applicable) 
  
Wounds- Yes 
  
Location stage II/possible fissure sacrum, TASHI 
  
Patient Safety: 
  
Falls Score Total Score: 3 Safety Level_______ Bed Alarm On? Yes Sitter?  No 
  
Plan for upcoming shift: safety, repositioning  
  
  
  
Discharge Plan: Yes CM following 
  
Active Consults: 
IP CONSULT TO CARDIOLOGY 
IP CONSULT TO CARDIOLOGY 
IP CONSULT TO HOSPITALIST 
IP CONSULT TO PALLIATIVE CARE - PROVIDER 
IP CONSULT TO PALLIATIVE CARE - PROVIDER

## 2020-04-15 NOTE — DISCHARGE SUMMARY
Discharge Summary Name: Chidi Nixon [id number] YOB: 1918 (Age: 80 y.o.) Date of Admission: 4/8/2020 Date of Discharge: 4/15/2020 Attending Physician: Alva Becker MD 
 
Discharge Diagnosis:  
Acute on chronic diastolic heart failure Acute Kidney Injury/ on Chronic Stage III Stage I sacral Pressure ulcer AFIB Sepsis, POA Acute metabolic encephalopathy Consultations: 
IP CONSULT TO CARDIOLOGY 
IP CONSULT TO CARDIOLOGY 
IP CONSULT TO HOSPITALIST 
IP CONSULT TO PALLIATIVE CARE - PROVIDER 
IP CONSULT TO PALLIATIVE CARE - PROVIDER Brief Admission History/Reason for Admission Per Bryan Jones MD:  
Anjelica Dumas is a 80 y.o. black female with a history of atrial fibrillation on Eliquis squamosal long cancer, hypertension, chronic kidney disease history of right breast cancer, colon cancer coronary artery disease diastolic CHF who presented to the emergency room complaining with increased leg swelling and shortness of breath over the past week patient is with family who provided most of the information. Patient is cleared by granddaughter saw on a daily basis. Apparently they contacted cardiology earlier in the week who increased her diuretics but unfortunately she is still not responding. Her eating and p.o. intake is reasonable for her. She is most the time breathing but she can get out of the bed and ambulate once in a while. Denies fever chills any sick contacts with corona patient chronic cough. No chest pain no abdominal pain no nausea no vomiting denies any UTI symptoms. No back pain. We were asked to admit for work up and evaluation of the above problems.  
Tristen Bates 9038 Course by Main Problems:  
Acute on chronic diastolic heart failure Acute Kidney Injury/ on Chronic Stage III Stage I sacral Pressure ulcer AFIB Sepsis, POA Acute metabolic encephalopathy Chest x-ray showed unchanged small to moderate chronic left pleural effusion with chronic  atelectasis/volume loss of the left lung. Likely trace right pleural effusion. EKG showed afib. Unchanged cardiomegaly. Pulmonary vascular congestion without overt pulmonary edema. Pt was diuresed with IV lasix and metolazone. Cr essentially worst due to cardiorenal syndrome. She was started on Eliquis 2.5mg BID. In terms of leukocytosis, UA neg, Bcx NTD. She was started on empiric IV levaquin. Due to poor prognosis and advance age, palliative care consulted. Family has opted for home with hospice. Medications changed to reflect comfort measures at discharge. Discharge Exam: 
Patient seen and examined by me on discharge day. Pertinent Findings: 
Visit Vitals /64 (BP 1 Location: Left arm, BP Patient Position: At rest;Supine) Pulse 79 Temp 96.6 °F (35.9 °C) Resp 18 Ht 5' 2\" (1.575 m) Wt 70.3 kg (155 lb) SpO2 99% BMI 28.35 kg/m² Gen:    Not in distress Chest: Clear lungs CVS:   Regular rhythm. No edema Abd:  Soft, not distended, not tender Discharge/Recent Laboratory Results: 
Recent Labs 04/14/20 
1151 * K 6.0*  
CL 93* CO2 32 * GLU 70  
CA 10.4* Recent Labs 04/15/20 
0406 HGB 14.1 HCT 41.1 WBC 22.4*  
 Discharge Medications: 
Current Discharge Medication List  
  
CONTINUE these medications which have NOT CHANGED Details OXYGEN-AIR DELIVERY SYSTEMS Take 2 L by inhalation as needed for Other (shortness of breath). isosorbide mononitrate ER (IMDUR) 30 mg tablet Take 1 Tab by mouth daily. Qty: 30 Tab, Refills: 3  
  
furosemide (LASIX) 40 mg tablet Take 2 Tabs by mouth two (2) times a day. Qty: 360 Tab, Refills: 3  
  
hydrALAZINE (APRESOLINE) 10 mg tablet Take 10 mg by mouth daily. Refills: 0  
  
acetaminophen (TYLENOL 8 HOUR) 650 mg TbER Take 1,300 mg by mouth every eight (8) hours as needed for Pain. nystatin (MYCOSTATIN) powder Apply 1 Each to affected area two (2) times daily as needed for Other (buttock irritation). use either powder or cream  
  
albuterol (PROVENTIL HFA) 90 mcg/actuation inhaler Take 2 Puffs by inhalation every four (4) hours as needed for Wheezing or Shortness of Breath. albuterol-ipratropium (DUO-NEB) 2.5 mg-0.5 mg/3 ml nebu Take 3 mL by inhalation every four (4) hours as needed (shortness of breath). dextran 70/hypromellose/PF (NATURAL TEARS, PF, OP) Administer 1 Drop to right eye daily as needed (dry eye). mometasone (NASONEX) 50 mcg/actuation nasal spray 2 Sprays by Both Nostrils route daily as needed (congestion). docusate sodium (COLACE) 100 mg capsule Take 100 mg by mouth daily as needed for Constipation. nystatin (MYCOSTATIN) topical cream Apply 1 g to affected area two (2) times daily as needed for Skin Irritation (buttocks). Use either cream or powder. STOP taking these medications  
  
 metOLazone (ZAROXOLYN) 2.5 mg tablet Comments:  
Reason for Stopping:   
   
 apixaban (ELIQUIS) 2.5 mg tablet Comments:  
Reason for Stopping:   
   
 mv-mn/folic acid/calcium/vit K (ONE-A-DAY WOMEN'S 50 PLUS PO) Comments:  
Reason for Stopping:   
   
 potassium chloride (KLOR-CON) 20 mEq packet Comments:  
Reason for Stopping:   
   
 colchicine (COLCRYS) 0.6 mg tablet Comments:  
Reason for Stopping:   
   
  
 
 
 
DISPOSITION:   
Home with Family:   
Home with HH/PT/OT/RN:   
SNF/LTC:   
LASHONDA:   
OTHER: Home with hospice Follow up with: PCP : Valeriano Rubalcava MD 
Follow-up Information Follow up With Specialties Details Why Contact Info Valeriano Rubalcava, [address] [place] [phone] Total time in minutes spent coordinating this discharge (includes going over instructions, follow-up, prescriptions, and preparing report for sign off to her PCP) :  35 minutes

## 2020-04-15 NOTE — PROGRESS NOTES
HA:  
1) Home with Hospice 9:29 AM- D/C order noted. CM spoke with pt's son Teena Ron and confirmed all the plans, pt's son confirmed stating the whole family is aware and waiting. Verbal explanation of the [employer] bundle letter provided and placed in d/c paperwork as a reference for family. Medicare pt has received, reviewed, and signed 2nd IM letter informing them of their right to appeal the discharge. Signed copy has been placed on pt bedside chart. Pt is cleared to d/c from CM perspective, RN informed. Care Management Interventions PCP Verified by CM: Yes Last Visit to PCP: 03/02/20 Mode of Transport at Discharge: [facility] Transport Time of Discharge: 1000 Transition of Care Consult (CM Consult): Home Hospice [place] H[facility]ice: No 
Reason Outside [place]: Out of service area MyChart Signup: No 
Discharge Durable Medical Equipment: No 
Physical Therapy Consult: Yes Occupational Therapy Consult: Yes Speech Therapy Consult: No 
Current Support Network: Has Personal Caregivers, Family Lives [place], Lives with Caregiver Confirm Follow Up Transport: Family The Plan for Transition of Care is Related to the Following Treatment Goals : hospice The Patient and/or Patient Representative was Provided with a Choice of Provider and Agrees with the Discharge Plan?: Yes Name of the Patient Representative Who was Provided with a Choice of Provider and Agrees with the Discharge Plan: Manuel Lisa [place] of Choice List was Provided with Basic Dialogue that Supports the Patient's Individualized Plan of Care/Goals, Treatment Preferences and Shares the Quality Data Associated with the Providers?: Yes Discharge Location Discharge Placement: Home with hospice SETH Titus, CM [facility] [phone]

## 2020-04-15 NOTE — PROGRESS NOTES
Reviewed discharge plan with granddaughter who states are ready for patinent. Informed ambulance is here and pt is wearing glasses upper dentures and lower dentures are in cup in belonging bag. Patine discharged via anbulance

## 2020-04-15 NOTE — PROGRESS NOTES
Bedside shift change report given to Jemma Jovel RN (oncoming nurse) by Angelina Sood RN (offgoing nurse). Report included the following information SBAR, Kardex, ED Summary, OR Summary, Procedure Summary, Intake/Output, MAR, Accordion, Recent Results, Med Rec Status, Cardiac Rhythm A-Fib, Alarm Parameters , Pre Procedure Checklist, Procedure Verification, Quality Measures and Dual Neuro Assessment.

## 2020-04-17 NOTE — PROGRESS NOTES
Patient discharged home to hospice care. Per Flora. [facility] will managed care. This patient is currently enrolled in the congestive heart failure bundle at [facility]. Bundle start date: 4/15/2020  Bundle end date: 7/15/2020.

## 2020-04-20 NOTE — CDMP QUERY
The patient has a documented history of hypertension, CKD, moderate mitral 
regurgitation, and diastolic CHF . She was admitted for acute on chronic diastolic CHF exacerbation and treated with IV diuretics. After study, was the etiology of the CHF felt to be due to: 
 
=> Hypertensive heart disease 
=> Valvular heart disease 
=> Both 
=> Other, please specify _________________ 
=> No further documentation can be provided The medical record reflects the following: 
  Risk Factors: 80 yr. old female admitted with CHF exacerbation Clinical Indicators: H&P notes \"Acute on chronic diastolic CHF POA LVEF 83-58% BNP >25933+ trop. Severe pulmonary HTN POA PA pressure 84\". DC summary notes \"Chest x-ray showed unchanged small to moderate chronic left pleural effusion with chronic  atelectasis/volume loss of the left lung. Likely trace right pleural effusion. EKG showed afib. Unchanged cardiomegaly. Pulmonary vascular congestion without overt pulmonary edema. Pt was diuresed with IV lasix and metolazone. Cr essentially worst due to cardiorenal syndrome. \" Treatment: IV diuretics(Lasix). Thank You, Indu Wong RN, Clinical  
[phone]

## 2020-05-19 ENCOUNTER — PATIENT OUTREACH (OUTPATIENT)
Dept: INTERNAL MEDICINE CLINIC | Age: 85
End: 2020-05-19

## 2020-05-19 NOTE — Clinical Note
Kirti Vanessa,  I contacted [facility] ([address] [facility]) and was informed that Mrs Alexander Crawford  on 2020.

## 2020-05-19 NOTE — PROGRESS NOTES
Outgoing call placed to [facility] (Claudell Maid) regarding update of patient status. Spoke to Wilian patient identified utilizing 2 identifiers. Introduced self reason for the call Wilian reports patient  2020. Episode of care closed.

## 2022-05-20 NOTE — DISCHARGE INSTRUCTIONS
Refilled.   You were seen in the ER for shortness of breath. Your xray and blood work shows this is from extra fluid. I would like you to take an extra lasix every morning for the next three days. Call your doctor Monday to schedule a recheck appointment. Learning About Fluid Overload  What is fluid overload? Fluid overload means that your body has too much water. The extra fluid in your body can raise your blood pressure and force your heart to work harder. It can also make it hard for you to breathe. Most of your body is made up of water. The body uses minerals like sodium and potassium to help organs such as your heart, kidneys, and liver balance how much water you need. For example, the heart pumps blood to move water around the body. And the kidneys work to get rid of the water that the body doesn't need. Health conditions like kidney disease, heart failure, and cirrhosis can cause fluid overload. Other things can cause extra fluid to build up. IV fluids, some medicines, too much salt (sodium) from food, and certain medical treatments can sometimes cause this fluid increase. What are the symptoms? Some of the most common symptoms are:  · Gaining weight over a short period of time. · Swelling in the ankles or legs. · Shortness of breath. How is it treated? The goal of treatment is to remove the extra fluid in your body. Your treatment will depend on the cause. Your doctor may:  · Give you medicines, such as diuretics (also called \"water pills\"). They help your body get rid of the extra fluid. · Restrict your fluid or salt intake. Follow-up care is a key part of your treatment and safety. Be sure to make and go to all appointments, and call your doctor if you are having problems. It's also a good idea to know your test results and keep a list of the medicines you take. Where can you learn more? Go to http://natty-mirza.info/.   Enter O110 in the search box to learn more about \"Learning About Fluid Overload. \"  Current as of: September 21, 2016  Content Version: 11.4  © 9109-2780 [employer]. Care instructions adapted under license by [employer] (which disclaims liability or warranty for this information). If you have questions about a medical condition or this instruction, always ask your healthcare professional. [place] [address] any warranty or liability for your use of this information.

## 2024-01-12 NOTE — ED NOTES
- s/p laparoscopic sleeve gastrectomy 1/17/2023 and followed by Dr Arroyo  - she had lost approx 80 lbs since her surgery    Meal tray ordered.

## 2024-02-06 NOTE — DISCHARGE SUMMARY
Hospitalist Discharge Summary Patient ID: 
Tori Lantigua [id number] 
89 y.o. 
8/12/1918 PCP on record: Chio Hernadez MD 
 
Admit date: 3/2/2019 Discharge date and time: 3/4/2019 DISCHARGE DIAGNOSIS: 
Acute on chronic diastolic CHF Chr A fib Chr resp failure CKD stage 2 HTN 
 
CONSULTATIONS: 
IP CONSULT TO CARDIOLOGY Excerpted HPI from H&P of Weston Payne MD: 
   Pt is 80 y. o.AA female with known chronic diastolic CHF, CRF on as needed oxygen at home per her report. Noted increasing SOB x 3 days, worse with exertion and laying down. Associated with increased BLE swelling. Minimal cough, no fevers, sore throat, myalgias 
 
______________________________________________________________________ DISCHARGE SUMMARY/HOSPITAL COURSE:  for full details see H&P, daily progress notes, labs, consult notes. .    Acute on chronic Diastolic CHF POA Pt was admitted to the hospital for shortness of breath due to volume overload. Started on IV lasix in ER, breathing improved. Pt is on RA ambulating in [place] without hypoxemia. Appreciate cardiology input recommended ACEI/ beta blocker not critical as HF is diastolic. No further intervention needed.  
  
.    Chronic AFib POA Stable cont eliquis. 
  
.   Chronic respiratory failure POA  
Due to COPD no acute exacerbation, Cont PRN oxygen at home 
  
.    CKD stage 2 Stable. Avoid nephrotoxic medications. Repeat BMP in 1 week 
  
.    Hypertension Stable, Cont. Norvasc 
_______________________________________________________________________ Patient seen and examined by me on discharge day. Pertinent Findings: 
Gen:    Not in distress Chest: Clear lungs CVS:   Regular rhythm. No edema Abd:  Soft, not distended, not tender 
_______________________________________________________________________ DISCHARGE MEDICATIONS:  
Current Discharge Medication List  
  
CONTINUE these medications which have NOT CHANGED Details OXYGEN-AIR DELIVERY SYSTEMS Take 2 L by inhalation as needed. allopurinol (ZYLOPRIM) 100 mg tablet Take 200 mg by mouth daily. (200 mg in the morning; 100 mg at night)  
  
albuterol (PROVENTIL HFA) 90 mcg/actuation inhaler Take 2 Puffs by inhalation every four (4) hours as needed for Wheezing or Shortness of Breath. furosemide (LASIX) 40 mg tablet Take 2 tab 80mg in AM and 1 tab 40mg in PM (Corrected Rx) Qty: 270 Tab, Refills: 1  
  
albuterol-ipratropium (DUO-NEB) 2.5 mg-0.5 mg/3 ml nebu Take 3 mL by inhalation every four (4) hours as needed (shortness of breath). apixaban (ELIQUIS) 2.5 mg tablet Take 1 Tab by mouth two (2) times a day. Qty: 60 Tab, Refills: 11  
  
budesonide-formoterol (SYMBICORT) 160-4.5 mcg/actuation HFAA Take 2 Puffs by inhalation two (2) times a day. mv-mn/folic acid/calcium/vit K (ONE-A-DAY WOMEN'S 50 PLUS PO) Take 1 Tab by mouth daily. dextran 70/hypromellose/PF (NATURAL TEARS, PF, OP) Administer 1 Drop to both eyes daily as needed (dry eyes). mometasone (NASONEX) 50 mcg/actuation nasal spray 2 Sprays by Both Nostrils route daily as needed (congestion). potassium chloride (KLOR-CON) 20 mEq packet Take 20 mEq by mouth daily. colchicine (COLCRYS) 0.6 mg tablet Take 0.6 mg by mouth daily. acetaminophen (TYLENOL EXTRA STRENGTH) 500 mg tablet Take 1,000 mg by mouth every six (6) hours as needed for Pain. amLODIPine (NORVASC) 2.5 mg tablet Take 2.5 mg by mouth daily. My Recommended Diet, Activity, Wound Care, and follow-up labs are listed in the patient's Discharge Insturctions which I have personally completed and reviewed. ______________________________________________________________________ Risk of deterioration: Moderate Condition at Discharge:  Stable 
______________________________________________________________________ Disposition Home with family and home health services ______________________________________________________________________ Care Plan discussed with:  
Care Manager 
 
______________________________________________________________________ Code Status: DNR/DNI 
______________________________________________________________________ Follow up with: PCP : Rosa Reyes MD 
Follow-up Information Follow up With Specialties Details Why Contact Info Rosa Reyes, [address] [facility] [address] 
[phone] Rosa Reyes MD Family Practice On 3/5/2019 @ 1:20 PM [address] [facility] [address] 
[phone] Britt Acosta MD Cardiology, [address] [place] [address] Vascular Surgery, Internal Medicine On 3/15/2019 10:15 [facility] 
[phone] Total time in minutes spent coordinating this discharge (includes going over instructions, follow-up, prescriptions, and preparing report for sign off to her PCP) : More than 30 minutes Signed: 
Maricarmen Stephens MD 
 Treatment Goal Explanation (Does Not Render In The Note): Stable for the purposes of categorizing medical decision making is defined by the specific treatment goals for an individual patient. A patient that is not at their treatment goal is not stable, even if the condition has not changed and there is no short- term threat to life or function.